# Patient Record
Sex: FEMALE | Race: BLACK OR AFRICAN AMERICAN | Employment: OTHER | URBAN - METROPOLITAN AREA
[De-identification: names, ages, dates, MRNs, and addresses within clinical notes are randomized per-mention and may not be internally consistent; named-entity substitution may affect disease eponyms.]

---

## 2017-01-12 ENCOUNTER — APPOINTMENT (OUTPATIENT)
Dept: GENERAL RADIOLOGY | Age: 57
End: 2017-01-12
Attending: NURSE PRACTITIONER
Payer: MEDICAID

## 2017-01-12 ENCOUNTER — HOSPITAL ENCOUNTER (EMERGENCY)
Age: 57
Discharge: HOME | End: 2017-01-12
Attending: EMERGENCY MEDICINE
Payer: MEDICAID

## 2017-01-12 VITALS
DIASTOLIC BLOOD PRESSURE: 97 MMHG | HEART RATE: 75 BPM | BODY MASS INDEX: 27.41 KG/M2 | RESPIRATION RATE: 17 BRPM | WEIGHT: 175 LBS | TEMPERATURE: 98.1 F | OXYGEN SATURATION: 100 % | SYSTOLIC BLOOD PRESSURE: 154 MMHG

## 2017-01-12 DIAGNOSIS — L03.012 FELON OF FINGER OF LEFT HAND: Primary | ICD-10-CM

## 2017-01-12 PROCEDURE — 74011250637 HC RX REV CODE- 250/637: Performed by: NURSE PRACTITIONER

## 2017-01-12 PROCEDURE — 73140 X-RAY EXAM OF FINGER(S): CPT

## 2017-01-12 PROCEDURE — 75810000289 HC I&D ABSCESS SIMP/COMP/MULT

## 2017-01-12 PROCEDURE — 99283 EMERGENCY DEPT VISIT LOW MDM: CPT

## 2017-01-12 RX ORDER — CLINDAMYCIN HYDROCHLORIDE 150 MG/1
300 CAPSULE ORAL
Status: COMPLETED | OUTPATIENT
Start: 2017-01-12 | End: 2017-01-12

## 2017-01-12 RX ORDER — OXYCODONE AND ACETAMINOPHEN 5; 325 MG/1; MG/1
1 TABLET ORAL
Qty: 12 TAB | Refills: 0 | Status: SHIPPED | OUTPATIENT
Start: 2017-01-12 | End: 2017-07-11

## 2017-01-12 RX ORDER — CLINDAMYCIN HYDROCHLORIDE 300 MG/1
300 CAPSULE ORAL 4 TIMES DAILY
Qty: 28 CAP | Refills: 0 | Status: SHIPPED | OUTPATIENT
Start: 2017-01-12 | End: 2017-01-19

## 2017-01-12 RX ORDER — OXYCODONE AND ACETAMINOPHEN 5; 325 MG/1; MG/1
1 TABLET ORAL
Status: COMPLETED | OUTPATIENT
Start: 2017-01-12 | End: 2017-01-12

## 2017-01-12 RX ADMIN — OXYCODONE HYDROCHLORIDE AND ACETAMINOPHEN 1 TABLET: 5; 325 TABLET ORAL at 17:08

## 2017-01-12 RX ADMIN — CLINDAMYCIN HYDROCHLORIDE 300 MG: 150 CAPSULE ORAL at 19:17

## 2017-01-12 NOTE — ED PROVIDER NOTES
HPI Comments:   4:45 PM   55 y.o. female presents to ED C/O finger pain. Patient has a HX of RA, BV, anemia, GERD, hysterectomy, salpingo-oophorectomy. Patient reports redness and swelling to left thumb x 3 days. Patient concerned she may have a piece of glass in thumb because she was cleaning up a broken glass cup the day before swelling started, however patient has had paronychia previously. Patient reports throbbing pain. Patient denies fever. Patient reports last tetanus shot was 1 year ago. Patient denies loss of sensation or movement of finger. Patient smokes 1/4ppd. Pt denies any other sxs or complaints. Written by Jonathon MORENO      The history is provided by the patient. History limited by: No language barrier. Past Medical History:   Diagnosis Date    Abnormal mammogram 2014     left with biopsy    Bacterial vaginosis 1/9/15     Dr Marcelle Roy GERD (gastroesophageal reflux disease)     Hot flashes 1/9/15     Dr Marcelle Roy Hypertension      intermittent noncomplaince with med per insurance    Iron deficiency anemia 6/2014    Renal insufficiency     Rheumatoid arthritis(714.0)        Past Surgical History:   Procedure Laterality Date    Hx laparoscopic supracervical hysterectomy  11/2014     Dr. Hao Sim, GYN    Hx salpingo-oophorectomy  11/2014         Family History:   Problem Relation Age of Onset    Arthritis-osteo Maternal Grandmother     Cancer Sister     Arthritis-osteo Mother     Hypertension Mother     Diabetes Mother        Social History     Social History    Marital status: SINGLE     Spouse name: N/A    Number of children: N/A    Years of education: N/A     Occupational History    Not on file.      Social History Main Topics    Smoking status: Current Every Day Smoker     Packs/day: 0.50     Years: 20.00     Types: Cigarettes    Smokeless tobacco: Never Used    Alcohol use No    Drug use: Yes     Special: Cocaine    Sexual activity: Yes Partners: Male     Other Topics Concern     Service No    Blood Transfusions Yes    Caffeine Concern No    Occupational Exposure No    Hobby Hazards No    Sleep Concern No    Stress Concern No    Weight Concern No    Special Diet No    Back Care No    Exercise No    Bike Helmet No    Seat Belt Yes    Self-Exams No     Social History Narrative         ALLERGIES: Review of patient's allergies indicates no known allergies. Review of Systems   Constitutional: Negative for chills, fatigue and fever. HENT: Negative for congestion, ear pain, facial swelling, rhinorrhea, sinus pressure, sore throat, trouble swallowing and voice change. Eyes: Negative for photophobia, pain, discharge and visual disturbance. Respiratory: Negative for cough, chest tightness, shortness of breath and wheezing. Cardiovascular: Negative for chest pain and leg swelling. Gastrointestinal: Negative for abdominal pain, blood in stool, constipation, diarrhea, nausea and vomiting. Endocrine: Negative for polyuria. Genitourinary: Negative for dysuria, flank pain, frequency, hematuria, pelvic pain, urgency and vaginal discharge. Musculoskeletal: Negative for arthralgias, back pain, gait problem, joint swelling, neck pain and neck stiffness. Skin: Positive for wound. Negative for rash. Allergic/Immunologic: Negative for immunocompromised state. Neurological: Negative for dizziness, weakness, light-headedness, numbness and headaches. Hematological: Negative for adenopathy. Psychiatric/Behavioral: Negative for agitation, confusion, hallucinations and suicidal ideas. The patient is not nervous/anxious. Vitals:    01/12/17 1544   BP: (!) 145/113   Pulse: 80   Resp: 17   Temp: 98.1 °F (36.7 °C)   SpO2: 100%   Weight: 79.4 kg (175 lb)            Physical Exam   Constitutional: She is oriented to person, place, and time. She appears well-developed and well-nourished. No distress.    HENT:   Head: Normocephalic and atraumatic. Right Ear: External ear normal.   Left Ear: External ear normal.   Nose: Nose normal.   Eyes: Conjunctivae and EOM are normal.   Neck: Normal range of motion. Neck supple. Cardiovascular: Normal rate, regular rhythm and normal heart sounds. Pulmonary/Chest: Effort normal and breath sounds normal. No respiratory distress. She has no wheezes. She has no rales. Musculoskeletal:        Hands:  Neurological: She is alert and oriented to person, place, and time. She exhibits normal muscle tone. Coordination normal.   Skin: She is not diaphoretic. Psychiatric: She has a normal mood and affect. Her behavior is normal. Judgment and thought content normal.   Nursing note and vitals reviewed. MDM  Number of Diagnoses or Management Options  Elevated blood pressure:   Felon of finger of left hand:   Diagnosis management comments: Clinical Impressio - Felon, elevated blood pressure     Plan:  I&D planned, short course of pain medication, no need for tetanus. Xray ordered to evaluate for FB.   5:59 PM No FB noted on xray of left thumb  6:02 PM Consent signed for I&D  7:03 PM Patient had a moderate amount of purulent drainage, improved pain. Dressed finger. Patient given first dose of clindamycin in department. Will discharge home with clindamycin and pain medication, wound check in 2 days. Patient referred to PCP for further evaluation of elevated blood pressure. Patient educated to return to the ED for any new or worsening symptoms. 7:13 PM patient's blood pressure improved in department.         Amount and/or Complexity of Data Reviewed  Tests in the radiology section of CPT®: ordered and reviewed      ED Course       I&D Abcess Simple  Date/Time: 1/12/2017 7:01 PM  Performed by: Alfred Vera  Authorized by: Alfred Vera     Consent:     Consent obtained:  Verbal and written (completed by Dr Maggi Castellano and [facility] NP-C)    Consent given by:  Patient    Risks discussed:  Bleeding and pain    Alternatives discussed:  No treatment  Location:     Indications for incision and drainage: fellon left thumb. Pre-procedure details:     Skin preparation:  Betadine  Anesthesia (see MAR for exact dosages): Anesthesia method:  Nerve block (digital block left thumb)    Block needle gauge:  25 G    Block anesthetic:  Lidocaine 1% w/o epi    Block outcome:  Anesthesia achieved  Procedure type:     Complexity:  Simple  Procedure details:     Incision types:  Stab incision (2 stab incision, black surface and ulnar aspect of black surface)    Scalpel blade:  11    Drainage:  Purulent and bloody    Drainage amount: Moderate    Wound treatment:  Wound left open  Post-procedure details:     Patient tolerance of procedure: Tolerated well, no immediate complications                      RESULTS:    No orders to display       Labs Reviewed - No data to display    No results found for this or any previous visit (from the past 12 hour(s)). PROGRESS NOTE:   4:45 PM   Initial assessment completed. Written by John MORENO     One or more blood pressure readings were noted elevated during the Pt's presentation in the emergency department this date. This abnormal reading has been cited in the Pt's diagnosis, and they have been encouraged to follow up with their primary care physician, or referred to a consultant for further evaluation and treatment. Herson Graves NP 7:07 PM     DISCHARGE NOTE:  7:07 PM   Fannie Huynh  results have been reviewed with her. She has been counseled regarding her diagnosis, treatment, and plan. She verbally conveys understanding and agreement of the signs, symptoms, diagnosis, treatment and prognosis and additionally agrees to follow up as discussed. She also agrees with the care-plan and conveys that all of her questions have been answered.   I have also provided discharge instructions for her that include: educational information regarding their diagnosis and treatment, and list of reasons why they would want to return to the ED prior to their follow-up appointment, should her condition change. CLINICAL IMPRESSION:    No diagnosis found. AFTER VISIT PLAN:    Current Discharge Medication List      CONTINUE these medications which have NOT CHANGED    Details   trimethoprim-polymyxin b (POLYTRIM) ophthalmic solution Administer 1 Drop to right eye every four (4) hours. 2 gtts qid OU x 5-7 days  Qty: 10 mL, Refills: 0      ferrous sulfate 325 mg (65 mg iron) tablet Take  by mouth Daily (before breakfast). Associated Diagnoses: Iron deficiency anemia, unspecified iron deficiency anemia type      quinapril-hydrochlorothiazide (ACCURETIC) 20-25 mg per tablet Take 1 Tab by mouth daily. Qty: 30 Tab, Refills: 6    Associated Diagnoses: Essential hypertension with goal blood pressure less than 140/90      ranitidine (ZANTAC) 150 mg tablet Take 1 Tab by mouth two (2) times a day. Qty: 60 Tab, Refills: 6    Associated Diagnoses: Gastroesophageal reflux disease, esophagitis presence not specified      celecoxib (CELEBREX) 400 mg capsule Take 1 Cap by mouth two (2) times a day.   Qty: 90 Cap, Refills: 0    Associated Diagnoses: Arthritis              Follow-up Information     None           Written by Tamiko MORENO

## 2017-01-13 ENCOUNTER — DOCUMENTATION ONLY (OUTPATIENT)
Dept: FAMILY MEDICINE CLINIC | Age: 57
End: 2017-01-13

## 2017-07-11 ENCOUNTER — HOSPITAL ENCOUNTER (EMERGENCY)
Age: 57
Discharge: HOME | End: 2017-07-11
Attending: EMERGENCY MEDICINE
Payer: MEDICAID

## 2017-07-11 VITALS
HEART RATE: 97 BPM | RESPIRATION RATE: 18 BRPM | TEMPERATURE: 98 F | DIASTOLIC BLOOD PRESSURE: 94 MMHG | OXYGEN SATURATION: 100 % | SYSTOLIC BLOOD PRESSURE: 137 MMHG

## 2017-07-11 DIAGNOSIS — S16.1XXA CERVICAL STRAIN, INITIAL ENCOUNTER: Primary | ICD-10-CM

## 2017-07-11 DIAGNOSIS — S39.012A LUMBAR STRAIN, INITIAL ENCOUNTER: ICD-10-CM

## 2017-07-11 DIAGNOSIS — R03.0 ELEVATED BLOOD PRESSURE READING: ICD-10-CM
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
## 2017-07-11 PROCEDURE — 99282 EMERGENCY DEPT VISIT SF MDM: CPT

## 2017-07-11 RX ORDER — CYCLOBENZAPRINE HCL 10 MG
10 TABLET ORAL
Qty: 10 TAB | Refills: 0 | Status: ON HOLD | OUTPATIENT
Start: 2017-07-11 | End: 2021-06-13 | Stop reason: CLARIF
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
## 2017-08-21 DIAGNOSIS — I10 ESSENTIAL HYPERTENSION WITH GOAL BLOOD PRESSURE LESS THAN 140/90: ICD-10-CM

## 2017-08-21 NOTE — LETTER
8/25/2017 6:33 PM 
 
Ms. Zulma Abel [facility] [place] [address] We have been trying to get in touch with you regarding an appointment. This appointment is recommended to follow up on chronic conditions and medication refills. Please call our office to make an appointment at [phone]. If you have been seeing a different primary care provider, have relocated to another area, or just do not want to be seen by our office any longer, please just give us a call and we will take you off the list to be contacted.  
 
 
 
 
Sincerely, 
 
 
Himanshu Mendez NP

## 2017-08-23 RX ORDER — QUINAPRIL HCL AND HYDROCHLOROTHIAZIDE 20; 25 MG/1; MG/1
TABLET ORAL
Qty: 30 TAB | Refills: 6 | OUTPATIENT
Start: 2017-08-23

## 2017-08-23 NOTE — TELEPHONE ENCOUNTER
8/23/2017  8:17 AM    Chief Complaint   Patient presents with    Medication Refill       Noted refill request for below medication. Last office visit 6/2016. Needs appointment- forwarded to clinical staff to get patient scheduled.          Requested Prescriptions     Pending Prescriptions Disp Refills    quinapril-hydroCHLOROthiazide (ACCURETIC) 20-25 mg per tablet [Pharmacy Med Name: QUINAPRIL/HCTZ 20-25MG TAB] 30 Tab 6     Sig: TAKE ONE TABLET BY MOUTH ONCE DAILY

## 2017-08-24 NOTE — TELEPHONE ENCOUNTER
Call made to pt, line busy. This is the second attempt to contact the pt. . Forwarded to the provider for review.

## 2017-08-25 NOTE — TELEPHONE ENCOUNTER
8/25/2017  6:33 PM    Chief Complaint   Patient presents with    Medication Refill       Noted that patient is unable to be reached via phone. Letter sent regarding need for appointment.

## 2018-01-15 ENCOUNTER — HOSPITAL ENCOUNTER (OUTPATIENT)
Dept: LAB | Age: 58
Discharge: HOME | End: 2018-01-15

## 2018-01-15 PROCEDURE — 99001 SPECIMEN HANDLING PT-LAB: CPT | Performed by: SPECIALIST

## 2018-06-22 ENCOUNTER — HOSPITAL ENCOUNTER (EMERGENCY)
Age: 58
Discharge: HOME | End: 2018-06-22
Attending: EMERGENCY MEDICINE
Payer: MEDICAID

## 2018-06-22 VITALS
DIASTOLIC BLOOD PRESSURE: 90 MMHG | RESPIRATION RATE: 20 BRPM | HEART RATE: 88 BPM | OXYGEN SATURATION: 98 % | SYSTOLIC BLOOD PRESSURE: 141 MMHG | TEMPERATURE: 99.1 F

## 2018-06-22 DIAGNOSIS — H15.101 EPISCLERITIS OF RIGHT EYE: Primary | ICD-10-CM

## 2018-06-22 DIAGNOSIS — G44.219 EPISODIC TENSION-TYPE HEADACHE, NOT INTRACTABLE: ICD-10-CM

## 2018-06-22 PROCEDURE — 74011250637 HC RX REV CODE- 250/637: Performed by: EMERGENCY MEDICINE

## 2018-06-22 PROCEDURE — 99283 EMERGENCY DEPT VISIT LOW MDM: CPT

## 2018-06-22 RX ORDER — METOCLOPRAMIDE 10 MG/1
10 TABLET ORAL ONCE
Status: COMPLETED | OUTPATIENT
Start: 2018-06-22 | End: 2018-06-22

## 2018-06-22 RX ORDER — ACETAMINOPHEN 325 MG/1
650 TABLET ORAL
Status: COMPLETED | OUTPATIENT
Start: 2018-06-22 | End: 2018-06-22

## 2018-06-22 RX ORDER — OLOPATADINE HYDROCHLORIDE 1 MG/ML
2 SOLUTION/ DROPS OPHTHALMIC 2 TIMES DAILY
Qty: 5 ML | Refills: 0 | Status: ON HOLD | OUTPATIENT
Start: 2018-06-22 | End: 2021-06-13 | Stop reason: CLARIF

## 2018-06-22 RX ADMIN — ACETAMINOPHEN 650 MG: 325 TABLET ORAL at 15:19

## 2018-06-22 RX ADMIN — METOCLOPRAMIDE HYDROCHLORIDE 10 MG: 10 TABLET ORAL at 03:00

## 2018-06-22 NOTE — ED TRIAGE NOTES
Pt reports headache, blurred vision and eye redness X 3-4 days. Pt states redness and blurry vision worse at night.  Pt ambulatory to triage with steady gait no assist

## 2018-06-22 NOTE — ED PROVIDER NOTES
EMERGENCY DEPARTMENT HISTORY AND PHYSICAL EXAM    2:36 PM      Date: 6/22/2018  Patient Name: Huang Tamayo    History of Presenting Illness     Chief Complaint   Patient presents with    Red Eye    Headache    Blurred Vision         History Provided By: Patient    Chief Complaint: Eye redness  Duration:  4 days  Timing:  Worsening  Location: worse right eye  Quality: red  Severity: Moderate  Modifying Factors: PT took BC powder and had relief of her HA. She says she has had this HA and feet swelling before. Associated Symptoms: Blurry vision, HA, and feet swelling      Additional History (Context): Huang Tamayo is a 65 y.o. female with Rheumatoid arthritis and HTN who presents with right eye redness which has been worsening over the past 4 days. PT took [facility] powder and had relief of her HA. She says she has had this HA and feet swelling before from her RA. She says here feet swelling flares up every once or twice a month. She does not wear glasses or contacts. Associated sx are  Blurry vision, HA, and feet swelling. She says she has had this HA and feet swelling before. Denies abd pain. Pt smokes but does not drink. She thinks it is worse outside. PCP: Christiano Thompson MD    Current Outpatient Prescriptions   Medication Sig Dispense Refill    olopatadine (PATANOL) 0.1 % ophthalmic solution Administer 2 Drops to both eyes two (2) times a day. 5 mL 0    cyclobenzaprine (FLEXERIL) 10 mg tablet Take 1 Tab by mouth three (3) times daily as needed for Muscle Spasm(s). 10 Tab 0    trimethoprim-polymyxin b (POLYTRIM) ophthalmic solution Administer 1 Drop to right eye every four (4) hours. 2 gtts qid OU x 5-7 days 10 mL 0    ferrous sulfate 325 mg (65 mg iron) tablet Take  by mouth Daily (before breakfast).  quinapril-hydrochlorothiazide (ACCURETIC) 20-25 mg per tablet Take 1 Tab by mouth daily. 30 Tab 6    ranitidine (ZANTAC) 150 mg tablet Take 1 Tab by mouth two (2) times a day.  60 Tab 6    celecoxib (CELEBREX) 400 mg capsule Take 1 Cap by mouth two (2) times a day. 80 Cap 0       Past History     Past Medical History:  Past Medical History:   Diagnosis Date    Abnormal mammogram 2014    left with biopsy    Bacterial vaginosis 1/9/15    Dr Jeremy Romeo GERD (gastroesophageal reflux disease)     Hot flashes 1/9/15    Dr Jeremy Romeo Hypertension     intermittent noncomplaince with med per insurance    Iron deficiency anemia 6/2014    Renal insufficiency     Rheumatoid arthritis(714.0)        Past Surgical History:  Past Surgical History:   Procedure Laterality Date    HX LAPAROSCOPIC SUPRACERVICAL HYSTERECTOMY  11/2014    Dr. Krystina Feldman, GYN    HX SALPINGO-OOPHORECTOMY  11/2014       Family History:  Family History   Problem Relation Age of Onset    Arthritis-osteo Maternal Grandmother     Cancer Sister     Arthritis-osteo Mother     Hypertension Mother     Diabetes Mother        Social History:  Social History   Substance Use Topics    Smoking status: Current Every Day Smoker     Packs/day: 0.50     Years: 20.00     Types: Cigarettes    Smokeless tobacco: Never Used    Alcohol use No       Allergies:  No Known Allergies      Review of Systems       Review of Systems   Constitutional: Negative for chills, diaphoresis and fever. HENT: Negative. Eyes: Positive for redness and visual disturbance. Respiratory: Negative for cough and shortness of breath. Cardiovascular: Negative for chest pain. Gastrointestinal: Negative for abdominal pain, constipation, diarrhea, nausea and vomiting. Genitourinary: Negative for dysuria, frequency and hematuria. Musculoskeletal: Negative for back pain. Positive for feet swelling   Skin: Negative for rash. Neurological: Positive for headaches. Negative for dizziness, light-headedness and numbness. All other systems reviewed and are negative.         Physical Exam     Visit Vitals    /90 (BP 1 Location: Left arm, BP Patient Position: Sitting)  Pulse 88    Temp 99.1 °F (37.3 °C)    Resp 20    LMP 10/15/2014    SpO2 98%         Physical Exam   Constitutional: No distress. HENT:   Head: Normocephalic and atraumatic. Mouth/Throat: Oropharynx is clear and moist.   Eyes: EOM are normal. Pupils are equal, round, and reactive to light. Right eye cornea and anterior chamber clear  Sectorial conjunctivitis/episcleritis form the 1 O'clock to the 4 o'clock position temproally   Neck: Normal range of motion. Neck supple. Cardiovascular: Normal rate, regular rhythm and normal heart sounds. No murmur heard. Pulmonary/Chest: Effort normal and breath sounds normal. She has no wheezes. She has no rales. Abdominal: Soft. Bowel sounds are normal. She exhibits no distension. There is no tenderness. Musculoskeletal: Normal range of motion. She exhibits no edema or deformity. Lymphadenopathy:     She has no cervical adenopathy. Neurological: She is alert. She exhibits normal muscle tone. Coordination normal.   Skin: Skin is warm and dry. No rash noted. She is not diaphoretic. No erythema. Psychiatric: She has a normal mood and affect. Her behavior is normal.         Diagnostic Study Results     Labs -  No results found for this or any previous visit (from the past 12 hour(s)). Radiologic Studies -   No orders to display         Medical Decision Making   I am the first provider for this patient. I reviewed the vital signs, available nursing notes, past medical history, past surgical history, family history and social history. Vital Signs-Reviewed the patient's vital signs.     Records Reviewed: Nursing Notes (Time of Review: 2:36 PM)    ED Course: Progress Notes, Reevaluation, and Consults:      Provider Notes (Medical Decision Making): right sectorial episcleritis, likely related to the pt's known Rheumatoid Arthritis; visual acuity ok; no indication for emergency opthalmology referral. Recurrent HA similar to events low suspicion for acute life threatening etiology. Symptomatic care, close outpatient PCM f/u. Diagnosis     Clinical Impression:   1. Episcleritis of right eye    2. Episodic tension-type headache, not intractable        Disposition: discharge home    Follow-up Information     Follow up With Details Comments [address], [place] In 2 days  [address] [place] [id number] [phone]      [facility] EMERGENCY DEPT  As needed, If symptoms worsen [address] [facility] [address] [phone]           Current Discharge Medication List      START taking these medications    Details   olopatadine (PATANOL) 0.1 % ophthalmic solution Administer 2 Drops to both eyes two (2) times a day. Qty: 5 mL, Refills: 0         CONTINUE these medications which have NOT CHANGED    Details   cyclobenzaprine (FLEXERIL) 10 mg tablet Take 1 Tab by mouth three (3) times daily as needed for Muscle Spasm(s). Qty: 10 Tab, Refills: 0      ferrous sulfate 325 mg (65 mg iron) tablet Take  by mouth Daily (before breakfast). Associated Diagnoses: Iron deficiency anemia, unspecified iron deficiency anemia type      quinapril-hydrochlorothiazide (ACCURETIC) 20-25 mg per tablet Take 1 Tab by mouth daily. Qty: 30 Tab, Refills: 6    Associated Diagnoses: Essential hypertension with goal blood pressure less than 140/90      ranitidine (ZANTAC) 150 mg tablet Take 1 Tab by mouth two (2) times a day. Qty: 60 Tab, Refills: 6    Associated Diagnoses: Gastroesophageal reflux disease, esophagitis presence not specified      celecoxib (CELEBREX) 400 mg capsule Take 1 Cap by mouth two (2) times a day.   Qty: 90 Cap, Refills: 0    Associated Diagnoses: Arthritis         STOP taking these medications       trimethoprim-polymyxin b (POLYTRIM) ophthalmic solution Comments:   Reason for Stopping:             _______________________________    Attestations:  Scribe Attestation     [employer] acting as a scribe for and in the presence of Bhavik Sharma MD      June 22, 2018 at 2:36 PM       Provider Attestation:      I personally performed the services described in the documentation, reviewed the documentation, as recorded by the scribe in my presence, and it accurately and completely records my words and actions.  June 22, 2018 at 2:36 PM - Bhavik Sharma MD    _______________________________

## 2018-06-22 NOTE — DISCHARGE INSTRUCTIONS
Episcleritis: Care Instructions  Your Care Instructions  The sclera (say \"SKLAIR-uh\") is the white of the eye. It protects the eye's inner parts. There are several layers of the sclera. The episclera (say \"ap-xqx-HZBYKD-uh\") is the top layer. Episcleritis (say \"fk-cdg-oqwuw-RY-tus\") means that the episclera is inflamed. The inflammation makes all or part of the white of the eye look red. For some people, the eye feels tender, hot, or irritated. Episcleritis is not a serious eye problem. It doesn't damage your eye or harm your vision. Usually it starts suddenly, and the cause isn't known. Some people already have a health problem that could be related, such as rheumatoid arthritis or an infection. To see if you have episcleritis, your doctor checks your vision. He or she looks closely at your eyes and talks to you about your symptoms. You may have tests and X-rays to look for medical problems that might be linked to your eye problem. Episcleritis usually goes away on its own in a few days, though it can take a few weeks. Your doctor may prescribe eyedrops to help relieve the inflammation. Follow-up care is a key part of your treatment and safety. Be sure to make and go to all appointments, and call your doctor if you are having problems. It's also a good idea to know your test results and keep a list of the medicines you take. How can you care for yourself at home? · Be safe with medicines. Use all your medicines exactly as prescribed. · If you are using eyedrops or ointment, be sure the dropper or bottle tip is clean. To put in eyedrops or ointment:  ¨ Tilt your head back, and pull your lower eyelid down with one finger. ¨ Drop or squirt the medicine inside the lower lid. ¨ Close your eye for 30 to 60 seconds to let the drops or ointment move around. ¨ Do not touch the ointment or dropper tip to your eyelashes or any other surface. When should you call for help?   Call your doctor now or seek immediate medical care if:  ? · You notice a loss of your vision. ? · You have vision changes. ? · You have new or worse eye pain. ? · You have new or worse redness in your eye. ? Watch closely for changes in your health, and be sure to contact your doctor if:  ? · You do not get better as expected. Where can you learn more? Go to http://migdalia-gisselle.info/. Enter [phone] in the search box to learn more about \"Episcleritis: Care Instructions. \"  Current as of: March 3, 2017  Content Version: 11.4  © 0455-2229 [employer]. Care instructions adapted under license by [employer] (which disclaims liability or warranty for this information). If you have questions about a medical condition or this instruction, always ask your healthcare professional. [address] any warranty or liability for your use of this information. Tension Headache: Care Instructions  Your Care Instructions  Most headaches are tension headaches. These headaches tend to happen again, especially if you are under stress. A tension headache may cause pain or a feeling of pressure all over your head. You probably can't pinpoint the center of the pain. If you keep getting tension headaches, the best thing you can do to limit them is to find out what is causing them and then make changes in those areas. Follow-up care is a key part of your treatment and safety. Be sure to make and go to all appointments, and call your doctor if you are having problems. It's also a good idea to know your test results and keep a list of the medicines you take. How can you care for yourself at home? · Rest in a quiet, dark room with a cool cloth on your forehead until your headache is gone. Close your eyes, and try to relax or go to sleep. Don't watch TV or read. Avoid using the computer. · Use a warm, moist towel or a heating pad set on low to relax tight shoulder and neck muscles.   · Have someone gently massage your neck and shoulders. · Take pain medicines exactly as directed. ¨ If the doctor gave you a prescription medicine for pain, take it as prescribed. ¨ If you are not taking a prescription pain medicine, ask your doctor if you can take an over-the-counter medicine. · Be careful not to take pain medicine more often than the instructions allow, because you may get worse or more frequent headaches when the medicine wears off. · If you get another tension headache, stop what you are doing and sit quietly for a moment. Close your eyes and breathe slowly. Try to relax your head and neck muscles. · Do not ignore new symptoms that occur with a headache, such as fever, weakness or numbness, vision changes, or confusion. These may be signs of a more serious problem. To help prevent headaches  · Keep a headache diary so you can figure out what triggers your headaches. Avoiding triggers may help you prevent headaches. Record when each headache began, how long it lasted, and what the pain was like (throbbing, aching, stabbing, or dull). List anything that may have triggered the headache, such as being physically or emotionally stressed or being anxious or depressed. Other possible triggers are hunger, anger, fatigue, poor posture, and muscle strain. · Find healthy ways to deal with stress. Headaches are most common during or right after stressful times. Take time to relax before and after you do something that has caused a headache in the past.  · Exercise daily to relieve stress. Relaxation exercises may help reduce tension. · Get plenty of sleep. · Eat regularly and well. Long periods without food can trigger a headache. · Treat yourself to a massage. Some people find that massages are very helpful in relieving tension. · Try to keep your muscles relaxed by keeping good posture. Check your jaw, face, neck, and shoulder muscles for tension, and try to relax them.  When sitting at a desk, change positions often, and stretch for 30 seconds each hour. · Reduce eyestrain from computers by blinking frequently and looking away from the computer screen every so often. Make sure you have proper eyewear and that your monitor is set up properly, about an arm's length away. When should you call for help? Call 911 anytime you think you may need emergency care. For example, call if:  ? · You have signs of a stroke. These may include:  ¨ Sudden numbness, paralysis, or weakness in your face, arm, or leg, especially on only one side of your body. ¨ Sudden vision changes. ¨ Sudden trouble speaking. ¨ Sudden confusion or trouble understanding simple statements. ¨ Sudden problems with walking or balance. ¨ A sudden, severe headache that is different from past headaches. ?Call your doctor now or seek immediate medical care if:  ? · You have new or worse nausea and vomiting. ? · You have a new or higher fever. ? · Your headache gets much worse. ? Watch closely for changes in your health, and be sure to contact your doctor if:  ? · You are not getting better after 2 days (48 hours). Where can you learn more? Go to http://migdalia-gisselle.info/. Enter [phone] in the search box to learn more about \"Tension Headache: Care Instructions. \"  Current as of: October 14, 2016  Content Version: 11.4  © 1813-9209 [employer]. Care instructions adapted under license by [employer] (which disclaims liability or warranty for this information). If you have questions about a medical condition or this instruction, always ask your healthcare professional. [place] [address] any warranty or liability for your use of this information.

## 2018-06-25 ENCOUNTER — HOSPITAL ENCOUNTER (OUTPATIENT)
Dept: LAB | Age: 58
Discharge: HOME | End: 2018-06-25
Payer: MEDICAID

## 2018-06-25 LAB
ERYTHROCYTE [SEDIMENTATION RATE] IN BLOOD: 49 MM/HR (ref 0–30)
RHEUMATOID FACT SER QL LA: NEGATIVE

## 2018-06-25 PROCEDURE — 36415 COLL VENOUS BLD VENIPUNCTURE: CPT | Performed by: SPECIALIST

## 2018-06-25 PROCEDURE — 86200 CCP ANTIBODY: CPT | Performed by: SPECIALIST

## 2018-06-25 PROCEDURE — 85652 RBC SED RATE AUTOMATED: CPT | Performed by: SPECIALIST

## 2018-06-25 PROCEDURE — 86140 C-REACTIVE PROTEIN: CPT | Performed by: SPECIALIST

## 2018-06-25 PROCEDURE — 86430 RHEUMATOID FACTOR TEST QUAL: CPT | Performed by: SPECIALIST

## 2018-06-26 LAB
CCP IGA+IGG SERPL IA-ACNC: >250 UNITS (ref 0–19)
CRP SERPL-MCNC: 3 MG/DL (ref 0–0.3)

## 2019-05-17 ENCOUNTER — HOSPITAL ENCOUNTER (EMERGENCY)
Age: 59
Discharge: HOME | End: 2019-05-17
Attending: EMERGENCY MEDICINE
Payer: MEDICAID

## 2019-05-17 VITALS
HEART RATE: 84 BPM | DIASTOLIC BLOOD PRESSURE: 111 MMHG | RESPIRATION RATE: 18 BRPM | BODY MASS INDEX: 27.28 KG/M2 | WEIGHT: 180 LBS | HEIGHT: 68 IN | OXYGEN SATURATION: 100 % | SYSTOLIC BLOOD PRESSURE: 168 MMHG | TEMPERATURE: 98.1 F

## 2019-05-17 DIAGNOSIS — R51.9 NONINTRACTABLE EPISODIC HEADACHE, UNSPECIFIED HEADACHE TYPE: Primary | ICD-10-CM

## 2019-05-17 DIAGNOSIS — I10 ESSENTIAL HYPERTENSION: ICD-10-CM

## 2019-05-17 PROCEDURE — 74011250637 HC RX REV CODE- 250/637: Performed by: EMERGENCY MEDICINE

## 2019-05-17 PROCEDURE — 99284 EMERGENCY DEPT VISIT MOD MDM: CPT

## 2019-05-17 RX ORDER — ACETAMINOPHEN 500 MG
1000 TABLET ORAL
Status: COMPLETED | OUTPATIENT
Start: 2019-05-17 | End: 2019-05-17

## 2019-05-17 RX ORDER — LISINOPRIL 20 MG/1
20 TABLET ORAL
Status: COMPLETED | OUTPATIENT
Start: 2019-05-17 | End: 2019-05-17

## 2019-05-17 RX ORDER — HYDROCHLOROTHIAZIDE 25 MG/1
25 TABLET ORAL
Status: COMPLETED | OUTPATIENT
Start: 2019-05-17 | End: 2019-05-17

## 2019-05-17 RX ADMIN — HYDROCHLOROTHIAZIDE 25 MG: 25 TABLET ORAL at 15:25

## 2019-05-17 RX ADMIN — ACETAMINOPHEN 1000 MG: 500 TABLET, FILM COATED ORAL at 14:49

## 2019-05-17 RX ADMIN — LISINOPRIL 20 MG: 20 TABLET ORAL at 15:24

## 2019-05-17 NOTE — DISCHARGE INSTRUCTIONS
Patient Education        DASH Diet: Care Instructions  Your Care Instructions    The DASH diet is an eating plan that can help lower your blood pressure. DASH stands for Dietary Approaches to Stop Hypertension. Hypertension is high blood pressure. The DASH diet focuses on eating foods that are high in calcium, potassium, and magnesium. These nutrients can lower blood pressure. The foods that are highest in these nutrients are fruits, vegetables, low-fat dairy products, nuts, seeds, and legumes. But taking calcium, potassium, and magnesium supplements instead of eating foods that are high in those nutrients does not have the same effect. The DASH diet also includes whole grains, fish, and poultry. The DASH diet is one of several lifestyle changes your doctor may recommend to lower your high blood pressure. Your doctor may also want you to decrease the amount of sodium in your diet. Lowering sodium while following the DASH diet can lower blood pressure even further than just the DASH diet alone. Follow-up care is a key part of your treatment and safety. Be sure to make and go to all appointments, and call your doctor if you are having problems. It's also a good idea to know your test results and keep a list of the medicines you take. How can you care for yourself at home? Following the DASH diet  · Eat 4 to 5 servings of fruit each day. A serving is 1 medium-sized piece of fruit, ½ cup chopped or canned fruit, 1/4 cup dried fruit, or 4 ounces (½ cup) of fruit juice. Choose fruit more often than fruit juice. · Eat 4 to 5 servings of vegetables each day. A serving is 1 cup of lettuce or raw leafy vegetables, ½ cup of chopped or cooked vegetables, or 4 ounces (½ cup) of vegetable juice. Choose vegetables more often than vegetable juice. · Get 2 to 3 servings of low-fat and fat-free dairy each day. A serving is 8 ounces of milk, 1 cup of yogurt, or 1 ½ ounces of cheese. · Eat 6 to 8 servings of grains each day.  A serving is 1 slice of bread, 1 ounce of dry cereal, or ½ cup of cooked rice, pasta, or cooked cereal. Try to choose whole-grain products as much as possible. · Limit lean meat, poultry, and fish to 2 servings each day. A serving is 3 ounces, about the size of a deck of cards. · Eat 4 to 5 servings of nuts, seeds, and legumes (cooked dried beans, lentils, and split peas) each week. A serving is 1/3 cup of nuts, 2 tablespoons of seeds, or ½ cup of cooked beans or peas. · Limit fats and oils to 2 to 3 servings each day. A serving is 1 teaspoon of vegetable oil or 2 tablespoons of salad dressing. · Limit sweets and added sugars to 5 servings or less a week. A serving is 1 tablespoon jelly or jam, ½ cup sorbet, or 1 cup of lemonade. · Eat less than 2,300 milligrams (mg) of sodium a day. If you limit your sodium to 1,500 mg a day, you can lower your blood pressure even more. Tips for success  · Start small. Do not try to make dramatic changes to your diet all at once. You might feel that you are missing out on your favorite foods and then be more likely to not follow the plan. Make small changes, and stick with them. Once those changes become habit, add a few more changes. · Try some of the following:  ? Make it a goal to eat a fruit or vegetable at every meal and at snacks. This will make it easy to get the recommended amount of fruits and vegetables each day. ? Try yogurt topped with fruit and nuts for a snack or healthy dessert. ? Add lettuce, tomato, cucumber, and onion to sandwiches. ? Combine a ready-made pizza crust with low-fat mozzarella cheese and lots of vegetable toppings. Try using tomatoes, squash, spinach, broccoli, carrots, cauliflower, and onions. ? Have a variety of cut-up vegetables with a low-fat dip as an appetizer instead of chips and dip. ? Sprinkle sunflower seeds or chopped almonds over salads. Or try adding chopped walnuts or almonds to cooked vegetables.   ? Try some vegetarian meals using beans and peas. Add garbanzo or kidney beans to salads. Make burritos and tacos with mashed elizalde beans or black beans. Where can you learn more? Go to http://migdalia-gisselle.info/. Enter F537 in the search box to learn more about \"DASH Diet: Care Instructions. \"  Current as of: July 22, 2018  Content Version: 11.9  © 9273-9974 [employer]. Care instructions adapted under license by [employer] (which disclaims liability or warranty for this information). If you have questions about a medical condition or this instruction, always ask your healthcare professional. [place] [address] any warranty or liability for your use of this information.

## 2019-10-28 ENCOUNTER — HOSPITAL ENCOUNTER (OUTPATIENT)
Dept: LAB | Age: 59
Discharge: HOME | End: 2019-10-28

## 2019-10-28 LAB — XX-LABCORP SPECIMEN COL,LCBCF: NORMAL

## 2019-10-28 PROCEDURE — 99001 SPECIMEN HANDLING PT-LAB: CPT

## 2020-03-16 ENCOUNTER — HOSPITAL ENCOUNTER (OUTPATIENT)
Dept: LAB | Age: 60
Discharge: HOME | End: 2020-03-16

## 2020-03-16 LAB — XX-LABCORP SPECIMEN COL,LCBCF: NORMAL

## 2020-03-16 PROCEDURE — 99001 SPECIMEN HANDLING PT-LAB: CPT

## 2020-04-25 ENCOUNTER — HOSPITAL ENCOUNTER (EMERGENCY)
Age: 60
Discharge: HOME | End: 2020-04-25
Attending: EMERGENCY MEDICINE
Payer: MEDICAID

## 2020-04-25 VITALS
HEIGHT: 67 IN | OXYGEN SATURATION: 100 % | SYSTOLIC BLOOD PRESSURE: 160 MMHG | TEMPERATURE: 98.7 F | BODY MASS INDEX: 27.94 KG/M2 | WEIGHT: 178 LBS | RESPIRATION RATE: 16 BRPM | HEART RATE: 56 BPM | DIASTOLIC BLOOD PRESSURE: 93 MMHG

## 2020-04-25 DIAGNOSIS — K08.89 PAIN, DENTAL: Primary | ICD-10-CM

## 2020-04-25 PROCEDURE — 99281 EMR DPT VST MAYX REQ PHY/QHP: CPT

## 2020-04-25 RX ORDER — PENICILLIN V POTASSIUM 500 MG/1
500 TABLET, FILM COATED ORAL 2 TIMES DAILY
Qty: 20 TAB | Refills: 0 | Status: SHIPPED | OUTPATIENT
Start: 2020-04-25 | End: 2020-05-05

## 2020-04-25 NOTE — ED TRIAGE NOTES
Pt presents with painful, swollen gums making it difficult to eat x1 week. Denies any new meds. Denies sob, difficulty breathing, tongue swelling, itchy/scratchy throat.

## 2020-04-25 NOTE — ED PROVIDER NOTES
EMERGENCY DEPARTMENT HISTORY AND PHYSICAL EXAM    Date: 4/25/2020  Patient Name: Philippe Aguirre    History of Presenting Illness     Chief Complaint   Patient presents with    Dental Pain         History Provided By: Patient        Additional History (Context): Philippe Aguirre is a 52-year-old female presenting to the emergency department with generalized dental pain. States has had this for years. Worse over the past few weeks. Has not been able to follow-up with dentist due to coronavirus. Patient denies fever, chills, drug use or any other complaints. Patient does smoke and does use illicit drugs. PCP: Nubia Fernandez MD    Current Outpatient Medications   Medication Sig Dispense Refill    penicillin v potassium (VEETID) 500 mg tablet Take 1 Tab by mouth two (2) times a day for 10 days. 20 Tab 0    olopatadine (PATANOL) 0.1 % ophthalmic solution Administer 2 Drops to both eyes two (2) times a day. 5 mL 0    cyclobenzaprine (FLEXERIL) 10 mg tablet Take 1 Tab by mouth three (3) times daily as needed for Muscle Spasm(s). 10 Tab 0    ferrous sulfate 325 mg (65 mg iron) tablet Take  by mouth Daily (before breakfast).  quinapril-hydrochlorothiazide (ACCURETIC) 20-25 mg per tablet Take 1 Tab by mouth daily. 30 Tab 6    ranitidine (ZANTAC) 150 mg tablet Take 1 Tab by mouth two (2) times a day. 60 Tab 6    celecoxib (CELEBREX) 400 mg capsule Take 1 Cap by mouth two (2) times a day.  80 Cap 0       Past History     Past Medical History:  Past Medical History:   Diagnosis Date    Abnormal mammogram 2014    left with biopsy    Bacterial vaginosis 1/9/15    Dr Dany Jimenez GERD (gastroesophageal reflux disease)     Hot flashes 1/9/15    Dr Dany Jimenez Hypertension     intermittent noncomplaince with med per insurance    Iron deficiency anemia 6/2014    Renal insufficiency     Rheumatoid arthritis(714.0)        Past Surgical History:  Past Surgical History:   Procedure Laterality Date    HX LAPAROSCOPIC SUPRACERVICAL HYSTERECTOMY  11/2014    Dr. Jovany Seymour, GYN    HX SALPINGO-OOPHORECTOMY  11/2014       Family History:  Family History   Problem Relation Age of Onset    Arthritis-osteo Maternal Grandmother     Cancer Sister     Arthritis-osteo Mother     Hypertension Mother     Diabetes Mother        Social History:  Social History     Tobacco Use    Smoking status: Current Every Day Smoker     Packs/day: 0.50     Years: 20.00     Pack years: 10.00     Types: Cigarettes    Smokeless tobacco: Never Used   Substance Use Topics    Alcohol use: No    Drug use: Yes     Types: Cocaine       Allergies:  No Known Allergies      Review of Systems   Review of Systems   Constitutional: Negative for chills and fever. HENT: Dental pain, negative for dysphagia  Eyes: Negative. Respiratory: Negative for cough and shortness of breath. Cardiovascular: Negative for chest pain and palpitations. Gastrointestinal: Negative for abdominal pain, constipation, diarrhea, nausea and vomiting. Genitourinary: Negative. Negative for difficulty urinating and flank pain. Musculoskeletal: Negative for back pain. Negative for gait problem and neck pain. Skin: Negative. Allergic/Immunologic: Negative. Neurological: Negative for dizziness, weakness, numbness and headaches. Psychiatric/Behavioral: Negative. All other systems reviewed and are negative. All Other Systems Negative  Physical Exam     Vitals:    04/25/20 1312   BP: (!) 160/93   Pulse: (!) 56   Resp: 16   Temp: 98.7 °F (37.1 °C)   SpO2: 100%   Weight: 80.7 kg (178 lb)   Height: 5' 7\" (1.702 m)     Physical Exam  Vitals signs and nursing note reviewed. Constitutional:       General: She is not in acute distress. Appearance: She is well-developed. She is not diaphoretic. HENT:      Head: Normocephalic and atraumatic. Nose: Nose normal.      Mouth/Throat:      Dentition: Abnormal dentition. Does not have dentures.  Dental caries present. No dental tenderness, gingival swelling or dental abscesses. Tongue: No lesions. Palate: No mass. Pharynx: Oropharynx is clear. Tonsils: No tonsillar exudate or tonsillar abscesses. Comments: No obvious source of pain, all gums erythematous. No abscess. No swelling  Eyes:      Conjunctiva/sclera: Conjunctivae normal.      Pupils: Pupils are equal, round, and reactive to light. Neck:      Musculoskeletal: Normal range of motion and neck supple. Cardiovascular:      Rate and Rhythm: Normal rate and regular rhythm. Pulmonary:      Effort: Pulmonary effort is normal. No respiratory distress. Breath sounds: Normal breath sounds. Abdominal:      Palpations: Abdomen is soft. Musculoskeletal: Normal range of motion. Skin:     General: Skin is warm. Findings: No rash. Neurological:      Mental Status: She is alert and oriented to person, place, and time. Cranial Nerves: No cranial nerve deficit. Coordination: Coordination normal.   Psychiatric:         Behavior: Behavior normal.           Diagnostic Study Results     Labs -   No results found for this or any previous visit (from the past 12 hour(s)). Radiologic Studies -   No orders to display     CT Results  (Last 48 hours)    None        CXR Results  (Last 48 hours)    None            Medical Decision Making   I am the first provider for this patient. I reviewed the vital signs, available nursing notes, past medical history, past surgical history, family history and social history. Vital Signs-Reviewed the patient's vital signs.         Records Reviewed: Nursing notes, old medical records and any previous labs, imaging, visits, consultations pertinent to patient care    Procedures:  Procedures    ED Course: Progress Notes, Reevaluation, and Consults:      Provider Notes (Medical Decision Making):   Pt presents ambulatory in NAD, well-hydrated, non-toxic in appearance, with slightly elevated bp normal vitals. Exam reveals diffusely poor dentition without localized induration or fluctuance to suggest drainable abscess. No sublingual/submandibular induration, trismus, or stridor. Normal speech. Handling oral secretions without difficulty. Pt with full ROM of neck. Low suspicion for Chris's angina or deep space infection. Will DC home with amoxicillin, analgesia. Pt is strongly advised to follow-up with dentist in short period of time as they will need definitive management. MED RECONCILIATION:  No current facility-administered medications for this encounter. Current Outpatient Medications   Medication Sig    penicillin v potassium (VEETID) 500 mg tablet Take 1 Tab by mouth two (2) times a day for 10 days.  olopatadine (PATANOL) 0.1 % ophthalmic solution Administer 2 Drops to both eyes two (2) times a day.  cyclobenzaprine (FLEXERIL) 10 mg tablet Take 1 Tab by mouth three (3) times daily as needed for Muscle Spasm(s).  ferrous sulfate 325 mg (65 mg iron) tablet Take  by mouth Daily (before breakfast).  quinapril-hydrochlorothiazide (ACCURETIC) 20-25 mg per tablet Take 1 Tab by mouth daily.  ranitidine (ZANTAC) 150 mg tablet Take 1 Tab by mouth two (2) times a day.  celecoxib (CELEBREX) 400 mg capsule Take 1 Cap by mouth two (2) times a day. Disposition:  home    DISCHARGE NOTE:     Pt has been reexamined. Patient has no new complaints, changes, or physical findings. Care plan outlined and precautions discussed. Results of work up, treatment plan and disposition were reviewed with the patient. All medications were reviewed with the patient; including any prescriptions given. All of pt's questions and concerns were addressed. Patient was instructed and agrees to follow up with pcp , as well as to return to the ED upon further deterioration. Patient is ready to go home.     Follow-up Information     Follow up With Specialties Details Why Contact Info    [facility] EMERGENCY DEPT Emergency Medicine   [address] [place] [id number]  [phone]    Ti Roach MD Rheumatology   [facility]    Janis Aguirre MD  [facility] [address] [phone]            Current Discharge Medication List      START taking these medications    Details   penicillin v potassium (VEETID) 500 mg tablet Take 1 Tab by mouth two (2) times a day for 10 days. Qty: 20 Tab, Refills: 0                 Diagnosis     Clinical Impression:   1.  Pain, dental

## 2020-04-25 NOTE — DISCHARGE INSTRUCTIONS
Patient Education        Head or Face Pain: Care Instructions  Your Care Instructions    Common causes of head or face pain are allergies, stress, and injuries. Other causes include tooth problems and sinus infections. Eating certain foods, such as chocolate or cheese, or drinking certain liquids, such as coffee or cola, can cause head pain for some people. If you have mild head pain, you may not need treatment. It is important to watch your symptoms and talk to your doctor if your pain continues or gets worse. Follow-up care is a key part of your treatment and safety. Be sure to make and go to all appointments, and call your doctor if you are having problems. It's also a good idea to know your test results and keep a list of the medicines you take. How can you care for yourself at home? · Take pain medicines exactly as directed. ? If the doctor gave you a prescription medicine for pain, take it as prescribed. ? If you are not taking a prescription pain medicine, ask your doctor if you can take an over-the-counter pain medicine. · Take it easy for the next few days or longer if you are not feeling well. · Use a warm, moist towel or heating pad set on low to relax tight muscles in your shoulder and neck. Have someone gently massage your neck and shoulders. · Put ice or a cold pack on the area for 10 to 20 minutes at a time. Put a thin cloth between the ice and your skin. When should you call for help? Call 911 anytime you think you may need emergency care. For example, call if:    · You have twitching, jerking, or a seizure.     · You passed out (lost consciousness).     · You have symptoms of a stroke. These may include:  ? Sudden numbness, tingling, weakness, or loss of movement in your face, arm, or leg, especially on only one side of your body. ? Sudden vision changes. ? Sudden trouble speaking. ? Sudden confusion or trouble understanding simple statements.   ? Sudden problems with walking or balance. ? A sudden, severe headache that is different from past headaches.     · You have jaw pain and pain in your chest, shoulder, neck, or arm.    Call your doctor now or seek immediate medical care if:    · You have a fever with a stiff neck or a severe headache.     · You have nausea and vomiting, or you cannot keep food or liquids down.    Watch closely for changes in your health, and be sure to contact your doctor if:    · Your head or face pain does not get better as expected. Where can you learn more? Go to http://migdalia-gisselle.info/  Enter P568 in the search box to learn more about \"Head or Face Pain: Care Instructions. \"  Current as of: June 26, 2019Content Version: 12.4  © 6096-8196 Healthwise, Incorporated. Care instructions adapted under license by [employer] (which disclaims liability or warranty for this information). If you have questions about a medical condition or this instruction, always ask your healthcare professional. [place] [address] any warranty or liability for your use of this information. Patient Education        Tooth and Gum Pain: Care Instructions  Your Care Instructions    The most common causes of dental pain are tooth decay and gum disease. Pain can also be caused by an infection of the tooth (abscess) or the gums. Or you may have pain from a broken or cracked tooth. Other causes of pain include infection and damage to a tooth from nervous grinding of your teeth. A wisdom tooth can be painful when it is coming in but cannot break through the gum. It can also be painful when the tooth is only partway in and extra gum tissue has formed around it. The tissue can get inflamed (pericoronitis), and sometimes it gets infected. Prompt dental care can help find the cause of your toothache and keep the tooth from dying or gum disease from getting worse. Self-care at home may reduce your pain and discomfort.   Follow-up care is a key part of your treatment and safety. Be sure to make and go to all appointments, and call your dentist or doctor if you are having problems. It's also a good idea to know your test results and keep a list of the medicines you take. How can you care for yourself at home? · To reduce pain and facial swelling, put an ice or cold pack on the outside of your cheek for 10 to 20 minutes at a time. Put a thin cloth between the ice and your skin. Do not use heat. · If your doctor prescribed antibiotics, take them as directed. Do not stop taking them just because you feel better. You need to take the full course of antibiotics. · Ask your doctor if you can take an over-the-counter pain medicine, such as acetaminophen (Tylenol), ibuprofen (Advil, Motrin), or naproxen (Aleve). Be safe with medicines. Read and follow all instructions on the label. · Avoid very hot, cold, or sweet foods and drinks if they increase your pain. · Rinse your mouth with warm salt water every 2 hours to help relieve pain and swelling. Mix 1 teaspoon of salt in 8 ounces of water. · Talk to your dentist about using special toothpaste for sensitive teeth. To reduce pain on contact with heat or cold or when brushing, brush with this toothpaste regularly or rub a small amount of the paste on the sensitive area with a clean finger 2 or 3 times a day. Floss gently between your teeth. · Do not smoke or use spit tobacco. Tobacco use can make gum problems worse, decreases your ability to fight infection in your gums, and delays healing. If you need help quitting, talk to your doctor about stop-smoking programs and medicines. These can increase your chances of quitting for good. When should you call for help? Call 911 anytime you think you may need emergency care.  For example, call if:    · You have trouble breathing.    Call your dentist or doctor now or seek immediate medical care if:    · You have signs of infection, such as:  ? Increased pain, swelling, warmth, or redness. ? Red streaks leading from the area. ? Pus draining from the area. ? A fever.    Watch closely for changes in your health, and be sure to contact your doctor if:    · You do not get better as expected. Where can you learn more? Go to http://migdalia-gisselle.info/  Enter H417 in the search box to learn more about \"Tooth and Gum Pain: Care Instructions. \"  Current as of: July 28, 2019Content Version: 12.4  © 7147-9835 Healthwise, Incorporated. Care instructions adapted under license by [email or web address] (which disclaims liability or warranty for this information). If you have questions about a medical condition or this instruction, always ask your healthcare professional. [address] any warranty or liability for your use of this information.

## 2020-04-30 ENCOUNTER — HOSPITAL ENCOUNTER (EMERGENCY)
Age: 60
Discharge: HOME | End: 2020-04-30
Attending: EMERGENCY MEDICINE
Payer: MEDICAID

## 2020-04-30 VITALS
DIASTOLIC BLOOD PRESSURE: 94 MMHG | WEIGHT: 178 LBS | SYSTOLIC BLOOD PRESSURE: 159 MMHG | RESPIRATION RATE: 15 BRPM | HEIGHT: 67 IN | TEMPERATURE: 98.6 F | BODY MASS INDEX: 27.94 KG/M2 | OXYGEN SATURATION: 100 % | HEART RATE: 60 BPM

## 2020-04-30 DIAGNOSIS — K12.1 STOMATITIS, ULCERATIVE: Primary | ICD-10-CM

## 2020-04-30 DIAGNOSIS — K13.79 BLEEDING FROM MOUTH: ICD-10-CM

## 2020-04-30 PROCEDURE — 99283 EMERGENCY DEPT VISIT LOW MDM: CPT

## 2020-04-30 PROCEDURE — 74011000250 HC RX REV CODE- 250: Performed by: EMERGENCY MEDICINE

## 2020-04-30 RX ORDER — LIDOCAINE HYDROCHLORIDE 20 MG/ML
15 SOLUTION OROPHARYNGEAL
Status: COMPLETED | OUTPATIENT
Start: 2020-04-30 | End: 2020-04-30

## 2020-04-30 RX ORDER — TRIAMCINOLONE ACETONIDE 1 MG/G
PASTE DENTAL 2 TIMES DAILY
Qty: 10 G | Refills: 0 | Status: ON HOLD | OUTPATIENT
Start: 2020-04-30 | End: 2021-06-13 | Stop reason: CLARIF

## 2020-04-30 RX ADMIN — LIDOCAINE HYDROCHLORIDE 15 ML: 20 SOLUTION ORAL; TOPICAL at 01:20

## 2020-04-30 NOTE — ED TRIAGE NOTES
Pt to ED via EMS with complaints of lesions inside of her mouth that started one week ago after smoking marijuana from an iron pipe that she states \"got too hot\"    Small amount of blood noted from upper and lower inner lip.

## 2020-04-30 NOTE — ED PROVIDER NOTES
Erica Ruth is a 63 y.o. female states she still having intermittent bleeding and hot pipe about a week ago. Still having lesions in her lips that keep bleeding. Patient has had no anticoagulants. She has no other bleeding anywhere else. No fever. No difficulty swallowing. The history is provided by the patient and medical records.         Past Medical History:   Diagnosis Date    Abnormal mammogram 2014    left with biopsy    Bacterial vaginosis 1/9/15    Dr Dottie Rutledge GERD (gastroesophageal reflux disease)     Hot flashes 1/9/15    Dr Dottie Rutledge Hypertension     intermittent noncomplaince with med per insurance    Iron deficiency anemia 6/2014    Renal insufficiency     Rheumatoid arthritis(714.0)        Past Surgical History:   Procedure Laterality Date    HX LAPAROSCOPIC SUPRACERVICAL HYSTERECTOMY  11/2014    Dr. Jerry Lawler, GYN    HX SALPINGO-OOPHORECTOMY  11/2014         Family History:   Problem Relation Age of Onset    Arthritis-osteo Maternal Grandmother     Cancer Sister     Arthritis-osteo Mother     Hypertension Mother     Diabetes Mother        Social History     Socioeconomic History    Marital status: SINGLE     Spouse name: Not on file    Number of children: Not on file    Years of education: Not on file    Highest education level: Not on file   Occupational History    Not on file   Social Needs    Financial resource strain: Not on file    Food insecurity     Worry: Not on file     Inability: Not on file    Transportation needs     Medical: Not on file     Non-medical: Not on file   Tobacco Use    Smoking status: Current Every Day Smoker     Packs/day: 0.50     Years: 20.00     Pack years: 10.00     Types: Cigarettes    Smokeless tobacco: Never Used   Substance and Sexual Activity    Alcohol use: No    Drug use: Yes     Types: Cocaine    Sexual activity: Yes     Partners: Male   Lifestyle    Physical activity     Days per week: Not on file     Minutes per session: Not on file    Stress: Not on file   Relationships    Social connections     Talks on phone: Not on file     Gets together: Not on file     Attends Jain service: Not on file     Active member of club or organization: Not on file     Attends meetings of clubs or organizations: Not on file     Relationship status: Not on file    Intimate partner violence     Fear of current or ex partner: Not on file     Emotionally abused: Not on file     Physically abused: Not on file     Forced sexual activity: Not on file   Other Topics Concern     Service No    Blood Transfusions Yes    Caffeine Concern No    Occupational Exposure No    Hobby Hazards No    Sleep Concern No    Stress Concern No    Weight Concern No    Special Diet No    Back Care No    Exercise No    Bike Helmet No    Seat Belt Yes    Self-Exams No   Social History Narrative    Not on file         ALLERGIES: Patient has no known allergies. Review of Systems   Constitutional: Negative for fever. HENT: Positive for dental problem and mouth sores. Negative for drooling, sore throat, trouble swallowing and voice change. Respiratory: Negative for shortness of breath. Gastrointestinal: Negative for vomiting. Musculoskeletal: Negative for gait problem. Neurological: Negative for numbness. Hematological: Does not bruise/bleed easily. Psychiatric/Behavioral: Positive for sleep disturbance. Vitals:    04/30/20 0112   BP: (!) 159/94   Pulse: 60   Resp: 15   Temp: 98.6 °F (37 °C)   SpO2: 100%   Weight: 80.7 kg (178 lb)   Height: 5' 7\" (1.702 m)            Physical Exam  Vitals signs and nursing note reviewed. Constitutional:       General: She is not in acute distress. Appearance: She is well-developed. She is not diaphoretic. HENT:      Head: Normocephalic and atraumatic.       Right Ear: External ear normal.      Left Ear: External ear normal.      Nose: Nose normal.      Mouth/Throat:      Mouth: Mucous membranes are moist.      Pharynx: Uvula midline. Comments: There are ulcerative lesions in the the entire lower and upper lips. There are small amount of bleeding. No other lesions noted. Multiple dental caries but no obvious abscesses. Eyes:      General: No scleral icterus. Conjunctiva/sclera: Conjunctivae normal.   Neck:      Musculoskeletal: Neck supple. Cardiovascular:      Rate and Rhythm: Normal rate and regular rhythm. Heart sounds: Normal heart sounds. Pulmonary:      Effort: Pulmonary effort is normal.      Breath sounds: Normal breath sounds. Skin:     General: Skin is warm and dry. Neurological:      Mental Status: She is alert and oriented to person, place, and time. Gait: Gait normal.   Psychiatric:         Behavior: Behavior normal.          MDM       Procedures    Vitals:  Patient Vitals for the past 12 hrs:   Temp Pulse Resp BP SpO2   04/30/20 0112 98.6 °F (37 °C) 60 15 (!) 159/94 100 %         Medications ordered:   Medications   lidocaine (XYLOCAINE) 2 % viscous solution 15 mL (has no administration in time range)         Lab findings:  No results found for this or any previous visit (from the past 12 hour(s)). EKG interpretation by ED Physician:      X-Ray, CT or other radiology findings or impressions:  No orders to display       Progress notes, Consult notes or additional Procedure notes:   Suspect stomatitis secondary to likely crack pipe use. Will place on intraoral rinses along with steroid paste    I have discussed with patient and/or family/sig other the results, interpretation of any imaging if performed, suspected diagnosis and treatment plan to include instructions regarding the diagnoses listed to which understanding was expressed with all questions answered      Reevaluation of patient:   stable    Disposition:  Diagnosis:   1. Stomatitis, ulcerative    2.  Bleeding from mouth        Disposition: home    Follow-up Information     Follow up With Specialties Details Why Viky Fine MD Rheumatology Schedule an appointment as soon as possible for a visit  [address] [address] [phone]              Patient's Medications   Start Taking    ALUMINUM-MAGNESIUM HYDROXIDE 200-200 MG/5 ML SUSP 5 ML, DIPHENHYDRAMINE 12.5 MG/5 ML LIQD 12.5 MG, LIDOCAINE 2 % SOLN 5 ML    15 mL by Swish and Spit route two (2) times daily as needed for Stomatitis or Pain. Magic mouth wash   Maalox  Lidocaine 2% viscous   Diphenhydramine oral solution     Pharmacy to mix equal portions of ingredients to a total volume as indicated in the dispense amount. TRIAMCINOLONE ACETONIDE (KENALOG) 0.1 % DENTAL PASTE    by Dental route two (2) times a day. Continue Taking    CELECOXIB (CELEBREX) 400 MG CAPSULE    Take 1 Cap by mouth two (2) times a day. CYCLOBENZAPRINE (FLEXERIL) 10 MG TABLET    Take 1 Tab by mouth three (3) times daily as needed for Muscle Spasm(s). FERROUS SULFATE 325 MG (65 MG IRON) TABLET    Take  by mouth Daily (before breakfast). OLOPATADINE (PATANOL) 0.1 % OPHTHALMIC SOLUTION    Administer 2 Drops to both eyes two (2) times a day. PENICILLIN V POTASSIUM (VEETID) 500 MG TABLET    Take 1 Tab by mouth two (2) times a day for 10 days. QUINAPRIL-HYDROCHLOROTHIAZIDE (ACCURETIC) 20-25 MG PER TABLET    Take 1 Tab by mouth daily. RANITIDINE (ZANTAC) 150 MG TABLET    Take 1 Tab by mouth two (2) times a day.    These Medications have changed    No medications on file   Stop Taking    No medications on file

## 2020-04-30 NOTE — DISCHARGE INSTRUCTIONS
Patient Education        Stomatitis: Care Instructions  Your Care Instructions  Stomatitis is swelling and redness of the lining of your mouth. It can cause painful sores that can make it hard for you to eat, drink, or swallow. Stomatitis may be caused by a viral or bacterial infection, a disease, or not taking care of your teeth and gums properly. Other causes include reactions to smoking, burns from hot food or drinks, or an allergic reaction. Allergy causes may be a result of flavorings such as spearmint or cinnamon that are used in chewing gum, toothpaste, soft drinks, candies, and other products. Your doctor may prescribe pain medicines or special mouth rinses. He or she may tell you to quit using some products that may be causing the sores. It can take up to 2 weeks for the sores to heal.  Some people with stomatitis also get a yeast infection of the mouth, called thrush. Medicines can treat this problem. Follow-up care is a key part of your treatment and safety. Be sure to make and go to all appointments, and call your doctor if you are having problems. It's also a good idea to know your test results and keep a list of the medicines you take. How can you care for yourself at home? · Be safe with medicines. Read and follow all instructions on the label. ? If the doctor gave you a prescription medicine for pain, take it as prescribed. ? If you are not taking a prescription pain medicine, ask your doctor if you can take an over-the-counter medicine. · If your doctor prescribed antibiotics, take them as directed. Do not stop taking them just because you feel better. You need to take the full course of antibiotics. · Use prescription mouth rinses as prescribed. Ask your doctor if you can freeze the mouth rinse in an ice cube tray. Sucking on a frozen cube of the mouth rinse can help ease the pain. · Make a rinse to keep your mouth from getting dry.  Add 1 teaspoon baking soda and ½ teaspoon salt to a quart of water. Use it to rinse your mouth 4 to 6 times each day. Spit out the rinse. Do not swallow it. · Do not use a mouthwash or other over-the-counter rinse with alcohol. These can dry out your mouth or cause more pain. · If your doctor gave you mouthwash or lozenges to treat your yeast infection, use them as directed. · Eat soft foods such as mashed potatoes and other cooked vegetables, noodles, applesauce, clear broth soups, yogurt, and cottage cheese. You can get extra protein by adding protein powder to milk shakes or breakfast drinks. Avoid eating spicy or crunchy foods. · Try eating cold foods such as ice cream or yogurt. · Avoid drinking high-acid juices such as orange, grapefruit, and cranberry juices. · Drink plenty of fluids to prevent dehydration. Drinking through a straw may help with pain. · Practice good oral hygiene. Use a very soft toothbrush to brush your teeth at least two times a day. Or use your finger to brush your teeth if your mouth is sore. When should you call for help? Call your doctor now or seek immediate medical care if:    · You have new or worse symptoms of infection, such as:  ? Increased pain, swelling, warmth, or redness. ? Red streaks leading from the area. ? Pus draining from the area. ? A fever.    Watch closely for changes in your health, and be sure to contact your doctor if:    · You do not get better as expected. Where can you learn more? Go to http://migdalia-gisselle.info/  Enter C462 in the search box to learn more about \"Stomatitis: Care Instructions. \"  Current as of: July 28, 2019Content Version: 12.4  © 6763-6143 Healthwise, Incorporated. Care instructions adapted under license by [employer] (which disclaims liability or warranty for this information).  If you have questions about a medical condition or this instruction, always ask your healthcare professional. [address]  any warranty or liability for your use of this information.

## 2020-04-30 NOTE — ED NOTES
Allergies verified, pt medicated per [facility] with Oral Lidocaine, tolerated well, pt immediately reports relief of mouth pain, currently prepared for discharge.

## 2020-05-02 ENCOUNTER — HOSPITAL ENCOUNTER (EMERGENCY)
Age: 60
Discharge: HOME | End: 2020-05-02
Attending: EMERGENCY MEDICINE
Payer: MEDICAID

## 2020-05-02 DIAGNOSIS — S09.93XD INJURY OF LIP, SUBSEQUENT ENCOUNTER: Primary | ICD-10-CM

## 2020-05-02 PROCEDURE — 99283 EMERGENCY DEPT VISIT LOW MDM: CPT

## 2020-05-02 PROCEDURE — 74011000250 HC RX REV CODE- 250: Performed by: EMERGENCY MEDICINE

## 2020-05-02 PROCEDURE — 74011250637 HC RX REV CODE- 250/637: Performed by: EMERGENCY MEDICINE

## 2020-05-02 RX ORDER — BACITRACIN 500 UNIT/G
1 PACKET (EA) TOPICAL 3 TIMES DAILY
Status: DISCONTINUED | OUTPATIENT
Start: 2020-05-02 | End: 2020-05-02 | Stop reason: HOSPADM

## 2020-05-02 RX ORDER — OXYCODONE AND ACETAMINOPHEN 5; 325 MG/1; MG/1
1 TABLET ORAL
Status: COMPLETED | OUTPATIENT
Start: 2020-05-02 | End: 2020-05-02

## 2020-05-02 RX ORDER — BACITRACIN 500 UNIT/G
1 PACKET (EA) TOPICAL 3 TIMES DAILY
Status: DISCONTINUED | OUTPATIENT
Start: 2020-05-02 | End: 2020-05-02

## 2020-05-02 RX ORDER — TRANEXAMIC ACID 100 MG/ML
1 INJECTION, SOLUTION INTRAVENOUS
Status: COMPLETED | OUTPATIENT
Start: 2020-05-02 | End: 2020-05-02

## 2020-05-02 RX ADMIN — BACITRACIN 1 PACKET: 500 OINTMENT TOPICAL at 13:15

## 2020-05-02 RX ADMIN — TRANEXAMIC ACID 1000 MG: 1 INJECTION, SOLUTION INTRAVENOUS at 10:43

## 2020-05-02 RX ADMIN — OXYCODONE HYDROCHLORIDE AND ACETAMINOPHEN 1 TABLET: 5; 325 TABLET ORAL at 13:15

## 2020-05-02 NOTE — ED PROVIDER NOTES
EMERGENCY DEPARTMENT HISTORY AND PHYSICAL EXAM    10:34 AM      Date: 5/2/2020  Patient Name: Heidy Chan    History of Presenting Illness     Chief Complaint   Patient presents with    Other         History Provided By: Patient      Additional History (Context): Heidy Chan is a 63 y.o. female who presents with bleeding from her lip wound. Patient states that a week ago she was going to smoke tobacco through a metal pipe and states it was hot and it burned her lips. Patient was seen the emergency department a couple of days ago and is still having continued bleeding. She denies any blood thinner use. She states that it is difficult to eat or drink anything due to the pain and continued bleeding. Patient denies any lightheadedness, dizziness, chest pain, shortness of breath, abdominal pain, nausea, vomiting. Patient was given Magic mouthwash and states that she has been using the mouthwash but is not improving her symptoms. PCP: Meg Wagoner MD      Current Facility-Administered Medications   Medication Dose Route Frequency Provider Last Rate Last Dose    bacitracin 500 unit/gram packet 1 Packet  1 Packet Topical TID Trace Hebert, DO   1 Packet at 05/02/20 1315     Current Outpatient Medications   Medication Sig Dispense Refill    aluminum-magnesium hydroxide 200-200 mg/5 mL susp 5 mL, diphenhydrAMINE 12.5 mg/5 mL liqd 12.5 mg, lidocaine 2 % soln 5 mL 15 mL by Swish and Spit route two (2) times daily as needed for Stomatitis or Pain. Magic mouth wash   Maalox  Lidocaine 2% viscous   Diphenhydramine oral solution     Pharmacy to mix equal portions of ingredients to a total volume as indicated in the dispense amount. 300 mL 1    triamcinolone acetonide (KENALOG) 0.1 % dental paste by Dental route two (2) times a day. 10 g 0    penicillin v potassium (VEETID) 500 mg tablet Take 1 Tab by mouth two (2) times a day for 10 days.  20 Tab 0    olopatadine (PATANOL) 0.1 % ophthalmic solution Administer 2 Drops to both eyes two (2) times a day. 5 mL 0    cyclobenzaprine (FLEXERIL) 10 mg tablet Take 1 Tab by mouth three (3) times daily as needed for Muscle Spasm(s). 10 Tab 0    ferrous sulfate 325 mg (65 mg iron) tablet Take  by mouth Daily (before breakfast).  quinapril-hydrochlorothiazide (ACCURETIC) 20-25 mg per tablet Take 1 Tab by mouth daily. 30 Tab 6    ranitidine (ZANTAC) 150 mg tablet Take 1 Tab by mouth two (2) times a day. 60 Tab 6    celecoxib (CELEBREX) 400 mg capsule Take 1 Cap by mouth two (2) times a day. 80 Cap 0       Past History     Past Medical History:  Past Medical History:   Diagnosis Date    Abnormal mammogram 2014    left with biopsy    Bacterial vaginosis 1/9/15    Dr Salina Shaw GERD (gastroesophageal reflux disease)     Hot flashes 1/9/15    Dr Salina Shaw Hypertension     intermittent noncomplaince with med per insurance    Iron deficiency anemia 6/2014    Renal insufficiency     Rheumatoid arthritis(714.0)        Past Surgical History:  Past Surgical History:   Procedure Laterality Date    HX LAPAROSCOPIC SUPRACERVICAL HYSTERECTOMY  11/2014    Dr. Julia Martin, GYN    HX SALPINGO-OOPHORECTOMY  11/2014       Family History:  Family History   Problem Relation Age of Onset    Arthritis-osteo Maternal Grandmother     Cancer Sister     Arthritis-osteo Mother     Hypertension Mother     Diabetes Mother        Social History:  Social History     Tobacco Use    Smoking status: Current Every Day Smoker     Packs/day: 0.50     Years: 20.00     Pack years: 10.00     Types: Cigarettes    Smokeless tobacco: Never Used   Substance Use Topics    Alcohol use: No    Drug use: Yes     Types: Cocaine       Allergies:  No Known Allergies      Review of Systems       Review of Systems   Constitutional: Negative for chills, fatigue and fever. HENT: Positive for facial swelling (Lip swelling and bleeding).  Negative for congestion, rhinorrhea, sinus pressure, sinus pain, sneezing and sore throat. Eyes: Negative for photophobia and visual disturbance. Respiratory: Negative for cough, shortness of breath and wheezing. Cardiovascular: Negative for chest pain and palpitations. Gastrointestinal: Negative for abdominal pain, constipation, diarrhea, nausea and vomiting. Genitourinary: Negative for dysuria, frequency and hematuria. Musculoskeletal: Negative for back pain, neck pain and neck stiffness. Neurological: Negative for dizziness, light-headedness and headaches. Psychiatric/Behavioral: Negative for agitation, behavioral problems and confusion. Physical Exam     Visit Vitals  [facility] 10/15/2014       Physical Exam  Constitutional:       General: She is not in acute distress. HENT:      Head: Normocephalic and atraumatic. Comments: Patient has blisters on the mucosal surface of her upper and lower lip. There is a large blood clot of the left lower lip. She does have slow oozing coming from the blisters. Dry oral mucosa present. Nose: No congestion or rhinorrhea. Mouth/Throat:      Mouth: Mucous membranes are dry. Eyes:      General: No scleral icterus. Pupils: Pupils are equal, round, and reactive to light. Neck:      Musculoskeletal: Normal range of motion. No neck rigidity. Cardiovascular:      Rate and Rhythm: Normal rate. Heart sounds: No murmur. No gallop. Pulmonary:      Effort: No respiratory distress. Breath sounds: Normal breath sounds. No wheezing. Abdominal:      General: There is no distension. Palpations: Abdomen is soft. Tenderness: There is no abdominal tenderness. Musculoskeletal: Normal range of motion. General: No swelling or deformity. Lymphadenopathy:      Cervical: No cervical adenopathy. Skin:     General: Skin is warm. Capillary Refill: Capillary refill takes less than 2 seconds. Coloration: Skin is not jaundiced. Findings: No bruising.    Neurological: Mental Status: She is alert and oriented to person, place, and time. Cranial Nerves: No cranial nerve deficit. Motor: No weakness. Psychiatric:         Mood and Affect: Mood normal.         Thought Content: Thought content normal.           Diagnostic Study Results     Labs -  No results found for this or any previous visit (from the past 12 hour(s)). Radiologic Studies -   No orders to display         Medical Decision Making   I am the first provider for this patient. I reviewed the vital signs, available nursing notes, past medical history, past surgical history, family history and social history. Vital Signs-Reviewed the patient's vital signs. Records Reviewed: Nursing Notes (Time of Review: 10:34 AM)    ED Course: Progress Notes, Reevaluation, and Consults:  TXA was soaked on a gauze pad and the patient put pressure on her lips. Upon reevaluation the bleeding has slowed and she states that she is seeing improvement in her symptoms. She would like a pain medication and to be discharged home. The patient still has the mouthwashes from her previous visit. Patient will continue keeping her lips moist and hydrated, to prevent cracking and dry and repeat bleeding. The patient understands that she was given bacitracin ointment. Patient will be discharged home and will return if she has worsening symptoms. Provider Notes (Medical Decision Making):   MDM  Number of Diagnoses or Management Options  Injury of lip, subsequent encounter:   Diagnosis management comments: Patient is a 30-year-old female who presents with a chief complaint of bleeding lip wound. Patient had smoked from a hot pipe 1 week ago, states that she was trying to smoke tobacco but it was hot and burned her lips. Patient sustained blisters of the upper and lower lips. They have continued to bleed and was seen the emergency department several days ago.   She got mouthwashes and has been using them but they are continued to bleed. Patient denies any blood thinner use. No lightheadedness or dizziness. On examination she does have open blisters of the upper and lower lips. There was a large blood clot that was removed and TXA was applied to the wounds. It did slow the oozing of blood. Patient was feeling better and she was given 1 pain pill upon discharge. The patient was educated on keeping her lips moist and covered in triple antibiotic ointment. Patient was given nonadherent dressing and discharged home. She will return if she is worsening symptoms. No further questions. Critical Care Time: 0      Diagnosis     Clinical Impression:   1. Injury of lip, subsequent encounter        Disposition: Discharge    Follow-up Information     Follow up With Specialties Details Why Antonio De La Torre MD Rheumatology Call in 1 day  [address] [facility] [place] [address] [phone]             Discharge Medication List as of 5/2/2020 12:57 PM      CONTINUE these medications which have NOT CHANGED    Details   aluminum-magnesium hydroxide 200-200 mg/5 mL susp 5 mL, diphenhydrAMINE 12.5 mg/5 mL liqd 12.5 mg, lidocaine 2 % soln 5 mL 15 mL by Swish and Spit route two (2) times daily as needed for Stomatitis or Pain. Magic mouth wash   Maalox  Lidocaine 2% viscous   Diphenhydramine oral solution     Pharmacy to mix equal portions of ingredients to a total volume as indicated in the di spense amount. , Print, Disp-300 mL, R-1      triamcinolone acetonide (KENALOG) 0.1 % dental paste by Dental route two (2) times a day., Print, Disp-10 g, R-0      penicillin v potassium (VEETID) 500 mg tablet Take 1 Tab by mouth two (2) times a day for 10 days. , Print, Disp-20 Tab, R-0      olopatadine (PATANOL) 0.1 % ophthalmic solution Administer 2 Drops to both eyes two (2) times a day., Normal, Disp-5 mL, R-0      cyclobenzaprine (FLEXERIL) 10 mg tablet Take 1 Tab by mouth three (3) times daily as needed for Muscle Spasm(s). , Print, Disp-10 Tab, R-0      ferrous sulfate 325 mg (65 mg iron) tablet Take  by mouth Daily (before breakfast). , Historical Med      quinapril-hydrochlorothiazide (ACCURETIC) 20-25 mg per tablet Take 1 Tab by mouth daily. , Normal, Disp-30 Tab, R-6      ranitidine (ZANTAC) 150 mg tablet Take 1 Tab by mouth two (2) times a day., Normal, Disp-60 Tab, R-6      celecoxib (CELEBREX) 400 mg capsule Take 1 Cap by mouth two (2) times a day., Sample, Disp-90 Cap, R-0           _______________________________    Please note that this dictation was completed with Domob, the computer voice recognition software. Quite often unanticipated grammatical, syntax, homophones, and other interpretive errors are inadvertently transcribed by the computer software. Please disregard these errors. Please excuse any errors that have escaped final proofreading.

## 2021-05-27 ENCOUNTER — HOSPITAL ENCOUNTER (INPATIENT)
Age: 61
LOS: 18 days | Discharge: HOME HEALTH | DRG: 058 | End: 2021-06-14
Attending: INTERNAL MEDICINE | Admitting: INTERNAL MEDICINE
Payer: MEDICAID

## 2021-05-27 DIAGNOSIS — R79.89 HIGH SERUM MAGNESIUM: ICD-10-CM

## 2021-05-27 DIAGNOSIS — E55.9 VITAMIN D DEFICIENCY: ICD-10-CM

## 2021-05-27 DIAGNOSIS — Z74.09 IMPAIRED MOBILITY AND ADLS: ICD-10-CM

## 2021-05-27 DIAGNOSIS — Z79.82 CURRENT USE OF ASPIRIN: ICD-10-CM

## 2021-05-27 DIAGNOSIS — G81.91 HEMIPARESIS OF RIGHT DOMINANT SIDE DUE TO ACUTE CEREBROVASCULAR DISEASE (HCC): ICD-10-CM

## 2021-05-27 DIAGNOSIS — N18.32 STAGE 3B CHRONIC KIDNEY DISEASE (HCC): ICD-10-CM

## 2021-05-27 DIAGNOSIS — Z86.2 HISTORY OF IRON DEFICIENCY ANEMIA: ICD-10-CM

## 2021-05-27 DIAGNOSIS — E78.2 MIXED HYPERLIPIDEMIA: ICD-10-CM

## 2021-05-27 DIAGNOSIS — N17.9 ACUTE RENAL FAILURE SUPERIMPOSED ON STAGE 3B CHRONIC KIDNEY DISEASE, UNSPECIFIED ACUTE RENAL FAILURE TYPE (HCC): ICD-10-CM

## 2021-05-27 DIAGNOSIS — Z79.01 ON CLOPIDOGREL THERAPY: ICD-10-CM

## 2021-05-27 DIAGNOSIS — Z79.899 ON STATIN THERAPY DUE TO RISK OF FUTURE CARDIOVASCULAR EVENT: ICD-10-CM

## 2021-05-27 DIAGNOSIS — N18.32 ACUTE RENAL FAILURE SUPERIMPOSED ON STAGE 3B CHRONIC KIDNEY DISEASE, UNSPECIFIED ACUTE RENAL FAILURE TYPE (HCC): ICD-10-CM

## 2021-05-27 DIAGNOSIS — K59.00 CONSTIPATION, UNSPECIFIED CONSTIPATION TYPE: ICD-10-CM

## 2021-05-27 DIAGNOSIS — I63.81 ACUTE LACUNAR STROKE (HCC): Primary | ICD-10-CM

## 2021-05-27 DIAGNOSIS — I67.89 HEMIPARESIS OF RIGHT DOMINANT SIDE DUE TO ACUTE CEREBROVASCULAR DISEASE (HCC): ICD-10-CM

## 2021-05-27 DIAGNOSIS — N18.32 HYPERTENSIVE HEART AND KIDNEY DISEASE WITHOUT HEART FAILURE AND WITH STAGE 3B CHRONIC KIDNEY DISEASE (HCC): ICD-10-CM

## 2021-05-27 DIAGNOSIS — Z78.9 IMPAIRED MOBILITY AND ADLS: ICD-10-CM

## 2021-05-27 DIAGNOSIS — Z86.39 HISTORY OF VITAMIN D DEFICIENCY: ICD-10-CM

## 2021-05-27 DIAGNOSIS — I13.10 HYPERTENSIVE HEART AND KIDNEY DISEASE WITHOUT HEART FAILURE AND WITH STAGE 3B CHRONIC KIDNEY DISEASE (HCC): ICD-10-CM

## 2021-05-27 PROCEDURE — 74011250637 HC RX REV CODE- 250/637: Performed by: INTERNAL MEDICINE

## 2021-05-27 PROCEDURE — 65310000000 HC RM PRIVATE REHAB

## 2021-05-27 PROCEDURE — 74011250636 HC RX REV CODE- 250/636: Performed by: INTERNAL MEDICINE

## 2021-05-27 PROCEDURE — 99223 1ST HOSP IP/OBS HIGH 75: CPT | Performed by: INTERNAL MEDICINE

## 2021-05-27 PROCEDURE — 2709999900 HC NON-CHARGEABLE SUPPLY

## 2021-05-27 RX ORDER — ACETAMINOPHEN 325 MG/1
650 TABLET ORAL
Status: DISCONTINUED | OUTPATIENT
Start: 2021-05-27 | End: 2021-06-14 | Stop reason: HOSPADM

## 2021-05-27 RX ORDER — BISACODYL 5 MG
10 TABLET, DELAYED RELEASE (ENTERIC COATED) ORAL
Status: DISCONTINUED | OUTPATIENT
Start: 2021-05-27 | End: 2021-06-14 | Stop reason: HOSPADM

## 2021-05-27 RX ORDER — LANOLIN ALCOHOL/MO/W.PET/CERES
3 CREAM (GRAM) TOPICAL
Status: DISCONTINUED | OUTPATIENT
Start: 2021-05-27 | End: 2021-06-14 | Stop reason: HOSPADM

## 2021-05-27 RX ORDER — POLYETHYLENE GLYCOL 3350 17 G/17G
17 POWDER, FOR SOLUTION ORAL DAILY
Status: ON HOLD | COMMUNITY
End: 2021-06-13 | Stop reason: CLARIF

## 2021-05-27 RX ORDER — ADHESIVE BANDAGE
30 BANDAGE TOPICAL
Status: ON HOLD | COMMUNITY
End: 2021-06-13 | Stop reason: CLARIF

## 2021-05-27 RX ORDER — CLOPIDOGREL BISULFATE 75 MG/1
75 TABLET ORAL DAILY
Status: ON HOLD | COMMUNITY
End: 2021-06-13 | Stop reason: CLARIF

## 2021-05-27 RX ORDER — ATORVASTATIN CALCIUM 40 MG/1
80 TABLET, FILM COATED ORAL DAILY
Status: DISCONTINUED | OUTPATIENT
Start: 2021-05-28 | End: 2021-06-14 | Stop reason: HOSPADM

## 2021-05-27 RX ORDER — ATORVASTATIN CALCIUM 40 MG/1
40 TABLET, FILM COATED ORAL DAILY
Status: ON HOLD | COMMUNITY
End: 2021-06-13 | Stop reason: CLARIF

## 2021-05-27 RX ORDER — AMOXICILLIN 250 MG
2 CAPSULE ORAL
Status: DISCONTINUED | OUTPATIENT
Start: 2021-05-27 | End: 2021-05-28

## 2021-05-27 RX ORDER — HEPARIN SODIUM 5000 [USP'U]/ML
5000 INJECTION, SOLUTION INTRAVENOUS; SUBCUTANEOUS EVERY 8 HOURS
Status: DISCONTINUED | OUTPATIENT
Start: 2021-05-27 | End: 2021-06-07

## 2021-05-27 RX ORDER — CHOLECALCIFEROL (VITAMIN D3) 125 MCG
100 CAPSULE ORAL DAILY
Status: DISCONTINUED | OUTPATIENT
Start: 2021-05-28 | End: 2021-06-14 | Stop reason: HOSPADM

## 2021-05-27 RX ORDER — GUAIFENESIN 100 MG/5ML
81 LIQUID (ML) ORAL DAILY
Status: ON HOLD | COMMUNITY
End: 2021-06-13 | Stop reason: CLARIF

## 2021-05-27 RX ORDER — AMOXICILLIN 250 MG
1 CAPSULE ORAL 2 TIMES DAILY
Status: ON HOLD | COMMUNITY
End: 2021-06-13 | Stop reason: CLARIF

## 2021-05-27 RX ORDER — CLOPIDOGREL BISULFATE 75 MG/1
75 TABLET ORAL
Status: DISCONTINUED | OUTPATIENT
Start: 2021-05-28 | End: 2021-06-14 | Stop reason: HOSPADM

## 2021-05-27 RX ORDER — AMLODIPINE BESYLATE 10 MG/1
10 TABLET ORAL DAILY
Status: DISCONTINUED | OUTPATIENT
Start: 2021-05-28 | End: 2021-06-02 | Stop reason: ALTCHOICE

## 2021-05-27 RX ORDER — AMLODIPINE BESYLATE 10 MG/1
10 TABLET ORAL DAILY
COMMUNITY
End: 2021-06-14

## 2021-05-27 RX ORDER — NITROGLYCERIN 0.4 MG/1
0.4 TABLET SUBLINGUAL
Status: ON HOLD | COMMUNITY
End: 2021-06-13 | Stop reason: CLARIF

## 2021-05-27 RX ORDER — POLYETHYLENE GLYCOL 3350 17 G/17G
17 POWDER, FOR SOLUTION ORAL DAILY
Status: DISCONTINUED | OUTPATIENT
Start: 2021-05-28 | End: 2021-05-28

## 2021-05-27 RX ORDER — HEPARIN SODIUM 5000 [USP'U]/ML
5000 INJECTION, SOLUTION INTRAVENOUS; SUBCUTANEOUS EVERY 8 HOURS
Status: ON HOLD | COMMUNITY
End: 2021-06-13 | Stop reason: CLARIF

## 2021-05-27 RX ORDER — GUAIFENESIN 100 MG/5ML
81 LIQUID (ML) ORAL
Status: COMPLETED | OUTPATIENT
Start: 2021-05-28 | End: 2021-06-12

## 2021-05-27 RX ORDER — MELOXICAM 7.5 MG/1
7.5 TABLET ORAL DAILY
COMMUNITY
End: 2021-06-14

## 2021-05-27 RX ORDER — ATENOLOL 25 MG/1
25 TABLET ORAL DAILY
COMMUNITY
End: 2021-06-14

## 2021-05-27 RX ORDER — ACETAMINOPHEN 325 MG/1
650 TABLET ORAL
Status: ON HOLD | COMMUNITY
End: 2021-06-13 | Stop reason: CLARIF

## 2021-05-27 RX ADMIN — Medication 3 MG: at 21:14

## 2021-05-27 RX ADMIN — HEPARIN SODIUM 5000 UNITS: 5000 INJECTION INTRAVENOUS; SUBCUTANEOUS at 22:00

## 2021-05-27 NOTE — ROUTINE PROCESS
[facility]    Bedside and Verbal shift change report given to Jesus Strickland RN (oncoming nurse) by Max Abbott RN (offgoing nurse). Report included the following information SBAR, Kardex, MAR and Recent Results.     Situation:   Code Status: Full Code   Reason for Admission: CVA  Hospital Day: 0   Problem List:   Hospital Problems  Date Reviewed: 6/28/2016        Codes Class Noted POA    Hypertensive heart and kidney disease without heart failure and with stage 3b chronic kidney disease (HCC) (Chronic) ICD-10-CM: I13.10, N18.32  ICD-9-CM: 404.90, 585.3  Unknown Yes        Mixed hyperlipidemia (Chronic) ICD-10-CM: E78.2  ICD-9-CM: 272.2  Unknown Yes        Constipation (Chronic) ICD-10-CM: K59.00  ICD-9-CM: 564.00  Unknown Yes        Current use of aspirin ICD-10-CM: Z79.82  ICD-9-CM: V58.66  5/23/2021 Yes        On clopidogrel therapy ICD-10-CM: Z79.01  ICD-9-CM: V58.61  5/23/2021 Yes        On statin therapy due to risk of future cardiovascular event ICD-10-CM: Z79.899  ICD-9-CM: V58.69  5/22/2021 Yes    Overview Signed 5/27/2021  2:27 PM by Shannon Gant MD     On Atorvastatin             * (Principal) Acute lacunar stroke [facility]) ICD-10-CM: I63.81  ICD-9-CM: 434.91  5/21/2021 Yes    Overview Signed 5/27/2021  2:15 PM by Shannon Gant MD     Acute Lacunar Stroke (acute lacunar infarct in the posterior left lentiform nucleus extending into the corona radiata) with residual right hemiparesis             Impaired mobility and ADLs ICD-10-CM: Z74.09, Z78.9  ICD-9-CM: V49.89  5/21/2021 Yes        Hemiparesis of right dominant side due to acute cerebrovascular disease ([facility] [address] ICD-10-CM: I67.89, G81.91  ICD-9-CM: 436, 342.91  5/21/2021 Yes              Background:   Past Medical History:   Past Medical History:   Diagnosis Date    Abnormal mammogram 2014    left with biopsy    Acute lacunar stroke ([facility] [address] 5/21/2021    Acute Lacunar Stroke (acute lacunar infarct in the posterior left lentiform nucleus extending into the corona radiata) with residual right hemiparesis    Bacterial vaginosis 1/9/15    Dr Michael Ceron Constipation     Current use of aspirin 5/23/2021    Gastroesophageal reflux disease 6/30/2016    Hemiparesis of right dominant side due to acute cerebrovascular disease ([facility] [address] 5/21/2021    History of Helicobacter pylori infection 7/24/2015    History of iron deficiency anemia 06/2014    History of vitamin D deficiency 6/11/2015    Hot flashes 1/9/15    Dr Michael Ceron On clopidogrel therapy 5/23/2021    On statin therapy due to risk of future cardiovascular event 5/22/2021    On Atorvastatin    Rheumatoid arthritis ([facility] [address]     Stage 3b chronic kidney disease ([facility] 7/24/2015    Tobacco use disorder       Patient taking anticoagulants yes    Patient has a defibrillator: no     Assessment:   Changes in Assessment throughout shift: none    Patient has central line: no  I     Last Vitals:     Vitals:    05/27/21 1417 05/27/21 1619   BP: 134/83 (!) 139/93   Pulse: 69 65   Resp: 18 18   Temp: 97 °F (36.1 °C) 96.8 °F (36 °C)   SpO2: 100% 98%   Weight: 84.4 kg (186 lb)    Height: 5' 7\" (1.702 m)    LMP: 10/15/2014        PAIN    Pain Assessment    Pain Intensity 1: 0 (05/27/21 1417) Pain Intensity 1: 2 (12/29/14 1105)      Pain Location 1: Abdomen      Pain Intervention(s) 1: Medication (see MAR)  Patient Stated Pain Goal: 0 Patient Stated Pain Goal: 0  o Intervention effective: no pain reported  o Other actions taken for pain: no pain reported     Skin Assessment  Skin color    Condition/Temperature    Integrity    Turgor    Weekly Pressure Ulcer Documentation  Pressure  Injury Documentation: No Pressure Injury Noted-Pressure Ulcer Prevention Initiated  Wound Prevention & Protection Methods  Orientation of wound Orientation of Wound Prevention: Posterior  Location of Prevention Location of Wound Prevention: Buttocks, Heel  Dressing Present Dressing Present : No  Dressing Status    Wound Offloading Wound Offloading (Prevention Methods): Adaptive equipment, Bed, pressure reduction mattress, Chair cushion, Elevate heels, Repositioning, Pillows, Turning, Wheelchair     INTAKE/OUPUT  Date 05/26/21 1900 - 05/27/21 0659(Not Admitted) 05/27/21 0700 - 05/28/21 0659   Shift 1533-6955 24 Hour Total 1034-5823 0025-7861 24 Hour Total   INTAKE   Shift Total(mL/kg)        OUTPUT   Urine          Urine Occurrence(s)   1 x  1 x   Stool          Stool Occurrence(s)   0 x  0 x   Shift Total(mL/kg)        NET        Weight (kg)   84.4 84.4 84.4       Recommendations:  1. Patient needs and requests: food & fluids    2. Diet: Cardiac & thin liquids    3. Pending tests/procedures: Labs in a.m.     4. Functional Level/Equipment: W/C with min assist    5. Estimated Discharge Date: TBD Posted on Whiteboard in Patients Room: [facility] Safety Check    A safety check occurred in the patient's room between off going nurse and oncoming nurse listed above. The safety check included the below items  Area Items   H  High Alert Medications - Verify all high alert medication drips (heparin, PCA, etc.)   E  Equipment - Suction is set up for ALL patients (with blessing)  - Red plugs utilized for all equipment (IV pumps, etc.)  - WOWs wiped down at end of shift.  - Room stocked with oxygen, suction, and other unit-specific supplies   A  Alarms - Bed alarm is set for fall risk patients  - Ensure chair alarm is in place and activated if patient is up in a chair   L  Lines - Check IV for any infiltration  - Rollins bag is empty if patient has a Rollins   - Tubing and IV bags are labeled   S  Safety   - Room is clean, patient is clean, and equipment is clean. - Hallways are clear from equipment besides carts. - Fall bracelet on for fall risk patients  - Ensure room is clear and free of clutter  - Suction is set up for ALL patients (with blessing)  - Hallways are clear from equipment besides carts.    - Isolation precautions followed, supplies available outside room, sign posted

## 2021-05-27 NOTE — H&P
[address]  [address] [place] [address], [address]     INPATIENT REHABILITATION  HISTORY AND PHYSICAL    Name: Jasper An CSN: [id number]   Age: 62 y.o. MRN: [id number]   Sex: female Admit Date: 5/27/2021     PCP: None    Rheumatologist: Dr. Christopher Krishnamurthy      Primary Rehab Impairment Category (DANGELO): Stroke    Impairment Group Label: Right body involvement (left brain)    Etiologic Diagnosis: Acute Lacunar Stroke (acute lacunar infarct in the posterior left lentiform nucleus extending into the corona radiata) with residual right hemiparesis      Subjective:     Patient seen and examined. History of the Present Illness: The patient is a 81-year-old, right-handed, Black female with multiple medical comorbidities who was admitted to [facility] on 5/21/2021 due to recurrent falls due to right-sided weakness. On 5/21/2021, the patient was brought to the [facility] Emergency Department for further evaluation of recurrent falls due to right-sided weakness. The patient was seen and examined by Emergency Medicine (Dr. Anjelica Cruz). Excerpt (HPI and Neurological) from the ED Provider Note by Dr. Anjelica Cruz:  \"SCOTT Friedman is a 62 y.o. female with PMHx HTN presents with RT sided weakness starting at 10am. She has had right arm and leg weakness that is made it difficult for her to walk, She has also felt like her face has been twisted. She feels like her symptoms have worsened since they started. She was driven to the hospital by family but had difficulty getting out of the car due to her right leg weakness. She denied any fever, chills, chest pain, shortness of breath, abdominal pain, nausea, vomiting. Neurological:    Mental status: She is alert and oriented to person, place and time.     Comments: RT leg and RT leg weakness that drifts to hit the bed  No appreciable facial droop or dysarthria  No aphasia\"    CT scan of the head (5/21/2021) showed no acute intracranial abnormality; moderate sequela of chronic small vessel ischemic disease    Acute Stroke Alert TeleNeurology (Dr. Petrona Marie) evaluated the patient and was not deemed to be a candidate for intravenous Alteplase because last known normal >4.5 hr.    CT angiogram of the head and neck (5/21/2021) showed:  1. Moderate stenosis in the inferior M2 division of the left MCA but with patent flow beyond the stenosis. 2. Otherwise patent intracranial circulation. No large vessel occlusion. No other significant intracranial stenosis. 3. Patent bilateral cervical carotid and vertebral arteries. No ICA origin stenosis. 4. 2 mm enhancing probable outpouching at the anterior lateral left cavernous ICA. Does not appear calcified on noncontrast CT images. Could represent a tiny extradural cavernous ICA aneurysm. 5. Soft tissue findings remarkable for a small gas collection right lateral to the trachea and esophagus at the thoracic inlet consistent with a small tracheal diverticulum. Subcentimeter heterogenous right thyroid nodule. Interventional Neurology consult (Dr. Josias Davey) was called for evaluation and comanagement. Patient was not deemed to be a candidate for mechanical thrombectomy due to the non-occlusive nature of the thrombus and being outside the typical 6 hour, NIHSS 6 CTA guidelines for mechanical thrombectomy. Patient was started on Aspirin 325 mg PO once daily and Atorvastatin 40 mg PO once daily. Atenolol was held due to bradycardia. Amlodipine 10 mg PO once daily was continued. WBC count (5/21/2021) = 6.1  Hgb/Hct (5/21/2021) = 16.5/50.0  BUN/Creatinine (5/21/2021) = 23/2.2    The patient was admitted under the service of the [address] [place] [address] (Dr. Tiago John).     Excerpt (Subjective and Neuro) from the H&P by Dr. Tiago John:  Raghu Little is a 62 y.o. female with a past medical history significant for hypertension who presented to the emergency department with complaints of increasing falls throughout the day as well as right-sided weakness. Patient reports she was in her usual state of health until 10:00 this a.m. She attempted to go upstairs and noted that it felt as though her feet had fallen from under her. She has had several episodes of falls today. She denies any head trauma. She has had not any associated visual changes, no shortness of breath or sputum production. She denies any fever or chills. No prior history of CVA. Wava Prim She had noted some slight slurring of he speech as well. Denies any dysphagia. Family at bedside state that F are the patient's second fall today, she was transported to the emergency department. Code stroke was initiated and initial CT imaging was negative. CTA was also performed that showed some blood vessel narrowing, however interventional neuroradiology did not feel patient was a candidate for any therapy. Neurology recommended continued hospitalization for work up. Patient states that she does not take aspirin therapy at home. She has been referred to the hospitalist service for further evaluation and management. Neuro: alert oriented, affect appropriate, new focal weakness is present Right upper and lower extremity 4/5 strength and Speech is dysarthric\"    Unfractionated heparin and SCDs were used for DVT prophylaxis. Aspirin dose was decreased from 325 mg to 81 mg PO once daily. Clopidogrel 75 mg PO once daily was added. MRI of the brain showed:  1. Acute lacunar infarct in the posterior left lentiform nucleus extending into the corona radiata. 2. Underlying moderate chronic microvascular ischemic changes in the white matter. 3. Incidental 2 cm left anterior frontal convexity arachnoid cyst.    MRA of the head and neck showed:  1. Compared with the CTA head and neck from 4 hours earlier no significant interval change.   2. Up to moderate narrowing in the left MCA inferior M2 division with patent flow beyond the stenosis unchanged from the CTA. 3. A 2 mm outpouching visible at the anterior lateral cavernous left ICA suspicious for extradural cavernous ICA aneurysm on CTA is not seen on this exam.   -This outpouching is a uncertain significance. Could be a crossing vein rather than aneurysm given the nonvisualization on this exam.  -These extradural aneurysms are typically asymptomatic with no risk of subarachnoid hemorrhage. 2D echocardiogram showed EF 65%; moderate concentric LV hypertrophy; normal LV diastolic function; no hemodynamically significant valvular disease; bubble study was performed and was negative for shunt. Physical Medicine and Rehabilitation consult (Dr. Adal Flores) was called on 5/24/2021 for evaluation of rehabilitation needs. The patient had remained hemodynamically stable but due to the abovementioned neurologic deficit, the patient was noted to have impaired mobility and ADLs. Patient was felt to be a good candidate for acute inpatient rehabilitation. Upon evaluation by Physical Therapy and Occupational Therapy, the patient was recommended for acute inpatient rehabilitation. The patient was discharged and was subsequently admitted to the [facility] for intensive rehabilitation to help recover strength, function and mobility.       Past Medical History:  Past Medical History:   Diagnosis Date    Abnormal mammogram 2014    left with biopsy    Acute lacunar stroke ([address] 5/21/2021    Acute Lacunar Stroke (acute lacunar infarct in the posterior left lentiform nucleus extending into the corona radiata) with residual right hemiparesis    Bacterial vaginosis 1/9/15    Dr Ofelia Barksdale Constipation     Current use of aspirin 5/23/2021    Gastroesophageal reflux disease 6/30/2016    Hemiparesis of right dominant side due to acute cerebrovascular disease ([address] 5/21/2021    History of Helicobacter pylori infection 2015    History of iron deficiency anemia 2014    History of vitamin D deficiency 2015    Hot flashes 1/9/15    Dr Dahlia Garcia On clopidogrel therapy 2021    On statin therapy due to risk of future cardiovascular event 2021    On Atorvastatin    Rhe[facility]     Stage 3b chronic kidney disease ([facility] 2015    Tobacco use disorder        Past Surgical History:  Past Surgical History:   Procedure Laterality Date    HX LAPAROSCOPIC SUPRACERVICAL HYSTERECTOMY  2014    Dr. Varinder Slade, GYN    HX SALPINGO-OOPHORECTOMY  2014       Allergies:  No Known Allergies      Social History: The patient is single, lives with her fiancee in a 2-story house with a 4-step entry in [address] Her bedroom is on the 2nd floor with ~9 steps to get upstairs. She smokes 5-6 cigarettes per day. She denied any alcohol or illicit drug use. She is on disability. Family History: Both parents are . Family History   Problem Relation Age of Onset    Arthritis-osteo Maternal Grandmother     Cancer Sister     Hypertension Sister    [de-identified] Arthritis-osteo Mother     Hypertension Mother     Diabetes Mother     Hypertension Brother     Stroke Neg Hx        Home Medications ( [x] Verbally confirmed with patient    [] From medication bottles brought by patient     [] From list provided by patient):  1. Amlodipine 10 mg PO once daily  2. Atenolol 25 mg PO once daily  3. Meloxicam 7.5 mg PO once daily as needed for pain      Transfer Medications (from the Discharge Summary prepared by Dr. Shauna Ramirez):  1. Aspirin 81 mg PO once daily  2. Atorvastatin 40 mg PO q HS  3. Clopidogrel 75 mg PO once daily  4. Polyethylene glycol 1 packet by mouth daily as needed for constipation  5. Senna-Docusate 1 tab PO BID  6. Amlodipine 10 mg PO once daily      Review Of Systems:   CONSTITUTIONAL: No weight loss. EYES: No blurred vision and no eye discharge. ENT: No nasal discharge. No ear pain. CARDIOVASCULAR: No chest pain and no diaphoresis. RESPIRATORY: No cough, no hemoptysis. GI: No vomiting, no diarrhea   : No urinary frequency and no dysuria. MUSCULOSKELETAL: No muscle pains. SKIN: No rashes. NEURO: As above. ENDOCRINE: No polyphagia and no polydipsia. HEMATOLOGY: No bleeding tendencies. Objective:     Vital Signs:  Patient Vitals for the past 24 hrs:   BP Temp Pulse Resp SpO2   05/27/21 1417 134/83 97 °F (36.1 °C) 69 18 100 %        There is no height or weight on file to calculate BMI. Physical Examination:  GENERAL SURVEY: Patient is awake, alert, oriented x 3, sitting comfortably on the chair, not in acute respiratory distress. HEENT: pink palpebral conjunctivae, anicteric sclerae, no nasoaural discharge, moist oral mucosa  NECK: supple, no jugular venous distention, no palpable lymph nodes  CHEST/LUNGS: symmetrical chest expansion, good air entry, clear breath sounds  HEART: adynamic precordium, good S1 S2, no S3, regular rhythm, no murmurs  ABDOMEN: flat, bowel sounds appreciated, soft, non-tender  EXTREMITIES: pink nailbeds, no edema, full and equal pulses, no calf tenderness   NEUROLOGICAL EXAM: The patient is awake, alert and oriented x3, able to answer questions fairly appropriately, able to follow 1 and 2 step commands. Able to tell time from the wall clock. Cranial nerves II-XII are grossly intact. No gross sensory deficit. Motor strength is 4 to 4+/5 on the RUE and RLE, 5/5 on the LUE and LLE.       Current Medications:  Current Facility-Administered Medications   Medication Dose Route Frequency    acetaminophen (TYLENOL) tablet 650 mg  650 mg Oral Q4H PRN    bisacodyL (DULCOLAX) tablet 10 mg  10 mg Oral Q48H PRN    heparin (porcine) injection 5,000 Units  5,000 Units SubCUTAneous Q8H    [START ON 5/28/2021] aspirin chewable tablet 81 mg  81 mg Oral DAILY WITH DINNER    [START ON 5/28/2021] atorvastatin (LIPITOR) tablet 80 mg  80 mg Oral DAILY    [START ON 5/28/2021] clopidogreL (PLAVIX) tablet 75 mg  75 mg Oral DAILY WITH BREAKFAST    [START ON 5/28/2021] polyethylene glycol (MIRALAX) packet 17 g  17 g Oral DAILY    senna-docusate (PERICOLACE) 8.6-50 mg per tablet 2 Tablet  2 Tablet Oral PCD    [START ON 5/28/2021] amLODIPine (NORVASC) tablet 10 mg  10 mg Oral DAILY    [START ON 5/28/2021] co-enzyme Q-10 (CO Q-10) capsule 100 mg  100 mg Oral DAILY    melatonin tablet 3 mg  3 mg Oral QHS       Assessment:     Primary Rehabilitation Diagnosis  1. Impaired Mobility and ADLs  2. Acute Lacunar Stroke (acute lacunar infarct in the posterior left lentiform nucleus extending into the corona radiata) with residual right hemiparesis    Comorbidities  Patient Active Problem List   Diagnosis Code    Rheumatoid arthritis ([facility] M06.9    History of vitamin D deficiency Z86.39    Cocaine use F14.90    Stage 3b chronic kidney disease (HCC) N18.32    History of Helicobacter pylori infection Z86.19    Abscess of finger L02.519    Gastroesophageal reflux disease K21.9    Acute lacunar stroke (HCC) I63.81    Impaired mobility and ADLs Z74.09, Z78.9    Hemiparesis of right dominant side due to acute cerebrovascular disease (HCC) I67.89, G81.91    Tobacco use disorder F17.200    Current use of aspirin Z79.82    On clopidogrel therapy Z79.01    Hypertensive heart and kidney disease without heart failure and with stage 3b chronic kidney disease (HCC) I13.10, N18.32    Mixed hyperlipidemia E78.2    On statin therapy due to risk of future cardiovascular event Z79.899    Constipation K59.00    History of iron deficiency anemia Z86.2       Willingness to participate in the program: Good      Rehabilitation Potential: Good      Plan:     1.  Medical Issues being followed closely:    [x]  Fall and safety precautions     []  Wound Care     [x]  Bowel and Bladder Function     [x]  Fluid Electrolyte and Nutrition Balance     []  Pain Control      2. Issues that 24 hour rehabilitation nursing is following:    [x]  Fall and safety precautions     []  Wound Care     [x]  Bowel and Bladder Function     [x]  Fluid Electrolyte and Nutrition Balance     []  Pain Control      [x]  Assistance with and education on in-room safety with transfers to and from the bed, wheelchair, toilet and shower. 3. Acute rehabilitation plan of care:    [x]  Patient to be evaluated and treated by:           [x]  Physical Therapy           [x]  Occupational Therapy           [x]  Speech Therapy     []  Hold Rehab until further notice     5. Medications:    [x]  MAR Reviewed     [x]  Continue Present Medications     6. DVT Prophylaxis:      []  Enoxaparin     [x]  Unfractionated Heparin     []  Warfarin     []  NOAC     []  NORBERT Stockings     []  Sequential Compression Device     []  None     7. Code status    [x]  Full code     []  Partial code     []  Do not intubate     []  Do not resuscitate     8. Rehabilitation program and expectations from patient, as well as medical issues discussed with the patient.       MEDICAL PLAN:  > Acute Lacunar Stroke (acute lacunar infarct in the posterior left lentiform nucleus extending into the corona radiata) with residual right hemiparesis   > Dual antiplatelet therapy (DAPT) was started 5/22/2021; Neurology recommending to continue DAPT for 21 days (~6/12/2021), then discontinue Aspirin and continue on Clopidogrel 75 mg PO once daily    > Aspirin 81 PO once daily with dinner   > Atorvastatin 40 mg PO once daily   > Clopidogrel 75 mg PO once daily with breakfast     > Melatonin 3 mg PO q HS (for stroke recovery)    > Constipation   > Miralax 17 grams in 8 oz water PO once daily   > PeriColace 2 tabs PO once daily with dinner    > Hypertensive heart and kidney disease without heart failure with stage 3b chronic kidney disease   > Amlodipine 10 mg PO once daily (9AM)    > Mixed hyperlipidemia   > Atorvastatin 40 mg PO once daily   > Coenzyme Q10 100 mg PO once daily    > Analgesia   > Acetaminophen 650 mg PO q 4 hr PRN for pain     > Diet:   > Specifications: Cardiac   > Solids (consistency): Regular    > Liquids (consistency): Thin    > Fluid restriction: None      PRECAUTIONS:   1. Safety/fall precautions. 2. Deep venous thrombosis precautions. 3. Aspiration precautions. POTENTIAL BARRIERS TO DISCHARGE:   1. Risk for falls. 2. Current home layout. 3. Family limited in ability to provide constant care. Personal Protective Equipment was used while interacting with patient including: goggles and KN95 face mask. Patient was using a surgical mask. Total clinical care time is 75 minutes, including review of chart including all labs, radiology, past medical history, and discussion with patient. Greater than 50% of my time was spent in coordination of care and counseling.       Naldo Bingham MD    May 27, 2021

## 2021-05-27 NOTE — ROUTINE PROCESS
Carlos Eduardo Lopez is a 62 y.o.  female admitted on 5/27/2021 from [facility]. Report received from [facility]. Transportation was provided by ambulance, and the patient was transferred to her room via [employer]. Patient was assisted to bed with assist of 3. The patient was oriented to her room. Fall risk precautions were discussed with the patient; she was instructed to call for help prior to getting up. The following fall risk precautions were initiated: bed/ chair alarms, door signage, yellow bracelet and socks as well as completion of the Devere Cornland tool in the patient's room. Four eyes nurse skin assessment was performed by Porfirio Santana RN and Dilshad Olosn RN.  Skin problems noted: SHYLA Liu RN

## 2021-05-28 ENCOUNTER — APPOINTMENT (OUTPATIENT)
Dept: ULTRASOUND IMAGING | Age: 61
DRG: 058 | End: 2021-05-28
Attending: INTERNAL MEDICINE
Payer: MEDICAID

## 2021-05-28 PROBLEM — R79.89 HIGH SERUM MAGNESIUM: Status: ACTIVE | Noted: 2021-05-28

## 2021-05-28 LAB
ANION GAP SERPL CALC-SCNC: 8 MMOL/L (ref 3–18)
BASOPHILS # BLD: 0 K/UL (ref 0–0.1)
BASOPHILS NFR BLD: 1 % (ref 0–2)
BUN SERPL-MCNC: 36 MG/DL (ref 7–18)
BUN/CREAT SERPL: 15 (ref 12–20)
CALCIUM SERPL-MCNC: 9.3 MG/DL (ref 8.5–10.1)
CHLORIDE SERPL-SCNC: 109 MMOL/L (ref 100–111)
CO2 SERPL-SCNC: 25 MMOL/L (ref 21–32)
CREAT SERPL-MCNC: 2.47 MG/DL (ref 0.6–1.3)
DIFFERENTIAL METHOD BLD: ABNORMAL
EOSINOPHIL # BLD: 0.1 K/UL (ref 0–0.4)
EOSINOPHIL NFR BLD: 2 % (ref 0–5)
ERYTHROCYTE [DISTWIDTH] IN BLOOD BY AUTOMATED COUNT: 13.7 % (ref 11.6–14.5)
GLUCOSE SERPL-MCNC: 97 MG/DL (ref 74–99)
HCT VFR BLD AUTO: 48.6 % (ref 35–45)
HGB BLD-MCNC: 15.8 G/DL (ref 12–16)
LYMPHOCYTES # BLD: 2.7 K/UL (ref 0.9–3.6)
LYMPHOCYTES NFR BLD: 49 % (ref 21–52)
MAGNESIUM SERPL-MCNC: 3.2 MG/DL (ref 1.6–2.6)
MCH RBC QN AUTO: 28.4 PG (ref 24–34)
MCHC RBC AUTO-ENTMCNC: 32.5 G/DL (ref 31–37)
MCV RBC AUTO: 87.3 FL (ref 74–97)
MONOCYTES # BLD: 0.6 K/UL (ref 0.05–1.2)
MONOCYTES NFR BLD: 10 % (ref 3–10)
NEUTS SEG # BLD: 2.1 K/UL (ref 1.8–8)
NEUTS SEG NFR BLD: 38 % (ref 40–73)
PLATELET # BLD AUTO: 230 K/UL (ref 135–420)
PMV BLD AUTO: 9.6 FL (ref 9.2–11.8)
POTASSIUM SERPL-SCNC: 4.9 MMOL/L (ref 3.5–5.5)
RBC # BLD AUTO: 5.57 M/UL (ref 4.2–5.3)
SODIUM SERPL-SCNC: 142 MMOL/L (ref 136–145)
WBC # BLD AUTO: 5.6 K/UL (ref 4.6–13.2)

## 2021-05-28 PROCEDURE — 76770 US EXAM ABDO BACK WALL COMP: CPT

## 2021-05-28 PROCEDURE — 85025 COMPLETE CBC W/AUTO DIFF WBC: CPT

## 2021-05-28 PROCEDURE — 97116 GAIT TRAINING THERAPY: CPT

## 2021-05-28 PROCEDURE — 74011250636 HC RX REV CODE- 250/636: Performed by: INTERNAL MEDICINE

## 2021-05-28 PROCEDURE — 99232 SBSQ HOSP IP/OBS MODERATE 35: CPT | Performed by: INTERNAL MEDICINE

## 2021-05-28 PROCEDURE — 36415 COLL VENOUS BLD VENIPUNCTURE: CPT

## 2021-05-28 PROCEDURE — 97166 OT EVAL MOD COMPLEX 45 MIN: CPT

## 2021-05-28 PROCEDURE — 83735 ASSAY OF MAGNESIUM: CPT

## 2021-05-28 PROCEDURE — 74011250637 HC RX REV CODE- 250/637: Performed by: INTERNAL MEDICINE

## 2021-05-28 PROCEDURE — 97530 THERAPEUTIC ACTIVITIES: CPT

## 2021-05-28 PROCEDURE — 92522 EVALUATE SPEECH PRODUCTION: CPT

## 2021-05-28 PROCEDURE — 2709999900 HC NON-CHARGEABLE SUPPLY

## 2021-05-28 PROCEDURE — 97162 PT EVAL MOD COMPLEX 30 MIN: CPT

## 2021-05-28 PROCEDURE — 80048 BASIC METABOLIC PNL TOTAL CA: CPT

## 2021-05-28 PROCEDURE — 97535 SELF CARE MNGMENT TRAINING: CPT

## 2021-05-28 PROCEDURE — 96125 COGNITIVE TEST BY HC PRO: CPT

## 2021-05-28 PROCEDURE — 65310000000 HC RM PRIVATE REHAB

## 2021-05-28 PROCEDURE — 76775 US EXAM ABDO BACK WALL LIM: CPT

## 2021-05-28 PROCEDURE — BW40ZZZ ULTRASONOGRAPHY OF ABDOMEN: ICD-10-PCS | Performed by: INTERNAL MEDICINE

## 2021-05-28 PROCEDURE — 97110 THERAPEUTIC EXERCISES: CPT

## 2021-05-28 PROCEDURE — 96105 ASSESSMENT OF APHASIA: CPT

## 2021-05-28 RX ORDER — IBUPROFEN 200 MG
1 TABLET ORAL DAILY
Status: DISCONTINUED | OUTPATIENT
Start: 2021-05-29 | End: 2021-06-14 | Stop reason: HOSPADM

## 2021-05-28 RX ADMIN — Medication 100 MG: at 08:52

## 2021-05-28 RX ADMIN — ATORVASTATIN CALCIUM 80 MG: 40 TABLET, FILM COATED ORAL at 08:52

## 2021-05-28 RX ADMIN — Medication 3 MG: at 21:55

## 2021-05-28 RX ADMIN — AMLODIPINE BESYLATE 10 MG: 10 TABLET ORAL at 08:52

## 2021-05-28 RX ADMIN — ASPIRIN 81 MG: 81 TABLET, CHEWABLE ORAL at 17:02

## 2021-05-28 RX ADMIN — HEPARIN SODIUM 5000 UNITS: 5000 INJECTION INTRAVENOUS; SUBCUTANEOUS at 06:27

## 2021-05-28 RX ADMIN — HEPARIN SODIUM 5000 UNITS: 5000 INJECTION INTRAVENOUS; SUBCUTANEOUS at 14:28

## 2021-05-28 RX ADMIN — CLOPIDOGREL BISULFATE 75 MG: 75 TABLET ORAL at 08:52

## 2021-05-28 RX ADMIN — HEPARIN SODIUM 5000 UNITS: 5000 INJECTION INTRAVENOUS; SUBCUTANEOUS at 21:55

## 2021-05-28 NOTE — PROGRESS NOTES
Occupational Therapy Goals   Long Term Goals  Initiated 2021 and to be accomplished within 4 week(s) 2021    1. Patient will perform grooming with modified independence using adaptive equipment as needed. 2. Pt will perform UB bathing with Mod I.  3. Pt will perform LB bathing with Mod I.  4. Pt will perform tub/shower transfer with Mod I using DME. 5. Pt will perform UB dressing with Mod I.  6. Patient will perform upper body dressing and lower body dressing with modified independence. 7. Patient will perform all aspects of toileting with modified independence. 8. Patient will perform toilet transfers with modified independence using RW. Short Term Goals   Initiated 2021 and to be accomplished within 7 day(s) 2021    1. Pt will perform grooming with Supv using adaptive equipment as needed. 2. Pt will perform UB bathing with Supv.  3. Pt will perform LB bathing with Supv.  4. Pt will perform tub/shower transfer with SBA using DME. 5. Pt will perform UB dressing with Supv.  6. Pt will perform LB dressing with Supv.  7. Pt will perform toileting task with SBA. 8. Pt will perform toilet transfer with SBA using RW. Prior Level of Function: independent with ADLs and functional mobility      OCCUPATIONAL THERAPY EVALUATION    Patient: Cori Moore 62 y.o.   Date: 2021  Primary Diagnosis: Acute lacunar stroke [facility]) [I63.81]    Patient identified with name and : yes    Past Medical History:   Past Medical History:   Diagnosis Date    Abnormal mammogram     left with biopsy    Acute [facility]unar stroke ([facility] [address] 2021    Acute Lacunar Stroke (acute lacunar infarct in the posterior left lentiform nucleus extending into the corona radiata) with residual right hemiparesis    Bacterial vaginosis 1/9/15    Dr Sven Ballesteros Constipation     Current use of aspirin 2021    Gastroesophageal reflux disease 2016    Hemiparesis of right dominant side due to acute cerebrovascular disease ([facility] [address] 5/21/2021    History of Helicobacter pylori infection 7/24/2015    History of iron deficiency anemia 06/2014    History of vitamin D deficiency 6/11/2015    Hot flashes 1/9/15    Dr Preeti Ritchie On clopidogrel therapy 5/23/2021    On statin therapy due to risk of future cardiovascular event 5/22/2021    On Atorvastatin    Rheumatoid arthritis ([facility] [address]     Stage 3b chronic kidney disease ([facility] [address] 7/24/2015    Tobacco use disorder      Precautions: fall risk    Time In: 900  Time Out: 10:30    Pain:  Pt reports 0/10 pain or discomfort prior to treatment. Pt reports 0/10 pain or discomfort post treatment. SUBJECTIVE:   Patient stated My right hand is definitely weaker.     OBJECTIVE DATA SUMMARY:   Nursing/RN cleared for pt to participate in OT evaluation and tx session. Patient presents lying semi-reclined in bed, A & O x 4 (except year), no c/o pain. Bed mobility: CGA supine -> sit edge of bed. Noted bilateral hands with contractures d/t chronic RA, pt c/o difficulty using dominant right hand during ADL tasks e.g. self feeding, issued built up handle utensils I.e. spoon, fork and knife, two handle cup landrum and two handle cup with lid with good return demonstration for use, nursing notified of adaptive equipment. Bedside commode transfer: Min A using RW, vc's for correct hand and feet placement. ADL routine performed seated on bedside commode and in stance w/ RW (FIM Scores in flow chart). Functional transfer: Min A using RW from bedside commode-> w/c, instruction for safety for lock/unlock brakes prior to sit to  order to prevent falls, pt provided good return demonstration. Grooming tasks seated w/c level at sinkside using nondominant left hand for oral hygiene, pt will benefit from red foam tubey  over toothbrush handle to grade grasp easier with affected dominant right hand.  Patient issued foam resistive block (blue), provided eduction with demonstration for (R) hand exercises for gross grasp, pincer pinch and digit adduction, pt provided good return demonstration and verbalized understanding to perform as tolerated to increase strength, coordination and motor control of affected (R) hand for functional tasks and mobility. Patient sitting up in w/c at end of tx session, call bell within reach & pt verbalized understanding to utilize for assist e.g. functional transfers in order to prevent falls, call bell within reach.       [x]  Right hand dominant   []  Left hand dominant    Therapy Diagnosis:   Difficulty with ADLs  [x]     Difficulty with functional transfers  [x]     Difficulty with ambulation  [x]     Difficulty with IADLs  [x]       Problem List:    Decreased strength B UE  [x]     Decreased strength trunk/core  [x]     Decreased AROM/hands  [x]     Decreased endurance  [x]     Decreased balance sitting  [x]     Decreased balance standing  [x]     Pain   []     Decreased PROM - hands  [x]       Functional Limitations:   Decreased independence with ADL  [x]     Decreased independence with functional transfers  [x]     Decreased independence with ambulation  [x]     Decreased independence with IADL  [x]       Previous Functional Level: Pre-Morbid Level of Function: independent    Home Environment: Home Situation  Home Environment: Private residence  # Steps to Enter: 2  Rails to Enter: Yes  Hand Rails : Bilateral  One/Two Story Residence: One story  # of Interior Steps: 9  Interior Rails: Left (ascending)  Living Alone: No  Support Systems: Family member(s)  Patient Expects to be Discharged to[de-identified] Private residence  Current DME Used/Available at Home: None  Tub or Shower Type: Tub/Shower combination    Barriers to Learning/Limitations: None  Compensate with: visual, verbal, tactile, kinesthetic cues/model    Outcome Measures:      MMT Initial Assessment   Right Upper Extremity  Left Upper Extremity    UE AROM 4/5 5/5   Shoulder flexion [facility]   Shoulder extension [facility] WFL   Shoulder ABDuction [facility]   Shoulder ADDUction [facility]  WFL   Elbow Flexion WFL WFL   Elbow Extension [facility] WFL   Wrist Extension/Flexion [facility] WFL    Impaired d/t arthritic changes Impaired d/t arthritic changes   0/5 No palpable muscle contraction  1/5 Palpable muscle contraction, no joint movement  2-/5 Less than full range of motion in gravity eliminated position  2/5 Able to complete full range of motion in gravity eliminated position  2+/5 Able to initiate movement against gravity  3-/5 More than half but not full range of motion against gravity  3/5 Able to complete full range of motion against gravity  3+/5 Completes full range of motion against gravity with minimal resistance  4-/5 Completes full range of motion against gravity with minimal resistance  4/5 Completes full range of motion against gravity with moderate resistance  5/5 Completes full range of motion against gravity with maximum resistance    Sensation: intact to light touch  Coordination: impaired d/t arthritic changes R > L d/t recent CVA    FIM SCORES Initial Assessment   Bladder - level of assist     Bladder - accident frequency score     Bowel - level of assist     Bowel - accident frequency score     Please see [facility] Interdisciplinary Eval: Coordination/Balance Section for details regarding FIM score description.     COGNITION/PERCEPTION Initial Assessment   Reading Status     Writing Status     Arousal/Alertness     Orientation Level Oriented X4   Visual Fields     Praxis     Body Scheme       COMPREHENSION MODE Initial Assessment   Primary Mode of Comprehension     Hearing Aide     Corrective Lenses     Score       EXPRESSION Initial Assessment   Primary Mode of Expression     Score     Comments       SOCIAL INTERACTION/PRAGMATICS Initial Assessment   Score     Comments       PROBLEM SOLVING Initial Assessment   Score     Comments       MEMORY Initial Assessment   Score     Comments       EATING Initial Assessment   Functional Level 5 Comments  (issued AE:built up handle utensils,2 handled cup with lid)     GROOMING Initial Assessment   Functional Level 5 (seated w/c level at sink using nondominant left hand)     Tasks completed by patient Brushed teeth, Washed face, Washed hands   Comments       BATHING Initial Assessment   Functional Level 5 (5 SBA UB, 4.5 CGA LB)   Body parts patient bathed Arm, left, Abdomen, Arm, right, Buttocks, Chest, Lower leg and foot, left, Lower leg and foot, right, Uma area, Thigh, left, Thigh, right   Comments  (seated on bedside commode & standing w/ RW)     TUB/SHOWER TRANSFER INDEPENDENCE Initial Assessment   Score  (NT d/t time constraints)   Comments       UPPER BODY DRESSING/UNDRESSING Initial Assessment   Functional Level 5 (SBA)   Items applied/Steps completed Pullover (4 steps)   Comments  (instruction for hemitechnique)       LOWER BODY DRESSING/UNDRESSING Initial Assessment   Functional Level  (4.5-4 CGA/Min A seated on bedside commode & in stance w/ RW)   Items applied/Steps completed Elastic waist pants (3 steps), Shoe, right (1 step), Shoe, left (1 step), Sock, left (1 step), Sock, right (1 step), Underpants (3 steps)   Comments  (cross leg method and hemitechnique)     TOILETING Initial Assessment   Functional Level  (4.5 CGA-4 Min A)   Comments  (4 Min A to hike underwear and pants over hips)     TOILET TRANSFER INDEPENDENCE Initial Assessment   Transfer score  (4 Min A using RW to bedside commode)   Comments       INSTRUMENTAL ADL Initial Assessment (PLOF)   Meal preparation Independent (PLOF)   Homemaking Independent (PLOF)   Medicine Management Independent (PLOF)   Financial Management Independent (PLOF)        ASSESSMENT :  Patient presents with chronic b/l arthritic changes d/t RA, decreased right hand strength,  functional activity tolerance and impaired balance resulting in impaired self care performance skills and functional mobility.     Pt would benefit from skilled occupational therapy in order to improve independent functional mobility/ADLs,/IADLs within the home. Interventions may include range of motion (AROM, PROM B UE), motor function (B UE/ strengthening/coordination), activity tolerance (vitals, oxygen saturation levels), balance training, ADL/IADL training and functional transfer training. Please see IRC; Interdisciplinary Eval, Care Plan, and Patient Education for further information regarding occupational therapy examination and plan of care. PLAN :  Recommendations and Planned Interventions:  [x]               Self Care Training                   [x]        Therapeutic Activities  [x]               Functional Mobility Training    []        Cognitive Retraining  [x]               Therapeutic Exercises            [x]        Endurance Activities  [x]               Balance Training                     [x]        Neuromuscular Re-Education  []               Visual/Perceptual Training      [x]   Home Safety Training  [x]               Patient Education                    [x]        Family Training/Education  []               Other (comment):    Frequency/Duration: Patient will be followed by occupational therapy 1-2 times per day/4-7 days per week to address goals. Discharge Recommendations: Home Health with 24/7 supv and assist e.g. ADLs   Further Equipment Recommendations for Discharge: bedside commode, grab bars, tub transfer bench, rolling walker and wheelchair      Please refer to the flow sheet for vital signs taken during this treatment. After treatment:   [x] Patient left in no apparent distress sitting up in chair  [] Patient left in no apparent distress in bed  [x] Call bell left within reach  [x] Nursing notified  [] Caregiver present  [x] Bed alarm activated    COMMUNICATION/EDUCATION:   [x] Home safety education was provided and the patient/caregiver indicated understanding. [x] Patient/family have participated as able in goal setting and plan of care.   [x] Patient/family agree to work toward stated goals and plan of care. [x] Patient understands intent and goals of therapy, but is neutral about his/her participation. [] Patient is unable to participate in goal setting and plan of care. Order received from MD for occupational therapy services and chart reviewed. Pt to be seen 5 times per week for 3 hours of total therapy per day for 2 weeks.   Thank you for the referral.    Thank you for this referral.  Crystal Bean  Outcome: Progressing Towards Goal

## 2021-05-28 NOTE — ROUTINE PROCESS
[facility]    Bedside and Verbal shift change report given to Bobo RN (oncoming nurse) by Genesis Sandoval RN (offgoing nurse). Report included the following information SBAR, Kardex, MAR and Recent Results.     Situation:   Code Status: Full Code   Reason for Admission: CVA  Hospital Day: 1   Problem List:   Hospital Problems  Date Reviewed: 6/28/2016        Codes Class Noted POA    Hypertensive heart and kidney disease without heart failure and with stage 3b chronic kidney disease (HCC) (Chronic) ICD-10-CM: I13.10, N18.32  ICD-9-CM: 404.90, 585.3  Unknown Yes        Mixed hyperlipidemia (Chronic) ICD-10-CM: E78.2  ICD-9-CM: 272.2  Unknown Yes        Constipation (Chronic) ICD-10-CM: K59.00  ICD-9-CM: 564.00  Unknown Yes        Current use of aspirin ICD-10-CM: Z79.82  ICD-9-CM: V58.66  5/23/2021 Yes        On clopidogrel therapy ICD-10-CM: Z79.01  ICD-9-CM: V58.61  5/23/2021 Yes        On statin therapy due to risk of future cardiovascular event ICD-10-CM: Z79.899  ICD-9-CM: V58.69  5/22/2021 Yes    Overview Signed 5/27/2021  2:27 PM by Odalis Castorena MD     On Atorvastatin             * (Principal) Acute lacunar stroke [facility]) ICD-10-CM: I63.81  ICD-9-CM: 434.91  5/21/2021 Yes    Overview Signed 5/27/2021  2:15 PM by Odalis Castorena MD     Acute Lacunar Stroke (acute lacunar infarct in the posterior left lentiform nucleus extending into the corona radiata) with residual right hemiparesis             Impaired mobility and ADLs ICD-10-CM: Z74.09, Z78.9  ICD-9-CM: V49.89  5/21/2021 Yes        Hemiparesis of right dominant side due to acute cerebrovascular disease ([facility] [address] ICD-10-CM: I67.89, G81.91  ICD-9-CM: 436, 342.91  5/21/2021 Yes              Background:   Past Medical History:   Past Medical History:   Diagnosis Date    Abnormal mammogram 2014    left with biopsy    Acute lacunar stroke ([facility] [address] 5/21/2021    Acute Lacunar Stroke (acute lacunar infarct in the posterior left lentiform nucleus extending into the corona radiata) with residual right hemiparesis    Bacterial vaginosis 1/9/15    Dr Eliana Snell Constipation     Current use of aspirin 5/23/2021    Gastroesophageal reflux disease 6/30/2016    Hemiparesis of right dominant side due to acute cerebrovascular disease ([facility] [address] 5/21/2021    History of Helicobacter pylori infection 7/24/2015    History of iron deficiency anemia 06/2014    History of vitamin D deficiency 6/11/2015    Hot flashes 1/9/15    Dr Eliana Snell On clopidogrel therapy 5/23/2021    On statin therapy due to risk of future cardiovascular event 5/22/2021    On Atorvastatin    Rheumatoid arthritis ([facility] [address]     Stage 3b chronic kidney disease ([facility] 7/24/2015    Tobacco use disorder       Patient taking anticoagulants yes    Patient has a defibrillator: no     Assessment:   Changes in Assessment throughout shift: none    Patient has central line: no  I     Last Vitals:     Vitals:    05/27/21 1417 05/27/21 1619 05/27/21 1942   BP: 134/83 (!) 139/93 (!) 149/89   Pulse: 69 65 75   Resp: 18 18 18   Temp: 97 °F (36.1 °C) 96.8 °F (36 °C) 97.1 °F (36.2 °C)   SpO2: 100% 98% 100%   Weight: 84.4 kg (186 lb)     Height: 5' 7\" (1.702 m)     LMP: 10/15/2014        PAIN    Pain Assessment    Pain Intensity 1: 0 (05/28/21 0400) Pain Intensity 1: 2 (12/29/14 1105)      Pain Location 1: Abdomen      Pain Intervention(s) 1: Medication (see MAR)  Patient Stated Pain Goal: 0 Patient Stated Pain Goal: 0  o Intervention effective: no pain reported  o Other actions taken for pain: no pain reported     Skin Assessment  Skin color    Condition/Temperature    Integrity    Turgor    Weekly Pressure Ulcer Documentation  Pressure  Injury Documentation: No Pressure Injury Noted-Pressure Ulcer Prevention Initiated  Wound Prevention & Protection Methods  Orientation of wound Orientation of Wound Prevention: Posterior  Location of Prevention Location of Wound Prevention: Buttocks, Heel  Dressing Present Dressing Present : No  Dressing Status    Wound Offloading Wound Offloading (Prevention Methods): Adaptive equipment, Bed, pressure reduction mattress, Chair cushion, Elevate heels, Repositioning, Pillows, Turning, Wheelchair     INTAKE/OUPUT  Date 05/27/21 0700 - 05/28/21 0659 05/28/21 0700 - 05/29/21 0659   Shift 1641-4811 7930-7519 24 Hour Total 2394-6665 0977-4568 24 Hour Total   INTAKE   Shift Total(mL/kg)         OUTPUT   Urine(mL/kg/hr)           Urine Occurrence(s) 1 x 1 x 2 x      Stool           Stool Occurrence(s) 0 x 0 x 0 x      Shift Total(mL/kg)         NET         Weight (kg) 84.4 84.4 84.4 84.4 84.4 84.4       Recommendations:  1. Patient needs and requests: Assist with toileting    2. Diet: Cardiac & thin liquids    3. Pending tests/procedures: Labs in a.m.     4. Functional Level/Equipment: W/C with min assist    5. Estimated Discharge Date: TBD Posted on Whiteboard in Patients Room: no       [facility] [place] Safety Check    A safety check occurred in the patient's room between off going nurse and oncoming nurse listed above. The safety check included the below items  Area Items   H  High Alert Medications - Verify all high alert medication drips (heparin, PCA, etc.)   E  Equipment - Suction is set up for ALL patients (with joaquinker)  - Red plugs utilized for all equipment (IV pumps, etc.)  - WOWs wiped down at end of shift.  - Room stocked with oxygen, suction, and other unit-specific supplies   A  Alarms - Bed alarm is set for fall risk patients  - Ensure chair alarm is in place and activated if patient is up in a chair   L  Lines - Check IV for any infiltration  - Rollins bag is empty if patient has a Rollins   - Tubing and IV bags are labeled   S  Safety   - Room is clean, patient is clean, and equipment is clean. - Hallways are clear from equipment besides carts.    - Fall bracelet on for fall risk patients  - Ensure room is clear and free of clutter  - Suction is set up for ALL patients (with blessing)  - Hallways are clear from equipment besides carts.    - Isolation precautions followed, supplies available outside room, sign posted

## 2021-05-28 NOTE — ROUTINE PROCESS
SHIFT CHANGE NOTE FOR [place]    Bedside and Verbal shift change report given to Oscar Mclean, RN (oncoming nurse) by Jasmin Fox RN (offgoing nurse). Report included the following information SBAR, Kardex, MAR and Recent Results.     Situation:   Code Status: Full Code   Reason for Admission: CVA  Hospital Day: 1   Problem List:   Hospital Problems  Date Reviewed: 6/28/2016        Codes Class Noted POA    Hypertensive heart and kidney disease without heart failure and with stage 3b chronic kidney disease (HCC) (Chronic) ICD-10-CM: I13.10, N18.32  ICD-9-CM: 404.90, 585.3  Unknown Yes        Mixed hyperlipidemia (Chronic) ICD-10-CM: E78.2  ICD-9-CM: 272.2  Unknown Yes        Constipation (Chronic) ICD-10-CM: K59.00  ICD-9-CM: 564.00  Unknown Yes        Current use of aspirin ICD-10-CM: Z79.82  ICD-9-CM: V58.66  5/23/2021 Yes        On clopidogrel therapy ICD-10-CM: Z79.01  ICD-9-CM: V58.61  5/23/2021 Yes        On statin therapy due to risk of future cardiovascular event ICD-10-CM: Z79.899  ICD-9-CM: V58.69  5/22/2021 Yes    Overview Signed 5/27/2021  2:27 PM by Duane Lorenz MD     On Atorvastatin             * (Principal) Acute lacunar stroke [facility]) ICD-10-CM: I63.81  ICD-9-CM: 434.91  5/21/2021 Yes    Overview Signed 5/27/2021  2:15 PM by Duane Lorenz MD     Acute Lacunar Stroke (acute lacunar infarct in the posterior left lentiform nucleus extending into the corona radiata) with residual right hemiparesis             Impaired mobility and ADLs ICD-10-CM: Z74.09, Z78.9  ICD-9-CM: V49.89  5/21/2021 Yes        Hemiparesis of right dominant side due to acute cerebrovascular disease ([facility] [address] ICD-10-CM: I67.89, G81.91  ICD-9-CM: 436, 342.91  5/21/2021 Yes              Background:   Past Medical History:   Past Medical History:   Diagnosis Date    Abnormal mammogram 2014    left with biopsy    Acute lacunar stroke ([facility] [address] 5/21/2021    Acute Lacunar Stroke (acute lacunar infarct in the posterior left lentiform nucleus extending into the corona radiata) with residual right hemiparesis    Bacterial vaginosis 1/9/15    Dr Latasha Murillo Constipation     Current use of aspirin 5/23/2021    Gastroesophageal reflux disease 6/30/2016    Hemiparesis of right dominant side due to acute cerebrovascular disease ([facility] [address] 5/21/2021    History of Helicobacter pylori infection 7/24/2015    History of iron deficiency anemia 06/2014    History of vitamin D deficiency 6/11/2015    Hot flashes 1/9/15    Dr Latasha Murillo On clopidogrel therapy 5/23/2021    On statin therapy due to risk of future cardiovascular event 5/22/2021    On Atorvastatin    Rheumatoid arthritis ([facility] [address]     Stage 3b chronic kidney disease ([facility] 7/24/2015    Tobacco use disorder       Patient taking anticoagulants yes    Patient has a defibrillator: no     Assessment:   Changes in Assessment throughout shift: none    Patient has central line: no  I     Last Vitals:     Vitals:    05/27/21 1417 05/27/21 1619 05/27/21 1942   BP: 134/83 (!) 139/93 (!) 149/89   Pulse: 69 65 75   Resp: 18 18 18   Temp: 97 °F (36.1 °C) 96.8 °F (36 °C) 97.1 °F (36.2 °C)   SpO2: 100% 98% 100%   Weight: 84.4 kg (186 lb)     Height: 5' 7\" (1.702 m)     LMP: 10/15/2014        PAIN    Pain Assessment    Pain Intensity 1: 0 (05/28/21 0400) Pain Intensity 1: 2 (12/29/14 1105)      Pain Location 1: Abdomen      Pain Intervention(s) 1: Medication (see MAR)  Patient Stated Pain Goal: 0 Patient Stated Pain Goal: 0  o Intervention effective: no pain reported  o Other actions taken for pain: no pain reported     Skin Assessment  Skin color    Condition/Temperature    Integrity    Turgor    Weekly Pressure Ulcer Documentation  Pressure  Injury Documentation: No Pressure Injury Noted-Pressure Ulcer Prevention Initiated  Wound Prevention & Protection Methods  Orientation of wound Orientation of Wound Prevention: Posterior  Location of Prevention Location of Wound Prevention: Buttocks, Heel  Dressing Present Dressing Present : No  Dressing Status    Wound Offloading Wound Offloading (Prevention Methods): Adaptive equipment, Bed, pressure reduction mattress, Chair cushion, Elevate heels, Repositioning, Pillows, Turning, Wheelchair     INTAKE/OUPUT  Date 05/27/21 0700 - 05/28/21 0659 05/28/21 0700 - 05/29/21 0659   Shift 7237-8426 2580-8066 24 Hour Total 7449-3658 1674-8180 24 Hour Total   INTAKE   Shift Total(mL/kg)         OUTPUT   Urine(mL/kg/hr)           Urine Occurrence(s) 1 x 1 x 2 x      Stool           Stool Occurrence(s) 0 x 0 x 0 x      Shift Total(mL/kg)         NET         Weight (kg) 84.4 84.4 84.4 84.4 84.4 84.4       Recommendations:  1. Patient needs and requests: Assist with toileting    2. Diet: Cardiac & thin liquids    3. Pending tests/procedures: Labs in a.m.     4. Functional Level/Equipment: W/C with min assist    5. Estimated Discharge Date: TBD Posted on Whiteboard in Patients Room: no       [facility] [place] Safety Check    A safety check occurred in the patient's room between off going nurse and oncoming nurse listed above. The safety check included the below items  Area Items   H  High Alert Medications - Verify all high alert medication drips (heparin, PCA, etc.)   E  Equipment - Suction is set up for ALL patients (with joaquinker)  - Red plugs utilized for all equipment (IV pumps, etc.)  - WOWs wiped down at end of shift.  - Room stocked with oxygen, suction, and other unit-specific supplies   A  Alarms - Bed alarm is set for fall risk patients  - Ensure chair alarm is in place and activated if patient is up in a chair   L  Lines - Check IV for any infiltration  - Rollins bag is empty if patient has a Rollins   - Tubing and IV bags are labeled   S  Safety   - Room is clean, patient is clean, and equipment is clean. - Hallways are clear from equipment besides carts.    - Fall bracelet on for fall risk patients  - Ensure room is clear and free of clutter  - Suction is set up for ALL patients (with blessing)  - Hallways are clear from equipment besides carts.    - Isolation precautions followed, supplies available outside room, sign posted

## 2021-05-28 NOTE — PROGRESS NOTES
Problem: Neurolinguistics Impaired (Adult)  Goal: *Speech Goal: (INSERT TEXT)  Description: Long term goals  Patient will:  1. Be oriented x 3 and recall events of the day, supervision. 2. Recall 3 words after 5 minutes, supervision. 3. Name 10-12 items within categories of increasing complexity, supervision. 4. Perform varied word finding tasks with 90% accuracy. 5. Perform problem solving/reasoning tasks with 90% accuracy. 6. Perform basic mathematical calculations, 90% accuracy. Short term goals (by 6/4/21)  Patient will:  1. Be oriented x 3 and recall events of the day, supervision. 2. Recall 3 words after 5 minutes, min cues. 3. Name 10-12 items within concrete categories, supervision. 4. Perform varied word finding tasks with 75-85% accuracy. 5. Perform problem solving/reasoning tasks with 70-80% accuracy. 6. Perform basic mathematical calculations, 70-80% accuracy. Note:   Speech LAnguage Pathology evaluation    Patient: Malina Moore (89 y.o. female)  Date: 5/28/2021  Primary Diagnosis: Acute lacunar stroke [facility]) [I63.81]        Precautions:   Fall, Skin  Time in: 1330  Time Out:  1430  SUBJECTIVE:   Patient stated It takes me longer to think.     OBJECTIVE:     Past Medical History:   Diagnosis Date    Abnormal mammogram 2014    left with biopsy    Acute lacunar stroke ([facility] [address] 5/21/2021    Acute Lacunar Stroke (acute lacunar infarct in the posterior left lentiform nucleus extending into the corona radiata) with residual right hemiparesis    Bacterial vaginosis 1/9/15    Dr Pebbles Chiu    Constipation     Current use of aspirin 5/23/2021    Gastroesophageal reflux disease 6/30/2016    Hemiparesis of right dominant side due to acute cerebrovascular disease ([facility] [address] 5/21/2021    History of Helicobacter pylori infection 7/24/2015    History of iron deficiency anemia 06/2014    History of vitamin D deficiency 6/11/2015    Hot flashes 1/9/15    Dr Pebbles Chiu    On clopidogrel therapy 5/23/2021    On statin therapy due to risk of future cardiovascular event 5/22/2021    On Atorvastatin    Rheumatoid arthritis ([facility] [address]     Stage 3b chronic kidney disease ([facility] [address] 7/24/2015    Tobacco use disorder      Past Surgical History:   Procedure Laterality Date    HX LAPAROSCOPIC SUPRACERVICAL HYSTERECTOMY  11/2014    Dr. Dragan Villalba, GYN    HX SALPINGO-OOPHORECTOMY  11/2014     Prior Level of Function/Home Situation: Independent, on disability due to arthritis  Home Situation  Home Environment: Private residence  # Steps to Enter: 5  Rails to Enter: Yes  Hand Rails : Bilateral (close enough to hold onto simultaneously)  One/Two Story Residence: Two story  # of Interior Steps: 9  Interior Rails: Right (right side going up stairs)  Living Alone: No  Support Systems: Spouse/Significant Other/Partner, Family member(s)  Patient Expects to be Discharged to[de-identified] Private residence  Current DME Used/Available at Home: None  Tub or Shower Type: Tub/Shower combination  Mental Status:  Neurologic State: Alert  Orientation Level: Oriented X4  Cognition: Appropriate for age attention/concentration, Follows commands, Impaired decision making, Memory loss  Perception: Appears intact  Perseveration: No perseveration noted  Safety/Judgement: Awareness of environment  Motor Speech:  Oral-Motor Structure/Motor Speech  Face: No impairment  Labial: No impairment  Dentition: Limited;Natural  Oral Hygiene: WFL  Lingual: No impairment  Velum: No impairment  Mandible: No impairment  Intelligibility: No impairment  Intonation: WFL  Rate: WFL  Prosody: WFL  Overall Impairment Severity: None  Language Comprehension and Expression:  Auditory Comprehension  Auditory Impairment: No   Verbal Expression  Primary Mode of Expression: Verbal  Initiation: No impairment  Repetition: Impaired  Sentence Repetition (%): 50 %  Repetition cueing type: Repetition  Repetition cueing amount:  Moderate  Naming: Impaired  Confrontation (%): 100 %  Divergent (%):  (Impaired)  Sentence Formulation: No impairment  Conversation: No impairment  Overall Impairment: Minimal  Reading Comprehension  Visual Impairment: No impairment  Pre-Morbid Reading Status: Literate  Scanning/Tracking : No impairment  Words : Yes  Sentence: Impaired  Paragraph : Impaired  Oral Reading: No impairment  Interfering Components: Attention to detail;Processing time  Effective Techniques: Large print  Overall Impairment Severity: Mild-moderate  Written Expression  Pre-Morbid Dominant Hand: Right  Pre-Morbid Writing Skills: Functional  Legibility : Decreased legibility;Uses dominant hand  Dictation : No impairment  Interfering Components: Other (Comment) (using affected hand, severe arthritis)  Effective Techniques: Language cues  Overall Impairment Severity: Moderate-severe  Neuro-Linguistics:  Verbal Reasoning Tasks: Impaired  Verbal Problem Solving: No Impairment  Verbal Organization: No Impairment  Thought Organization: WFL  Mathematical: Impaired  Memory: Impaired  Attention : No Impairement  Pragmatics:  Pragmatics Impairment: Verbose  Pragmatics Impairment Severity: Minimal  Voice:  Patient's voice quality is WFL for age and gender. Pain:  Pain Scale 1: Numeric (0 - 10)  Pain Intensity 1: 0     After treatment:   [x]              Patient left in no apparent distress sitting up in chair  []              Patient left in no apparent distress in bed  [x]              Call bell left within reach  []              Nursing notified  []              Caregiver present  []              Bed alarm activated    ASSESSMENT:   Based on the objective data described below, the patient presents with mild deficits of recall, reasoning and word retrieval secondary to L CVA. Patient will benefit from skilled intervention to address the above impairments.   Patients rehabilitation potential is considered to be Excellent  Factors which may influence rehabilitation potential include:   []              None noted  []              Mental ability/status  []              Medical condition  [x]              Home/family situation and support systems  [x]              Safety awareness  []              Pain tolerance/management  []              Other:     PLAN:   Recommendations and Planned Interventions:  SLP will follow daily to address memory, reasoning and word retrieval difficulty. Frequency/Duration: Patient will be followed by speech-language pathology 1-2 times per day/4-7 days per week to address goals. Discharge Recommendations: To Be Determined    Estimated LOS: 2-3 weeks    COMMUNICATION/EDUCATION:   [x]  Patient/family have participated as able in goal setting and plan of care. [x]  Patient/family agree to work toward stated goals and plan of care. []  Patient understands intent and goals of therapy, but is neutral about his/her participation. []  Patient is unable to participate in goal setting and plan of care.     Thank you for this referral.  MAGGIE Taylor  Time Calculation: 60 mins

## 2021-05-28 NOTE — PROGRESS NOTES
SW received referral. SW reviewed chart. SW met with pt at bedside. SW introduced self and discussed role. SW engaged pt in the initial SW assessment. Pt. prior to admission was staying with her fiadrianna at [address], [place]. She verbalized this home is a 2 story [facility] with 4 steps to enter, and 6 steps to get to the second floor. The Home on file for [address] [facility] is the home of her daughter's home whom she is still debating on if she will transition there or where she was prior to discharge with her fiance. The home of her daughter's is 1 story with no steps. Her daughter's name is Matthew Funez [phone]. She is listed as the point of contact for family education. SW did get permission to reach out. Prior to admission pt was independent with ADL's, and IADL's. She had just lost her sister and was dealing with a lot of stress according to the pt. No current hx of rehab. She verbalized she needed a PCP, and was coordinating with her sister to get the name of her PCP to call the office and see if they are accepting patients. Pt. Has no DME. Her pharmacy is [facility]. SW informed pt of the dcp process. The discharge date is pending. DHARMESH educated her on team conference meetings, family education, and the need to communicate with her  for continued stay reviews. DHARMESH contacted pt daughter with permission to arrange for family education. Initial family education arranged for 6/1 at 9:30am    Contact info provided. DHARMESH will continue to follow.     Aditi Speaker MSW, LCSW, CCM  Doctoral Candidate, Doctor of Social Work

## 2021-05-28 NOTE — PROGRESS NOTES
Problem: Mobility Impaired (Adult and Pediatric)  Goal: *Therapy Goal (Edit Goal, Insert Text)  Description: Physical Therapy Short Term Goals  Initiated 5/28/2021 and to be accomplished within 7 day(s) on 6/4/2021  1. Patient will move from supine to sit and sit to supine , scoot up and down, and roll side to side in bed with supervision/set-up. 2.  Patient will transfer from bed to chair and chair to bed with minimal assistance/contact guard assist using the least restrictive device. 3.  Patient will perform sit to stand with minimal assistance/contact guard assist.  4.  Patient will ambulate with minimal assistance/contact guard assist for 50 feet with the least restrictive device. 5.  Patient will ascend/descend 5 stairs with 1 handrail(s) with moderate assistance . Physical Therapy Long Term Goals  Initiated 5/28/2021 and to be accomplished within 21 day(s) on 6/18/2021  1. Patient will move from supine to sit and sit to supine , scoot up and down, and roll side to side in bed with modified independence. 2.  Patient will transfer from bed to chair and chair to bed with modified independence using the least restrictive device. 3.  Patient will perform sit to stand with modified independence. 4.  Patient will ambulate with modified independence for 150 feet with the least restrictive device. 5.  Patient will ascend/descend 12 stairs with 1 handrail(s) with contact guard assist/standby assistance. Outcome: Progressing Towards Goal   PHYSICAL THERAPY EVALUATION    Patient: Carmen Mejia (2 y.o. female)  Date: 5/28/2021  Diagnosis: Acute lacunar stroke [facility]) [I63.81] [facility]  Precautions: (P) Fall, Skin  Chart, physical therapy assessment, plan of care and goals were reviewed. Time in:1035  Time out:1135    Patient seen for: Balance activities; Patient education;Gait training; Therapeutic exercise;Transfer training; Wheelchair mobility    Pain:  Patient denied pain during session. Patient identified with name and : yes    SUBJECTIVE:     Patient reporting ready to participate in therapy. Reporting that she fell prior to her hospitalization due to stroke onset, but has not had falls prior to her stroke. Patient's Goal for Physical Therapy: Patient indicating that she wanted to walk better.     OBJECTIVE DATA SUMMARY:     Past Medical History:   Diagnosis Date    Abnormal mammogram     left with biopsy    Acute lacunar stroke ([facility] 2021    Acute Lacunar Stroke (acute lacunar infarct in the posterior left lentiform nucleus extending into the corona radiata) with residual right hemiparesis    Bacterial vaginosis 1/9/15    Dr Pebbles Chiu    Constipation     Current use of aspirin 2021    Gastroesophageal reflux disease 2016    Hemiparesis of right dominant side due to acute cerebrovascular disease ([facility] 2021    History of Helicobacter pylori infection 2015    History of iron deficiency anemia 2014    History of vitamin D deficiency 2015    Hot flashes 1/9/15    Dr Pebbles Chiu    On clopidogrel therapy 2021    On statin therapy due to risk of future cardiovascular event 2021    On Atorvastatin    Rheumatoid arthritis ([facility]     Stage 3b chronic kidney disease ([facility] 2015    Tobacco use disorder      Past Surgical History:   Procedure Laterality Date    HX LAPAROSCOPIC SUPRACERVICAL HYSTERECTOMY  2014    Dr. Juan Manuel Bernal, GYN    HX SALPINGO-OOPHORECTOMY  2014       Problem List:    Decreased strength B LE, right LE weaker than left  [x]     Decreased strength trunk/core  [x]     Decreased AROM   [x]     Decreased PROM  []    Decreased endurance  [x]     Decreased balance sitting  [x]     Decreased balance standing  [x]     Pain   []     Slow ambulation velocity  [x]    Decreased coordination  [x]    Decreased safety awareness  [x]      Functional Limitations:   Decreased independence with bed mobility  [x]     Decreased independence with functional transfers  [x]     Decreased independence with ambulation  [x]     Decreased independence with stair negotiation  [x]       Previous Functional Level: Patient lives in two story home, was independent with walking, transfers and bed mobility without AD prior to hospitalization. Patient reporting that she drove and was able to walk community-level distances. Home Environment: Home Environment: Private residence  # Steps to Enter: 5  Rails to Enter: Yes  Hand Rails : Bilateral  One/Two Story Residence: One story  # of Interior Steps: 9  Interior Rails: Right (ascending)  Living Alone: No  Support Systems: Family member(s), significant other  Patient Expects to be Discharged to[de-identified] Private residence  Current DME Used/Available at Home: None  Tub or Shower Type: Tub/Shower combination    Barriers to Learning/Limitations: yes;  cognitive and physical  Compensate with: visual, verbal, tactile, kinesthetic cues/model         Outcome Measures: See functional measures. Lawrence Balance Scale and PASS to be further assessed.       MMT Initial Assessment   Right Lower Extremity Left Lower Extremity   Hip Flexion  2  4+   Knee Extension  4  4+   Knee Flexion  4  4+   Ankle Dorsiflexion  3+  4+   0/5 No palpable muscle contraction  1/5 Palpable muscle contraction, no joint movement  2-/5 Less than full range of motion in gravity eliminated position  2/5 Able to complete full range of motion in gravity eliminated position  2+/5 Able to initiate movement against gravity  3-/5 More than half but not full range of motion against gravity  3/5 Able to complete full range of motion against gravity  3+/5 Completes full range of motion against gravity with minimal resistance  4-/5 Completes full range of motion against gravity with minimal-moderate resistance  4/5 Completes full range of motion against gravity with moderate resistance  4+/5 Completes full range of motion against gravity with moderate-maximum resistance  5/5 Completes full range of motion against gravity with maximum resistance        GROSS ASSESSMENT Initial Assessment 5/28/2021   AROM Generally decreased, functional   Strength Grossly decreased, non-functional (right LE more impaired than left)   Coordination Generally decreased, functional   Tone Normal   Sensation Impaired (patient reporting intermittent \"numbness\" right LE)   PROM Within functional limits       POSTURE Initial Assessment 5/28/2021   Posture (WDL) Exceptions to [facility]   Posture Assessment Forward head;Rounded shoulders       BALANCE Initial Assessment 5/28/2021    Sitting - Static: Good (unsupported)  Sitting - Dynamic: Good (unsupported)  Standing - Static: Fair;Occasional;Poor  Standing - Dynamic : Impaired       BED/CHAIR/WHEELCHAIR TRANSFERS Initial Assessment 5/28/2021   Rolling Right 5 (Stand-by assistance)   Rolling Left 5 (Stand-by assistance)   Supine to Sit 5 (Stand-by assistance) (cue for not holding her breath)   Sit to Stand Moderate assistance   Sit to Supine  (SBA)   Transfer Assist Score 3 (Moderate assistance )   Transfer Type Other   Comments  Performing bed mobility using mat table. Performing stand step transfers without AD as per PLOF, needing moderate assistance and blocking of right knee for sit to stand and stand step transfer, assist with weightshifting appropriately. Using RW, needing moderate support with assist at right hand to  RW appropriately (may benefit from handle splint)   Car Transfer Moderate assistance (using RW); needing steadying support and assist with right hand .     Car Type car transfer simulator       WHEELCHAIR MOBILITY/MANAGEMENT Initial Assessment 5/28/2021   Able to Propel 70 feet   Assist Level 2   Curbs/ramps assistance required  (mod A for steering, using rosalba-technique)   Wheelchair set up assistance required 2 (Maximal assistance)   Wheelchair management Manages left brake, Manages right brake (extra time, cues) Comments Patient performing w/c mobility with mod A for steering. Performing with rosalba-technique. WALKING INDEPENDENCE Initial Assessment 5/28/2021   Assistive device  (no AD as per PLOF; trialed RW for 38 ft mod A)   Ambulation assistance - level surface 2 (Maximal assistance) (maximal trunk support, blocking right LE)   Distance 1 Feet (ft) without AD; 38 ft with RW   Comments  Max A with gait without AD with patient reporting feeling \"dizzy\" with attempt. Vitals monitored (see flowsheet), but patient did not have significant orthostatic changes observed from sitting to standing. Patient also performing gait with RW and needing assistance with right hand  (would benefit from trial of handle splint to facilitate ), able to gait for 38 ft but very narrow VLADIMIR observed with increased path deviations requiring therapist to manage RW. Ambulation assistance - unlevel surface  (NT due to safety concern)       GAIT Initial Assessment 5/28/2021   Gait Description (WDL) Exceptions to WDL   Gait Abnormalities Decreased step clearance; Step to gait; Hemiplegic (right knee buckling)       STEPS/STAIRS Initial Assessment 5/28/2021   Steps/Stairs ambulated 1   Rail Use Both (therapist assisting at right UE to )   Assistance Level 2 (Maximal assistance) (lifting/lowering assistance required)   Comments  Patient performing one step with assistance for lifting and lowering right LE appropriately as well as to facilitate  right UE. Curbs/Ramps  (NT due to safety concern)       ASSESSMENT :  Based on the objective data described above, the patient presents with decreased strength, endurance, balance, coordination leading to difficulty performing safe and independent functional mobility. Patient will benefit from skilled intervention to address the above impairments.   Patient's rehabilitation potential is considered to be Good  Factors which may influence rehabilitation potential include:   [] None noted  []         Mental ability/status  [x]         Medical condition  [x]         Home/family situation and support systems  [x]         Safety awareness  []         Pain tolerance/management  []         Other:      PLAN :  See STG and LTG above. Order received from MD for physical therapy services and chart reviewed. Pt to be seen 5 times per week for 3 hours of total therapy per day for 3 weeks. Thank you for the referral.    Pt would benefit from skilled physical therapy in order to improve independent functional mobility within the home. Interventions may include range of motion (AROM, PROM B LE/trunk), motor function (B LE/trunk strengthening/coordination), activity tolerance (vitals, oxygen saturation levels), bed mobility training, balance activities, gait training (progressive ambulation program), wheelchair management and functional transfer training. Discharge Recommendations: Home Health  Further Equipment Recommendations for Discharge: TBD pending progress       Activity Tolerance:   Fair to good depending on fatigue. Please refer to the flowsheet for vital signs taken during this treatment. After treatment:   [] Patient left in no apparent distress in bed  [x] Patient left in no apparent distress sitting up in chair  [] Patient left in no apparent distress in w/c mobilizing under own power  [] Patient left in no apparent distress dining area  [] Patient left in no apparent distress mobilizing under own power  [x] Call bell left within reach  [] Nursing notified  [] Caregiver present  [] Bed alarm activated   [x] Chair alarm activated. COMMUNICATION/EDUCATION:   [x]         Fall prevention education was provided and the patient/caregiver indicated understanding. [x]         Patient/family have participated as able in goal setting and plan of care. [x]         Patient/family agree to work toward stated goals and plan of care.   []         Patient understands intent and goals of therapy, but is neutral about his/her participation. []         Patient is unable to participate in goal setting and plan of care.     Thank you for this referral.  Alicia Lundberg, PT  5/28/2021

## 2021-05-28 NOTE — PROGRESS NOTES
[facility] PHYSICAL REHABILITATION  [address] [place] [address], [address]     INPATIENT REHABILITATION  DAILY PROGRESS NOTE     Date: 5/28/2021    Name: Blanca Chan Age / Sex: 62 y.o. / female   CSN: [id number] MRN: [id number]   [facility] Date: 5/27/2021 Length of Stay: 1 days     Primary Rehab Diagnosis: Impaired Mobility and ADLs secondary to Acute Lacunar Stroke (acute lacunar infarct in the posterior left lentiform nucleus extending into the corona radiata) with residual right hemiparesis      Subjective:     Patient seen and examined. Blood pressure fairly controlled. Patient's Complaint:   No significant medical complaints    Pain Control: no current joint or muscle symptoms, essentially pain-free      Objective:     Vital Signs:  Patient Vitals for the past 24 hrs:   BP Temp Pulse Resp SpO2 Height Weight   05/28/21 1103 (!) 139/90 -- (!) 107 -- -- -- --   05/28/21 1100 (!) 141/96 -- 85 -- -- -- --   05/28/21 0750 (!) 146/105 (!) 96.6 °F (35.9 °C) 67 20 96 % -- --   05/27/21 1942 (!) 149/89 97.1 °F (36.2 °C) 75 18 100 % -- --   05/27/21 1619 (!) 139/93 96.8 °F (36 °C) 65 18 98 % -- --   05/27/21 1417 134/83 97 °F (36.1 °C) 69 18 100 % 5' 7\" (1.702 m) 84.4 kg (186 lb)        Physical Examination:  GENERAL SURVEY: Patient is awake, alert, oriented x 3, sitting comfortably on the chair, not in acute respiratory distress.   HEENT: pink palpebral conjunctivae, anicteric sclerae, no nasoaural discharge, moist oral mucosa  NECK: supple, no jugular venous distention, no palpable lymph nodes  CHEST/LUNGS: symmetrical chest expansion, good air entry, clear breath sounds  HEART: adynamic precordium, good S1 S2, no S3, regular rhythm, no murmurs  ABDOMEN: flat, bowel sounds appreciated, soft, non-tender  EXTREMITIES: pink nailbeds, no edema, full and equal pulses, no calf tenderness   NEUROLOGICAL EXAM: The patient is awake, alert and oriented x3, able to answer questions fairly appropriately, able to follow 1 and 2 step commands. Able to tell time from the wall clock. Cranial nerves II-XII are grossly intact. No gross sensory deficit. Motor strength is 4 to 4+/5 on the RUE and RLE, 5/5 on the LUE and LLE.       Current Medications:  Current Facility-Administered Medications   Medication Dose Route Frequency    acetaminophen (TYLENOL) tablet 650 mg  650 mg Oral Q4H PRN    bisacodyL (DULCOLAX) tablet 10 mg  10 mg Oral Q48H PRN    heparin (porcine) injection 5,000 Units  5,000 Units SubCUTAneous Q8H    aspirin chewable tablet 81 mg  81 mg Oral DAILY WITH DINNER    atorvastatin (LIPITOR) tablet 80 mg  80 mg Oral DAILY    clopidogreL (PLAVIX) tablet 75 mg  75 mg Oral DAILY WITH BREAKFAST    polyethylene glycol (MIRALAX) packet 17 g  17 g Oral DAILY    senna-docusate (PERICOLACE) 8.6-50 mg per tablet 2 Tablet  2 Tablet Oral PCD    amLODIPine (NORVASC) tablet 10 mg  10 mg Oral DAILY    co-enzyme Q-10 (CO Q-10) capsule 100 mg  100 mg Oral DAILY    melatonin tablet 3 mg  3 mg Oral QHS       Allergies:  No Known Allergies      Functional Progress:    OCCUPATIONAL THERAPY    Eating  Functional Level: 5     Grooming  Functional Level: 5 (seated w/c level at sink using nondominant left hand)     Bathing  Functional Level: 5 (5 SBA UB, 4.5 CGA LB)     Upper Body Dressing  Functional Level: 5 (SBA)     Lower Body Dressing  Functional Level:  (4.5-4 CGA/Min A seated on bedside commode & in stance w/ RW)     Toileting  Functional Level:  (4.5 CGA-4 Min A)     Toilet Transfers  Toilet Transfer Score:  (4 Min A using RW to bedside commode)     Tub/Shower Transfer  Tub/Shower Transfer Score:  (NT d/t time constraints)       Legend:   7 - Independent   6 - Modified Independent   5 - Standby Assistance / Supervision / Set-up   4 - Minimum Assistance / Contact Guard Assistance   3 - Moderate Assistance   2 - Maximum Assistance   1 - Total Assistance / Dependent       PHYSICAL THERAPY    Wheelchair Mobility/Management  Able to Propel (ft): 70 feet  Functional Level: 2  Curbs/Ramps Assist Required (FIM Score):  (mod A for steering, using rosalba-technique)  Wheelchair Setup Assist Required : 2 (Maximal assistance)  Wheelchair Management: Manages left brake, Manages right brake (extra time, cues)     Gait  Amount of Assistance: 2 (Maximal assistance) (maximal trunk support, blocking right LE)  Distance (ft): 1 Feet (ft)  Assistive Device:  (no AD as per PLOF; trialed RW for 38 ft mod A)     Balance-Sitting/Standing  Sitting - Static: Good (unsupported)  Sitting - Dynamic: Good (unsupported)  Standing - Static: Fair, Occasional, Poor  Standing - Dynamic : Impaired     Bed/Mat Mobility  Rolling Right : 5 (Stand-by assistance)  Rolling Left : 5 (Stand-by assistance)  Supine to Sit : 5 (Stand-by assistance) (cue for not holding her breath)  Sit to Supine :  (SBA)     Transfers  Transfer Type: Other  Other: stand step without AD as per PLOF  Transfer Assistance : 3 (Moderate assistance )  Sit to Stand Assistance: Moderate assistance  Car Transfers:  Moderate assistance (using RW)  Car Type: car transfer simulator     Steps or Stairs  Steps/Stairs Ambulated (#): 1  Level of Assist : 2 (Maximal assistance) (lifting/lowering assistance required)  Rail Use: Both (therapist assisting at right UE to )         SPEECH AND LANGUAGE PATHOLOGY                                      Legend:   7 - Independent   6 - Modified Independent   5 - Standby Assistance / Supervision / Set-up   4 - Minimum Assistance / Contact Guard Assistance   3 - Moderate Assistance   2 - Maximum Assistance   1 - Total Assistance / Dependent        Lab/Data Review:  Recent Results (from the past 24 hour(s))   CBC WITH AUTOMATED DIFF    Collection Time: 05/28/21  6:13 AM   Result Value Ref Range    WBC 5.6 4.6 - 13.2 K/uL    RBC 5.57 (H) 4.20 - 5.30 M/uL    HGB 15.8 12.0 - 16.0 g/dL    HCT 48.6 (H) 35.0 - 45.0 %    MCV 87.3 74.0 - 97.0 FL    MCH 28.4 24.0 - 34.0 PG    MCHC 32.5 31.0 - 37.0 g/dL    RDW 13.7 11.6 - 14.5 %    PLATELET 584 930 - 987 K/uL    MPV 9.6 9.2 - 11.8 FL    NEUTROPHILS 38 (L) 40 - 73 %    LYMPHOCYTES 49 21 - 52 %    MONOCYTES 10 3 - 10 %    EOSINOPHILS 2 0 - 5 %    BASOPHILS 1 0 - 2 %    ABS. NEUTROPHILS 2.1 1.8 - 8.0 K/UL    ABS. LYMPHOCYTES 2.7 0.9 - 3.6 K/UL    ABS. MONOCYTES 0.6 0.05 - 1.2 K/UL    ABS. EOSINOPHILS 0.1 0.0 - 0.4 K/UL    ABS. BASOPHILS 0.0 0.0 - 0.1 K/UL    DF AUTOMATED     MAGNESIUM    Collection Time: 05/28/21  6:13 AM   Result Value Ref Range    Magnesium 3.2 (H) 1.6 - 2.6 mg/dL   METABOLIC PANEL, BASIC    Collection Time: 05/28/21  6:13 AM   Result Value Ref Range    Sodium 142 136 - 145 mmol/L    Potassium 4.9 3.5 - 5.5 mmol/L    Chloride 109 100 - 111 mmol/L    CO2 25 21 - 32 mmol/L    Anion gap 8 3.0 - 18 mmol/L    Glucose 97 74 - 99 mg/dL    BUN 36 (H) 7.0 - 18 MG/DL    Creatinine 2.47 (H) 0.6 - 1.3 MG/DL    BUN/Creatinine ratio 15 12 - 20      GFR est AA 24 (L) >60 ml/min/1.73m2    GFR est non-AA 20 (L) >60 ml/min/1.73m2    Calcium 9.3 8.5 - 10.1 MG/DL       Estimated Glomerular Filtration Rate:  On admission, estimated GFR, based on a Creatinine of 2.47 mg/dl:   Using CKD-EPI = 23.9 mL/min/1.73m2   Using MDRD = 25.7 mL/min/1.73m2       Assessment:     Primary Rehab Diagnosis  1. Impaired Mobility and ADLs  2.  Acute Lacunar Stroke (acute lacunar infarct in the posterior left lentiform nucleus extending into the corona radiata) with residual right hemiparesis    Comorbidities  Patient Active Problem List   Diagnosis Code    Rheumatoid arthritis ([facility] M06.9    History of vitamin D deficiency Z86.39    Cocaine use F14.90    Stage 3b chronic kidney disease (HCC) N18.32    History of Helicobacter pylori infection Z86.19    Abscess of finger L02.519    Gastroesophageal reflux disease K21.9    Acute lacunar stroke (HCC) I63.81    Impaired mobility and ADLs Z74.09, Z78.9    Hemiparesis of right dominant side due to acute cerebrovascular disease (HCC) I67.89, G81.91    Tobacco use disorder F17.200    Current use of aspirin Z79.82    On clopidogrel therapy Z79.01    Hypertensive heart and kidney disease without heart failure and with stage 3b chronic kidney disease (HCC) I13.10, N18.32    Mixed hyperlipidemia E78.2    On statin therapy due to risk of future cardiovascular event Z79.899    Constipation K59.00    History of iron deficiency anemia Z86.2    High serum magnesium R79.89        Plan:     1. Justification for continued stay: Good progression towards established rehabilitation goals. 2. Medical Issues being followed closely:    [x]  Fall and safety precautions     []  Wound Care     [x]  Bowel and Bladder Function     [x]  Fluid Electrolyte and Nutrition Balance     []  Pain Control      3. Issues that 24 hour rehabilitation nursing is following:    [x]  Fall and safety precautions     []  Wound Care     [x]  Bowel and Bladder Function     [x]  Fluid Electrolyte and Nutrition Balance     []  Pain Control      [x]  Assistance with and education on in-room safety with transfers to and from the bed, wheelchair, toilet and shower. 4. Acute rehabilitation plan of care:    [x]  Continue current care and rehab. [x]  Physical Therapy           [x]  Occupational Therapy           [x]  Speech Therapy     []  Hold Rehab until further notice     5. Medications:    [x]  MAR Reviewed     [x]  Continue Present Medications     6. DVT Prophylaxis:      []  Enoxaparin     [x]  Unfractionated Heparin     []  Warfarin     []  NOAC     []  NORBERT Stockings     []  Sequential Compression Device     []  None     7. Code status    [x]  Full code     []  Partial code     []  Do not intubate     []  Do not resuscitate     8.  Orders:   > Acute Lacunar Stroke (acute lacunar infarct in the posterior left lentiform nucleus extending into the corona radiata) with residual right hemiparesis   > Dual antiplatelet therapy (DAPT) was started 5/22/2021; Neurology recommending to continue DAPT for 21 days (~6/12/2021), then discontinue Aspirin and continue on Clopidogrel 75 mg PO once daily    > On 5/27/2021, started Melatonin 3 mg PO q HS (for stroke recovery)   > Continue:    > Aspirin 81 PO once daily with dinner (STOP DATE: 6/12/2021)    > Atorvastatin 40 mg PO once daily    > Clopidogrel 75 mg PO once daily with breakfast      > Melatonin 3 mg PO q HS    > Constipation   > Discontinue (re: refused by patient last night and this AM):    > Miralax 17 grams in 8 oz water PO once daily    > PeriColace 2 tabs PO once daily with dinner    > Hypertensive heart and kidney disease without heart failure with stage 3b chronic kidney disease   > Continue Amlodipine 10 mg PO once daily (9AM)    > Mixed hyperlipidemia   > Start Coenzyme Q10 100 mg PO once daily   > Continue Atorvastatin 40 mg PO once daily    > High serum magnesium   > Mg (5/28/2021, on admission to the ARU) = 3.2   > patient reported she was given 3 doses of Milk of magnesia at [place] [id number] prior to discharge to the ARU   > Avoid magnesium-containing medications/supplements   > Repeat Mg on Monday (5/31/2021)    > Stage 3b-4 chronic kidney disease   > BUN/Creatinine (5/28/2021, on admission to the ARU) = 36/2.47   > Retroperitoneal ultrasound (5/28/2021) showed:    > Increased parenchymal echogenicity in both kidneys consistent with medical renal disease. There is a prominent cortical cyst in the mid right kidney. No hydronephrosis or nephrolithiasis.       > Renal cortical thickness is somewhat less than 10 mm in both kidneys.   The left kidney is somewhat small with respect to the right kidney which is low normal in size.   > PTH   > Vitamin D 25-Hydroxy   > Urine microalbumin   > Will monitor renal function    > Tobacco use disorder   > Start Nicotine 14 mg/24 hr patch, 1 patch on skin once daily    > Analgesia   > Continue Acetaminophen 650 mg PO q 4 hr PRN for pain     > Diet:   > Specifications: Cardiac   > Solids (consistency): Regular    > Liquids (consistency): Thin    > Fluid restriction: None      9. Personal Protective Equipment was used while interacting with patient including: goggles and KN95 face mask. Patient was using a surgical mask. 10. Patient's progress in rehabilitation and medical issues discussed with the patient. All questions answered to the best of my ability. Care plan discussed with patient, 2 daughters and nurse. 11. Total clinical care time is 30 minutes, including review of chart including all labs, radiology, past medical history, and discussion with patient and 2 daughters. Greater than 50% of my time was spent in coordination of care and counseling.       SignedVinay Aranda MD    May 28, 2021

## 2021-05-28 NOTE — CONSULTS
Comprehensive Nutrition Assessment    Type and Reason for Visit: Initial, Consult    Nutrition Recommendations/Plan:   No nutrition intervention indicated at this time. Will re-screen as appropriate. Monitor meal intake    Nutrition Assessment:  Pt admitted for therapy s/p CVA. On regular consistency diet; SLP consulted. Unable to assess po intake at this time; will continue to monitor. Wt fluctuation with recent wt gain PTA per chart hx    Malnutrition Assessment:  Malnutrition Status:  Insufficient data      Nutrition History and Allergies: Past medical hx:  Stroke, constipation, GERD, iron deficiency anemia, vitamin D deficiency, CKD stage 3. Weight fluctuation PTA per chart hx; recent wt gain PTA. No known food allergies     Estimated Daily Nutrient Needs:  Energy (kcal): 4895-3336; Weight Used for Energy Requirements: Admission (84.4 kg)  Protein (g): 68-84; Weight Used for Protein Requirements: Admission (x0.8-1)  Fluid (ml/day): 6983-1594; Method Used for Fluid Requirements: 1 ml/kcal      Nutrition Related Findings:  BM 5/27. No edema. Wounds:    None       Current Nutrition Therapies:  DIET CARDIAC Regular    Anthropometric Measures:  · Height:  5' 7\" (170.2 cm)  · Current Body Wt:  84.4 kg (186 lb 1.1 oz)   · Admission Body Wt:  186 lb 1.1 oz    · Usual Body Wt:   (178-186 lb recently)     · Ideal Body Wt:  135 lbs:  137.8 %   · Adjusted Body Weight:   ; Weight Adjustment for: No adjustment   · BMI Category: Overweight (BMI 25.0-29. 9)       Nutrition Diagnosis:   · No nutrition diagnosis at this time        Nutrition Interventions:   Food and/or Nutrient Delivery: Continue current diet  Nutrition Education and Counseling: Education not indicated  Coordination of Nutrition Care: Continue to monitor while inpatient    Goals:  PO nutrition intake will meet >75% of patient's estimated nutrition needs within the next 7 days       Nutrition Monitoring and Evaluation:   Behavioral-Environmental Outcomes: None identified  Food/Nutrient Intake Outcomes: Food and nutrient intake  Physical Signs/Symptoms Outcomes: Meal time behavior, Nutrition focused physical findings    Discharge Planning:     Too soon to determine     Electronically signed by Sulma Maddox RD on 5/28/2021 at 3:41 PM    Contact: [phone]

## 2021-05-29 PROCEDURE — 74011250636 HC RX REV CODE- 250/636: Performed by: INTERNAL MEDICINE

## 2021-05-29 PROCEDURE — 74011250637 HC RX REV CODE- 250/637: Performed by: INTERNAL MEDICINE

## 2021-05-29 PROCEDURE — 2709999900 HC NON-CHARGEABLE SUPPLY

## 2021-05-29 PROCEDURE — 65310000000 HC RM PRIVATE REHAB

## 2021-05-29 RX ORDER — SODIUM CHLORIDE 9 MG/ML
1000 INJECTION, SOLUTION INTRAVENOUS
Status: DISCONTINUED | OUTPATIENT
Start: 2021-05-29 | End: 2021-05-29

## 2021-05-29 RX ORDER — SODIUM CHLORIDE 9 MG/ML
100 INJECTION, SOLUTION INTRAVENOUS
Status: DISCONTINUED | OUTPATIENT
Start: 2021-05-29 | End: 2021-05-29

## 2021-05-29 RX ORDER — SODIUM CHLORIDE 9 MG/ML
100 INJECTION, SOLUTION INTRAVENOUS
Status: COMPLETED | OUTPATIENT
Start: 2021-05-29 | End: 2021-05-30

## 2021-05-29 RX ORDER — HYDRALAZINE HYDROCHLORIDE 10 MG/1
10 TABLET, FILM COATED ORAL
Status: DISCONTINUED | OUTPATIENT
Start: 2021-05-29 | End: 2021-06-05

## 2021-05-29 RX ADMIN — AMLODIPINE BESYLATE 10 MG: 10 TABLET ORAL at 08:20

## 2021-05-29 RX ADMIN — HEPARIN SODIUM 5000 UNITS: 5000 INJECTION INTRAVENOUS; SUBCUTANEOUS at 21:47

## 2021-05-29 RX ADMIN — Medication 100 MG: at 08:19

## 2021-05-29 RX ADMIN — ACETAMINOPHEN 650 MG: 325 TABLET ORAL at 21:49

## 2021-05-29 RX ADMIN — HEPARIN SODIUM 5000 UNITS: 5000 INJECTION INTRAVENOUS; SUBCUTANEOUS at 14:30

## 2021-05-29 RX ADMIN — SODIUM CHLORIDE 100 ML/HR: 900 INJECTION, SOLUTION INTRAVENOUS at 18:09

## 2021-05-29 RX ADMIN — ASPIRIN 81 MG: 81 TABLET, CHEWABLE ORAL at 16:38

## 2021-05-29 RX ADMIN — Medication 3 MG: at 21:47

## 2021-05-29 RX ADMIN — HEPARIN SODIUM 5000 UNITS: 5000 INJECTION INTRAVENOUS; SUBCUTANEOUS at 06:13

## 2021-05-29 RX ADMIN — ATORVASTATIN CALCIUM 80 MG: 40 TABLET, FILM COATED ORAL at 08:20

## 2021-05-29 RX ADMIN — CLOPIDOGREL BISULFATE 75 MG: 75 TABLET ORAL at 08:20

## 2021-05-29 NOTE — ROUTINE PROCESS
[facility]    Bedside and Verbal shift change report given to David Miner RN (oncoming nurse) by Dagmar Cogan, RN (offgoing nurse). Report included the following information SBAR, Kardex, MAR and Recent Results.     Situation:   Code Status: Full Code   Reason for Admission: CVA  Hospital Day: 2   Problem List:   Hospital Problems  Date Reviewed: 5/28/2021        Codes Class Noted POA    High serum magnesium ICD-10-CM: R79.89  ICD-9-CM: 790.99  5/28/2021 Yes        Hypertensive heart and kidney disease without heart failure and with stage 3b chronic kidney disease (HCC) (Chronic) ICD-10-CM: I13.10, N18.32  ICD-9-CM: 404.90, 585.3  Unknown Yes        Mixed hyperlipidemia (Chronic) ICD-10-CM: E78.2  ICD-9-CM: 272.2  Unknown Yes        Constipation (Chronic) ICD-10-CM: K59.00  ICD-9-CM: 564.00  Unknown Yes        Current use of aspirin ICD-10-CM: Z79.82  ICD-9-CM: V58.66  5/23/2021 Yes        On clopidogrel therapy ICD-10-CM: Z79.01  ICD-9-CM: V58.61  5/23/2021 Yes        On statin therapy due to risk of future cardiovascular event ICD-10-CM: Z79.899  ICD-9-CM: V58.69  5/22/2021 Yes    Overview Signed 5/27/2021  2:27 PM by Shira Floyd MD     On Atorvastatin             * (Principal) Acute lacunar stroke [facility]) ICD-10-CM: I63.81  ICD-9-CM: 434.91  5/21/2021 Yes    Overview Signed 5/27/2021  2:15 PM by Shira Floyd MD     Acute Lacunar Stroke (acute lacunar infarct in the posterior left lentiform nucleus extending into the corona radiata) with residual right hemiparesis             Impaired mobility and ADLs ICD-10-CM: Z74.09, Z78.9  ICD-9-CM: V49.89  5/21/2021 Yes        Hemiparesis of right dominant side due to acute cerebrovascular disease ([address] ICD-10-CM: I67.89, G81.91  ICD-9-CM: 436, 342.91  5/21/2021 Yes              Background:   Past Medical History:   Past Medical History:   Diagnosis Date    Abnormal mammogram 2014    left with biopsy    [facility] 5/21/2021    Acute Lacunar Stroke (acute lacunar infarct in the posterior left lentiform nucleus extending into the corona radiata) with residual right hemiparesis    Bacterial vaginosis 1/9/15    Dr Cho Resides Constipation     Current use of aspirin 5/23/2021    Gastroesophageal reflux disease 6/30/2016    Hemiparesis of right dominant side due to acute cerebrovascular disease ([facility] 5/21/2021    History of Helicobacter pylori infection 7/24/2015    History of iron deficiency anemia 06/2014    History of vitamin D deficiency 6/11/2015    Hot flashes 1/9/15    Dr Cho Resides On clopidogrel therapy 5/23/2021    On statin therapy due to risk of future cardiovascular event 5/22/2021    On Atorvastatin    Rheumatoid arthritis ([facility]     Stage 3b chronic kidney disease ([facility] 7/24/2015    Tobacco use disorder       Patient taking anticoagulants yes    Patient has a defibrillator: no     Assessment:   Changes in Assessment throughout shift: none    Patient has central line: no  I     Last Vitals:     Vitals:    05/28/21 1103 05/28/21 1510 05/28/21 1544 05/28/21 2145   BP: (!) 139/90 (!) 140/88  (!) 141/97   Pulse: (!) 107 88  97   Resp:  19  18   Temp:  96.9 °F (36.1 °C)  97 °F (36.1 °C)   SpO2:  98%  100%   Weight:       Height:   5' 7\" (1.702 m)    LMP: 10/15/2014        PAIN    Pain Assessment    Pain Intensity 1: 0 (05/29/21 0400) Pain Intensity 1: 2 (12/29/14 1105)      Pain Location 1: Abdomen      Pain Intervention(s) 1: Medication (see MAR)  Patient Stated Pain Goal: 0 Patient Stated Pain Goal: 0  o Intervention effective: no pain reported  o Other actions taken for pain: no pain reported     Skin Assessment  Skin color    Condition/Temperature    Integrity    Turgor    Weekly Pressure Ulcer Documentation  Pressure  Injury Documentation: No Pressure Injury Noted-Pressure Ulcer Prevention Initiated  Wound Prevention & Protection Methods  Orientation of wound Orientation of Wound Prevention: Posterior  Location of Prevention Location of Wound Prevention: Buttocks, Sacrum/Coccyx  Dressing Present Dressing Present : No  Dressing Status    Wound Offloading Wound Offloading (Prevention Methods): Bed, pressure redistribution/air     INTAKE/OUPUT  Date 05/28/21 0700 - 05/29/21 0659 05/29/21 0700 - 05/30/21 0659   Shift 0700-1859 1900-0659 24 Hour Total 0700-1859 1900-0659 24 Hour Total   INTAKE   Shift Total(mL/kg)         OUTPUT   Urine(mL/kg/hr)           Urine Occurrence(s) 1 x  1 x      Stool           Stool Occurrence(s) 0 x  0 x      Shift Total(mL/kg)         NET         Weight (kg) 84.4 84.4 84.4 84.4 84.4 84.4       Recommendations:  1. Patient needs and requests: Assist with toileting    2. Diet: Cardiac & thin liquids    3. Pending tests/procedures: none  4. .     5. Functional Level/Equipment: W/C with min assist    6. Estimated Discharge Date: TBD Posted on Whiteboard in Patients Room: [facility] Safety Check    A safety check occurred in the patient's room between off going nurse and oncoming nurse listed above. The safety check included the below items  Area Items   H  High Alert Medications - Verify all high alert medication drips (heparin, PCA, etc.)   E  Equipment - Suction is set up for ALL patients (with blessing)  - Red plugs utilized for all equipment (IV pumps, etc.)  - WOWs wiped down at end of shift.  - Room stocked with oxygen, suction, and other unit-specific supplies   A  Alarms - Bed alarm is set for fall risk patients  - Ensure chair alarm is in place and activated if patient is up in a chair   L  Lines - Check IV for any infiltration  - Rollins bag is empty if patient has a Rollins   - Tubing and IV bags are labeled   S  Safety   - Room is clean, patient is clean, and equipment is clean. - Hallways are clear from equipment besides carts.    - Fall bracelet on for fall risk patients  - Ensure room is clear and free of clutter  - Suction is set up for ALL patients (with blessing)  - Hallways are clear from equipment besides carts.    - Isolation precautions followed, supplies available outside room, sign posted

## 2021-05-29 NOTE — ROUTINE PROCESS
SHIFT CHANGE NOTE FOR [place]    Bedside and Verbal shift change report given to Bobo RN (oncoming nurse) by Trinh Mendoza RN (offgoing nurse). Report included the following information SBAR, Kardex, MAR and Recent Results.     Situation:   Code Status: Full Code   Reason for Admission: CVA  Hospital Day: 2   Problem List:   Hospital Problems  Date Reviewed: 5/28/2021        Codes Class Noted POA    High serum magnesium ICD-10-CM: R79.89  ICD-9-CM: 790.99  5/28/2021 Yes        Hypertensive heart and kidney disease without heart failure and with stage 3b chronic kidney disease (HCC) (Chronic) ICD-10-CM: I13.10, N18.32  ICD-9-CM: 404.90, 585.3  Unknown Yes        Mixed hyperlipidemia (Chronic) ICD-10-CM: E78.2  ICD-9-CM: 272.2  Unknown Yes        Constipation (Chronic) ICD-10-CM: K59.00  ICD-9-CM: 564.00  Unknown Yes        Current use of aspirin ICD-10-CM: Z79.82  ICD-9-CM: V58.66  5/23/2021 Yes        On clopidogrel therapy ICD-10-CM: Z79.01  ICD-9-CM: V58.61  5/23/2021 Yes        On statin therapy due to risk of future cardiovascular event ICD-10-CM: Z79.899  ICD-9-CM: V58.69  5/22/2021 Yes    Overview Signed 5/27/2021  2:27 PM by Tiesha To MD     On Atorvastatin             * (Principal) Acute lacunar stroke [facility]) ICD-10-CM: I63.81  ICD-9-CM: 434.91  5/21/2021 Yes    Overview Signed 5/27/2021  2:15 PM by Tiesha To MD     Acute Lacunar Stroke (acute lacunar infarct in the posterior left lentiform nucleus extending into the corona radiata) with residual right hemiparesis             Impaired mobility and ADLs ICD-10-CM: Z74.09, Z78.9  ICD-9-CM: V49.89  5/21/2021 Yes        Hemiparesis of right dominant side due to acute cerebrovascular disease ([address] ICD-10-CM: I67.89, G81.91  ICD-9-CM: 436, 342.91  5/21/2021 Yes              Background:   Past Medical History:   Past Medical History:   Diagnosis Date    Abnormal mammogram 2014    left with biopsy    [facility] 5/21/2021    Acute Lacunar Stroke (acute lacunar infarct in the posterior left lentiform nucleus extending into the corona radiata) with residual right hemiparesis    Bacterial vaginosis 1/9/15    Dr Vidal Getting Constipation     Current use of aspirin 5/23/2021    Gastroesophageal reflux disease 6/30/2016    [facility]ares[facility] of right dominant side [facility] to [facility]21/20[facility]    History of Helicobacter pylori infection 7/24/2015    History of iron deficiency anemia 06/2014    History of vitamin D deficiency 6/11/2015    Hot flashes 1/9/15    Dr Vidal Getting On clopidogrel therapy 5/23/2021    On statin therapy due to risk of future cardiovascular event 5/22/2021    On Atorvastatin    Rheumatoid arthritis ([facility]     Stage 3b chronic kidney disease ([facility] 7/24/2015    Tobacco use disorder       Patient taking anticoagulants yes    Patient has a defibrillator: no     Assessment:   Changes in Assessment throughout shift: none    Patient has central line: no  I     Last Vitals:     Vitals:    05/28/21 1544 05/28/21 2145 05/29/21 0738 05/29/21 1549   BP:  (!) 141/97 (!) 146/94 (!) 153/96   Pulse:  97 80 91   Resp:  18 20 18   Temp:  97 °F (36.1 °C) 97.4 °F (36.3 °C) 98.1 °F (36.7 °C)   SpO2:  100% 98% 100%   Weight:       Height: 5' 7\" (1.702 m)      LMP: 10/15/2014        PAIN    Pain Assessment    Pain Intensity 1: 0 (05/29/21 1637) Pain Intensity 1: 2 (12/29/14 1105)      Pain Location 1: Abdomen      Pain Intervention(s) 1: Medication (see MAR)  Patient Stated Pain Goal: 0 Patient Stated Pain Goal: 0  o Intervention effective: no pain reported  o Other actions taken for pain: no pain reported     Skin Assessment  Skin color    Condition/Temperature    Integrity    Turgor    Weekly Pressure Ulcer Documentation  Pressure  Injury Documentation: No Pressure Injury Noted-Pressure Ulcer Prevention Initiated  Wound Prevention & Protection Methods  Orientation of wound Orientation of Wound Prevention: Posterior  Location of Prevention Location of Wound Prevention: Buttocks  Dressing Present Dressing Present : No  Dressing Status    Wound Offloading Wound Offloading (Prevention Methods): Bed, pressure redistribution/air     INTAKE/OUPUT  Date 05/28/21 1900 - 05/29/21 0659 05/29/21 0700 - 05/30/21 0659   Shift 9398-9167 24 Hour Total 0946-7921 7209-2670 24 Hour Total   INTAKE   Shift Total(mL/kg)        OUTPUT   Urine(mL/kg/hr)          Urine Occurrence(s) 2 x 3 x 3 x  3 x   Stool          Stool Occurrence(s) 0 x 0 x 0 x  0 x   Shift Total(mL/kg)        NET        Weight (kg) 84.4 84.4 84.4 84.4 84.4       Recommendations:  1. Patient needs and requests: Assist with toileting    2. Diet: Cardiac & thin liquids    3. Pending tests/procedures: none  4. .     5. Functional Level/Equipment: W/C with min assist    6. Estimated Discharge Date: TBD Posted on Whiteboard in Patients Room: [facility] Safety Check    A safety check occurred in the patient's room between off going nurse and oncoming nurse listed above. The safety check included the below items  Area Items   H  High Alert Medications - Verify all high alert medication drips (heparin, PCA, etc.)   E  Equipment - Suction is set up for ALL patients (with blessing)  - Red plugs utilized for all equipment (IV pumps, etc.)  - WOWs wiped down at end of shift.  - Room stocked with oxygen, suction, and other unit-specific supplies   A  Alarms - Bed alarm is set for fall risk patients  - Ensure chair alarm is in place and activated if patient is up in a chair   L  Lines - Check IV for any infiltration  - Rollins bag is empty if patient has a Rollins   - Tubing and IV bags are labeled   S  Safety   - Room is clean, patient is clean, and equipment is clean. - Hallways are clear from equipment besides carts.    - Fall bracelet on for fall risk patients  - Ensure room is clear and free of clutter  - Suction is set up for ALL patients (with blessing)  - Hallways are clear from equipment besides carts.    - Isolation precautions followed, supplies available outside room, sign posted

## 2021-05-29 NOTE — PROGRESS NOTES
Problem: Pressure Injury - Risk of  Goal: *Prevention of pressure injury  Description: Document John Scale and appropriate interventions in the flowsheet. Outcome: Progressing Towards Goal  Note: Pressure Injury Interventions:  Sensory Interventions: Assess changes in LOC, Assess need for specialty bed, Chair cushion, Check visual cues for pain, Float heels, Keep linens dry and wrinkle-free, Maintain/enhance activity level, Minimize linen layers, Pressure redistribution bed/mattress (bed type)    Moisture Interventions: Absorbent underpads, Assess need for specialty bed, Limit adult briefs, Maintain skin hydration (lotion/cream), Minimize layers    Activity Interventions: Chair cushion, Increase time out of bed, Pressure redistribution bed/mattress(bed type)    Mobility Interventions: Chair cushion, Float heels, HOB 30 degrees or less, Pressure redistribution bed/mattress (bed type)    Nutrition Interventions: Document food/fluid/supplement intake    Friction and Shear Interventions: Feet elevated on foot rest, HOB 30 degrees or less, Minimize layers                Problem: Patient Education: Go to Patient Education Activity  Goal: Patient/Family Education  Outcome: Progressing Towards Goal     Problem: Falls - Risk of  Goal: *Absence of Falls  Description: Document Leigh Ann Fall Risk and appropriate interventions in the flowsheet.   Outcome: Progressing Towards Goal  Note: Fall Risk Interventions:  Mobility Interventions: Bed/chair exit alarm, Patient to call before getting OOB, Utilize walker, cane, or other assistive device, Utilize gait belt for transfers/ambulation         Medication Interventions: Bed/chair exit alarm, Patient to call before getting OOB, Teach patient to arise slowly, Utilize gait belt for transfers/ambulation    Elimination Interventions: Bed/chair exit alarm, Call light in reach, Patient to call for help with toileting needs, Stay With Me (per policy), Elevated toilet seat, Toilet paper/wipes in reach    History of Falls Interventions: Bed/chair exit alarm, Door open when patient unattended, Investigate reason for fall, Room close to nurse's station, Utilize gait belt for transfer/ambulation         Problem: Patient Education: Go to Patient Education Activity  Goal: Patient/Family Education  Outcome: Progressing Towards Goal

## 2021-05-29 NOTE — PROGRESS NOTES
Progress Note    Patient: Rosi Love MRN: [id number]  CSN: [id number]    YOB: 1960  Age: 62 y.o. Sex: female    DOA: 5/27/2021 LOS:  LOS: 2 days                    Subjective:     Primary Rehab Diagnosis: Impaired Mobility and ADLs secondary to Acute Lacunar Stroke (acute lacunar infarct in the posterior left lentiform nucleus extending into the corona radiata) with residual right hemiparesis    Patient seen and examined review chart discussed with the nursing staff    Review of systems  General: No fevers or chills. Cardiovascular: No chest pain or pressure. No palpitations. Pulmonary: No shortness of breath, cough or wheeze. Gastrointestinal: No abdominal pain, nausea, vomiting or diarrhea. Genitourinary: No urinary frequency, urgency, hesitancy or dysuria. Musculoskeletal: No joint or muscle pain, no back pain, no recent trauma. Neurologic: No headache, Rt sided weakness      Objective:     Physical Exam:  Visit Vitals  BP (!) 146/94   Pulse 80   Temp 97.4 °F (36.3 °C)   Resp 20   Ht 5' 7\" (1.702 m)   Wt 84.4 kg (186 lb)   SpO2 98%   Breastfeeding No   BMI 29.13 kg/m²        General:         Alert, cooperative, no acute distress    HEENT: NC, Atraumatic. PERRLA, anicteric sclerae. Lungs: CTA Bilaterally. No Wheezing/Rhonchi/Rales. Heart:  Regular  rhythm,  No murmur, No Rubs, No Gallops  Abdomen: Soft, Non distended, Non tender.  +Bowel sounds, no HSM  Extremities: No lower limb edema   Psych:   Not anxious or agitated. Neurologic:  CN 2-12 grossly intact, Alert and oriented X 3. No gross sensory deficit.  Motor strength is 4 to 4+/5 on the RUE and RLE, 5/5 on the LUE and LLE.     Intake and Output:  Current Shift:  No intake/output data recorded. Last three shifts:  No intake/output data recorded.     Labs: Results:       Chemistry Recent Labs     05/28/21  0613   GLU 97      K 4.9      CO2 25   BUN 36*   CREA 2.47*   CA 9.3   AGAP 8   BUCR 15      CBC w/Diff Recent Labs     05/28/21  0613   WBC 5.6   RBC 5.57*   HGB 15.8   HCT 48.6*      GRANS 38*   LYMPH 49   EOS 2      Cardiac Enzymes No results for input(s): CPK, CKND1, GABBY in the last 72 hours. No lab exists for component: CKRMB, TROIP   Coagulation No results for input(s): PTP, INR, APTT, INREXT in the last 72 hours. Lipid Panel Lab Results   Component Value Date/Time    Cholesterol, total 188 06/25/2016 12:00 AM    HDL Cholesterol 30 (L) 06/25/2016 12:00 AM    LDL, calculated 106 (H) 06/25/2016 12:00 AM    VLDL, calculated 52 (H) 06/25/2016 12:00 AM    Triglyceride 260 (H) 06/25/2016 12:00 AM    CHOL/HDL Ratio 5.6 (H) 06/11/2015 09:01 AM      BNP No results for input(s): BNPP in the last 72 hours. Liver Enzymes No results for input(s): TP, ALB, TBIL, AP in the last 72 hours.     No lab exists for component: SGOT, GPT, DBIL   Thyroid Studies Lab Results   Component Value Date/Time    TSH 0.942 06/25/2016 12:00 AM          Procedures/imaging: see electronic medical records for all procedures/Xrays and details which were not copied into this note but were reviewed prior to creation of Plan    Medications:   Current Facility-Administered Medications   Medication Dose Route Frequency    nicotine (NICODERM CQ) 14 mg/24 hr patch 1 Patch  1 Patch TransDERmal DAILY    acetaminophen (TYLENOL) tablet 650 mg  650 mg Oral Q4H PRN    bisacodyL (DULCOLAX) tablet 10 mg  10 mg Oral Q48H PRN    heparin (porcine) injection 5,000 Units  5,000 Units SubCUTAneous Q8H    aspirin chewable tablet 81 mg  81 mg Oral DAILY WITH DINNER    atorvastatin (LIPITOR) tablet 80 mg  80 mg Oral DAILY    clopidogreL (PLAVIX) tablet 75 mg  75 mg Oral DAILY WITH BREAKFAST    amLODIPine (NORVASC) tablet 10 mg  10 mg Oral DAILY    co-enzyme Q-10 (CO Q-10) capsule 100 mg  100 mg Oral DAILY    melatonin tablet 3 mg  3 mg Oral QHS       Assessment/Plan     Principal Problem:    Acute lacunar stroke ([facility] (5/21/2021) Overview: Acute Lacunar Stroke (acute lacunar infarct in the posterior left       lentiform nucleus extending into the corona radiata) with residual right       hemiparesis    Active Problems:    Impaired mobility and ADLs (5/21/2021)      Hemiparesis of right dominant side due to acute cerebrovascular disease ([address] (5/21/2021)      Current use of aspirin (5/23/2021)      On clopidogrel therapy (5/23/2021)      Hypertensive heart and kidney disease without heart failure and with stage 3b chronic kidney disease (HCC) ()      Mixed hyperlipidemia ()      On statin therapy due to risk of future cardiovascular event (5/22/2021)      Overview:  On Atorvastatin      Constipation ()      High serum magnesium (5/28/2021)    Plan    Acute lacunar stroke/hemiparesis right dominant side due to CVA/moderate stenosis of left MCA  Dual antiplatelet for 21 days  Aspirin 81 mg once a day  Plavix 75 mg once a day  PT and OT    Hyperlipidemia   Lipitor 80 mg  Call to 1000 mg daily    Chronic kidney disease   Will give IV fluid 1 litter   Recheck lab in AM  Will get nephrology consult    Hypertension  Norvasc 5 mg once a day  Hydralazine 10 mg TID prn SBP above 160    Gastroesophageal flux disease/treated for H. pylori    Cbc, bmp , mag in AM  DVT prophylaxis with heparin    Amelie Campos MD  5/29/2021 3:53 PM

## 2021-05-30 LAB
ANION GAP SERPL CALC-SCNC: 6 MMOL/L (ref 3–18)
BASOPHILS # BLD: 0 K/UL (ref 0–0.1)
BASOPHILS NFR BLD: 0 % (ref 0–2)
BUN SERPL-MCNC: 37 MG/DL (ref 7–18)
BUN/CREAT SERPL: 16 (ref 12–20)
CALCIUM SERPL-MCNC: 9.3 MG/DL (ref 8.5–10.1)
CHLORIDE SERPL-SCNC: 113 MMOL/L (ref 100–111)
CO2 SERPL-SCNC: 23 MMOL/L (ref 21–32)
CREAT SERPL-MCNC: 2.25 MG/DL (ref 0.6–1.3)
DIFFERENTIAL METHOD BLD: ABNORMAL
EOSINOPHIL # BLD: 0.2 K/UL (ref 0–0.4)
EOSINOPHIL NFR BLD: 3 % (ref 0–5)
ERYTHROCYTE [DISTWIDTH] IN BLOOD BY AUTOMATED COUNT: 13.7 % (ref 11.6–14.5)
GLUCOSE SERPL-MCNC: 94 MG/DL (ref 74–99)
HCT VFR BLD AUTO: 47.1 % (ref 35–45)
HGB BLD-MCNC: 14.9 G/DL (ref 12–16)
LYMPHOCYTES # BLD: 2.3 K/UL (ref 0.9–3.6)
LYMPHOCYTES NFR BLD: 47 % (ref 21–52)
MAGNESIUM SERPL-MCNC: 2.9 MG/DL (ref 1.6–2.6)
MCH RBC QN AUTO: 27.3 PG (ref 24–34)
MCHC RBC AUTO-ENTMCNC: 31.6 G/DL (ref 31–37)
MCV RBC AUTO: 86.3 FL (ref 74–97)
MONOCYTES # BLD: 0.5 K/UL (ref 0.05–1.2)
MONOCYTES NFR BLD: 10 % (ref 3–10)
NEUTS SEG # BLD: 2 K/UL (ref 1.8–8)
NEUTS SEG NFR BLD: 40 % (ref 40–73)
PLATELET # BLD AUTO: 217 K/UL (ref 135–420)
PMV BLD AUTO: 9.7 FL (ref 9.2–11.8)
POTASSIUM SERPL-SCNC: 4.6 MMOL/L (ref 3.5–5.5)
RBC # BLD AUTO: 5.46 M/UL (ref 4.2–5.3)
SODIUM SERPL-SCNC: 142 MMOL/L (ref 136–145)
WBC # BLD AUTO: 5 K/UL (ref 4.6–13.2)

## 2021-05-30 PROCEDURE — 80048 BASIC METABOLIC PNL TOTAL CA: CPT

## 2021-05-30 PROCEDURE — 74011250636 HC RX REV CODE- 250/636: Performed by: INTERNAL MEDICINE

## 2021-05-30 PROCEDURE — 2709999900 HC NON-CHARGEABLE SUPPLY

## 2021-05-30 PROCEDURE — 36415 COLL VENOUS BLD VENIPUNCTURE: CPT

## 2021-05-30 PROCEDURE — 65310000000 HC RM PRIVATE REHAB

## 2021-05-30 PROCEDURE — 74011250636 HC RX REV CODE- 250/636: Performed by: FAMILY MEDICINE

## 2021-05-30 PROCEDURE — 85025 COMPLETE CBC W/AUTO DIFF WBC: CPT

## 2021-05-30 PROCEDURE — 83735 ASSAY OF MAGNESIUM: CPT

## 2021-05-30 PROCEDURE — 74011250637 HC RX REV CODE- 250/637: Performed by: INTERNAL MEDICINE

## 2021-05-30 RX ORDER — SODIUM CHLORIDE 9 MG/ML
1000 INJECTION, SOLUTION INTRAVENOUS
Status: DISCONTINUED | OUTPATIENT
Start: 2021-05-30 | End: 2021-05-31 | Stop reason: SDUPTHER

## 2021-05-30 RX ADMIN — Medication 100 MG: at 08:37

## 2021-05-30 RX ADMIN — ATORVASTATIN CALCIUM 80 MG: 40 TABLET, FILM COATED ORAL at 08:38

## 2021-05-30 RX ADMIN — ASPIRIN 81 MG: 81 TABLET, CHEWABLE ORAL at 17:02

## 2021-05-30 RX ADMIN — CLOPIDOGREL BISULFATE 75 MG: 75 TABLET ORAL at 08:38

## 2021-05-30 RX ADMIN — AMLODIPINE BESYLATE 10 MG: 10 TABLET ORAL at 08:37

## 2021-05-30 RX ADMIN — HEPARIN SODIUM 5000 UNITS: 5000 INJECTION INTRAVENOUS; SUBCUTANEOUS at 21:34

## 2021-05-30 RX ADMIN — HEPARIN SODIUM 5000 UNITS: 5000 INJECTION INTRAVENOUS; SUBCUTANEOUS at 06:53

## 2021-05-30 RX ADMIN — HEPARIN SODIUM 5000 UNITS: 5000 INJECTION INTRAVENOUS; SUBCUTANEOUS at 13:28

## 2021-05-30 RX ADMIN — SODIUM CHLORIDE 1000 ML: 900 INJECTION, SOLUTION INTRAVENOUS at 17:08

## 2021-05-30 RX ADMIN — Medication 3 MG: at 21:34

## 2021-05-30 NOTE — ROUTINE PROCESS
SHIFT CHANGE NOTE FOR [place]    Bedside and Verbal shift change report given to Matt Biggs RN (oncoming nurse) by Haile Blanco RN   (offgoing nurse). Report included the following information SBAR, Kardex, MAR and Recent Results.     Situation:   Code Status: Full Code   Reason for Admission: CVA  Hospital Day: 3   Problem List:   Hospital Problems  Date Reviewed: 5/28/2021        Codes Class Noted POA    High serum magnesium ICD-10-CM: R79.89  ICD-9-CM: 790.99  5/28/2021 Yes        Hypertensive heart and kidney disease without heart failure and with stage 3b chronic kidney disease (HCC) (Chronic) ICD-10-CM: I13.10, N18.32  ICD-9-CM: 404.90, 585.3  Unknown Yes        Mixed hyperlipidemia (Chronic) ICD-10-CM: E78.2  ICD-9-CM: 272.2  Unknown Yes        Constipation (Chronic) ICD-10-CM: K59.00  ICD-9-CM: 564.00  Unknown Yes        Current use of aspirin ICD-10-CM: Z79.82  ICD-9-CM: V58.66  5/23/2021 Yes        On clopidogrel therapy ICD-10-CM: Z79.01  ICD-9-CM: V58.61  5/23/2021 Yes        On statin therapy due to risk of future cardiovascular event ICD-10-CM: Z79.899  ICD-9-CM: V58.69  5/22/2021 Yes    Overview Signed 5/27/2021  2:27 PM by Leela Bucio MD     On Atorvastatin             * (Principal) Acute lacunar stroke [facility]) ICD-10-CM: I63.81  ICD-9-CM: 434.91  5/21/2021 Yes    Overview Signed 5/27/2021  2:15 PM by Leela Bucio MD     Acute Lacunar Stroke (acute lacunar infarct in the posterior left lentiform nucleus extending into the corona radiata) with residual right hemiparesis             Impaired mobility and ADLs ICD-10-CM: Z74.09, Z78.9  ICD-9-CM: V49.89  5/21/2021 Yes        Hemiparesis of right dominant side due to acute cerebrovascular disease ([facility] ICD-10-CM: I67.89, G81.91  ICD-9-CM: 436, 342.91  5/21/2021 Yes              Background:   Past Medical History:   Past Medical History:   Diagnosis Date    Abnormal mammogram 2014    left with biopsy    [facility] 5/21/2021 Acute Lacunar Stroke (acute lacunar infarct in the posterior left lentiform nucleus extending into the corona radiata) with residual right hemiparesis    Bacterial vaginosis 1/9/15    Dr Ofelia Barksdale Constipation     Current use of aspirin 5/23/2021    Gastroesophageal reflux disease 6/30/2016    Hemiparesis of right dominant side due to acute cerebrovascular disease ([address] [facility] 5/21/2021    History of Helicobacter pylori infection 7/24/2015    History of iron deficiency anemia 06/2014    History of vitamin D deficiency 6/11/2015    Hot flashes 1/9/15    Dr Ofelia Barksdale On clopidogrel therapy 5/23/2021    On statin therapy due to risk of future cardiovascular event 5/22/2021    On Atorvastatin    [facility]     Stage 3b chronic kidney disease ([facility] 7/24/2015    Tobacco use disorder       Patient taking anticoagulants yes    Patient has a defibrillator: no     Assessment:   Changes in Assessment throughout shift: none    Patient has central line: no  I     Last Vitals:     Vitals:    05/29/21 0738 05/29/21 1549 05/29/21 2155 05/30/21 0735   BP: (!) 146/94 (!) 153/96 (!) 153/90 (!) 147/98   Pulse: 80 91 82 78   Resp: 20 18 18 17   Temp: 97.4 °F (36.3 °C) 98.1 °F (36.7 °C) 97.3 °F (36.3 °C) (!) 96.5 °F (35.8 °C)   SpO2: 98% 100% 99% 96%   Weight:       Height:       LMP: 10/15/2014        PAIN    Pain Assessment    Pain Intensity 1: 0 (05/30/21 1600) Pain Intensity 1: 2 (12/29/14 1105)    Pain Location 1: Arm Pain Location 1: Abdomen    Pain Intervention(s) 1: Medication (see MAR) Pain Intervention(s) 1: Medication (see MAR)  Patient Stated Pain Goal: 0 Patient Stated Pain Goal: 0  o Intervention effective: no pain reported  o Other actions taken for pain: no pain reported     Skin Assessment  Skin color    Condition/Temperature    Integrity    Turgor    Weekly Pressure Ulcer Documentation  Pressure  Injury Documentation: No Pressure Injury Noted-Pressure Ulcer Prevention Initiated  Wound Prevention & Protection Methods  Orientation of wound Orientation of Wound Prevention: Posterior  Location of Prevention Location of Wound Prevention: Sacrum/Coccyx  Dressing Present Dressing Present : No  Dressing Status    Wound Offloading Wound Offloading (Prevention Methods): Bed, pressure reduction mattress     INTAKE/OUPUT  Date 05/29/21 1900 - 05/30/21 0659 05/30/21 0700 - 05/31/21 0659   Shift 2529-7672 24 Hour Total 4950-6171 7117-3595 24 Hour Total   INTAKE   Shift Total(mL/kg)        OUTPUT   Urine(mL/kg/hr)          Urine Occurrence(s)  3 x 3 x  3 x   Stool          Stool Occurrence(s)  0 x 0 x  0 x   Shift Total(mL/kg)        NET        Weight (kg) 84.4 84.4 84.4 84.4 84.4       Recommendations:  1. Patient needs and requests: Assist with toileting    2. Diet: Cardiac & thin liquids    3. Pending tests/procedures: none  4. .     5. Functional Level/Equipment: W/C with min assist    6. Estimated Discharge Date: TBD Posted on Whiteboard in Patients Room: no       [facility] Safety Check    A safety check occurred in the patient's room between off going nurse and oncoming nurse listed above. The safety check included the below items  Area Items   H  High Alert Medications - Verify all high alert medication drips (heparin, PCA, etc.)   E  Equipment - Suction is set up for ALL patients (with yanker)  - Red plugs utilized for all equipment (IV pumps, etc.)  - WOWs wiped down at end of shift.  - Room stocked with oxygen, suction, and other unit-specific supplies   A  Alarms - Bed alarm is set for fall risk patients  - Ensure chair alarm is in place and activated if patient is up in a chair   L  Lines - Check IV for any infiltration  - Rollins bag is empty if patient has a Rollins   - Tubing and IV bags are labeled   S  Safety   - Room is clean, patient is clean, and equipment is clean. - Hallways are clear from equipment besides carts.    - Fall bracelet on for fall risk patients  - Ensure room is clear and free of clutter  - Suction is set up for ALL patients (with blessing)  - Hallways are clear from equipment besides carts.    - Isolation precautions followed, supplies available outside room, sign posted

## 2021-05-30 NOTE — PROGRESS NOTES
Progress Note    Patient: Hansel Rosales MRN: [id number]  CSN: [id number]    YOB: 1960  Age: 62 y.o. Sex: female    DOA: 5/27/2021 LOS:  LOS: 3 days                    Subjective:     Primary Rehab Diagnosis: Impaired Mobility and ADLs secondary to Acute Lacunar Stroke (acute lacunar infarct in the posterior left lentiform nucleus extending into the corona radiata) with residual right hemiparesis    Patient seen and examined    Review of systems  General: No fevers or chills. Cardiovascular: No chest pain or pressure. Pulmonary: No shortness of breath, cough or wheeze. Gastrointestinal: No abdominal pain, nausea, vomiting or diarrhea. Genitourinary: Nodysuria. Musculoskeletal: No joint or muscle pain, no back pain, no recent trauma. Neurologic: No headache, Rt sided weakness      Objective:     Physical Exam:  Visit Vitals  BP (!) 147/98 (BP 1 Location: Right upper arm, BP Patient Position: At rest)   Pulse 78   Temp (!) 96.5 °F (35.8 °C)   Resp 17   Ht 5' 7\" (1.702 m)   Wt 84.4 kg (186 lb)   SpO2 96%   Breastfeeding No   BMI 29.13 kg/m²        General:         Alert,no acute distress    HEENT: NC, Atraumatic. PERRLA, anicteric sclerae. Lungs: CTA Bilaterally. No Wheezing/Rhonchi/Rales. Heart:  Regular  rhythm,  No murmur, No Rubs, No Gallops  Abdomen: Soft, Non distended, Non tender.    Extremities: No lower limb edema   Psych:   Not anxious or agitated. Neurologic:  CN 2-12 grossly intact, Alert and oriented X 3. No gross sensory deficit.  Motor strength is 4 to 4+/5 on the RUE and RLE, 5/5 on the LUE and LLE.     Intake and Output:  Current Shift:  No intake/output data recorded. Last three shifts:  No intake/output data recorded.     Labs: Results:       Chemistry Recent Labs     05/30/21  0706 05/28/21  0613   GLU 94 97    142   K 4.6 4.9   * 109   CO2 23 25   BUN 37* 36*   CREA 2.25* 2.47*   CA 9.3 9.3   AGAP 6 8   BUCR 16 15      CBC w/Diff Recent Labs 05/30/21  0706 05/28/21  0613   WBC 5.0 5.6   RBC 5.46* 5.57*   HGB 14.9 15.8   HCT 47.1* 48.6*    230   GRANS 40 38*   LYMPH 47 49   EOS 3 2      Cardiac Enzymes No results for input(s): CPK, CKND1, GABBY in the last 72 hours. No lab exists for component: CKRMB, TROIP   Coagulation No results for input(s): PTP, INR, APTT, INREXT, INREXT in the last 72 hours. Lipid Panel Lab Results   Component Value Date/Time    Cholesterol, total 188 06/25/2016 12:00 AM    HDL Cholesterol 30 (L) 06/25/2016 12:00 AM    LDL, calculated 106 (H) 06/25/2016 12:00 AM    VLDL, calculated 52 (H) 06/25/2016 12:00 AM    Triglyceride 260 (H) 06/25/2016 12:00 AM    CHOL/HDL Ratio 5.6 (H) 06/11/2015 09:01 AM      BNP No results for input(s): BNPP in the last 72 hours. Liver Enzymes No results for input(s): TP, ALB, TBIL, AP in the last 72 hours.     No lab exists for component: SGOT, GPT, DBIL   Thyroid Studies Lab Results   Component Value Date/Time    TSH 0.942 06/25/2016 12:00 AM          Procedures/imaging: see electronic medical records for all procedures/Xrays and details which were not copied into this note but were reviewed prior to creation of Plan    Medications:   Current Facility-Administered Medications   Medication Dose Route Frequency    0.9% sodium chloride infusion 1,000 mL  1,000 mL IntraVENous PCD    hydrALAZINE (APRESOLINE) tablet 10 mg  10 mg Oral TID PRN    nicotine (NICODERM CQ) 14 mg/24 hr patch 1 Patch  1 Patch TransDERmal DAILY    acetaminophen (TYLENOL) tablet 650 mg  650 mg Oral Q4H PRN    bisacodyL (DULCOLAX) tablet 10 mg  10 mg Oral Q48H PRN    heparin (porcine) injection 5,000 Units  5,000 Units SubCUTAneous Q8H    aspirin chewable tablet 81 mg  81 mg Oral DAILY WITH DINNER    atorvastatin (LIPITOR) tablet 80 mg  80 mg Oral DAILY    clopidogreL (PLAVIX) tablet 75 mg  75 mg Oral DAILY WITH BREAKFAST    amLODIPine (NORVASC) tablet 10 mg  10 mg Oral DAILY    co-enzyme Q-10 (CO Q-10) capsule 100 mg  100 mg Oral DAILY    melatonin tablet 3 mg  3 mg Oral QHS       Assessment/Plan     Principal Problem:    Acute lacunar stroke ([facility] [address] (5/21/2021)      Overview: Acute Lacunar Stroke (acute lacunar infarct in the posterior left       lentiform nucleus extending into the corona radiata) with residual right       hemiparesis    Active Problems:    Impaired mobility and ADLs (5/21/2021)      Hemiparesis of right dominant side due to acute cerebrovascular disease ([facility] [address] (5/21/2021)      Current use of aspirin (5/23/2021)      On clopidogrel therapy (5/23/2021)      Hypertensive heart and kidney disease without heart failure and with stage 3b chronic kidney disease (HCC) ()      Mixed hyperlipidemia ()      On statin therapy due to risk of future cardiovascular event (5/22/2021)      Overview:  On Atorvastatin      Constipation ()      High serum magnesium (5/28/2021)    Plan    Acute lacunar stroke/hemiparesis right dominant side due to CVA/moderate stenosis of left MCA  Dual antiplatelet for 21 days  Aspirin 81 mg once a day  Plavix 75 mg once a day  PT and OT    Hyperlipidemia   Lipitor 80 mg  Call to 1000 mg daily    Chronic kidney disease   Will give IV fluid 1 litter x days   Recheck BMP  And mag in AM   Will get nephrology consult before discharge    Hypertension  Norvasc 5 mg once a day  Hydralazine 10 mg TID prn SBP above 160    Gastroesophageal flux disease/treated for H. pylori    BMP in AM  DVT prophylaxis with heparin    Judy Pires MD  5/30/2021 4:16 PM

## 2021-05-30 NOTE — ROUTINE PROCESS
SHIFT CHANGE NOTE FOR Celsa Pham RN (offgoing nurse). Report included the following information SBAR, Kardex, MAR and Recent Results. Situation: 
 Code Status: Full Code Reason for Admission: CVA Hospital Day: 3 Problem List:  
Hospital Problems  Date Reviewed: 5/28/2021 Codes Class Noted POA High serum magnesium ICD-10-CM: R79.89 ICD-9-CM: 790.99  5/28/2021 Yes Hypertensive heart and kidney disease without heart failure and with stage 3b chronic kidney disease (HCC) (Chronic) ICD-10-CM: I13.10, N18.32 
ICD-9-CM: 404.90, 585.3  Unknown Yes Mixed hyperlipidemia (Chronic) ICD-10-CM: M18.3 ICD-9-CM: 272.2  Unknown Yes Constipation (Chronic) ICD-10-CM: K59.00 ICD-9-CM: 564.00  Unknown Yes Current use of aspirin ICD-10-CM: Z79.82 ICD-9-CM: V58.66  5/23/2021 Yes On clopidogrel therapy ICD-10-CM: Z79.01 
ICD-9-CM: V58.61  5/23/2021 Yes On statin therapy due to risk of future cardiovascular event ICD-10-CM: Z79.899 ICD-9-CM: V58.69  5/22/2021 Yes Overview Signed 5/27/2021  2:27 PM by Dylan Ochoa MD  
  On Atorvastatin * (Principal) Acute lacunar stroke [facility]) ICD-10-CM: W83.02 ICD-9-CM: 434.91  5/21/2021 Yes Overview Signed 5/27/2021  2:15 PM by Dylan Ochoa MD  
  Acute Lacunar Stroke (acute lacunar infarct in the posterior left lentiform nucleus extending into the corona radiata) with residual right hemiparesis Impaired mobility and ADLs ICD-10-CM: Z74.09, Z78.9 ICD-9-CM: V49.89  5/21/2021 Yes Hemiparesis of right dominant side due to acute cerebrovascular disease ([facility] [address] ICD-10-CM: I67.89, G81.91 
ICD-9-CM: 436, 342.91  5/21/2021 Yes Background: 
 Past Medical History:  
Past Medical History:  
Diagnosis Date  Abnormal mammogram 2014  
 left with biopsy  Acute lacunar stroke ([facility] [address] 5/21/2021  Acute Lacunar Stroke (acute lacunar infarct in the posterior left lentiform nucleus extending into the corona radiata) with residual right hemiparesis  Bacterial vaginosis 1/9/15 Dr Onelia Lynch  Constipation  Current use of aspirin 5/23/2021  Gastroesophageal reflux disease 6/30/2016  Hemiparesis of right dominant side due to acute cerebrovascular disease ([facility] [address] 5/21/2021  
 History of Helicobacter pylori infection 7/24/2015  History of iron deficiency anemia 06/2014  History of vitamin D deficiency 6/11/2015  Hot flashes 1/9/15 Dr Onelia Lynch  On clopidogrel therapy 5/23/2021  On statin therapy due to risk of future cardiovascular event 5/22/2021 On Atorvastatin  Rheumatoid arthritis ([facility] [address]  Stage 3b chronic kidney disease ([facility] 7/24/2015  Tobacco use disorder Patient taking anticoagulants yes Patient has a defibrillator: no  
 
Assessment: 
 Changes in Assessment throughout shift: none Patient has central line: no I   
 Last Vitals: 
  
Vitals:  
 05/28/21 2145 05/29/21 2261 05/29/21 1549 05/29/21 2155 BP: (!) 141/97 (!) 146/94 (!) 153/96 (!) 153/90 Pulse: 97 80 91 82 Resp: 18 20 18 18 Temp: 97 °F (36.1 °C) 97.4 °F (36.3 °C) 98.1 °F (36.7 °C) 97.3 °F (36.3 °C) SpO2: 100% 98% 100% 99% Weight:      
Height:      
LMP: 10/15/2014  PAIN Pain Assessment Pain Intensity 1: 0 (05/29/21 2020) Pain Intensity 1: 2 (12/29/14 1105) Pain Location 1: Abdomen Pain Intervention(s) 1: Medication (see MAR) Patient Stated Pain Goal: 0 Patient Stated Pain Goal: 0 
o Intervention effective: no pain reported 
o Other actions taken for pain: no pain reported  Skin Assessment Skin color Condition/Temperature Integrity Turgor Weekly Pressure Ulcer Documentation  Pressure  Injury Documentation: No Pressure Injury Noted-Pressure Ulcer Prevention Initiated Wound Prevention & Protection Methods Orientation of wound Orientation of Wound Prevention: Posterior Location of Prevention Location of Wound Prevention: Buttocks, Sacrum/Coccyx Dressing Present Dressing Present : No 
Dressing Status Wound Offloading Wound Offloading (Prevention Methods): Bed, pressure redistribution/air  INTAKE/OUPUT Date 05/29/21 0700 - 05/30/21 9019 05/30/21 0700 - 05/31/21 1610 Shift 0415-0641 5505-9183 24 Hour Total 9944-7307 5508-1633 24 Hour Total  
INTAKE Shift Total(mL/kg) OUTPUT Urine(mL/kg/hr) Urine Occurrence(s) 3 x  3 x Stool Stool Occurrence(s) 0 x  0 x Shift Total(mL/kg) NET Weight (kg) 84.4 84.4 84.4 84.4 84.4 84.4 Recommendations: 1. Patient needs and requests: Assist with toileting 2. Diet: Cardiac & thin liquids 3. Pending tests/procedures: Labs 
4. .  
 
5. Functional Level/Equipment: W/C with min assist 
 
6. Estimated Discharge Date: TBD Posted on Whiteboard in Patients Room: no  
 
 
[facility] [place] Safety Check A safety check occurred in the patient's room between off going nurse and oncoming nurse listed above. The safety check included the below items Area Items H High Alert Medications - Verify all high alert medication drips (heparin, PCA, etc.) E Equipment - Suction is set up for ALL patients (with blessing) - Red plugs utilized for all equipment (IV pumps, etc.) - WOWs wiped down at end of shift. 
- Room stocked with oxygen, suction, and other unit-specific supplies A Alarms - Bed alarm is set for fall risk patients - Ensure chair alarm is in place and activated if patient is up in a chair L Lines - Check IV for any infiltration - Rollins bag is empty if patient has a Rollins - Tubing and IV bags are labeled [place] Safety - Room is clean, patient is clean, and equipment is clean. - Hallways are clear from equipment besides carts. - Fall bracelet on for fall risk patients - Ensure room is clear and free of clutter - Suction is set up for ALL patients (with blessing) - Hallways are clear from equipment besides carts.   
- Isolation precautions followed, supplies available outside room, sign posted

## 2021-05-30 NOTE — PROGRESS NOTES
Problem: Self Care Deficits Care Plan (Adult)  Goal: *Acute Goals and Plan of Care (Insert Text)  Description: Occupational Therapy Goals   Long Term Goals  Initiated 2021 and to be accomplished within 4 week(s) 2021    1. Patient will perform grooming with modified independence using adaptive equipment as needed. 2. Pt will perform UB bathing with Mod I.  3. Pt will perform LB bathing with Mod I.  4. Pt will perform tub/shower transfer with Mod I using DME. 5. Pt will perform UB dressing with Mod I.  6. Patient will perform upper body dressing and lower body dressing with modified independence. 7. Patient will perform all aspects of toileting with modified independence. 8. Patient will perform toilet transfers with modified independence using RW. Short Term Goals   Initiated 2021 and to be accomplished within 7 day(s) 2021    1. Pt will perform grooming with Supv using adaptive equipment as needed. 2. Pt will perform UB bathing with Supv.  3. Pt will perform LB bathing with Supv.  4. Pt will perform tub/shower transfer with SBA using DME. 5. Pt will perform UB dressing with Supv.  6. Pt will perform LB dressing with Supv.  7. Pt will perform toileting task with SBA. 8. Pt will perform toilet transfer with SBA using RW. Outcome: Progressing Towards Goal  Occupational Therapy TREATMENT    Patient: Latha Yap   62 y.o. Patient identified with name and : yes    Date: 2021    First Tx Session  Time In: 1200  Time Out: 200    Second Tx Session  Time In:  Time Out:    Diagnosis: Acute lacunar stroke [facility]) [I63.81]   Precautions: Fall, Skin  Chart, occupational therapy assessment, plan of care, and goals were reviewed. Pain:  Pt reports 0/10 pain or discomfort prior to treatment. Pt reports 0/10 pain or discomfort post treatment.    Intervention Provided:       SUBJECTIVE:   Patient stated .    OBJECTIVE DATA SUMMARY:     THERAPEUTIC ACTIVITY Daily Assessment Right hand strengthening with hoop and loop velcro board tools, pt participated well with right hand grasp, supination and pronation for increasing right hand strength, coordination and dexterity for improved ADL and functional tasks and functional transfers. THERAPEUTIC EXERCISE Daily Assessment    Patient participated yellow resistive  theraputty hand exercises to increase right hand strength, coordination and dexterity. Following education with demonstration, pt performed return demonstration with good accuracy for removing of small beads from putty incorporating pincer pinch and three jaw germania pinch, pincer pinch with each digit opposed to thumb and extensor stretch of all digits and thumb, pt tolerated well. MOBILITY/TRANSFERS Daily Assessment    Bed mobility: supv supine <-> sit edge of bed. Functional transfer: Min A sit to stand and Min A stand step using RW from edge of bed <-> w/c, vc's for correct right hand positioing to push up from bed surface and w/c arm rest during sit to stand and correct right hand grasp on RW during functional transfers. ASSESSMENT:  First tx session: Group tx x 2 for UE there ex to increase UB strength and functional activity tolerance for improved ADL performance and functional mobility: UB bike x 15 mins w/ 4 rest breaks, pt tolerated well. Patient sitting up in w/c at end of tx session, call bell within reach & pt verbalized understanding following repetition with education for how to utilize for assist e.g. functional transfers in order to prevent falls, w/c alarm intact. Second tx session: THERE EX and THERE act performed seated w/c level at tabletop addressing strengthening of affected right hand. Patient reports she felt like her affected right hand was getting stronger and tolerated there ex and there act well with report of muscle fatigue, no c/o pain and/or discomfort.  Patient lying semi-reclined in bed at end of tx session, call bell within reach & pt verbalized understanding following repetition with education for how to utilize for assist e.g. functional transfers in order to prevent falls, bed alarm intact. Progression toward goals:  [x]          Improving appropriately and progressing toward goals  []          Improving slowly and progressing toward goals  []          Not making progress toward goals and plan of care will be adjusted     PLAN:  Patient continues to benefit from skilled intervention to address the above impairments. Continue treatment per established plan of care. Discharge Recommendations:  Home Health w/ 24/7 supv and assist e.g. ADLs  Further Equipment Recommendations for Discharge:  bedside commode, tub bench, grab bars, rolling walker, and wheelchair      Activity Tolerance:  fair    Estimated LOS:1-2 weeks    Please refer to the flowsheet for vital signs taken during this treatment. After treatment:   [x]  Patient left in no apparent distress sitting up in chair   []  Patient left in no apparent distress in bed  [x]  Call bell left within reach  [x]  Nursing notified  []  Caregiver present  [x]  chair alarm activated    COMMUNICATION/EDUCATION:   [x] Home safety education was provided and the patient/caregiver indicated understanding. [x] Patient/family have participated as able in goal setting and plan of care. [x] Patient/family agree to work toward stated goals and plan of care. [] Patient understands intent and goals of therapy, but is neutral about his/her participation. [] Patient is unable to participate in goal setting and plan of care.       Duane Stacy

## 2021-05-30 NOTE — PROGRESS NOTES
Problem: Pressure Injury - Risk of  Goal: *Prevention of pressure injury  Description: Document John Scale and appropriate interventions in the flowsheet. Outcome: Progressing Towards Goal  Note: Pressure Injury Interventions:  Sensory Interventions: Assess changes in LOC    Moisture Interventions: Absorbent underpads    Activity Interventions: Chair cushion, Increase time out of bed, Pressure redistribution bed/mattress(bed type)    Mobility Interventions: Chair cushion, HOB 30 degrees or less, Pressure redistribution bed/mattress (bed type)    Nutrition Interventions: Document food/fluid/supplement intake    Friction and Shear Interventions: HOB 30 degrees or less                Problem: Patient Education: Go to Patient Education Activity  Goal: Patient/Family Education  Outcome: Progressing Towards Goal     Problem: Falls - Risk of  Goal: *Absence of Falls  Description: Document Leigh Ann Fall Risk and appropriate interventions in the flowsheet.   Outcome: Progressing Towards Goal  Note: Fall Risk Interventions:  Mobility Interventions: Bed/chair exit alarm, Patient to call before getting OOB         Medication Interventions: Patient to call before getting OOB    Elimination Interventions: Bed/chair exit alarm, Call light in reach, Patient to call for help with toileting needs    History of Falls Interventions: Bed/chair exit alarm, Evaluate medications/consider consulting pharmacy         Problem: Patient Education: Go to Patient Education Activity  Goal: Patient/Family Education  Outcome: Progressing Towards Goal     Problem: Patient Education: Go to Patient Education Activity  Goal: Patient/Family Education  Outcome: Progressing Towards Goal     Problem: Patient Education: Go to Patient Education Activity  Goal: Patient/Family Education  Outcome: Progressing Towards Goal     Problem: Patient Education: Go to Patient Education Activity  Goal: Patient/Family Education  Outcome: Progressing Towards Goal

## 2021-05-31 PROBLEM — E55.9 VITAMIN D DEFICIENCY: Chronic | Status: ACTIVE | Noted: 2021-05-31

## 2021-05-31 PROBLEM — N25.81 SECONDARY HYPERPARATHYROIDISM OF RENAL ORIGIN (HCC): Chronic | Status: ACTIVE | Noted: 2021-05-31

## 2021-05-31 LAB
25(OH)D3 SERPL-MCNC: 10.5 NG/ML (ref 30–100)
ALBUMIN SERPL-MCNC: 3.5 G/DL (ref 3.4–5)
ANION GAP SERPL CALC-SCNC: 8 MMOL/L (ref 3–18)
APPEARANCE UR: CLEAR
BACTERIA URNS QL MICRO: ABNORMAL /HPF
BILIRUB UR QL: NEGATIVE
BUN SERPL-MCNC: 36 MG/DL (ref 7–18)
BUN/CREAT SERPL: 17 (ref 12–20)
CALCIUM SERPL-MCNC: 9.1 MG/DL (ref 8.5–10.1)
CALCIUM SERPL-MCNC: 9.4 MG/DL (ref 8.5–10.1)
CHLORIDE SERPL-SCNC: 110 MMOL/L (ref 100–111)
CK SERPL-CCNC: 107 U/L (ref 26–192)
CO2 SERPL-SCNC: 22 MMOL/L (ref 21–32)
COLOR UR: YELLOW
CREAT SERPL-MCNC: 2.15 MG/DL (ref 0.6–1.3)
CREAT UR-MCNC: 93 MG/DL (ref 30–125)
CREAT UR-MCNC: 93 MG/DL (ref 30–125)
EPITH CASTS URNS QL MICRO: ABNORMAL /LPF (ref 0–5)
GLUCOSE SERPL-MCNC: 100 MG/DL (ref 74–99)
GLUCOSE UR STRIP.AUTO-MCNC: NEGATIVE MG/DL
HCT VFR BLD AUTO: 44.1 % (ref 35–45)
HGB BLD-MCNC: 14.5 G/DL (ref 12–16)
HGB UR QL STRIP: NEGATIVE
KETONES UR QL STRIP.AUTO: NEGATIVE MG/DL
LEUKOCYTE ESTERASE UR QL STRIP.AUTO: NEGATIVE
MAGNESIUM SERPL-MCNC: 2.6 MG/DL (ref 1.6–2.6)
MICROALBUMIN UR-MCNC: 15.3 MG/DL (ref 0–3)
MICROALBUMIN/CREAT UR-RTO: 165 MG/G (ref 0–30)
NITRITE UR QL STRIP.AUTO: NEGATIVE
PH UR STRIP: 5 [PH] (ref 5–8)
PHOSPHATE SERPL-MCNC: 4.1 MG/DL (ref 2.5–4.9)
PLATELET # BLD AUTO: 207 K/UL (ref 135–420)
POTASSIUM SERPL-SCNC: 4.6 MMOL/L (ref 3.5–5.5)
PROT UR STRIP-MCNC: 30 MG/DL
PROT UR-MCNC: 31 MG/DL
PTH-INTACT SERPL-MCNC: 276.1 PG/ML (ref 18.4–88)
RBC #/AREA URNS HPF: ABNORMAL /HPF (ref 0–5)
SODIUM SERPL-SCNC: 140 MMOL/L (ref 136–145)
SODIUM UR-SCNC: 52 MMOL/L (ref 20–110)
SP GR UR REFRACTOMETRY: 1.01 (ref 1–1.03)
UROBILINOGEN UR QL STRIP.AUTO: 0.2 EU/DL (ref 0.2–1)
WBC URNS QL MICRO: ABNORMAL /HPF (ref 0–5)

## 2021-05-31 PROCEDURE — 82570 ASSAY OF URINE CREATININE: CPT

## 2021-05-31 PROCEDURE — 74011250637 HC RX REV CODE- 250/637: Performed by: INTERNAL MEDICINE

## 2021-05-31 PROCEDURE — 82043 UR ALBUMIN QUANTITATIVE: CPT

## 2021-05-31 PROCEDURE — 97116 GAIT TRAINING THERAPY: CPT

## 2021-05-31 PROCEDURE — 82550 ASSAY OF CK (CPK): CPT

## 2021-05-31 PROCEDURE — 83735 ASSAY OF MAGNESIUM: CPT

## 2021-05-31 PROCEDURE — 65310000000 HC RM PRIVATE REHAB

## 2021-05-31 PROCEDURE — 85018 HEMOGLOBIN: CPT

## 2021-05-31 PROCEDURE — 83970 ASSAY OF PARATHORMONE: CPT

## 2021-05-31 PROCEDURE — 36415 COLL VENOUS BLD VENIPUNCTURE: CPT

## 2021-05-31 PROCEDURE — 97530 THERAPEUTIC ACTIVITIES: CPT

## 2021-05-31 PROCEDURE — 84300 ASSAY OF URINE SODIUM: CPT

## 2021-05-31 PROCEDURE — 84156 ASSAY OF PROTEIN URINE: CPT

## 2021-05-31 PROCEDURE — 74011250636 HC RX REV CODE- 250/636: Performed by: INTERNAL MEDICINE

## 2021-05-31 PROCEDURE — 97110 THERAPEUTIC EXERCISES: CPT

## 2021-05-31 PROCEDURE — 80069 RENAL FUNCTION PANEL: CPT

## 2021-05-31 PROCEDURE — 85049 AUTOMATED PLATELET COUNT: CPT

## 2021-05-31 PROCEDURE — 82306 VITAMIN D 25 HYDROXY: CPT

## 2021-05-31 PROCEDURE — 2709999900 HC NON-CHARGEABLE SUPPLY

## 2021-05-31 PROCEDURE — 97150 GROUP THERAPEUTIC PROCEDURES: CPT

## 2021-05-31 PROCEDURE — 74011250636 HC RX REV CODE- 250/636: Performed by: FAMILY MEDICINE

## 2021-05-31 PROCEDURE — 92507 TX SP LANG VOICE COMM INDIV: CPT

## 2021-05-31 PROCEDURE — 81001 URINALYSIS AUTO W/SCOPE: CPT

## 2021-05-31 PROCEDURE — 97535 SELF CARE MNGMENT TRAINING: CPT

## 2021-05-31 RX ORDER — SODIUM CHLORIDE 9 MG/ML
1000 INJECTION, SOLUTION INTRAVENOUS
Status: COMPLETED | OUTPATIENT
Start: 2021-05-31 | End: 2021-06-02

## 2021-05-31 RX ORDER — METOPROLOL TARTRATE 25 MG/1
25 TABLET, FILM COATED ORAL EVERY 12 HOURS
Status: DISCONTINUED | OUTPATIENT
Start: 2021-05-31 | End: 2021-06-11

## 2021-05-31 RX ORDER — CHOLECALCIFEROL (VITAMIN D3) 125 MCG
5000 CAPSULE ORAL DAILY
Status: DISCONTINUED | OUTPATIENT
Start: 2021-06-01 | End: 2021-06-14 | Stop reason: HOSPADM

## 2021-05-31 RX ADMIN — Medication 3 MG: at 21:30

## 2021-05-31 RX ADMIN — CLOPIDOGREL BISULFATE 75 MG: 75 TABLET ORAL at 08:43

## 2021-05-31 RX ADMIN — HEPARIN SODIUM 5000 UNITS: 5000 INJECTION INTRAVENOUS; SUBCUTANEOUS at 13:03

## 2021-05-31 RX ADMIN — Medication 50000 UNITS: at 13:30

## 2021-05-31 RX ADMIN — ASPIRIN 81 MG: 81 TABLET, CHEWABLE ORAL at 17:03

## 2021-05-31 RX ADMIN — METOPROLOL TARTRATE 25 MG: 25 TABLET, FILM COATED ORAL at 13:26

## 2021-05-31 RX ADMIN — ATORVASTATIN CALCIUM 80 MG: 40 TABLET, FILM COATED ORAL at 08:43

## 2021-05-31 RX ADMIN — AMLODIPINE BESYLATE 10 MG: 10 TABLET ORAL at 08:43

## 2021-05-31 RX ADMIN — SODIUM CHLORIDE 1000 ML: 900 INJECTION, SOLUTION INTRAVENOUS at 18:12

## 2021-05-31 RX ADMIN — HEPARIN SODIUM 5000 UNITS: 5000 INJECTION INTRAVENOUS; SUBCUTANEOUS at 06:09

## 2021-05-31 RX ADMIN — METOPROLOL TARTRATE 25 MG: 25 TABLET, FILM COATED ORAL at 21:29

## 2021-05-31 RX ADMIN — Medication 100 MG: at 08:43

## 2021-05-31 RX ADMIN — HEPARIN SODIUM 5000 UNITS: 5000 INJECTION INTRAVENOUS; SUBCUTANEOUS at 21:30

## 2021-05-31 NOTE — PROGRESS NOTES
Problem: Mobility Impaired (Adult and Pediatric)  Goal: *Therapy Goal (Edit Goal, Insert Text)  Description: Physical Therapy Short Term Goals  Initiated 5/28/2021 and to be accomplished within 7 day(s) on 6/4/2021  1. Patient will move from supine to sit and sit to supine , scoot up and down, and roll side to side in bed with supervision/set-up. 2.  Patient will transfer from bed to chair and chair to bed with minimal assistance/contact guard assist using the least restrictive device. 3.  Patient will perform sit to stand with minimal assistance/contact guard assist.  4.  Patient will ambulate with minimal assistance/contact guard assist for 50 feet with the least restrictive device. 5.  Patient will ascend/descend 5 stairs with 1 handrail(s) with moderate assistance . Physical Therapy Long Term Goals  Initiated 5/28/2021 and to be accomplished within 21 day(s) on 6/18/2021  1. Patient will move from supine to sit and sit to supine , scoot up and down, and roll side to side in bed with modified independence. 2.  Patient will transfer from bed to chair and chair to bed with modified independence using the least restrictive device. 3.  Patient will perform sit to stand with modified independence. 4.  Patient will ambulate with modified independence for 150 feet with the least restrictive device. 5.  Patient will ascend/descend 12 stairs with 1 handrail(s) with contact guard assist/standby assistance. Outcome: Progressing Towards Goal    PHYSICAL THERAPY TREATMENT    Patient: Latha Yap (67 y.o. female)  Date: 5/31/2021  Diagnosis: Acute lacunar stroke [facility]) [I63.81] Acute lacunar stroke [facility])  Precautions: Fall, Skin  Chart, physical therapy assessment, plan of care and goals were reviewed. Time In:1130  Time Out:1230    Patient seen for: Gait training;Patient education; Therapeutic exercise;Transfer training; Wheelchair mobility    Pain:  Pt pain was reported as 0/10 pre-treatment.   Pt pain was reported as 0/10 post-treatment. Intervention: N/A    Patient identified with name and : YES    SUBJECTIVE:      Patient reports she is going to work hard because she wants to get out of here. She asks to call her friend who is on the way to lt her know she will be in therapy and to wait in her room. OBJECTIVE DATA SUMMARY:    Objective:     BED/MAT MOBILITY Daily Assessment    Patient performed bed mobility on the mat in the gym with SBA and minimal cuing for attention to right UE while rolling. Rolling Right : 5 (Stand-by assistance)  Rolling Left : 5 (Stand-by assistance)  Supine to Sit : 5 (Stand-by assistance)  Sit to Supine :  (SBA)      TRANSFERS Daily Assessment    Patient performed STS x 10 from w/c, trial of bilateral NDT and right NDT hand placement. Patient is limited with use of hands d/t arthritis however performed safest with right NDT method. Assistance from PTA to maintain pressure through hands on distal right thigh. Mod A for anterior and up boost with max cuing to engage gluts. Patient performed stand step transfers with RW and Mod A x 10 from w/c<>mat table, with manual assistance for NDT hand placement and anterior and up boost during STS portion. Mod cuing for step pattern with turning with RW to prevent scissoring. Patient demonstrating increased right quad control and increased right step clearance today. Transfer Type: Other  Other: stand step with RW  Transfer Assistance : 3 (Moderate assistance ) (d/t STS assistance)  Sit to Stand Assistance: Moderate assistance  Car Transfers: Not tested  Car Type:  (NT)        GAIT Daily Assessment    Gait Description (WDL) Exceptions to WDL    Gait Abnormalities Decreased step clearance; Hemiplegic; Step to gait    Assistive device Gait belt;Walker, rolling    Ambulation assistance - level surface 4 (Minimal assistance)    Distance 20 Feet (ft) (x 2)    Ambulation assistance- uneven surface NT    Comments Patient ambulated in the gym between mat tables with RW and Min A for balance and safety. She demonstrates decreased step clearance and partial step through gait after mod cuing. Patient demonstrating increased right quad control and increased right step clearance today. STEPS/STAIRS Daily Assessment     Steps/Stairs ambulated 0    Assistance Required 0 (Not tested)    Rail Use  (NT)    Comments NT    Curbs/Ramps NT        BALANCE Daily Assessment     Sitting - Static: Good (unsupported)  Sitting - Dynamic: Good (unsupported)  Standing - Static: Fair  Standing - Dynamic : Impaired        WHEELCHAIR MOBILITY Daily Assessment    Patient propels w/c with left rosalba technique from room to gym and gym to room with S and VCs for attention to surroundings as she bumped her right foot rest into the wall  X 2. Able to Propel (ft): 72 feet  Functional Level: 2  Curbs/Ramps Assist Required (FIM Score): 5 (Supervision)  Wheelchair Setup Assist Required : 2 (Maximal assistance)  Wheelchair Management: Manages left brake;Manages right brake (with cuing)        THERAPEUTIC EXERCISES Daily Assessment    Patient performed therex in the gym to increase core and LE strength to allow patient to perform safer transfers and improve gait pattern. Extremity: Both;Right  Exercise Type #1: Supine lower extremity strengthening (Bridging with TrA and GS, R heel slides, R SAQ)  Sets Performed: 1  Reps Performed: 10  Level of Assist: Supervision  Exercise Type #2: Seated lower extremity strengthening (Marching, LAQ, HR/TR)  Sets Performed: 1  Reps Performed: 10       ASSESSMENT:  Patient is making progress towards her goals requiring decreased assistance today and demonstrating improving right quad control and step clearance. She would benefit from continued focus on sit to stands as she demonstrates moderate challenge partially d/t decreased use of hand d/t arthritis as well as decreased BLE strength, Right>Left.    Progression toward goals:  [x]      Improving appropriately and progressing toward goals  []      Improving slowly and progressing toward goals  []      Not making progress toward goals and plan of care will be adjusted      PLAN:  Patient continues to benefit from skilled intervention to address the above impairments. Continue treatment per established plan of care. Discharge Recommendations:  Home Health  Further Equipment Recommendations for Discharge: TBD pending progress      Estimated Discharge Date: 3 weeks    Activity Tolerance:   Fair +  Please refer to the flowsheet for vital signs taken during this treatment.     After treatment:   [] Patient left in no apparent distress in bed  [] Patient left in no apparent distress sitting up in chair  [x] Patient left in no apparent distress in w/c   [] Patient left in no apparent distress dining area  [] Patient left in no apparent distress mobilizing under own power  [x] Call bell left within reach  [] Nursing notified  [] Caregiver present  [] Bed alarm activated   [x] Chair alarm activated      Christine Bailey  5/31/2021

## 2021-05-31 NOTE — PROGRESS NOTES
Problem: Neurolinguistics Impaired (Adult)  Goal: *Speech Goal: (INSERT TEXT)  Description: Long term goals  Patient will:  1. Be oriented x 3 and recall events of the day, supervision. 2. Recall 3 words after 5 minutes, supervision. 3. Name 10-12 items within categories of increasing complexity, supervision. 4. Perform varied word finding tasks with 90% accuracy. 5. Perform problem solving/reasoning tasks with 90% accuracy. 6. Perform basic mathematical calculations, 90% accuracy. Short term goals (by 6/4/21)  Patient will:  1. Be oriented x 3 and recall events of the day, supervision. 2. Recall 3 words after 5 minutes, min cues. 3. Name 10-12 items within concrete categories, supervision. 4. Perform varied word finding tasks with 75-85% accuracy. 5. Perform problem solving/reasoning tasks with 70-80% accuracy. 6. Perform basic mathematical calculations, 70-80% accuracy. Note:   Speech language pathology treatment    Patient: Sasha Valenzuela (89 y.o. female)  Date: 5/31/2021  Diagnosis: Acute lacunar stroke [facility]) [I63.81] Acute lacunar stroke [facility])       Time in: 1530  Time Out:  1600  SUBJECTIVE:   Patient stated Elena Modi worked me hard. OBJECTIVE:   Mental Status:  Mrs. Kaur was awake and alert this afternoon. Treatment & Interventions:   Patient was seen for a thirty minute treatment session this afternoon. The following therapy tasks were presented:  Neuro-Linguistics:   Orientation:   Supervision  Recent memory:  Supervision  Recall 3 words:  Min assist  Basic calculations:  60% accuracy  Discuss advantages:  85% accuracy  Discuss disadvantages: 80% accuracy (same topics as above)  Divergent naming (FRUIT): Patient listed 5, more with cues from the SLP    Response & Tolerance to Activities:  Mrs. Kaur was engaged and cooperative this afternoon. She continues to demonstrate mild cognitive-linguistic deficits.     Pain:  Pain Scale 1: Numeric (0 - 10)  No report of pain     After treatment:   [x]       Patient left in no apparent distress sitting up in chair  []       Patient left in no apparent distress in bed  [x]       Call bell left within reach  []       Nursing notified  []       Caregiver present  []       Bed alarm activated    ASSESSMENT:   Progression toward goals:  [x]       Improving appropriately and progressing toward goals  []       Improving slowly and progressing toward goals  []       Not making progress toward goals and plan of care will be adjusted    PLAN:   Patient continues to benefit from skilled intervention to address the above impairments. Continue treatment per established plan of care. Discharge Recommendations:   To Be Determined    Estimated LOS: 2-3 weeks    MAGGIE Taylor  Time Calculation: 30 mins

## 2021-05-31 NOTE — PROGRESS NOTES
Problem: Self Care Deficits Care Plan (Adult)  Goal: *Acute Goals and Plan of Care (Insert Text)  Description: Occupational Therapy Goals   Long Term Goals  Initiated 2021 and to be accomplished within 4 week(s) 2021    1. Patient will perform grooming with modified independence using adaptive equipment as needed. 2. Pt will perform UB bathing with Mod I.  3. Pt will perform LB bathing with Mod I.  4. Pt will perform tub/shower transfer with Mod I using DME. 5. Pt will perform UB dressing with Mod I.  6. Patient will perform upper body dressing and lower body dressing with modified independence. 7. Patient will perform all aspects of toileting with modified independence. 8. Patient will perform toilet transfers with modified independence using RW. Short Term Goals   Initiated 2021 and to be accomplished within 7 day(s) 2021    1. Pt will perform grooming with Supv using adaptive equipment as needed. 2. Pt will perform UB bathing with Supv.  3. Pt will perform LB bathing with Supv.  4. Pt will perform tub/shower transfer with SBA using DME. 5. Pt will perform UB dressing with Supv.  6. Pt will perform LB dressing with Supv.  7. Pt will perform toileting task with SBA. 8. Pt will perform toilet transfer with SBA using RW. Outcome: Progressing Towards Goal    Occupational Therapy TREATMENT    Patient: Rosi Love   62 y.o. Patient identified with name and : yes    Date: 2021    First Tx Session  Time In: 26  Time Out: 1030    Diagnosis: Acute lacunar stroke [facility]) [I63.81]   Precautions: Fall, Skin  Chart, occupational therapy assessment, plan of care, and goals were reviewed. Pain:  Pt reports 0/10 pain or discomfort prior to treatment. Pt reports 0/10 pain or discomfort post treatment. Intervention Provided:       SUBJECTIVE:   Patient stated I already washed my lower half in the bed, my fiance helped me set up.     OBJECTIVE DATA SUMMARY: THERAPEUTIC ACTIVITY Daily Assessment    Strengthening of right hand with graded resistive clamps (yellow and red) for retrieval of small sponges from tabletop and placement into tall cup to increase fine motor and coordination for improved ADL and functional tasks and functional transfers, pt participated well with no c/o pain and/or discomfort. GROOMING Daily Assessment    Grooming  Grooming Assistance : 6 (Modified independent)  Adaptive Equipment: Adaptive toothbrush  Comments:  (built up handle toothbrush seated edge of bed)     UPPER BODY BATHING Daily Assessment    Upper Body Bathing  Bathing Assistance, Upper: 5 (Supervision) (dep with back)  Upper Body : Marcel technique training  Position Performed: Seated edge of bed  Comments:  (pt will benefit from long hand bath sponge (on order))     LOWER BODY BATHING Daily Assessment    Lower Body Bathing  Bathing Assistance, Lower :  (pt reports already completed long sitting w/ fiance assist)     UPPER BODY DRESSING Daily Assessment    Upper Body Dressing   Dressing Assistance : 5 (Supervision)  Adaptive Equipment:  (hemitechnique)  Comments:  (seated edge of bed)     LOWER BODY DRESSING Daily Assessment    Lower Body Dressing   Dressing Assistance :  (pt reports already completed long sitting w/ fiance assist)     MOBILITY/TRANSFERS Daily Assessment     Bed mobility: Supv supine  to sit edge of bed, Functional transfer: CGA sit to stand, vc's to push up from bed surface, stand step using RW with improved right hand grasp on RW, vc's to reach back for w/c arm rests during stand to sit for slow and safe descent. Tub-shower unit transfer: following instruction and demonstration, pt provided good return demonstration with CGA using RW, tub transfer bench and grab bar.        ASSESSMENT:  Group tx x 2 with instruction provided for arthritic and energy conservation techniques and home safety, visual aide handouts provided to increase accuracy with carryover for safe d/c home, pt participated well and provided good return verbalization for use of ECTs and home safety. Patient sitting up in w/c at end of tx session, call bell within reach & pt verbalized understanding to utilize for assist e.g. functional transfers in order to prevent falls, call bell within reach. Progression toward goals:  [x]          Improving appropriately and progressing toward goals  []          Improving slowly and progressing toward goals  []          Not making progress toward goals and plan of care will be adjusted     PLAN:  Patient continues to benefit from skilled intervention to address the above impairments. Continue treatment per established plan of care. Discharge Recommendations:  Home Health w/ 24/7 supv and assist e.g. ADLs  Further Equipment Recommendations for Discharge:  bedside commode, grab bars, transfer bench, rolling walker, and wheelchair      Activity Tolerance:  Fair    Estimated LOS: 1 week    Please refer to the flowsheet for vital signs taken during this treatment. After treatment:   [x]  Patient left in no apparent distress sitting up in chair   []  Patient left in no apparent distress in bed  [x]  Call bell left within reach  [x]  Nursing notified  []  Caregiver present  [x]  chair alarm activated    COMMUNICATION/EDUCATION:   [x] Home safety education was provided and the patient/caregiver indicated understanding. [x] Patient/family have participated as able in goal setting and plan of care. [x] Patient/family agree to work toward stated goals and plan of care. [] Patient understands intent and goals of therapy, but is neutral about his/her participation. [] Patient is unable to participate in goal setting and plan of care.       Sravanthi Vega

## 2021-05-31 NOTE — PROGRESS NOTES
Consult noted for REHANA on CKD , chart reviewed, orders placed, A/P as below    Patient is a 64-year-old -American female past medical history of hypertension, dyslipidemia, history of CVA with hemiparesis, anemia, history of rheumatoid arthritis on multiple NSAIDs  who is presently admitted at the rehab s/p CVA    HPI  The patient was initially brought to [facility] emergency room for evaluation of recurrent falls due to right-sided weakness. ED of the head showed no acute intracranial abnormality, acute stroke telehealth was done patient was not deemed a candidate for alteplase. On discharge patient was transferred to the rehab unit. It was noted that patient had elevated creatinine in the 2.5 range. Patient's last known creatinine of 1.6 in 2016. Unclear what her baseline creatinine is recently. Nephrology was consulted for concern for REHANA versus advanced CKD    Assessment and plan:    #1 acute kidney injury on chronic kidney disease stage III/IV. Patient's baseline creatinine unclear but is close to 2 range. Appears to be having a mild REHANA, I think it is prerenal as it is improving with hydration. Creatinine is trending down now 2.1 today. Patient also has history of using Sutton 2 inhibitors chronically.   #2 hypertension  #3 history of CVA with right-sided hemiparesis  #4 dyslipidemia  #5 mild proteinuria  #6 rheumatoid arthritis  #7 NSAID use    Plan:    #1 check urine analysis, urine protein creatinine ratio  #2 agree with gentle hydration  #3 encourage p.o. intake  #4 no more NSAIDs  #5 follow renal panels daily  #6 renal ultrasound    Following patient closely, consult note to follow    Please call with questions    Savannah Gates MD [facility]  Cell [phone]  Pager: [phone]

## 2021-05-31 NOTE — PROGRESS NOTES
Progress Note    Patient: Anuja Jean MRN: [id number]  CSN: [id number]    YOB: 1960  Age: 62 y.o. Sex: female    DOA: 5/27/2021 LOS:  LOS: 4 days                    Subjective:     Primary Rehab Diagnosis: Impaired Mobility and ADLs secondary to Acute Lacunar Stroke (acute lacunar infarct in the posterior left lentiform nucleus extending into the corona radiata) with residual right hemiparesis    Patient seen and examined going to rehab this morning   Received IV fluid no new c/o     Review of systems  General: No fevers or chills. Cardiovascular: No chest pain or pressure. Pulmonary: No shortness of breath, cough or wheeze. Gastrointestinal: No abdominal pain, nausea, vomiting or diarrhea. Genitourinary: No dysuria. Musculoskeletal: No joint or muscle pain, no back pain, no recent trauma. Neurologic: No headache, Rt sided weakness      Objective:     Physical Exam:  Visit Vitals  BP (!) 143/87 (BP 1 Location: Left upper arm)   Pulse 78   Temp 97 °F (36.1 °C)   Resp 18   Ht 5' 7\" (1.702 m)   Wt 84.4 kg (186 lb)   SpO2 99%   Breastfeeding No   BMI 29.13 kg/m²        General:         Alert,no acute distress    HEENT: NC, Atraumatic. PERRLA, anicteric sclerae. Lungs: CTA Bilaterally. No Wheezing/Rhonchi/Rales. Heart:  Regular  rhythm,  No murmur, No Rubs, No Gallops  Abdomen: Soft, Non distended, Non tender.    Extremities: No lower limb edema   Psych:   Not anxious or agitated. Neurologic:  CN 2-12 grossly intact, Alert and oriented X 3. No gross sensory deficit.  Motor strength is 4 to 4+/5 on the RUE and RLE, 5/5 on the LUE and LLE.     Intake and Output:  Current Shift:  05/31 0701 - 05/31 1900  In: 250 [P.O.:250]  Out: -   Last three shifts:  No intake/output data recorded.     Labs: Results:       Chemistry Recent Labs     05/31/21  0545 05/30/21  0706   * 94    142   K 4.6 4.6    113*   CO2 22 23   BUN 36* 37*   CREA 2.15* 2.25*   CA 9.4  9.1 9.3 AGAP 8 6   BUCR 17 16   ALB 3.5  --       CBC w/Diff Recent Labs     05/31/21  0545 05/30/21  0706   WBC  --  5.0   RBC  --  5.46*   HGB 14.5 14.9   HCT 44.1 47.1*    217   GRANS  --  40   LYMPH  --  47   EOS  --  3      Cardiac Enzymes No results for input(s): CPK, CKND1, GABBY in the last 72 hours. No lab exists for component: CKRMB, TROIP   Coagulation No results for input(s): PTP, INR, APTT, INREXT, INREXT in the last 72 hours. Lipid Panel Lab Results   Component Value Date/Time    Cholesterol, total 188 06/25/2016 12:00 AM    HDL Cholesterol 30 (L) 06/25/2016 12:00 AM    LDL, calculated 106 (H) 06/25/2016 12:00 AM    VLDL, calculated 52 (H) 06/25/2016 12:00 AM    Triglyceride 260 (H) 06/25/2016 12:00 AM    CHOL/HDL Ratio 5.6 (H) 06/11/2015 09:01 AM      BNP No results for input(s): BNPP in the last 72 hours.    Liver Enzymes Recent Labs     05/31/21  0545   ALB 3.5      Thyroid Studies Lab Results   Component Value Date/Time    TSH 0.942 06/25/2016 12:00 AM          Procedures/imaging: see electronic medical records for all procedures/Xrays and details which were not copied into this note but were reviewed prior to creation of Plan    Medications:   Current Facility-Administered Medications   Medication Dose Route Frequency    0.9% sodium chloride infusion 1,000 mL  1,000 mL IntraVENous PCD    hydrALAZINE (APRESOLINE) tablet 10 mg  10 mg Oral TID PRN    nicotine (NICODERM CQ) 14 mg/24 hr patch 1 Patch  1 Patch TransDERmal DAILY    acetaminophen (TYLENOL) tablet 650 mg  650 mg Oral Q4H PRN    bisacodyL (DULCOLAX) tablet 10 mg  10 mg Oral Q48H PRN    heparin (porcine) injection 5,000 Units  5,000 Units SubCUTAneous Q8H    aspirin chewable tablet 81 mg  81 mg Oral DAILY WITH DINNER    atorvastatin (LIPITOR) tablet 80 mg  80 mg Oral DAILY    clopidogreL (PLAVIX) tablet 75 mg  75 mg Oral DAILY WITH BREAKFAST    amLODIPine (NORVASC) tablet 10 mg  10 mg Oral DAILY    co-enzyme Q-10 (CO Q-10) capsule 100 mg  100 mg Oral DAILY    melatonin tablet 3 mg  3 mg Oral QHS       Assessment/Plan     Principal Problem:    Acute lacunar stroke ([facility] [address] (5/21/2021)      Overview: Acute Lacunar Stroke (acute lacunar infarct in the posterior left       lentiform nucleus extending into the corona radiata) with residual right       hemiparesis    Active Problems:    Impaired mobility and ADLs (5/21/2021)      Hemiparesis of right dominant side due to acute cerebrovascular disease ([address] (5/21/2021)      Current use of aspirin (5/23/2021)      On clopidogrel therapy (5/23/2021)      Hypertensive heart and kidney disease without heart failure and with stage 3b chronic kidney disease (HCC) ()      Mixed hyperlipidemia ()      On statin therapy due to risk of future cardiovascular event (5/22/2021)      Overview: On Atorvastatin      Constipation ()      High serum magnesium (5/28/2021)    Plan    Acute lacunar stroke/hemiparesis right dominant side due to CVA/moderate stenosis of left MCA  Dual antiplatelet for 21 days  Aspirin 81 mg once a day  Plavix 75 mg once a day  PT and OT    Hyperlipidemia   Lipitor 80 mg  Call to 1000 mg daily    Chronic kidney disease   Give another 1 litter  Tonight recheck lab in AM   Recheck BMP  And mag in AM   Discussed with Dr Yumiko Lopez for nephrology consult    Hypertension  Norvasc 5 mg once a day  Hydralazine 10 mg TID prn SBP above 160    Gastroesophageal flux disease/treated for H. pylori    BMP in AM  DVT prophylaxis with heparin    Part of this note was dictated use Dragon medical software. Please excuse errors that have escaped final proofreading.     La Stanford MD  5/31/2021 12:56 PM

## 2021-05-31 NOTE — ROUTINE PROCESS
[facility]    Bedside and Verbal shift change report given to Rj Read, CLAY (oncoming nurse) by Leeta Cushing, RN   (offgoing nurse). Report included the following information SBAR, Kardex, MAR and Recent Results.     Situation:   Code Status: Full Code   Reason for Admission: CVA  Hospital Day: 4   Problem List:   Hospital Problems  Date Reviewed: 5/28/2021        Codes Class Noted POA    Secondary hyperparathyroidism of renal origin ([facility] (Chronic) ICD-10-CM: N25.81  ICD-9-CM: 588.81  5/31/2021 Yes        Vitamin D deficiency (Chronic) ICD-10-CM: E55.9  ICD-9-CM: 268.9  5/31/2021 Yes    Overview Signed 5/31/2021  1:17 PM by Mariaelena Garcia MD     Vitamin D 25-Hydroxy (5/31/2021) = 10.5              High serum magnesium ICD-10-CM: R79.89  ICD-9-CM: 790.99  5/28/2021 Yes        Hypertensive heart and kidney disease without heart failure and with stage 3b chronic kidney disease ([facility] (Chronic) ICD-10-CM: I13.10, N18.32  ICD-9-CM: 404.90, 585.3  Unknown Yes        Mixed hyperlipidemia (Chronic) ICD-10-CM: E78.2  ICD-9-CM: 272.2  Unknown Yes        Constipation (Chronic) ICD-10-CM: K59.00  ICD-9-CM: 564.00  Unknown Yes        Current use of aspirin ICD-10-CM: Z79.82  ICD-9-CM: V58.66  5/23/2021 Yes        On clopidogrel therapy ICD-10-CM: Z79.01  ICD-9-CM: V58.61  5/23/2021 Yes        On statin therapy due to risk of future cardiovascular event ICD-10-CM: Z79.899  ICD-9-CM: V58.69  5/22/2021 Yes    Overview Signed 5/27/2021  2:27 PM by Mariaelena Garcia MD     On Atorvastatin             * (Principal) Acute lacunar stroke [facility]) ICD-10-CM: I63.81  ICD-9-CM: 434.91  5/21/2021 Yes    Overview Signed 5/27/2021  2:15 PM by Mariaelena Garcia MD     Acute Lacunar Stroke (acute lacunar infarct in the posterior left lentiform nucleus extending into the corona radiata) with residual right hemiparesis             Impaired mobility and ADLs ICD-10-CM: Z74.09, Z78.9  ICD-9-CM: V49.89  5/21/2021 Yes        Hemiparesis of right dominant side due to acute cerebrovascular disease [facility]) ICD-10-CM: I67.89, G81.91  ICD-9-CM: 436, 342.91  5/21/2021 Yes              Background:   Past Medical History:   Past Medical History:   Diagnosis Date    Abnormal mammogram 2014    left with biopsy    Acute lacunar stroke ([facility] 5/21/2021    Acute Lacunar Stroke (acute lacunar infarct in the posterior left lentiform nucleus extending into the corona radiata) with residual right hemiparesis    Bacterial vaginosis 1/9/15    Dr Raquel Gaines Constipation     Current use of aspirin 5/23/2021    Gastroesophageal reflux disease 6/30/2016    Hemiparesis of right dominant side due to acute cerebrovascular disease ([facility] 5/21/2021    History of Helicobacter pylori infection 7/24/2015    History of iron deficiency anemia 06/2014    History of vitamin D deficiency 6/11/2015    Hot flashes 1/9/15    Dr Raquel Gaines On clopidogrel therapy 5/23/2021    On statin therapy due to risk of future cardiovascular event 5/22/2021    On Atorvastatin    Rheumatoid arthritis ([facility]     Secondary hyperparathyroidism of renal origin ([facility] 5/31/2021    Stage 3b chronic kidney disease ([facility] 7/24/2015    Tobacco use disorder     Vitamin D deficiency 5/31/2021    Vitamin D 25-Hydroxy (5/31/2021) = 10.5       Patient taking anticoagulants yes    Patient has a defibrillator: no     Assessment:   Changes in Assessment throughout shift: none    Patient has central line: no  I     Last Vitals:     Vitals:    05/30/21 0735 05/30/21 2100 05/31/21 0719 05/31/21 1647   BP: (!) 147/98 (!) 148/93 (!) 143/87 (!) 131/94   Pulse: 78 86 78 95   Resp: 17 18 18 18   Temp: (!) 96.5 °F (35.8 °C) 97.2 °F (36.2 °C) 97 °F (36.1 °C) 97.2 °F (36.2 °C)   SpO2: 96% 100% 99% 95%   Weight:       Height:       LMP: 10/15/2014        PAIN    Pain Assessment    Pain Intensity 1: 0 (05/31/21 1621) Pain Intensity 1: 2 (12/29/14 3772)    Pain Location 1: Arm Pain Location 1: Abdomen    Pain Intervention(s) 1: Medication (see MAR) Pain Intervention(s) 1: Medication (see MAR)  Patient Stated Pain Goal: 0 Patient Stated Pain Goal: 0  o Intervention effective: no pain reported  o Other actions taken for pain: no pain reported     Skin Assessment  Skin color    Condition/Temperature    Integrity    Turgor    Weekly Pressure Ulcer Documentation  Pressure  Injury Documentation: No Pressure Injury Noted-Pressure Ulcer Prevention Initiated  Wound Prevention & Protection Methods  Orientation of wound Orientation of Wound Prevention: Posterior  Location of Prevention Location of Wound Prevention: Buttocks  Dressing Present Dressing Present : No  Dressing Status    Wound Offloading Wound Offloading (Prevention Methods): Bed, pressure redistribution/air     INTAKE/OUPUT  Date 05/30/21 0700 - 05/31/21 0659 05/31/21 0700 - 06/01/21 0659   Shift 2008-1823 1226-4335 24 Hour Total 8122-2574 2772-9329 24 Hour Total   INTAKE   P.O.    500  500     P. O.    500  500   Shift Total(mL/kg)    500(5.9)  500(5.9)   OUTPUT   Urine(mL/kg/hr)           Urine Occurrence(s) 3 x 3 x 6 x 3 x  3 x   Stool           Stool Occurrence(s) 0 x 0 x 0 x 0 x  0 x   Shift Total(mL/kg)         NET    500  500   Weight (kg) 84.4 84.4 84.4 84.4 84.4 84.4       Recommendations:  1. Patient needs and requests: Assist with toileting    2. Diet: Cardiac & thin liquids    3. Pending tests/procedures: none  4. .     5. Functional Level/Equipment: W/C with min assist    6. Estimated Discharge Date: TBD Posted on Whiteboard in Patients Room: [facility] Safety Check    A safety check occurred in the patient's room between off going nurse and oncoming nurse listed above.     The safety check included the below items  Area Items   H  High Alert Medications - Verify all high alert medication drips (heparin, PCA, etc.)   E  Equipment - Suction is set up for ALL patients (with joaquinker)  - Red plugs utilized for all equipment (IV pumps, etc.)  - WOWs wiped down at end of shift.  - Room stocked with oxygen, suction, and other unit-specific supplies   A  Alarms - Bed alarm is set for fall risk patients  - Ensure chair alarm is in place and activated if patient is up in a chair   L  Lines - Check IV for any infiltration  - Rollins bag is empty if patient has a Rollins   - Tubing and IV bags are labeled   S  Safety   - Room is clean, patient is clean, and equipment is clean. - Hallways are clear from equipment besides carts. - Fall bracelet on for fall risk patients  - Ensure room is clear and free of clutter  - Suction is set up for ALL patients (with yanker)  - Hallways are clear from equipment besides carts.    - Isolation precautions followed, supplies available outside room, sign posted

## 2021-05-31 NOTE — REHAB NOTE
[facility] PHYSICAL REHABILITATION  [address] [place] [address]  OVERALL PLAN OF CARE    Name: Jimena Pond CSN: [id number]   Age: 62 y.o. MRN: [id number]   Sex: female Admit Date: 5/27/2021     Primary Rehab Diagnosis  1. Impaired Mobility and ADLs  2. Acute Lacunar Stroke (acute lacunar infarct in the posterior left lentiform nucleus extending into the corona radiata) with residual right hemiparesis    Comorbidities  Patient Active Problem List   Diagnosis Code    Rheumatoid arthritis ([facility] M06.9    History of vitamin D deficiency Z86.39    Cocaine use F14.90    Stage 3b chronic kidney disease (HCC) N18.32    History of Helicobacter pylori infection Z86.19    Abscess of finger L02.519    Gastroesophageal reflux disease K21.9    Acute lacunar stroke (HCC) I63.81    Impaired mobility and ADLs Z74.09, Z78.9    Hemiparesis of right dominant side due to acute cerebrovascular disease (HCC) I67.89, G81.91    Tobacco use disorder F17.200    Current use of aspirin Z79.82    On clopidogrel therapy Z79.01    Hypertensive heart and kidney disease without heart failure and with stage 3b chronic kidney disease (HCC) I13.10, N18.32    Mixed hyperlipidemia E78.2    On statin therapy due to risk of future cardiovascular event Z79.899    Constipation K59.00    History of iron deficiency anemia Z86.2    High serum magnesium R79.89        ANTICIPATED INTERVENTIONS THAT SUPPORT THE MEDICAL NECESSITY OF THIS ADMISSION:    I. Physical Therapy              A. Intensity: 1 hour per day              B. Frequency: 5 times per week              C. Duration: 3 weeks              D. Long Term Goals:    1. Patient will move from supine to sit and sit to supine , scoot up and down, and roll side to side in bed with modified independence. 2.  Patient will transfer from bed to chair and chair to bed with modified independence using the least restrictive device.     3. Patient will perform sit to stand with modified independence. 4.  Patient will ambulate with modified independence for 150 feet with the least restrictive device. 5.  Patient will ascend/descend 12 stairs with 1 handrail(s) with contact guard assist/standby assistance. E. Interventions: Interventions may include range of motion (AROM, PROM B LE/trunk), motor function (B LE/trunk strengthening/coordination), activity tolerance (vitals, oxygen saturation levels), bed mobility training, balance activities, gait training (progressive ambulation program), wheelchair management and functional transfer training. II. Occupational Therapy  [id number]. Intensity: 1 hour per day              B. Frequency: 5 times per week              C. Duration: 2 weeks              D. Long Term Goals:    1. Patient will perform grooming with modified independence using adaptive equipment as needed. 2. Pt will perform UB bathing with Mod I.    3. Pt will perform LB bathing with Mod I.    4. Pt will perform tub/shower transfer with Mod I using DME. 5. Pt will perform UB dressing with Mod I.    6. Patient will perform upper body dressing and lower body dressing with modified independence. 7. Patient will perform all aspects of toileting with modified independence. 8. Patient will perform toilet transfers with modified independence using RW.   E. Interventions: Interventions may include range of motion (AROM, PROM B UE), motor function (B UE/ strengthening/coordination), activity tolerance (vitals, oxygen saturation levels), balance training, ADL/IADL training and functional transfer training. III. Speech Therapy              A. Intensity: 1-2 times per day              B. Frequency: 4-7 times per week              C. Duration: 2-3 weeks              D. Long Term Goals:    1. Be oriented x 3 and recall events of the day, supervision. 2. Recall 3 words after 5 minutes, supervision.     3. Name 10-12 items within categories of increasing complexity, supervision. 4. Perform varied word finding tasks with 90% accuracy. 5. Perform problem solving/reasoning tasks with 90% accuracy. 6. Perform basic mathematical calculations, 90% accuracy. E. Interventions: SLP will follow daily to address memory, reasoning and word retrieval difficulty. PHYSICIAN'S ASSESSMENT OF FINDINGS:    Are the established goals sufficient for achieving the optimal level of function? [x]  Yes      []  No    What changes would you recommend to the goals as written? None      Are the interventions noted sufficient for achieving the optimal level of function? [x]  Yes      []  No    What changes would you recommend to the interventions noted? If therapy staff is unable to provide 3 hr of total therapy per day in 5 days due to medical issues or decreased patient tolerance, may modify treatment schedule to 15 hr/week.       Estimated length of stay: 2 weeks      Medical rehabilitation prognosis:    []  Excellent     [x]  Good     []  Fair     []  Guarded       Discharge Destination:     [x]  Home     []  [address]     []  [place]     []  [address]     []  [place]     []  Other:       Signed:    Doris Viramontes MD    May 29, 2021

## 2021-06-01 LAB
ANION GAP SERPL CALC-SCNC: 8 MMOL/L (ref 3–18)
BUN SERPL-MCNC: 37 MG/DL (ref 7–18)
BUN/CREAT SERPL: 16 (ref 12–20)
CALCIUM SERPL-MCNC: 8.6 MG/DL (ref 8.5–10.1)
CHLORIDE SERPL-SCNC: 111 MMOL/L (ref 100–111)
CO2 SERPL-SCNC: 20 MMOL/L (ref 21–32)
CREAT SERPL-MCNC: 2.27 MG/DL (ref 0.6–1.3)
GLUCOSE SERPL-MCNC: 121 MG/DL (ref 74–99)
POTASSIUM SERPL-SCNC: 4.3 MMOL/L (ref 3.5–5.5)
SODIUM SERPL-SCNC: 139 MMOL/L (ref 136–145)

## 2021-06-01 PROCEDURE — 2709999900 HC NON-CHARGEABLE SUPPLY

## 2021-06-01 PROCEDURE — 97116 GAIT TRAINING THERAPY: CPT

## 2021-06-01 PROCEDURE — 36415 COLL VENOUS BLD VENIPUNCTURE: CPT

## 2021-06-01 PROCEDURE — 97112 NEUROMUSCULAR REEDUCATION: CPT

## 2021-06-01 PROCEDURE — 92507 TX SP LANG VOICE COMM INDIV: CPT

## 2021-06-01 PROCEDURE — 74011250636 HC RX REV CODE- 250/636: Performed by: INTERNAL MEDICINE

## 2021-06-01 PROCEDURE — 99232 SBSQ HOSP IP/OBS MODERATE 35: CPT | Performed by: INTERNAL MEDICINE

## 2021-06-01 PROCEDURE — 74011250636 HC RX REV CODE- 250/636: Performed by: FAMILY MEDICINE

## 2021-06-01 PROCEDURE — 97535 SELF CARE MNGMENT TRAINING: CPT

## 2021-06-01 PROCEDURE — 97530 THERAPEUTIC ACTIVITIES: CPT

## 2021-06-01 PROCEDURE — 97110 THERAPEUTIC EXERCISES: CPT

## 2021-06-01 PROCEDURE — 65310000000 HC RM PRIVATE REHAB

## 2021-06-01 PROCEDURE — 80048 BASIC METABOLIC PNL TOTAL CA: CPT

## 2021-06-01 PROCEDURE — 74011250637 HC RX REV CODE- 250/637: Performed by: INTERNAL MEDICINE

## 2021-06-01 RX ADMIN — AMLODIPINE BESYLATE 10 MG: 10 TABLET ORAL at 08:13

## 2021-06-01 RX ADMIN — HEPARIN SODIUM 5000 UNITS: 5000 INJECTION INTRAVENOUS; SUBCUTANEOUS at 05:14

## 2021-06-01 RX ADMIN — METOPROLOL TARTRATE 25 MG: 25 TABLET, FILM COATED ORAL at 08:13

## 2021-06-01 RX ADMIN — SODIUM CHLORIDE 1000 ML: 900 INJECTION, SOLUTION INTRAVENOUS at 19:13

## 2021-06-01 RX ADMIN — Medication 100 MG: at 08:13

## 2021-06-01 RX ADMIN — Medication 5000 UNITS: at 08:13

## 2021-06-01 RX ADMIN — HEPARIN SODIUM 5000 UNITS: 5000 INJECTION INTRAVENOUS; SUBCUTANEOUS at 21:02

## 2021-06-01 RX ADMIN — ATORVASTATIN CALCIUM 80 MG: 40 TABLET, FILM COATED ORAL at 08:13

## 2021-06-01 RX ADMIN — HEPARIN SODIUM 5000 UNITS: 5000 INJECTION INTRAVENOUS; SUBCUTANEOUS at 13:04

## 2021-06-01 RX ADMIN — Medication 3 MG: at 21:01

## 2021-06-01 RX ADMIN — ASPIRIN 81 MG: 81 TABLET, CHEWABLE ORAL at 16:18

## 2021-06-01 RX ADMIN — CLOPIDOGREL BISULFATE 75 MG: 75 TABLET ORAL at 08:13

## 2021-06-01 RX ADMIN — METOPROLOL TARTRATE 25 MG: 25 TABLET, FILM COATED ORAL at 21:02

## 2021-06-01 NOTE — INTERDISCIPLINARY ROUNDS
[facility] PHYSICAL REHABILITATION  [address] [place] [address], [address]    INPATIENT REHABILITATION  PRE-TEAM CONFERENCE SUMMARY     Date of Conference: 6/1/2021    Patient Information:        Name: Jean Bass Age / Sex: 62 y.o. / female   CSN: [id number] MRN: [id number]   [facility] Date: 5/27/2021 Length of Stay: 5 days     Primary Rehab Diagnosis  1. Impaired Mobility and ADLs  2.  Acute Lacunar Stroke (acute lacunar infarct in the posterior left lentiform nucleus extending into the corona radiata) with residual right hemiparesis    Comorbidities  Patient Active Problem List   Diagnosis Code    [facility] M06.9    History of vitamin D deficiency Z86.39    Cocaine use F14.90    Stage 3b chronic kidney disease (HCC) N18.32    History of Helicobacter pylori infection Z86.19    Abscess of finger L02.519    Gastroesophageal reflux disease K21.9    Acute lacunar stroke (HCC) I63.81    Impaired mobility and ADLs Z74.09, Z78.9    Hemiparesis of right dominant side due to acute cerebrovascular disease (HCC) I67.89, G81.91    Tobacco use disorder F17.200    Current use of aspirin Z79.82    On clopidogrel therapy Z79.01    Hypertensive heart and kidney disease without heart failure and with stage 3b chronic kidney disease (HCC) I13.10, N18.32    Mixed hyperlipidemia E78.2    On statin therapy due to risk of future cardiovascular event Z79.899    Constipation K59.00    History of iron deficiency anemia Z86.2    High serum magnesium R79.89    Secondary hyperparathyroidism of renal origin ([facility] N25.81    Vitamin D deficiency E55.9          Therapy:     FIM SCORES Initial Assessment Weekly Progress Assessment 6/1/2021   Eating Functional Level: 5  Comments:  (issued AE:built up handle utensils,2 handled cup with lid) Feeding/Eating Assistance: 6 (Modified independent) (following set up e.g. opening packaging and use of AE)  Adaptive Equipment: Built up knife;Built up fork;Built up spoon (two hand cup landrum and 2 handle cup with lid)   Swallowing     Grooming 5 (seated w/c level at sink using nondominant left hand) Grooming Assistance : 6 (Modified independent)  Adaptive Equipment: Adaptive toothbrush  Comments:  (built up handle toothbrush)   Bathing 5 (5 SBA UB, 4.5 CGA LB) Bathing Assistance, Upper: 5 (Stand-by assistance) (dep w/ back, will benefit from long handle bathsponge)  Upper Body : Marcel technique training  Position Performed: Seated edge of bed  Bathing Assistance, Lower : 5 (Stand-by assistance) (- cga)  Adaptive Equipment: Walker  Position Performed: Seated edge of bed;Standing (w/ RW)  Adaptive Equipment: Walker  Comments:  (using crossing leg technique)   Upper Body Dressing Functional Level: 5 (SBA)  Items Applied/Steps Completed: Pullover (4 steps)  Comments:  (instruction for hemitechnique) Dressing Assistance : 5 (Supervision) (SBA using hemitechnique)  Comments:  (seated edge of bed )   Lower Body Dressing Functional Level:  (4.5-4 CGA/Min A seated on bedside commode & in stance w/ RW)  Items Applied/Steps Completed: Elastic waist pants (3 steps), Shoe, right (1 step), Shoe, left (1 step), Sock, left (1 step), Sock, right (1 step), Underpants (3 steps)  Comments:  (cross leg method and hemitechnique) Dressing Assistance : 4 (Contact guard assistance) (- Min A)  Leg Crossed Method Used:  Yes  Adaptive Equipment Used: Reacher;Walker  Comments:  (cross leg technique thread underwear & pants,stand to hike)   Toileting Functional Level:  (4.5 CGA-4 Min A)  Comments:  (4 Min A to hike underwear and pants over hips) 4.5 cga - 5 Min A    Bladder 1 1   Bowel  0 0   Toilet Transfer [place] Toilet Transfer Score:  (4 Min A using RW to bedside commode) 4 (Contact guard assistance) (using RW to bedside commode)   Tub/Shower Transfer [place] Tub or Shower Type: Tub/Shower combination  Tub/Shower Transfer Score:  (NT d/t time constraints) Tub/Shower combination  4 (Contact guard assistance)   Comprehension Primary Mode of Comprehension: Auditory  Score: 5 Auditory  6   Expression Primary Mode of Expression: Verbal  Score: 6 Verbal  5   Social Interaction Score: 5 5   Problem Solving Score: 4 3   Memory Score: 4 4     FIM SCORES Initial Assessment Weekly Progress Assessment 6/1/2021   Bed/Chair/Wheelchair Transfers Transfer Type: Other  Other: stand step without AD as per PLOF  Transfer Assistance : 3 (Moderate assistance )  Sit to Stand Assistance: Moderate assistance  Car Transfers: Moderate assistance (using RW)  Car Type: car transfer simulator   Transfer Type: Other  Other: stand step with RW  Transfer Assistance : 3 (Moderate assistance ) (d/t STS assistance)  Sit to Stand Assistance:  Moderate assistance  Car Transfers: Not tested  Car Type:  (NT)   Bed Mobility Rolling Right 5 (Stand-by assistance)   Rolling Left 5 (Stand-by assistance)   Supine to Sit 5 (Stand-by assistance) (cue for not holding her breath)   Sit to Stand Moderate assistance   Sit to Supine  (SBA)    Rolling Right    SBA   Rolling Left    SBA   Supine to Sit    SBA   Sit to Stand    mod A   Sit to Supine    SBA      Locomotion (W/C) Able to Propel (ft): 70 feet  Functional Level: 2  Curbs/Ramps Assist Required (FIM Score):  (mod A for steering, using rosalba-technique)  Wheelchair Setup Assist Required : 2 (Maximal assistance)  Wheelchair Management: Manages left brake, Manages right brake (extra time, cues) Function  supervision level  Setup Assistance: max A         Locomotion (W/C distance)    72 ft   Locomotion (Walk) 2 (Maximal assistance) (maximal trunk support, blocking right LE)  Min A with RW      Locomotion (Walk dist.) 1 Feet (ft)  20 ft   Steps/Stairs Steps/Stairs Ambulated (#): 1  Level of Assist : 2 (Maximal assistance) (lifting/lowering assistance required)  Rail Use: Both (therapist assisting at right UE to )  NT         Nursing:     Neuro:   AAA&O x  4 Respiratory:   [] WNL   [] O2 LPM:   Other:  Peripheral Vascular:   [] TEDS present   [] Edema present ____ Grade   Cardiac:   [] WNL   [] Other  Genitourinary:   [] continent   [] incontinent   [] irving  Abdominal _______ LBM  GI: __cardiac regular____ Diet ___thin___ Liquids _____ tube feeds  Musculoskeletal: ____ ROM Transfers _____ Assistive Device Used  ____ Level of Assistance  Skin Integumentary:   [] Intact   [] Not Intact   __________Preventative Measures  Details______________________________________________________________  Pain: [x] Controlled   [] Not Controlled   Pain Meds:   [] Scheduled   [] PRN        Registered Dietitian / Nutrition:   No data found. Supplements:          [] Yes   [] No      Amount of supplement consumed:       Intake/Output Summary (Last 24 hours) at 6/1/2021 1024  Last data filed at 5/31/2021 1305  Gross per 24 hour   Intake 250 ml   Output --   Net 250 ml                              Last bowel movement:         Interdisciplinary Team Goals:     1. Discipline  Physical Therapy    Goal  Patient will consistently perform sit to stand at min A level using appropriate AD    Barrier  Decreased strength, endurance, balance, chronic RA    Intervention  Therex, theract, neuromuscular re-ed    Goal written by:   Rahul Alvarenga, PT, DPT     2. Discipline  Occupational Therapy    Goal   increase right arm and hand strength/coordination for ADL and functional tasks and improve balance functional transfers    Barrier     hemiplegia RUE, chronic RA w/ arthritic changes of b/l hands, impaired strength and balance       Intervention  ADL retraining, there ex, there act and NMR    Goal written by:   Velasquez Rice MS OTR/L     3. Discipline  Speech Therapy    Goal Patient will name 10-12 items within mildly abstract categories, supervision. Barrier Mild cognitive-linguistic deficits    Intervention  Cognitive retraining    Goal written by: Shantell Gonzales, REGINO_SLP     4.  Discipline  Nursing Goal  improve creatinine level and keep normal kidney function    Barrier  disease process    Intervention  encourage fluid intake and administer iv fluids as ordered    Goal written by:  Simran Shore     5. Discipline  Clinical Psychology    Goal  Increase insight about all stroke occurrence and recovery    Barrier  Presumed limited insight about parameters of recovery    Intervention  Patient/stroke education    Goal written by:  Guy Garcia, PhD     6. Discipline  Nutrition / Dietetics    Goal      Barrier      Intervention      Goal written by:         Disposition / Discharge Planning: Follow-up services:  [x] Physical Therapy             [x] Occupational Therapy       [x] Speech Therapy           [] Skilled Nursing      [] Medical Social Worker   [] Aide        [] Outpatient      [] vs   [x] Home Health  [] vs       [] to progress to outpatient       [] with 24-hour supervision       [x] with 24-hour assistance   [] [place]   [facility] [place]: Bedside commode, tub transfer bench, grab bar in tub-shower; mobility device TBD   Estimated discharge date:     Discharge Location:  [] Home  [] versus    [] [place]    [] [address]   [] Other:           Electronic Signatures:      Signature Date Signed   Physical Therapist    Bobo Andre, PT, DPT  6/1/2021   Occupational Therapist   Reece Webb MS OTR/L  6/1/2021   Speech Therapist    Danny Thornton  6/1/21   Recreational Therapist    Sally Quintana, CTRS 6/1/2021   Nursing    Simran Shore  6/1/2021   Dietitian         Clinical Psychologist    Guy Garcia, PhD  6/1/2021    Physician    Adia Ac MD   6/1/2021               Opportunity to share with family/caregiver[] YES [] NO    Relationship to patient____________________________________________________      The above information has been reviewed with the patient in a language that they can understand.  Opportunity for comments and questions has been provided and a signed attestation has been scanned into the \"media tab\" of the EMR.       Patient Signature: ______________________________________________________    Date Signed: __________________________________________________________

## 2021-06-01 NOTE — INTERDISCIPLINARY ROUNDS
[facility] PHYSICAL REHABILITATION  [address] [place] [address], [address]    INPATIENT REHABILITATION  TEAM CONFERENCE SUMMARY     Date of Conference: 6/1/2021    Patient Information:        Name: Anuja Jean Age / Sex: 62 y.o. / female   CSN: [id number] MRN: [id number]   [facility] Date: 5/27/2021 Length of Stay: 5 days     Primary Rehab Diagnosis  1. Impaired Mobility and ADLs  2.  Acute Lacunar Stroke (acute lacunar infarct in the posterior left lentiform nucleus extending into the corona radiata) with residual right hemiparesis    Comorbidities  Patient Active Problem List   Diagnosis Code    Rheumatoid arthritis ([facility] [address] [facility] M06.9    History of vitamin D deficiency Z86.39    Cocaine use F14.90    Stage 3b chronic kidney disease (HCC) N18.32    History of Helicobacter pylori infection Z86.19    Abscess of finger L02.519    Gastroesophageal reflux disease K21.9    Acute lacunar stroke (HCC) I63.81    Impaired mobility and ADLs Z74.09, Z78.9    Hemiparesis of right dominant side due to acute cerebrovascular disease (HCC) I67.89, G81.91    Tobacco use disorder F17.200    Current use of aspirin Z79.82    On clopidogrel therapy Z79.01    Hypertensive heart and kidney disease without heart failure and with stage 3b chronic kidney disease (HCC) I13.10, N18.32    Mixed hyperlipidemia E78.2    On statin therapy due to risk of future cardiovascular event Z79.899    Constipation K59.00    History of iron deficiency anemia Z86.2    High serum magnesium R79.89    [facility] of renal [facility] ([facility] N25.81    Vitamin D deficiency E55.9          Therapy:     FIM SCORES Initial Assessment Weekly Progress Assessment 6/1/2021   Eating Functional Level: 5  Comments:  (issued AE:built up handle utensils,2 handled cup with lid) Feeding/Eating Assistance: 6 (Modified independent) (following set up e.g. opening packaging and use of AE)  Adaptive Equipment: Built up knife;Built up fork;Built up spoon (two hand cup landrum and 2 handle cup with lid)   Swallowing     Grooming 5 (seated w/c level at sink using nondominant left hand) Grooming Assistance : 6 (Modified independent)  Adaptive Equipment: Adaptive toothbrush  Comments:  (built up handle toothbrush)   Bathing 5 (5 SBA UB, 4.5 CGA LB) Bathing Assistance, Upper: 5 (Stand-by assistance) (dep w/ back, will benefit from long handle bathsponge)  Upper Body : Marcel technique training  Position Performed: Seated edge of bed  Bathing Assistance, Lower : 5 (Stand-by assistance) (- cga)  Adaptive Equipment: Walker  Position Performed: Seated edge of bed;Standing (w/ RW)  Adaptive Equipment: Walker  Comments:  (using crossing leg technique)   Upper Body Dressing Functional Level: 5 (SBA)  Items Applied/Steps Completed: Pullover (4 steps)  Comments:  (instruction for hemitechnique) Dressing Assistance : 5 (Supervision) (SBA using hemitechnique)  Comments:  (seated edge of bed )   Lower Body Dressing Functional Level:  (4.5-4 CGA/Min A seated on bedside commode & in stance w/ RW)  Items Applied/Steps Completed: Elastic waist pants (3 steps), Shoe, right (1 step), Shoe, left (1 step), Sock, left (1 step), Sock, right (1 step), Underpants (3 steps)  Comments:  (cross leg method and hemitechnique) Dressing Assistance : 4 (Contact guard assistance) (- Min A)  Leg Crossed Method Used:  Yes  Adaptive Equipment Used: Reacher;Walker  Comments:  (cross leg technique thread underwear & pants,stand to hike)   Toileting Functional Level:  (4.5 CGA-4 Min A)  Comments:  (4 Min A to hike underwear and pants over hips) 4.5 cga - 5 Min A    Bladder 1 0   Bowel  0 0   Toilet Transfer [place] Toilet Transfer Score:  (4 Min A using RW to bedside commode) 4 (Contact guard assistance) (using RW to bedside commode)   Tub/Shower Transfer [place] Tub or Shower Type: Tub/Shower combination  Tub/Shower Transfer Score:  (NT d/t time constraints) Tub/Shower combination  4 (Contact guard assistance)   Comprehension Primary Mode of Comprehension: Auditory  Score: 5        Expression Primary Mode of Expression: Verbal  Score: 6        Social Interaction Score: 5     Problem Solving Score: 4     Memory Score: 4       FIM SCORES Initial Assessment Weekly Progress Assessment 6/1/2021   Bed/Chair/Wheelchair Transfers Transfer Type: Other  Other: stand step without AD as per PLOF  Transfer Assistance : 3 (Moderate assistance )  Sit to Stand Assistance: Moderate assistance  Car Transfers: Moderate assistance (using RW)  Car Type: car transfer simulator Sit to Stand Assistance: Minimal assistance Transfer Type: Other  Other: stand step with RW  Transfer Assistance : 3 (Moderate assistance ) (d/t STS assistance)  Sit to Stand Assistance:  Moderate assistance  Car Transfers: Not tested  Car Type:  (NT)   Bed Mobility Rolling Right 5 (Stand-by assistance)   Rolling Left 5 (Stand-by assistance)   Supine to Sit 5 (Stand-by assistance) (cue for not holding her breath)   Sit to Stand Moderate assistance   Sit to Supine  (SBA)    Rolling Right    SBA   Rolling Left    SBA   Supine to Sit    SBA   Sit to Stand   Minimal assistancemod A   Sit to Supine    SBA      Locomotion (W/C) Able to Propel (ft): 70 feet  Functional Level: 2  Curbs/Ramps Assist Required (FIM Score):  (mod A for steering, using rosalba-technique)  Wheelchair Setup Assist Required : 2 (Maximal assistance)  Wheelchair Management: Manages left brake, Manages right brake (extra time, cues) Function  (mod assist for steering)supervision level  Setup Assistance: max A  2 (Maximal assistance)      Locomotion (W/C distance)   72 feet72 ft   Locomotion (Walk) 2 (Maximal assistance) (maximal trunk support, blocking right LE) 4 (Minimal assistance)Min A with RW  Walker, rolling;Gait belt   Locomotion (Walk dist.) 1 Feet (ft) 35 Feet (ft)20 ft   Steps/Stairs Steps/Stairs Ambulated (#): 1  Level of Assist : 2 (Maximal assistance) (lifting/lowering assistance required)  Rail Use: Both (therapist assisting at right UE to )  NT         Nursing:     Neuro:   AAA&O x  4          Respiratory:   [] WNL   [] O2 LPM:   Other:  Peripheral Vascular:   [] TEDS present   [] Edema present ____ Grade   Cardiac:   [] WNL   [] Other  Genitourinary:   [] continent   [] incontinent   [] irving  Abdominal _______ LBM  GI: __cardiac regular____ Diet ___thin___ Liquids _____ tube feeds  Musculoskeletal: ____ ROM Transfers _____ Assistive Device Used  ____ Level of Assistance  Skin Integumentary:   [] Intact   [] Not Intact   __________Preventative Measures  Details______________________________________________________________  Pain: [x] Controlled   [] Not Controlled   Pain Meds:   [] Scheduled   [] PRN        Registered Dietitian / Nutrition:   No data found. Supplements:          [] Yes   [] No      Amount of supplement consumed:     No intake or output data in the 24 hours ending 06/01/21 1801                           Last bowel movement:         Interdisciplinary Team Goals:     1. Discipline  Physical Therapy    Goal  Patient will consistently perform sit to stand at min A level using appropriate AD    Barrier  Decreased strength, endurance, balance, chronic RA    Intervention  Therex, theract, neuromuscular re-ed    Goal written by:   Swetha Erazo, PT, DPT     2. Discipline  Occupational Therapy    Goal   increase right arm and hand strength/coordination for ADL and functional tasks and improve balance functional transfers    Barrier     hemiplegia RUE, chronic RA w/ arthritic changes of b/l hands, impaired strength and balance       Intervention  ADL retraining, there ex, there act and NMR    Goal written by:   Mariely Mejia MS OTR/L     3. Discipline  Speech Therapy    Goal Patient will name 10-12 items within mildly abstract categories, supervision.     Barrier Mild cognitive-linguistic deficits    Intervention  Cognitive retraining    Goal written by: Maverick Mcmullen, CCC_SLP 4. Discipline  Nursing    Goal  improve creatinine level and keep normal kidney function    Barrier  disease process    Intervention  encourage fluid intake and administer iv fluids as ordered    Goal written by:  Joshua Patel     5. Discipline  Clinical Psychology    Goal  Increase insight about all stroke occurrence and recovery    Barrier  Presumed limited insight about parameters of recovery    Intervention  Patient/stroke education    Goal written by:  Cesar Samayoa, PhD     6. Discipline  Nutrition / Dietetics    Goal      Barrier      Intervention      Goal written by:         Disposition / Discharge Planning:      Follow-up services:  [x] Physical Therapy             [x] Occupational Therapy       [x] Speech Therapy           [] Skilled Nursing      [] Medical Social Worker   [] Aide        [] Outpatient      [] vs   [x] Home Health  [] vs       [] to progress to outpatient       [] with 24-hour supervision       [x] with 24-hour assistance   [] [place]   [facility] recommendations: Bedside commode, tub transfer bench, grab bar in tub-shower; mobility device TBD   Estimated discharge date:  6/15/2021   Discharge Location:  [x] Home  [] versus    [] [facility]    [] [address]   [] Other:           Interdisciplinary team rounds were held this PM with the following team members:       Name   Physical Therapist    Alta Peralta PT, DPT   Occupational Therapist    Brianne Speak MS OTR/L   Speech Therapist    Ria Valdez, [id number] [facility]   Recreational Therapist    Monroe Darby, [address] [facility]   Physician    Bhavik Yoon MD        Paige Baxter MSW       Signed:  Bhavik Yoon MD    June 1, 2021

## 2021-06-01 NOTE — PROGRESS NOTES
Problem: Mobility Impaired (Adult and Pediatric)  Goal: *Therapy Goal (Edit Goal, Insert Text)  Description: Physical Therapy Short Term Goals  Initiated 5/28/2021 and to be accomplished within 7 day(s) on 6/4/2021  1. Patient will move from supine to sit and sit to supine , scoot up and down, and roll side to side in bed with supervision/set-up. 2.  Patient will transfer from bed to chair and chair to bed with minimal assistance/contact guard assist using the least restrictive device. 3.  Patient will perform sit to stand with minimal assistance/contact guard assist.  4.  Patient will ambulate with minimal assistance/contact guard assist for 50 feet with the least restrictive device. 5.  Patient will ascend/descend 5 stairs with 1 handrail(s) with moderate assistance . Physical Therapy Long Term Goals  Initiated 5/28/2021 and to be accomplished within 21 day(s) on 6/18/2021  1. Patient will move from supine to sit and sit to supine , scoot up and down, and roll side to side in bed with modified independence. 2.  Patient will transfer from bed to chair and chair to bed with modified independence using the least restrictive device. 3.  Patient will perform sit to stand with modified independence. 4.  Patient will ambulate with modified independence for 150 feet with the least restrictive device. 5.  Patient will ascend/descend 12 stairs with 1 handrail(s) with contact guard assist/standby assistance. Outcome: Progressing Towards Goal   PHYSICAL THERAPY TREATMENT    Patient: Charity White (49 y.o. female)  Date: 6/1/2021  Diagnosis: Acute lacunar stroke [facility]) [I63.81] Acute lacunar stroke [facility])  Precautions: Fall, Skin  Chart, physical therapy assessment, plan of care and goals were reviewed.     Time In:1007  Time UAW:6405    Patient seen for: Wheelchair mobility;Transfer training;Gait training    Time In:1415  Time Out:1515    Patient seen for: Wheelchair mobility;Transfer training;Gait training;Balance activities      Pain:  Pt pain was reported as no c/o pre-treatment. Pt pain was reported as no c/o post-treatment. Intervention: NA     Patient identified with name and : yes     SUBJECTIVE:      Pt reports right hamstring feels tight after propelling w/c with BLE for PM session. OBJECTIVE DATA SUMMARY:    Objective:     TRANSFERS Daily Assessment     Sit to Stand Assistance: Minimal assistance   Pt performed sit to stand from w/c with min assist pushing up from w/c with BUE. Transfer Type: Other  Other: stand step txfr with RW  Transfer Assistance : 4 (Minimal assistance)  Sit to Stand Assistance: Minimal assistance  Pt performed sit to stand from w/c with B hands on distal femurs with min assist for anterior wt shift and v/c for proper feet placement. Pt tends to launch and even with v/c had difficulty not using momentum for sit to stand. GAIT Daily Assessment    Gait Description (WDL)      Gait Abnormalities Decreased step clearance; Hemiplegic;Scissoring; Step to gait    Assistive device Walker, rolling;Gait belt    Ambulation assistance - level surface 4 (Minimal assistance)    Distance 35 Feet (ft) 36 Feet (ft)    Ambulation assistance- uneven surface      Comments Pt ambulated 35ft with RW and min assist for balance and holding right hand on handle of RW. During PM session, pt ambulated 36ft with RW and 2 turn arounds with right handle splint on RW and min assist for balance. Pt required v/c for erect posture and proper sequencing. Pt also demo'd right knee recurvatum and required v/c to attempt to decrease hyperextension. Pt also required v/c to prevent crossing over feet with turn around.          BALANCE Daily Assessment     Sitting - Static: Good (unsupported)  Sitting - Dynamic: Good (unsupported)  Standing - Static: Fair  Standing - Dynamic : Impaired        WHEELCHAIR MOBILITY Daily Assessment     Able to Propel (ft): 72 feet  Functional Level:  (mod assist for steering)  Curbs/Ramps Assist Required (FIM Score): 0 (Not tested)  Wheelchair Setup Assist Required : 2 (Maximal assistance)  Wheelchair Management: Manages left brake;Manages right brake   Pt propelled w/c from room to gym for both sessions. During first session, pt required mod assist for steering with pt using only left UE and attempt to use left LE. During PM, pt used BLE and left UE for propulsion, with pt wearing sneakers. Pt required min assist to maintain momentum. Neuro Re-Education:  Pt performed standing BLE tap ups on 4\" step x15 each to challenge right LE in SLS and with hip flexion to tap up. Pt required min assist to prevent right knee hyperextension with wt bearing to perform tap ups on left LE.     ASSESSMENT:  Pt demo'd improved use of RW with right handle splint. Pt demo's decreased control of right foot placement and poor DF strength for heel strike on right with gait and txfrs. Pt tolerated PT well but fatigued at end. Progression toward goals:  []      Improving appropriately and progressing toward goals  [x]      Improving slowly and progressing toward goals  []      Not making progress toward goals and plan of care will be adjusted      PLAN:  Patient continues to benefit from skilled intervention to address the above impairments. Continue treatment per established plan of care. Discharge Recommendations:  Home Health  Further Equipment Recommendations for Discharge:  rolling walker      Estimated Discharge Date: 6/15/2021    Activity Tolerance:   Fair+  Please refer to the flowsheet for vital signs taken during this treatment.     After treatment:   [x] Patient left in no apparent distress in bed  [] Patient left in no apparent distress sitting up in chair  [] Patient left in no apparent distress in w/c mobilizing under own power  [] Patient left in no apparent distress dining area  [] Patient left in no apparent distress mobilizing under own power  [x] Call bell left within reach  [] Nursing notified  [] Caregiver present  [x] Bed alarm activated   [] Chair alarm activated      Elvie Guzmán PTA  6/1/2021

## 2021-06-01 NOTE — PROGRESS NOTES
[x] Psychology  [] Social Work [] Recreational Therapy    INTERVENTION  UNITS/TIME OF SERVICE   Assessment  June 1, 2021   Supportive Counseling    Orientation    Discharge Planning    Resource Linkage              Progress/Current Status    Patient seen for Psychological Evaluation as requested on admission to ARU by Dr. Deven Mera. Patient found sitting upright in wheelchair in bedroom and immediately and entirely amenable to discussion about stroke occurrence and recurrence. Patient is not only motivated to improve her status and hopeful for herself in recovery, but she also seems interested in better understanding aspects of healthcare management, in order to reduce risk for recurrence. Patient is denying acute feelings of emotional distress and no lability is present; however, she does admit to feeling stressed by forced dependency and separation from family. Patient is otherwise denying history of mental health services and is not currently requiring psychotropic medication for mood nor behavior stability, but is Rx Melatonin for sleep enhancement.   Patient will be monitored for any emergent difficulties while on ARU and be encouraged to persevere in her therapy effort.+    Stanford Peraza, THE [facility] 6/1/2021 4:22 PM

## 2021-06-01 NOTE — PROGRESS NOTES
Problem: Self Care Deficits Care Plan (Adult)  Goal: *Acute Goals and Plan of Care (Insert Text)  Description: Occupational Therapy Goals   Long Term Goals  Initiated 2021 and to be accomplished within 4 week(s) 2021    1. Patient will perform grooming with modified independence using adaptive equipment as needed. 2. Pt will perform UB bathing with Mod I.  3. Pt will perform LB bathing with Mod I.  4. Pt will perform tub/shower transfer with Mod I using DME. 5. Pt will perform UB dressing with Mod I.  6. Patient will perform upper body dressing and lower body dressing with modified independence. 7. Patient will perform all aspects of toileting with modified independence. 8. Patient will perform toilet transfers with modified independence using RW. Short Term Goals   Initiated 2021 and to be accomplished within 7 day(s) 2021    1. Pt will perform grooming with Supv using adaptive equipment as needed. 2. Pt will perform UB bathing with Supv.  3. Pt will perform LB bathing with Supv.  4. Pt will perform tub/shower transfer with SBA using DME. 5. Pt will perform UB dressing with Supv.  6. Pt will perform LB dressing with Supv.  7. Pt will perform toileting task with SBA. 8. Pt will perform toilet transfer with SBA using RW. Outcome: Progressing Towards Goal  Occupational Therapy TREATMENT    Patient: Jasper An   62 y.o. Patient identified with name and : yes    Date: 2021    First Tx Session  Time In: 042  Time Out:958    Diagnosis: Acute lacunar stroke [facility]) [I63.81]   Precautions: Fall, Skin  Chart, occupational therapy assessment, plan of care, and goals were reviewed. Pain:  Pt reports 0/10 pain or discomfort prior to treatment. Pt reports 0/10 pain or discomfort post treatment. Intervention Provided:       SUBJECTIVE:   Patient stated I am using my right hand better.     OBJECTIVE DATA SUMMARY:       FEEDING/EATING Daily Assessment Feeding/Eating  Feeding/Eating Assistance: 6 (Modified independent) (following set up e.g. opening packaging and use of AE)  Adaptive Equipment: Built up knife;Built up fork;Built up spoon (two hand cup landrum and 2 handle cup with lid)     GROOMING Daily Assessment    Grooming  Grooming Assistance : 6 (Modified independent)  Adaptive Equipment: Adaptive toothbrush  Comments:  (built up handle toothbrush)     UPPER BODY BATHING Daily Assessment    Upper Body Bathing  Bathing Assistance, Upper: 5 (Stand-by assistance) (dep w/ back, will benefit from long handle bathsponge)  Upper Body : Marcel technique training  Position Performed: Seated edge of bed     LOWER BODY BATHING Daily Assessment    Lower Body Bathing  Bathing Assistance, Lower : 5 (Stand-by assistance) (- cga)  Adaptive Equipment: Walker  Position Performed: Seated edge of bed;Standing (w/ RW)  Adaptive Equipment: Walker  Comments:  (using crossing leg technique)       UPPER BODY DRESSING Daily Assessment    Upper Body Dressing   Dressing Assistance : 5 (Supervision) (SBA using hemitechnique)  Comments:  (seated edge of bed )     LOWER BODY DRESSING Daily Assessment    Lower Body Dressing   Dressing Assistance : 4 (Contact guard assistance) (- Min A)  Leg Crossed Method Used: Yes  Adaptive Equipment Used: Reacher;Walker  Comments:  (cross leg technique thread underwear & pants,stand to hike)     MOBILITY/TRANSFERS Daily Assessment    Functional Transfers  Amount of Assistance Required: 4 (Contact guard assistance) (using RW to bedside commode)  Tub or Shower Type: Tub/Shower combination  Amount of Assistance Required: 4 (Contact guard assistance)  Adaptive Equipment: Walker (comment); Wheelchair;Tub transfer bench;Grab bars       ASSESSMENT:  Family education with pt and aram/Dustin with instruction and practice performing ADL routine for sponge bathing, grooming and dressing tasks while seated edge of bed and in stance w/ RW (pt declined to shower), (FIM levels in flow sheet), Fiance provided assist with set up of bath basin and gathering clothing to wear. Patient utilizing reacher for item retrieval from floor surface for fall prevention and during LB dressing to thread pants over LLE and using hemitechnique for dressing affected RLE first and undress last, pt utilizing crossing leg method for LB bathing of calves and feet and LB dressing to doff/don slipper socks and thread underwear and RLE, CGA sit to stand with Oziel Holliday providing good return demonstration for CGA with RW while pt performing LB ADL of perihygiene and hiking underwear and pants over hips requiring min A to hike over right hip. Instruction provided with demianance providing good return demonstration for bedside commode transfer with CGA using gait belt and RW while pt side stepping w/o LOB and additional time from edge of bed -> bedside commode w/ RW followed by function transfer from bedside commode-> w/c w/ RW. Following demonstration, fiance provided good return demonstration for tub-shower transfer: CGA sit to stand from w/c with correct hand placement to push up from w/c armrests, CGA using RW and gait belt for stand step from w/c -> tub transfer bench, correct hand placement to reach back for tub transfer bench seat for stand to sit, SBA swinging LEs over tub while scooting toward left using grab bar, sit to stand w/ CGA using left wall grab bar and pushing up from tub transfer bench w/ RUE, recommendation for installation of wall grab bar in tub-shower, non-slip bathtub mat, tub transfer bench and handheld shower head on hose, SBA swinging BLEs over tub while scooting towards right and correct hand placement for sit to stand from tub bench w/ BUEs and CGA sit to stand followed by stand step w/ CGA using RW from tub transfer bench to w/c. Patient and fiance without further questions for family education and verbalize understanding of OTR's recommendation.  Patient sitting up in w/c at end of tx session, call bell within reach & pt verbalized understanding following repetition with education for how to utilize for assist e.g. functional transfers in order to prevent falls, w/c alarm intact. [x]          Improving appropriately and progressing toward goals  []          Improving slowly and progressing toward goals  []          Not making progress toward goals and plan of care will be adjusted     PLAN:  Patient continues to benefit from skilled intervention to address the above impairments. Continue treatment per established plan of care. Discharge Recommendations:  Home Health w/ 24/7 supv and assist e.g. ADLs  Further Equipment Recommendations for Discharge:  bedside commode, grab bars transfer bench, and rolling walker     Activity Tolerance:  fair  Estimated LOS: 1 week    Please refer to the flowsheet for vital signs taken during this treatment. After treatment:   [x]  Patient left in no apparent distress sitting up in chair   []  Patient left in no apparent distress in bed  [x]  Call bell left within reach  [x]  Nursing notified  [x]  Caregiver/fiance present  [x]  chair alarm activated    COMMUNICATION/EDUCATION:   [x] Home safety education was provided and the patient/caregiver indicated understanding. [x] Patient/family have participated as able in goal setting and plan of care. [x] Patient/family agree to work toward stated goals and plan of care. [] Patient understands intent and goals of therapy, but is neutral about his/her participation. [] Patient is unable to participate in goal setting and plan of care.       Sravanthi Vega

## 2021-06-01 NOTE — CONSULTS
[facility] PSYCHOLOGICAL SCREENING    Assessment Initiated:  June 1, 2021    Rehab Diagnosis:  Left CVA    Pertinent Physical/Psychiatric History:     Patient Active Problem List   Diagnosis Code    Rheumatoid arthritis ([facility] M06.9    History of vitamin D deficiency Z86.39    Cocaine use F14.90    Stage 3b chronic kidney disease ([facility].) N18.32    History of Helicobacter pylori infection Z86.19    Abscess of finger L02.519    Gastroesophageal reflux disease K21.9    Acute lacunar stroke (HCC) I63.81    Impaired mobility and ADLs Z74.09, Z78.9    Hemiparesis of right dominant side due to acute cerebrovascular disease (HCC) I67.89, G81.91    Tobacco use disorder F17.200    Current use of aspirin Z79.82    On clopidogrel therapy Z79.01    Hypertensive heart and kidney disease without heart failure and with stage 3b chronic kidney disease (HCC) I13.10, N18.32    Mixed hyperlipidemia E78.2    On statin therapy due to risk of future cardiovascular event Z79.899    Constipation K59.00    History of iron deficiency anemia Z86.2    High serum magnesium R79.89    Secondary hyperparathyroidism of renal origin ([facility] N25.81    Vitamin D deficiency E55.9       Patient denies history of mental health services and is only Rx Melatonin for sleep enhancement, but no psychotropic medication for mood nor behavior stability. Patient acknowledges having recently been tobacco dependent and just beginning Nicotine Patch for ease in cessation, and hoping to curb desire entirely for long term. There is no report of other substance abuse nor dependence.       OBJECTIVE  GENERAL OBSERVATIONS  Willingness to participate in program: [x] good   [] fair [] indifferent [] poor    General Appearance:  Patient observed casually and appropriately dressed and groomed, sitting upright in wheelchair in bedroom and not in any distress    Sensory Impairments:  Patient has satisfactory auditory reception and comprehension and responds to all inquiry with intelligibility.   She presents with left side weakness secondary to CVA    Alevism Affiliation:  [facility]    Admission Assessment  Discharge Status   [x] alert  [] lethargic  [] difficult to arouse  [] fluctuating  [] other: Level of Consciousness [x] alert  [] lethargic  [] difficult to arouse  [] fluctuating  [] other:   [x] person  [x] place  [x] time  [x] situation Oriented [x] person  [x] place  [x] time  [x] situation   [x] within normal limits  [] impaired       [] mild        [] moderate        [] severe Attention [x] within normal limits  [] impaired       [] mild        [] moderate        [] severe   [x] within normal limits  [x] impaired       [x] mild        [] moderate        [] severe Memory [x] within normal limits  [x] impaired       [x] mild        [] moderate        [] severe   [x] appropriate to situation  [] depressed  [] anxious  [] angry   [] fearful  [] emotionally labile  [x] other: Admits to stress with illness and forced dependency Mood [x] appropriate to situation  [] depressed  [] anxious  [] angry   [] fearful  [] emotionally labile  [] other:   [x] appropriate  [] flat  [] inappropriate to content of speech Affect [x] appropriate  [] flat  [] inappropriate to content of speech   [x] appropriate  [] aggressive/agitated  [] withdrawn  [] inappropriate  [x] other: Patient presents calm, composed and cooperative, neither restless nor agitated Behavior [x] appropriate  [] aggressive/agitated  [] withdrawn  [] inappropriate  [] other:   [] good  [x] limited  [] denial  [] none Insight Into Illness [x] good  [] limited  [] denial  [] none   [x] intact  [x] impaired       [x] mild        [] moderate        [] severe       Describe: Patient will require cues, prompts and redirection for maximum recall and problem solving with novel and/or complex instruction Cognition [x] intact  [x] impaired       [x] mild        [] moderate        [] severe       Describe:    [x] coping  [] demonstrates poor adjustment  [] undetermined       As evidenced by: She is motivated to improve and hopeful for self in recovery Patient Adjustment to Disability [x] coping  [] demonstrates poor adjustment  [] undetermined       As evidenced by:    [] coping  [] demonstrates poor adjustment  [x] undetermined      As evidenced by: Not available on evaluation Family Adjustment to Disability [x] coping  [] demonstrates poor adjustment  [] undetermined      As evidenced by:      ASSESSMENT  Clinical Impression:  Patient is a 62year old, coupled (for twenty three years), retired and receiving [address] (secondary to Rheumatoid Arthritis),  female. She and partner reside in [place] in two story residence with four steps to enter, and then nine steps to second floor to sleep; however, on discharge, she expects to recuperate at her daughter's in [place] in one level residence with no steps to enter and then relocate with fiance to new location. Patient admits to having had poor healthcare management prior to this stroke occurrence and now motivated to better understand healthcare issues and improve her lifestyle. She will certainly benefit from further education to best understand the parameters of her recovery and in order to identify realistic goals for self in recovery, thereby minimizing subjective frustration. On contact, patient presents as calm and composed and not in distress. She admits to feeling stressed by stroke and forced recovery, as well as aspects of dependency; however, she is motivated to improve. She denies history of mental health services. At this time, she is only Rx Melatonin for sleep and Nicotine Patch for cessation but no psychotropic medication for mood nor behavior stability purposes. She will be monitored for any emergent, emotional and/or behavioral difficulties that might emerge and cause interference for her.     Cognitively, patient is alert and oriented. She is attentive to all discussion but noted to have some decline in recall and possibly with problem solving. Thus, she will benefit from compensatory strategies such as cues, prompts and redirection to maximize all problem solving and recall with novel and/or complex instruction. Patient Strengths:  Alert, oriented, pleasant, cooperative and motivated to improve status    Patient Preferences:  Patient expects to discharge to daughter's residence in [place] and then to new residential location with her partner that can accommodate her needs    Rehab Potential:  Good    Educational Needs: Under each heading list the specific items in which the patient or family will need education/training. Example: hip precautions, use of walker, ADL equipment, neglect, judgment, adjustment, etc.     Special considerations or accommodations for teaching:  [x] Yes     [] No     [] NA  If Yes, explain: Compensatory strategies for maximum problems with evidence of cognitive communication change post stroke Discharge Status    Completed Demonstrated/ Verbalized Understanding    Yes No Yes No   Info regarding disability:  [] [] [] []   Adjustment:  [] [] [] []   Cognition:  [] [] [] []   Other: [] [] [] []   Other: [] [] [] []   If education not completed, explain: [] [] [] []     PLAN  Problem: Limited insight about stroke and recovery  Long Term Goal: Increase all insight  Intervention: Patient/stroke education  At Discharge - LTG Achieved: [x] Yes [] No If not achieved, explain:    Problem: Situational stress with illness  Long Term Goal: Minimize stress and distress  Intervention: Support  and behavioral redirection  At Discharge - LTG Achieved: [x] Yes [] No If not achieved, explain:    Problem:  Forced dependency  Long Term Goal: Accept increased dependency  Intervention: Patient education and support   At Discharge - LTG Achieved: [x] Yes [] No If not achieved, explain:    Problem: Cognitive communication disorder  Long Term Goal: Maximize all problem solving and recall  Intervention: Cues, prompts, repetition and redirection  At Discharge - LTG Achieved: [x] Yes [] No If not achieved, explain:    Problem: Poor healthcare compliance  Long Term Goal: Maximize understanding of need for compliance  Intervention: Patient education  At Discharge - LTG Achieved: [x] Yes [] No If not achieved, explain:    Jeanne Melgar, THE [facility]  6/1/2021 4:25 PM    DISCHARGE STATUS    Clinical Impressions: Patient was observed to be active and motivated in her therapy effort on ARU and feeling encouraged by gains made on date of discharge. There were \"issues\" observed between her and partner, and he seemingly, especially angry that she was to discharge to sister's rather than return to home with him. Regardless, patient was steadfast in her decision and did not seem to be \"ruffled\" by his antagonism and criticism of her decision. Patient insisting that she feels good about progress to date and remains hopeful for continued gains.     Follow-up Services Recommended Purpose                 Jeanne Melgar, PHD  Discharge Date/Time:

## 2021-06-01 NOTE — ROUTINE PROCESS
[facility]    Bedside and Verbal shift change report given to Fanny Jerome, CLAY (oncoming nurse) by Malik Reese RN   (offgoing nurse). Report included the following information SBAR, Kardex, MAR and Recent Results.     Situation:   Code Status: Full Code   Reason for Admission: CVA  Hospital Day: 5   Problem List:   Hospital Problems  Date Reviewed: 6/1/2021        Codes Class Noted POA    Secondary hyperparathyroidism of renal origin ([facility] (Chronic) ICD-10-CM: N25.81  ICD-9-CM: 588.81  5/31/2021 Yes        Vitamin D deficiency (Chronic) ICD-10-CM: E55.9  ICD-9-CM: 268.9  5/31/2021 Yes    Overview Signed 5/31/2021  1:17 PM by Juan Weber MD     Vitamin D 25-Hydroxy (5/31/2021) = 10.5              High serum magnesium ICD-10-CM: R79.89  ICD-9-CM: 790.99  5/28/2021 Yes        Hypertensive heart and kidney disease without heart failure and with stage 3b chronic kidney disease ([facility] (Chronic) ICD-10-CM: I13.10, N18.32  ICD-9-CM: 404.90, 585.3  Unknown Yes        Mixed hyperlipidemia (Chronic) ICD-10-CM: E78.2  ICD-9-CM: 272.2  Unknown Yes        Constipation (Chronic) ICD-10-CM: K59.00  ICD-9-CM: 564.00  Unknown Yes        Current use of aspirin ICD-10-CM: Z79.82  ICD-9-CM: V58.66  5/23/2021 Yes        On clopidogrel therapy ICD-10-CM: Z79.01  ICD-9-CM: V58.61  5/23/2021 Yes        On statin therapy due to risk of future cardiovascular event ICD-10-CM: Z79.899  ICD-9-CM: V58.69  5/22/2021 Yes    Overview Signed 5/27/2021  2:27 PM by Juan Weber MD     On Atorvastatin             * (Principal) Acute lacunar stroke [facility]) ICD-10-CM: I63.81  ICD-9-CM: 434.91  5/21/2021 Yes    Overview Signed 5/27/2021  2:15 PM by Juan Weber MD     Acute Lacunar Stroke (acute lacunar infarct in the posterior left lentiform nucleus extending into the corona radiata) with residual right hemiparesis             Impaired mobility and ADLs ICD-10-CM: Z74.09, Z78.9  ICD-9-CM: V49.89  5/21/2021 Yes        Hemiparesis of right dominant side due to acute cerebrovascular disease [facility]) ICD-10-CM: I67.89, G81.91  ICD-9-CM: 436, 342.91  5/21/2021 Yes              Background:   Past Medical History:   Past Medical History:   Diagnosis Date    Abnormal mammogram 2014    left with biopsy    Acute lacunar stroke ([facility] 5/21/2021    Acute Lacunar Stroke (acute lacunar infarct in the posterior left lentiform nucleus extending into the corona radiata) with residual right hemiparesis    Bacterial vaginosis 1/9/15    Dr Eduard Shukla Constipation     Current use of aspirin 5/23/2021    Gastroesophageal reflux disease 6/30/2016    Hemiparesis of right dominant side due to acute cerebrovascular disease ([facility] 5/21/2021    History of Helicobacter pylori infection 7/24/2015    History of iron deficiency anemia 06/2014    History of vitamin D deficiency 6/11/2015    Hot flashes 1/9/15    Dr Eduard Shukla On clopidogrel therapy 5/23/2021    On statin therapy due to risk of future cardiovascular event 5/22/2021    On Atorvastatin    Rheumatoid arthritis ([facility]     Secondary hyperparathyroidism of renal origin ([facility] 5/31/2021    Stage 3b chronic kidney disease ([facility] 7/24/2015    Tobacco use disorder     Vitamin D deficiency 5/31/2021    Vitamin D 25-Hydroxy (5/31/2021) = 10.5       Patient taking anticoagulants yes    Patient has a defibrillator: no     Assessment:   Changes in Assessment throughout shift: none    Patient has central line: no  I     Last Vitals:     Vitals:    05/31/21 1647 05/31/21 2111 06/01/21 0748 06/01/21 1530   BP: (!) 131/94 (!) 147/96 134/88 (!) 137/95   Pulse: 95 80 70 79   Resp: 18 20 18 18   Temp: 97.2 °F (36.2 °C) 97.3 °F (36.3 °C) 97 °F (36.1 °C) 97.8 °F (36.6 °C)   SpO2: 95% 97% 100% 100%   Weight:       Height:       LMP: 10/15/2014        PAIN    Pain Assessment    Pain Intensity 1: 0 (06/01/21 1549) Pain Intensity 1: 2 (12/29/14 1872)    Pain Location 1: Arm Pain Location 1: Abdomen    Pain Intervention(s) 1: Medication (see MAR) Pain Intervention(s) 1: Medication (see MAR)  Patient Stated Pain Goal: 0 Patient Stated Pain Goal: 0  o Intervention effective: no pain reported  o Other actions taken for pain: no pain reported     Skin Assessment  Skin color    Condition/Temperature    Integrity    Turgor    Weekly Pressure Ulcer Documentation  Pressure  Injury Documentation: No Pressure Injury Noted-Pressure Ulcer Prevention Initiated  Wound Prevention & Protection Methods  Orientation of wound Orientation of Wound Prevention: Posterior  Location of Prevention Location of Wound Prevention: Buttocks  Dressing Present Dressing Present : No  Dressing Status    Wound Offloading Wound Offloading (Prevention Methods): Bed, pressure reduction mattress     INTAKE/OUPUT  Date 05/31/21 0700 - 06/01/21 0659 06/01/21 0700 - 06/02/21 0659   Shift 3202-9889 9652-4857 24 Hour Total 8266-3222 5095-4344 24 Hour Total   INTAKE   P.O. 500  500        P. O. 500  500      Shift Total(mL/kg) 500(5.9)  500(5.9)      OUTPUT   Urine(mL/kg/hr)           Urine Occurrence(s) 3 x 3 x 6 x 1 x  1 x   Stool           Stool Occurrence(s) 0 x 0 x 0 x 0 x  0 x   Shift Total(mL/kg)           500      Weight (kg) 84.4 84.4 84.4 84.4 84.4 84.4       Recommendations:  1. Patient needs and requests: Assist with toileting    2. Diet: Cardiac & thin liquids    3. Pending tests/procedures: none  4. .     5. Functional Level/Equipment: W/C with min assist    6. Estimated Discharge Date: TBD Posted on Whiteboard in Patients Room: [facility] Safety Check    A safety check occurred in the patient's room between off going nurse and oncoming nurse listed above.     The safety check included the below items  Area Items   H  High Alert Medications - Verify all high alert medication drips (heparin, PCA, etc.)   E  Equipment - Suction is set up for ALL patients (with yanker)  - Red plugs utilized for all equipment (IV pumps, etc.)  - WOWs wiped down at end of shift.  - Room stocked with oxygen, suction, and other unit-specific supplies   A  Alarms - Bed alarm is set for fall risk patients  - Ensure chair alarm is in place and activated if patient is up in a chair   L  Lines - Check IV for any infiltration  - Rollins bag is empty if patient has a Rollins   - Tubing and IV bags are labeled   S  Safety   - Room is clean, patient is clean, and equipment is clean. - Hallways are clear from equipment besides carts. - Fall bracelet on for fall risk patients  - Ensure room is clear and free of clutter  - Suction is set up for ALL patients (with yanker)  - Hallways are clear from equipment besides carts.    - Isolation precautions followed, supplies available outside room, sign posted

## 2021-06-01 NOTE — PROGRESS NOTES
Problem: Neurolinguistics Impaired (Adult)  Goal: *Speech Goal: (INSERT TEXT)  Description: Long term goals  Patient will:  1. Be oriented x 3 and recall events of the day, supervision. 2. Recall 3 words after 5 minutes, supervision. 3. Name 10-12 items within categories of increasing complexity, supervision. 4. Perform varied word finding tasks with 90% accuracy. 5. Perform problem solving/reasoning tasks with 90% accuracy. 6. Perform basic mathematical calculations, 90% accuracy. Short term goals (by 6/4/21)  Patient will:  1. Be oriented x 3 and recall events of the day, supervision. 2. Recall 3 words after 5 minutes, min cues. 3. Name 10-12 items within concrete categories, supervision. 4. Perform varied word finding tasks with 75-85% accuracy. 5. Perform problem solving/reasoning tasks with 70-80% accuracy. 6. Perform basic mathematical calculations, 70-80% accuracy. Note:   Speech language pathology treatment    Patient: Geovanni Landon (61 y.o. female)  Date: 6/1/2021  Diagnosis: Acute lacunar stroke ([facility] [I63.81] Acute lacunar stroke ([facility]       Time in: 1030 1330  Time Out:  1100 1400  SUBJECTIVE:   Patient stated That was my kidney doctor. OBJECTIVE:   Mental Status:  Ms. João Mera was awake, alert and engaged in today's sessions. Treatment & Interventions:   Patient was seen in her room for two thirty minute therapy sessions. The following treatment tasks were presented:  Neuro-Linguistics:   Orientation:   Supervision  Recent memory:  Supervision  Recall 3 words:  Min assist  2 factors for solving problem: 100% accuracy  ID source of message: 80% accuracy  Things that are long & thin: Patient listed 6  Scattergories:  Game presented and patient engaged in 3 rounds. Patient asked to provide word in 12 categories, all beginning with a specific letter. Patient able to provide 10 of 2 responses in each round.     Response & Tolerance to Activities:  Ms. João Mera was pleasant and cooperative in all tasks presented. Pain:  Pain Scale 1: Numeric (0 - 10)  No report of pain     After treatment:   []       Patient left in no apparent distress sitting up in chair  [x]       Patient left in no apparent distress in bed  [x]       Call bell left within reach  []       Nursing notified  []       Caregiver present  []       Bed alarm activated    ASSESSMENT:   Progression toward goals:  [x]       Improving appropriately and progressing toward goals  []       Improving slowly and progressing toward goals  []       Not making progress toward goals and plan of care will be adjusted    PLAN:   Patient continues to benefit from skilled intervention to address the above impairments. Continue treatment per established plan of care. Discharge Recommendations:   To Be Determined    Estimated LOS: Through 6/15/21    MAGGIE Leos  Time Calculation:  61 Minutes

## 2021-06-01 NOTE — PROGRESS NOTES
[facility] PHYSICAL REHABILITATION  [address] [place] [address], [address]     INPATIENT REHABILITATION  DAILY PROGRESS NOTE     Date: 6/1/2021    Name: Whit Jonas Age / Sex: 62 y.o. / female   CSN: [id number] MRN: [id number]   [facility] [place] Date: 5/27/2021 Length of Stay: 5 days     Primary Rehab Diagnosis: Impaired Mobility and ADLs secondary to Acute Lacunar Stroke (acute lacunar infarct in the posterior left lentiform nucleus extending into the corona radiata) with residual right hemiparesis      Subjective:     Patient seen and examined. Blood pressure better controlled. Patient's Complaint:   No significant medical complaints    Pain Control: no current joint or muscle symptoms, essentially pain-free      Objective:     Vital Signs:  Patient Vitals for the past 24 hrs:   BP Temp Pulse Resp SpO2   06/01/21 0748 134/88 97 °F (36.1 °C) 70 18 100 %   05/31/21 2111 (!) 147/96 97.3 °F (36.3 °C) 80 20 97 %   05/31/21 1647 (!) 131/94 97.2 °F (36.2 °C) 95 18 95 %        Physical Examination:  GENERAL SURVEY: Patient is awake, alert, oriented x 3, sitting comfortably on the chair, not in acute respiratory distress. HEENT: pink palpebral conjunctivae, anicteric sclerae, no nasoaural discharge, moist oral mucosa  NECK: supple, no jugular venous distention, no palpable lymph nodes  CHEST/LUNGS: symmetrical chest expansion, good air entry, clear breath sounds  HEART: adynamic precordium, good S1 S2, no S3, regular rhythm, no murmurs  ABDOMEN: flat, bowel sounds appreciated, soft, non-tender  EXTREMITIES: pink nailbeds, no edema, full and equal pulses, no calf tenderness   NEUROLOGICAL EXAM: The patient is awake, alert and oriented x3, able to answer questions fairly appropriately, able to follow 1 and 2 step commands. Able to tell time from the wall clock. Cranial nerves II-XII are grossly intact. No gross sensory deficit.   Motor strength is 4 to 4+/5 on the RUE and RLE, 5/5 on the LUE and MIRIAM.      Current Medications:  Current Facility-Administered Medications   Medication Dose Route Frequency    0.9% sodium chloride infusion 1,000 mL  1,000 mL IntraVENous PCD    metoprolol tartrate (LOPRESSOR) tablet 25 mg  25 mg Oral Q12H    cholecalciferol (VITAMIN D3) capsule 5,000 Units  5,000 Units Oral DAILY    hydrALAZINE (APRESOLINE) tablet 10 mg  10 mg Oral TID PRN    nicotine (NICODERM CQ) 14 mg/24 hr patch 1 Patch  1 Patch TransDERmal DAILY    acetaminophen (TYLENOL) tablet 650 mg  650 mg Oral Q4H PRN    bisacodyL (DULCOLAX) tablet 10 mg  10 mg Oral Q48H PRN    heparin (porcine) injection 5,000 Units  5,000 Units SubCUTAneous Q8H    aspirin chewable tablet 81 mg  81 mg Oral DAILY WITH DINNER    atorvastatin (LIPITOR) tablet 80 mg  80 mg Oral DAILY    clopidogreL (PLAVIX) tablet 75 mg  75 mg Oral DAILY WITH BREAKFAST    amLODIPine (NORVASC) tablet 10 mg  10 mg Oral DAILY    co-enzyme Q-10 (CO Q-10) capsule 100 mg  100 mg Oral DAILY    melatonin tablet 3 mg  3 mg Oral QHS       Allergies:  No Known Allergies      Functional Progress:    OCCUPATIONAL THERAPY    ON ADMISSION MOST RECENT   Eating  Functional Level: 5   Eating  Functional Level: 5     Grooming  Functional Level: 5 (seated w/c level at sink using nondominant left hand)   Grooming  Functional Level: 5 (seated w/c level at sink using nondominant left hand)     Bathing  Functional Level: 5 (5 SBA UB, 4.5 CGA LB)   Bathing  Functional Level: 5 (5 SBA UB, 4.5 CGA LB)     Upper Body Dressing  Functional Level: 5 (SBA)   Upper Body Dressing  Functional Level: 5 (SBA)     Lower Body Dressing  Functional Level:  (4.5-4 CGA/Min A seated on bedside commode & in stance w/ RW)   Lower Body Dressing  Functional Level:  (4.5-4 CGA/Min A seated on bedside commode & in stance w/ RW)     Toileting  Functional Level:  (4.5 CGA-4 Min A)   Toileting  Functional Level: 2     Toilet Transfers  Toilet Transfer Score:  (4 Min A using RW to bedside commode)   Toilet Transfers  Toilet Transfer Score:  (4 Min A using RW to bedside commode)     Tub /Shower Transfers  Tub/Shower Transfer Score:  (NT d/t time constraints)   Tub/Shower Transfers  Tub/Shower Transfer Score:  (NT d/t time constraints)       Legend:   7 - Independent   6 - Modified Independent   5 - Standby Assistance / Supervision / Set-up   4 - Minimum Assistance / Contact Guard Assistance   3 - Moderate Assistance   2 - Maximum Assistance   1 - Total Assistance / Dependent       Lab/Data Review:  Recent Results (from the past 24 hour(s))   CK    Collection Time: 05/31/21  4:37 PM   Result Value Ref Range     26 - 416 U/L   METABOLIC PANEL, BASIC    Collection Time: 06/01/21  4:30 AM   Result Value Ref Range    Sodium 139 136 - 145 mmol/L    Potassium 4.3 3.5 - 5.5 mmol/L    Chloride 111 100 - 111 mmol/L    CO2 20 (L) 21 - 32 mmol/L    Anion gap 8 3.0 - 18 mmol/L    Glucose 121 (H) 74 - 99 mg/dL    BUN 37 (H) 7.0 - 18 MG/DL    Creatinine 2.27 (H) 0.6 - 1.3 MG/DL    BUN/Creatinine ratio 16 12 - 20      GFR est AA 27 (L) >60 ml/min/1.73m2    GFR est non-AA 22 (L) >60 ml/min/1.73m2    Calcium 8.6 8.5 - 10.1 MG/DL       Assessment:     Primary Rehab Diagnosis  1. Impaired Mobility and ADLs  2.  Acute Lacunar Stroke (acute lacunar infarct in the posterior left lentiform nucleus extending into the corona radiata) with residual right hemiparesis    Comorbidities  Patient Active Problem List   Diagnosis Code    Rheumatoid arthritis ([facility] M06.9    History of vitamin D deficiency Z86.39    Cocaine use F14.90    Stage 3b chronic kidney disease (HCC) N18.32    History of Helicobacter pylori infection Z86.19    Abscess of finger L02.519    Gastroesophageal reflux disease K21.9    Acute lacunar stroke (HCC) I63.81    Impaired mobility and ADLs Z74.09, Z78.9    Hemiparesis of right dominant side due to acute cerebrovascular disease (HCC) I67.89, G81.91    Tobacco use disorder F17.200    Current use of aspirin Z79.82    On clopidogrel therapy Z79.01    Hypertensive heart and kidney disease without heart failure and with stage 3b chronic kidney disease (HCC) I13.10, N18.32    Mixed hyperlipidemia E78.2    On statin therapy due to risk of future cardiovascular event Z79.899    Constipation K59.00    History of iron deficiency anemia Z86.2    High serum magnesium R79.89    Secondary hyperparathyroidism of renal origin ([facility] N25.81    Vitamin D deficiency E55.9        Plan:     1. Justification for continued stay: Good progression towards established rehabilitation goals. 2. Medical Issues being followed closely:    [x]  Fall and safety precautions     []  Wound Care     [x]  Bowel and Bladder Function     [x]  Fluid Electrolyte and Nutrition Balance     []  Pain Control      3. Issues that 24 hour rehabilitation nursing is following:    [x]  Fall and safety precautions     []  Wound Care     [x]  Bowel and Bladder Function     [x]  Fluid Electrolyte and Nutrition Balance     []  Pain Control      [x]  Assistance with and education on in-room safety with transfers to and from the bed, wheelchair, toilet and shower. 4. Acute rehabilitation plan of care:    [x]  Continue current care and rehab. [x]  Physical Therapy           [x]  Occupational Therapy           [x]  Speech Therapy     []  Hold Rehab until further notice     5. Medications:    [x]  MAR Reviewed     [x]  Continue Present Medications     6. DVT Prophylaxis:      []  Enoxaparin     [x]  Unfractionated Heparin     []  Warfarin     []  NOAC     []  NORBERT Stockings     []  Sequential Compression Device     []  None     7. Code status    [x]  Full code     []  Partial code     []  Do not intubate     []  Do not resuscitate     8.  Orders:   > Acute Lacunar Stroke (acute lacunar infarct in the posterior left lentiform nucleus extending into the corona radiata) with residual right hemiparesis   > Dual antiplatelet therapy (DAPT) was started 5/22/2021; Neurology recommending to continue DAPT for 21 days (~6/12/2021), then discontinue Aspirin and continue on Clopidogrel 75 mg PO once daily    > On 5/27/2021, started Melatonin 3 mg PO q HS (for stroke recovery)   > Continue:    > Aspirin 81 PO once daily with dinner (STOP DATE: 6/12/2021)    > Atorvastatin 40 mg PO once daily    > Clopidogrel 75 mg PO once daily with breakfast      > Melatonin 3 mg PO q HS    > Constipation   > On 5/28/2021, discontinued (re: refused by patient last night and this AM):    > Miralax 17 grams in 8 oz water PO once daily    > PeriColace 2 tabs PO once daily with dinner    > Hypertensive heart and kidney disease without heart failure with stage 3b chronic kidney disease   > On 5/31/2021, started Metoprolol tartrate 25 mg PO q 12 hr (9AM, 9PM)   > Continue:    > Amlodipine 10 mg PO once daily (9AM)    > Metoprolol tartrate 25 mg PO q 12 hr (9AM, 9PM)    > Mixed hyperlipidemia   > On 5/28/2021, started Coenzyme Q10 100 mg PO once daily   > Continue:    > Atorvastatin 40 mg PO once daily    > Coenzyme Q10 100 mg PO once daily    > High serum magnesium   > Mg (5/28/2021, on admission to the ARU) = 3.2   > patient reported she was given 3 doses of Milk of magnesia at [facility] prior to discharge to the ARU   > Avoid magnesium-containing medications/supplements      05/31/21  0545 05/30/21  0706 05/28/21  0613   MG 2.6 2.9* 3.2*     > Stage 3b-4 chronic kidney disease   > BUN/Creatinine (5/28/2021, on admission to the ARU) = 36/2.47   > Retroperitoneal ultrasound (5/28/2021) showed:    > Increased parenchymal echogenicity in both kidneys consistent with medical renal disease. There is a prominent cortical cyst in the mid right kidney. No hydronephrosis or nephrolithiasis.       > Renal cortical thickness is somewhat less than 10 mm in both kidneys.   The left kidney is somewhat small with respect to the right kidney which is low normal in size.   > On 5/29/2021, started IVF: 0.9%  ml.hr x 1 liter once daily after dinner   > PTH (5/31/2021) = 276.1   > Vitamin D 25-Hydroxy (5/31/2021) = 10.1   > Urine microalbumin (5/31/2021) = 15.30   > Microalbumin/Creatinine ratio (5/31/2021) = 165   > CK (5/31/2021) = 107   > Urinalysis (5/31/2021): SG 1.013, protein 30, no casts seen   > Urine creatinine (5/31/2021) = 93.00   > Random urine protein (5/31/2021) = 31   > Random urine sodium (5/31/2021) = 52      06/01/21  0430 05/31/21  0545 05/30/21  0706 05/28/21  0613   BUN 37* 36* 37* 36*   CREA 2.27* 2.15* 2.25* 2.47*      > Nephrology consult (Dr. Batool Espinoza) called on 5/31/2021 for evaluation and comanagement   > Continue IVF: 0.9%  ml.hr x 1 liter once daily after dinner    > Tobacco use disorder   > On 5/28/2021, started Nicotine 14 mg/24 hr patch, 1 patch on skin once daily   > Continue Nicotine 14 mg/24 hr patch, 1 patch on skin once daily    > Vitamin D Deficiency   > Vitamin D 25-Hydroxy (5/31/2021) = 10.1   > On 5/31/2021, patient was given Cholecalciferol 50,000 units PO x 1 dose    > Start Cholecalciferol 5,000 units PO once daily    > Analgesia   > Continue Acetaminophen 650 mg PO q 4 hr PRN for pain     > Diet:   > Specifications: Cardiac   > Solids (consistency): Regular    > Liquids (consistency): Thin    > Fluid restriction: None      9. Personal Protective Equipment was used while interacting with patient including: goggles and KN95 face mask. Patient was using a surgical mask. 10. Patient's progress in rehabilitation and medical issues discussed with the patient. All questions answered to the best of my ability. Care plan discussed with patient and nurse. 11. Total clinical care time is 30 minutes, including review of chart including all labs, radiology, past medical history, and discussion with patient. Greater than 50% of my time was spent in coordination of care and counseling.       Signed:    Shoaib Oh MD    June 1, 2021

## 2021-06-01 NOTE — ROUTINE PROCESS
SHIFT CHANGE NOTE FOR [facility]    Bedside and Verbal shift change report given to Didi Sparks, RN (oncoming nurse) by Jagruti Hinds, CLAY   (offgoing nurse). Report included the following information SBAR, Kardex, MAR and Recent Results.     Situation:   Code Status: Full Code   Reason for Admission: CVA  Hospital Day: 5   Problem List:   Hospital Problems  Date Reviewed: 5/28/2021        Codes Class Noted POA    Secondary hyperparathyroidism of renal origin ([facility] (Chronic) ICD-10-CM: N25.81  ICD-9-CM: 588.81  5/31/2021 Yes        Vitamin D deficiency (Chronic) ICD-10-CM: E55.9  ICD-9-CM: 268.9  5/31/2021 Yes    Overview Signed 5/31/2021  1:17 PM by Nubia Reeves MD     Vitamin D 25-Hydroxy (5/31/2021) = 10.5              High serum magnesium ICD-10-CM: R79.89  ICD-9-CM: 790.99  5/28/2021 Yes        Hypertensive heart and kidney disease without heart failure and with stage 3b chronic kidney disease ([facility] (Chronic) ICD-10-CM: I13.10, N18.32  ICD-9-CM: 404.90, 585.3  Unknown Yes        Mixed hyperlipidemia (Chronic) ICD-10-CM: E78.2  ICD-9-CM: 272.2  Unknown Yes        Constipation (Chronic) ICD-10-CM: K59.00  ICD-9-CM: 564.00  Unknown Yes        Current use of aspirin ICD-10-CM: Z79.82  ICD-9-CM: V58.66  5/23/2021 Yes        On clopidogrel therapy ICD-10-CM: Z79.01  ICD-9-CM: V58.61  5/23/2021 Yes        On statin therapy due to risk of future cardiovascular event ICD-10-CM: Z79.899  ICD-9-CM: V58.69  5/22/2021 Yes    Overview Signed 5/27/2021  2:27 PM by Nubia Reeves MD     On Atorvastatin             * (Principal) Acute lacunar stroke [facility]) ICD-10-CM: I63.81  ICD-9-CM: 434.91  5/21/2021 Yes    Overview Signed 5/27/2021  2:15 PM by Nubia Reeves MD     Acute Lacunar Stroke (acute lacunar infarct in the posterior left lentiform nucleus extending into the corona radiata) with residual right hemiparesis             Impaired mobility and ADLs ICD-10-CM: Z74.09, Z78.9  ICD-9-CM: V49.89  5/21/2021 Yes        Hemiparesis of right dominant side due to acute cerebrovascular disease [facility]) ICD-10-CM: I67.89, G81.91  ICD-9-CM: 436, 342.91  5/21/2021 Yes              Background:   Past Medical History:   Past Medical History:   Diagnosis Date    Abnormal mammogram 2014    left with biopsy    Acute lacunar stroke ([facility] 5/21/2021    Acute Lacunar Stroke (acute lacunar infarct in the posterior left lentiform nucleus extending into the corona radiata) with residual right hemiparesis    Bacterial vaginosis 1/9/15    Dr Stevens Speaks Constipation     Current use of aspirin 5/23/2021    Gastroesophageal reflux disease 6/30/2016    Hemiparesis of right dominant side due to acute cerebrovascular disease ([facility] 5/21/2021    History of Helicobacter pylori infection 7/24/2015    History of iron deficiency anemia 06/2014    History of vitamin D deficiency 6/11/2015    Hot flashes 1/9/15    Dr Stevens Speaks On clopidogrel therapy 5/23/2021    On statin therapy due to risk of future cardiovascular event 5/22/2021    On Atorvastatin    Rheumatoid arthritis ([facility]     Secondary hyperparathyroidism of renal origin ([facility] 5/31/2021    Stage 3b chronic kidney disease ([facility] 7/24/2015    Tobacco use disorder     Vitamin D deficiency 5/31/2021    Vitamin D 25-Hydroxy (5/31/2021) = 10.5       Patient taking anticoagulants yes    Patient has a defibrillator: no     Assessment:   Changes in Assessment throughout shift: none    Patient has central line: no  I     Last Vitals:     Vitals:    05/30/21 2100 05/31/21 0719 05/31/21 1647 05/31/21 2111   BP: (!) 148/93 (!) 143/87 (!) 131/94 (!) 147/96   Pulse: 86 78 95 80   Resp: 18 18 18 20   Temp: 97.2 °F (36.2 °C) 97 °F (36.1 °C) 97.2 °F (36.2 °C) 97.3 °F (36.3 °C)   SpO2: 100% 99% 95% 97%   Weight:       Height:       LMP: 10/15/2014        PAIN    Pain Assessment    Pain Intensity 1: 0 (06/01/21 0400) Pain Intensity 1: 2 (12/29/14 1105)    Pain Location 1: Arm Pain Location 1: Abdomen    Pain Intervention(s) 1: Medication (see MAR) Pain Intervention(s) 1: Medication (see MAR)  Patient Stated Pain Goal: 0 Patient Stated Pain Goal: 0  o Intervention effective: no pain reported  o Other actions taken for pain: no pain reported     Skin Assessment  Skin color    Condition/Temperature    Integrity    Turgor    Weekly Pressure Ulcer Documentation  Pressure  Injury Documentation: No Pressure Injury Noted-Pressure Ulcer Prevention Initiated  Wound Prevention & Protection Methods  Orientation of wound Orientation of Wound Prevention: Posterior  Location of Prevention Location of Wound Prevention: Buttocks, Sacrum/Coccyx  Dressing Present Dressing Present : No  Dressing Status    Wound Offloading Wound Offloading (Prevention Methods): Bed, pressure redistribution/air     INTAKE/OUPUT  Date 05/31/21 0700 - 06/01/21 0659 06/01/21 0700 - 06/02/21 0659   Shift 0499-8359 7963-0149 24 Hour Total 0742-1204 0476-1074 24 Hour Total   INTAKE   P.O. 500  500        P. O. 500  500      Shift Total(mL/kg) 500(5.9)  500(5.9)      OUTPUT   Urine(mL/kg/hr)           Urine Occurrence(s) 3 x 3 x 6 x      Stool           Stool Occurrence(s) 0 x 0 x 0 x      Shift Total(mL/kg)           500      Weight (kg) 84.4 84.4 84.4 84.4 84.4 84.4       Recommendations:  1. Patient needs and requests: Assist with toileting    2. Diet: Cardiac & thin liquids    3. Pending tests/procedures: none  4. .     5. Functional Level/Equipment: W/C with min assist    6. Estimated Discharge Date: TBD Posted on Whiteboard in Patients Room: [facility] Safety Check    A safety check occurred in the patient's room between off going nurse and oncoming nurse listed above.     The safety check included the below items  Area Items   H  High Alert Medications - Verify all high alert medication drips (heparin, PCA, etc.)   E  Equipment - Suction is set up for ALL patients (with joaquinker)  - Red plugs utilized for all equipment (IV pumps, etc.)  - WOWs wiped down at end of shift.  - Room stocked with oxygen, suction, and other unit-specific supplies   A  Alarms - Bed alarm is set for fall risk patients  - Ensure chair alarm is in place and activated if patient is up in a chair   L  Lines - Check IV for any infiltration  - Rollins bag is empty if patient has a Rollins   - Tubing and IV bags are labeled   S  Safety   - Room is clean, patient is clean, and equipment is clean. - Hallways are clear from equipment besides carts. - Fall bracelet on for fall risk patients  - Ensure room is clear and free of clutter  - Suction is set up for ALL patients (with yanker)  - Hallways are clear from equipment besides carts.    - Isolation precautions followed, supplies available outside room, sign posted

## 2021-06-01 NOTE — CONSULTS
Consult Note           Consult requested by: Marisela Lion MD    ADMIT DATE: 5/27/2021    CONSULT DATE: June 1, 2021             Admission diagnosis: Acute lacunar stroke [facility])   Reason for Nephrology Consultation: REHANA/CKD    Assessment and plan:     #1 acute kidney injury on chronic kidney disease stage III/IV. Patient's baseline creatinine unclear but is likely close to 2 range. Appears to be having a mild REHANA, I think it is prerenal as it is improving with hydration. Patient also has history of using Sutton 2 inhibitors /multiple NSAID use including BC powder chronically. Her CKD is likely d/t HTN nephrosclerosis and NSAID use . Renal US wwith no hydro   #2 hypertension , continue amlodipine   #3 history of CVA with right-sided hemiparesis  #4 dyslipidemia  #5 mild proteinuria  #6 rheumatoid arthritis  #7 NSAID use     Plan:     #1 check urine analysis, urine protein creatinine ratio  #2 agree with gentle hydration  #3 encourage p.o. intake  #4 no more NSAIDs , counseled patient   #5 follow renal panels daily    Following patient closely, consult note to follow     Please call with questions     Mariely Ko MD [facility]  Cell [phone]  Pager: [phone]          HPI  The patient was initially brought to [facility] emergency room for evaluation of recurrent falls due to right-sided weakness. ED of the head showed no acute intracranial abnormality, acute stroke telehealth was done patient was not deemed a candidate for alteplase. On discharge patient was transferred to the rehab unit.     It was noted that patient had elevated creatinine in the 2.5 range. Patient's last known creatinine of 1.6 in 2016. Unclear what her baseline creatinine is recently.   Nephrology was consulted for concern for REHANA versus advanced CKD       Past Medical History:   Diagnosis Date    Abnormal mammogram 2014    left with biopsy    Acute lacunar stroke ([facility] 5/21/2021    Acute Lacunar Stroke (acute lacunar infarct in the posterior left lentiform nucleus extending into the corona radiata) with residual right hemiparesis    Bacterial vaginosis 1/9/15    Dr Pepper Holstein Constipation     Current use of aspirin 5/23/2021    Gastroesophageal reflux disease 6/30/2016    Hemiparesis of right dominant side due to acute cerebrovascular disease ([facility] 5/21/2021    History of Helicobacter pylori infection 7/24/2015    History of iron deficiency anemia 06/2014    History of vitamin D deficiency 6/11/2015    Hot flashes 1/9/15    Dr Pepper Holstein On clopidogrel therapy 5/23/2021    On statin therapy due to risk of future cardiovascular event 5/22/2021    On Atorvastatin    Rheumatoid arthritis ([facility]     Secondary hyperparathyroidism of renal origin ([facility] 5/31/2021    Stage 3b chronic kidney disease ([facility] 7/24/2015    Tobacco use disorder     Vitamin D deficiency 5/31/2021    Vitamin D 25-Hydroxy (5/31/2021) = 10.5       Past Surgical History:   Procedure Laterality Date    HX LAPAROSCOPIC SUPRACERVICAL HYSTERECTOMY  11/2014    Dr. Karina Campo, GYN    HX SALPINGO-OOPHORECTOMY  11/2014       Social History     Socioeconomic History    Marital status: SINGLE     Spouse name: Not on file    Number of children: Not on file    Years of education: Not on file    Highest education level: Not on file   Occupational History    Not on file   Tobacco Use    Smoking status: Current Every Day Smoker     Packs/day: 0.50     Years: 20.00     Pack years: 10.00     Types: Cigarettes    Smokeless tobacco: Never Used   Substance and Sexual Activity    Alcohol use: No    Drug use: Yes     Types: Cocaine    Sexual activity: Yes     Partners: Male   Other Topics Concern     Service No    Blood Transfusions Yes    Caffeine Concern No    Occupational Exposure No    Hobby Hazards No    Sleep Concern No    Stress Concern No    Weight Concern No    Special Diet No    Back Care No    Exercise No    Bike Helmet No  Seat Belt Yes    Self-Exams No   Social History Narrative    Not on file     Social Determinants of Health     Financial Resource Strain:     Difficulty of Paying Living Expenses:    Food Insecurity:     Worried About Running Out of Food in the Last Year:     [address] in the Last Year:    Transportation Needs:     Lack of Transportation (Medical):  Lack of Transportation (Non-Medical):    Physical Activity:     Days of Exercise per Week:     Minutes of Exercise per Session:    Stress:     Feeling of Stress :    Social Connections:     Frequency of Communication with Friends and Family:     Frequency of Social Gatherings with Friends and Family:     Attends [employer] Services:     Active Member of Clubs or Organizations:     Attends Club or Organization Meetings:     Marital Status:    Intimate Partner Violence:     Fear of Current or Ex-Partner:     Emotionally Abused:     Physically Abused:     Sexually Abused:        Family History   Problem Relation Age of Onset    Arthritis-osteo Maternal Grandmother     Cancer Sister     Hypertension Sister     Arthritis-osteo Mother     Hypertension Mother     Diabetes Mother     Hypertension Brother     Stroke Neg Hx      No Known Allergies     Home Medications:     Medications Prior to Admission   Medication Sig    acetaminophen (TylenoL) 325 mg tablet Take 650 mg by mouth every six (6) hours as needed for Pain or Fever.  amLODIPine (Norvasc) 10 mg tablet Take 10 mg by mouth daily. Indications: high blood pressure    aspirin 81 mg chewable tablet Take 81 mg by mouth daily.  atenoloL (TENORMIN) 25 mg tablet Take 25 mg by mouth daily.  atorvastatin (LIPITOR) 40 mg tablet Take 40 mg by mouth daily. At bedtime    clopidogreL (Plavix) 75 mg tab Take 75 mg by mouth daily.  heparin sodium,porcine (heparin, porcine,) 5,000 unit/mL injection 5,000 Units every eight (8) hours.     magnesium hydroxide ([facility]) 400 mg/5 mL suspension Take 30 mL by mouth nightly. As needed    meloxicam (MOBIC) 7.5 mg tablet Take 7.5 mg by mouth daily. As needed for pain    nitroglycerin (Nitrostat) 0.4 mg SL tablet 0.4 mg by SubLINGual route every five (5) minutes as needed for Chest Pain. Up to 3 doses.  polyethylene glycol (Miralax) 17 gram/dose powder Take 17 g by mouth daily. Prn constipation   Indications: constipation    senna-docusate (PERICOLACE) 8.6-50 mg per tablet Take 1 Tablet by mouth two (2) times a day.  aluminum-magnesium hydroxide 200-200 mg/5 mL susp 5 mL, diphenhydrAMINE 12.5 mg/5 mL liqd 12.5 mg, lidocaine 2 % soln 5 mL 15 mL by Swish and Spit route two (2) times daily as needed for Stomatitis or Pain. Magic mouth wash   Maalox  Lidocaine 2% viscous   Diphenhydramine oral solution     Pharmacy to mix equal portions of ingredients to a total volume as indicated in the dispense amount.  triamcinolone acetonide (KENALOG) 0.1 % dental paste by Dental route two (2) times a day.  olopatadine (PATANOL) 0.1 % ophthalmic solution Administer 2 Drops to both eyes two (2) times a day.  cyclobenzaprine (FLEXERIL) 10 mg tablet Take 1 Tab by mouth three (3) times daily as needed for Muscle Spasm(s).  ferrous sulfate 325 mg (65 mg iron) tablet Take  by mouth Daily (before breakfast).  quinapril-hydrochlorothiazide (ACCURETIC) 20-25 mg per tablet Take 1 Tab by mouth daily.  ranitidine (ZANTAC) 150 mg tablet Take 1 Tab by mouth two (2) times a day.  celecoxib (CELEBREX) 400 mg capsule Take 1 Cap by mouth two (2) times a day.        Current Inpatient Medications:     Current Facility-Administered Medications   Medication Dose Route Frequency    0.9% sodium chloride infusion 1,000 mL  1,000 mL IntraVENous PCD    metoprolol tartrate (LOPRESSOR) tablet 25 mg  25 mg Oral Q12H    cholecalciferol (VITAMIN D3) capsule 5,000 Units  5,000 Units Oral DAILY    hydrALAZINE (APRESOLINE) tablet 10 mg  10 mg Oral TID PRN  nicotine (NICODERM CQ) 14 mg/24 hr patch 1 Patch  1 Patch TransDERmal DAILY    acetaminophen (TYLENOL) tablet 650 mg  650 mg Oral Q4H PRN    bisacodyL (DULCOLAX) tablet 10 mg  10 mg Oral Q48H PRN    heparin (porcine) injection 5,000 Units  5,000 Units SubCUTAneous Q8H    aspirin chewable tablet 81 mg  81 mg Oral DAILY WITH DINNER    atorvastatin (LIPITOR) tablet 80 mg  80 mg Oral DAILY    clopidogreL (PLAVIX) tablet 75 mg  75 mg Oral DAILY WITH BREAKFAST    amLODIPine (NORVASC) tablet 10 mg  10 mg Oral DAILY    co-enzyme Q-10 (CO Q-10) capsule 100 mg  100 mg Oral DAILY    melatonin tablet 3 mg  3 mg Oral QHS       Review of Systems:   No fever or chills. No sore throat. No cough or hemoptysis. No shortness of breath or chest pain. No orthopnea or paroxysmal nocturnal dyspnea. Good appetite. No nausea, vomiting, abdominal pain, melena or hematochezia. No constipation or diarrhea. No dysuria, no gross hematuria of voiding difficulties. No ankle swelling, no joint paints. No muscle aches. No skin changes. No dizziness or lightheadedness. No headaches. Physical Assessment:     Vitals:    05/31/21 0719 05/31/21 1647 05/31/21 2111 06/01/21 0748   BP: (!) 143/87 (!) 131/94 (!) 147/96 134/88   Pulse: 78 95 80 70   Resp: 18 18 20 18   Temp: 97 °F (36.1 °C) 97.2 °F (36.2 °C) 97.3 °F (36.3 °C) 97 °F (36.1 °C)   SpO2: 99% 95% 97% 100%   Weight:       Height:         Last 3 Recorded Weights in this Encounter    05/27/21 1417   Weight: 84.4 kg (186 lb)     Admission weight: Weight: 84.4 kg (186 lb) (05/27/21 1417)    No intake or output data in the 24 hours ending 06/01/21 1400    Patient is in no apparent distress. HEENT: mmm  Neck: no cervical lymphadenopathy or thyromegaly. Lungs: good air entry, clear to auscultation bilaterally. Cardiovascular system: S1, S2, regular rate and rhythm. Abdomen: soft, non tender, non distended. Extremities: no clubbing, cyanosis or edema.    Neurologic: Alert, oriented time three. Data Review:    Labs: Results:       Chemistry Recent Labs     06/01/21  0430 05/31/21  0545 05/30/21  0706   * 100* 94    140 142   K 4.3 4.6 4.6    110 113*   CO2 20* 22 23   BUN 37* 36* 37*   CREA 2.27* 2.15* 2.25*   CA 8.6 9.4  9.1 9.3   AGAP 8 8 6   BUCR 16 17 16   ALB  --  3.5  --    PHOS  --  4.1  --          CBC w/Diff Recent Labs     05/31/21  0545 05/30/21  0706   WBC  --  5.0   RBC  --  5.46*   HGB 14.5 14.9   HCT 44.1 47.1*    217   GRANS  --  40   LYMPH  --  47   EOS  --  3         Iron/Ferritin No results for input(s): IRON in the last 72 hours. No lab exists for component: TIBCCALC   PTH/VIT D No results for input(s): PTH in the last 72 hours.     No lab exists for component: ARIEL Nava MD  6/1/2021  2:00 PM      June 1, 2021

## 2021-06-02 LAB
ANION GAP SERPL CALC-SCNC: 6 MMOL/L (ref 3–18)
BUN SERPL-MCNC: 35 MG/DL (ref 7–18)
BUN/CREAT SERPL: 16 (ref 12–20)
CALCIUM SERPL-MCNC: 9.3 MG/DL (ref 8.5–10.1)
CHLORIDE SERPL-SCNC: 112 MMOL/L (ref 100–111)
CO2 SERPL-SCNC: 22 MMOL/L (ref 21–32)
CREAT SERPL-MCNC: 2.24 MG/DL (ref 0.6–1.3)
GLUCOSE SERPL-MCNC: 106 MG/DL (ref 74–99)
POTASSIUM SERPL-SCNC: 5.2 MMOL/L (ref 3.5–5.5)
SODIUM SERPL-SCNC: 140 MMOL/L (ref 136–145)

## 2021-06-02 PROCEDURE — 80048 BASIC METABOLIC PNL TOTAL CA: CPT

## 2021-06-02 PROCEDURE — 97535 SELF CARE MNGMENT TRAINING: CPT

## 2021-06-02 PROCEDURE — 65310000000 HC RM PRIVATE REHAB

## 2021-06-02 PROCEDURE — 97110 THERAPEUTIC EXERCISES: CPT

## 2021-06-02 PROCEDURE — 2709999900 HC NON-CHARGEABLE SUPPLY

## 2021-06-02 PROCEDURE — 36415 COLL VENOUS BLD VENIPUNCTURE: CPT

## 2021-06-02 PROCEDURE — 97112 NEUROMUSCULAR REEDUCATION: CPT

## 2021-06-02 PROCEDURE — 99232 SBSQ HOSP IP/OBS MODERATE 35: CPT | Performed by: INTERNAL MEDICINE

## 2021-06-02 PROCEDURE — 97116 GAIT TRAINING THERAPY: CPT

## 2021-06-02 PROCEDURE — 74011250637 HC RX REV CODE- 250/637: Performed by: INTERNAL MEDICINE

## 2021-06-02 PROCEDURE — 92507 TX SP LANG VOICE COMM INDIV: CPT

## 2021-06-02 PROCEDURE — 74011250636 HC RX REV CODE- 250/636: Performed by: INTERNAL MEDICINE

## 2021-06-02 PROCEDURE — 97530 THERAPEUTIC ACTIVITIES: CPT

## 2021-06-02 RX ORDER — LOSARTAN POTASSIUM 25 MG/1
12.5 TABLET ORAL
Status: COMPLETED | OUTPATIENT
Start: 2021-06-02 | End: 2021-06-02

## 2021-06-02 RX ORDER — POLYETHYLENE GLYCOL 3350 17 G/17G
17 POWDER, FOR SOLUTION ORAL DAILY
Status: DISCONTINUED | OUTPATIENT
Start: 2021-06-03 | End: 2021-06-14 | Stop reason: HOSPADM

## 2021-06-02 RX ORDER — AMOXICILLIN 250 MG
2 CAPSULE ORAL
Status: DISCONTINUED | OUTPATIENT
Start: 2021-06-02 | End: 2021-06-14 | Stop reason: HOSPADM

## 2021-06-02 RX ORDER — LOSARTAN POTASSIUM 25 MG/1
12.5 TABLET ORAL DAILY
Status: DISCONTINUED | OUTPATIENT
Start: 2021-06-03 | End: 2021-06-05

## 2021-06-02 RX ADMIN — METOPROLOL TARTRATE 25 MG: 25 TABLET, FILM COATED ORAL at 21:24

## 2021-06-02 RX ADMIN — Medication 100 MG: at 09:22

## 2021-06-02 RX ADMIN — CLOPIDOGREL BISULFATE 75 MG: 75 TABLET ORAL at 09:24

## 2021-06-02 RX ADMIN — ACETAMINOPHEN 650 MG: 325 TABLET ORAL at 06:28

## 2021-06-02 RX ADMIN — METOPROLOL TARTRATE 25 MG: 25 TABLET, FILM COATED ORAL at 09:23

## 2021-06-02 RX ADMIN — LOSARTAN POTASSIUM 12.5 MG: 25 TABLET, FILM COATED ORAL at 12:11

## 2021-06-02 RX ADMIN — HEPARIN SODIUM 5000 UNITS: 5000 INJECTION INTRAVENOUS; SUBCUTANEOUS at 06:24

## 2021-06-02 RX ADMIN — HEPARIN SODIUM 5000 UNITS: 5000 INJECTION INTRAVENOUS; SUBCUTANEOUS at 14:54

## 2021-06-02 RX ADMIN — AMLODIPINE BESYLATE 10 MG: 10 TABLET ORAL at 09:24

## 2021-06-02 RX ADMIN — HEPARIN SODIUM 5000 UNITS: 5000 INJECTION INTRAVENOUS; SUBCUTANEOUS at 21:25

## 2021-06-02 RX ADMIN — Medication 3 MG: at 21:24

## 2021-06-02 RX ADMIN — ACETAMINOPHEN 650 MG: 325 TABLET ORAL at 21:28

## 2021-06-02 RX ADMIN — DOCUSATE SODIUM 50MG AND SENNOSIDES 8.6MG 2 TABLET: 8.6; 5 TABLET, FILM COATED ORAL at 18:05

## 2021-06-02 RX ADMIN — Medication 5000 UNITS: at 09:24

## 2021-06-02 RX ADMIN — ATORVASTATIN CALCIUM 80 MG: 40 TABLET, FILM COATED ORAL at 09:24

## 2021-06-02 RX ADMIN — ASPIRIN 81 MG: 81 TABLET, CHEWABLE ORAL at 18:04

## 2021-06-02 NOTE — PROGRESS NOTES
[facility] PHYSICAL REHABILITATION  [address] [place] [address], [address]     INPATIENT REHABILITATION  DAILY PROGRESS NOTE     Date: 6/2/2021    Name: Nayana James Age / Sex: 62 y.o. / female   CSN: [id number] MRN: [id number]   [facility] Date: 5/27/2021 Length of Stay: 6 days     Primary Rehab Diagnosis: Impaired Mobility and ADLs secondary to Acute Lacunar Stroke (acute lacunar infarct in the posterior left lentiform nucleus extending into the corona radiata) with residual right hemiparesis      Subjective:     Patient seen and examined. Blood pressure fairly controlled. Patient's Complaint:   Constipation    Pain Control: no current joint or muscle symptoms, essentially pain-free      Objective:     Vital Signs:  Patient Vitals for the past 24 hrs:   BP Temp Pulse Resp SpO2   06/02/21 1211 130/79 -- -- -- --   06/02/21 0816 (!) 143/100 97.1 °F (36.2 °C) 69 16 99 %   06/01/21 2055 (!) 155/98 97.3 °F (36.3 °C) 76 20 99 %   06/01/21 1530 (!) 137/95 97.8 °F (36.6 °C) 79 18 100 %        Physical Examination:  GENERAL SURVEY: Patient is awake, alert, oriented x 3, sitting comfortably on the chair, not in acute respiratory distress. HEENT: pink palpebral conjunctivae, anicteric sclerae, no nasoaural discharge, moist oral mucosa  NECK: supple, no jugular venous distention, no palpable lymph nodes  CHEST/LUNGS: symmetrical chest expansion, good air entry, clear breath sounds  HEART: adynamic precordium, good S1 S2, no S3, regular rhythm, no murmurs  ABDOMEN: flat, bowel sounds appreciated, soft, non-tender  EXTREMITIES: pink nailbeds, no edema, full and equal pulses, no calf tenderness   NEUROLOGICAL EXAM: The patient is awake, alert and oriented x3, able to answer questions fairly appropriately, able to follow 1 and 2 step commands. Able to tell time from the wall clock. Cranial nerves II-XII are grossly intact. No gross sensory deficit.   Motor strength is 4 to 4+/5 on the RUE and RLE, 5/5 on the KLAUS and MIRIAM.       Current Medications:  Current Facility-Administered Medications   Medication Dose Route Frequency    [START ON 6/3/2021] losartan (COZAAR) tablet 12.5 mg  12.5 mg Oral DAILY    metoprolol tartrate (LOPRESSOR) tablet 25 mg  25 mg Oral Q12H    cholecalciferol (VITAMIN D3) capsule 5,000 Units  5,000 Units Oral DAILY    hydrALAZINE (APRESOLINE) tablet 10 mg  10 mg Oral TID PRN    nicotine (NICODERM CQ) 14 mg/24 hr patch 1 Patch  1 Patch TransDERmal DAILY    acetaminophen (TYLENOL) tablet 650 mg  650 mg Oral Q4H PRN    bisacodyL (DULCOLAX) tablet 10 mg  10 mg Oral Q48H PRN    heparin (porcine) injection 5,000 Units  5,000 Units SubCUTAneous Q8H    aspirin chewable tablet 81 mg  81 mg Oral DAILY WITH DINNER    atorvastatin (LIPITOR) tablet 80 mg  80 mg Oral DAILY    clopidogreL (PLAVIX) tablet 75 mg  75 mg Oral DAILY WITH BREAKFAST    co-enzyme Q-10 (CO Q-10) capsule 100 mg  100 mg Oral DAILY    melatonin tablet 3 mg  3 mg Oral QHS       Allergies:  No Known Allergies      Functional Progress:    SPEECH AND LANGUAGE PATHOLOGY    ON ADMISSION MOST RECENT   Comprehension (Native Language)  Primary Mode of Comprehension: Auditory  Score: 5 Comprehension (Native Language)  Primary Mode of Comprehension: Auditory  Score: 6     Expression (Native Language)  Primary Mode of Expression: Verbal  Score: 6   Expression (Native Language)  Primary Mode of Expression: Verbal  Score: 5     Social Interaction/Pragmatics  Score: 5 Social Interaction/Pragmatics  Score: 5     Problem Solving  Score: 4   Problem Solving  Score: 3     Memory  Score: 4 Memory  Score: 4       Legend:   7 - Independent   6 - Modified Independent   5 - Standby Assistance / Supervision / Set-up   4 - Minimum Assistance / Contact Guard Assistance   3 - Moderate Assistance   2 - Maximum Assistance   1 - Total Assistance / Dependent       Lab/Data Review:  Recent Results (from the past 24 hour(s))   METABOLIC PANEL, BASIC    Collection Time: 06/02/21  4:45 AM   Result Value Ref Range    Sodium 140 136 - 145 mmol/L    Potassium 5.2 3.5 - 5.5 mmol/L    Chloride 112 (H) 100 - 111 mmol/L    CO2 22 21 - 32 mmol/L    Anion gap 6 3.0 - 18 mmol/L    Glucose 106 (H) 74 - 99 mg/dL    BUN 35 (H) 7.0 - 18 MG/DL    Creatinine 2.24 (H) 0.6 - 1.3 MG/DL    BUN/Creatinine ratio 16 12 - 20      GFR est AA 27 (L) >60 ml/min/1.73m2    GFR est non-AA 22 (L) >60 ml/min/1.73m2    Calcium 9.3 8.5 - 10.1 MG/DL       Assessment:     Primary Rehab Diagnosis  1. Impaired Mobility and ADLs  2. Acute Lacunar Stroke (acute lacunar infarct in the posterior left lentiform nucleus extending into the corona radiata) with residual right hemiparesis    Comorbidities  Patient Active Problem List   Diagnosis Code    Rheumatoid arthritis ([facility] M06.9    History of vitamin D deficiency Z86.39    Cocaine use F14.90    Stage 3b chronic kidney disease (HCC) N18.32    History of Helicobacter pylori infection Z86.19    Abscess of finger L02.519    Gastroesophageal reflux disease K21.9    Acute lacunar stroke (HCC) I63.81    Impaired mobility and ADLs Z74.09, Z78.9    Hemiparesis of right dominant side due to acute cerebrovascular disease (HCC) I67.89, G81.91    Tobacco use disorder F17.200    Current use of aspirin Z79.82    On clopidogrel therapy Z79.01    Hypertensive heart and kidney disease without heart failure and with stage 3b chronic kidney disease (HCC) I13.10, N18.32    Mixed hyperlipidemia E78.2    On statin therapy due to risk of future cardiovascular event Z79.899    Constipation K59.00    History of iron deficiency anemia Z86.2    High serum magnesium R79.89    Secondary hyperparathyroidism of renal origin ([facility] N25.81    Vitamin D deficiency E55.9        Plan:     1. Justification for continued stay: Good progression towards established rehabilitation goals.     2. Medical Issues being followed closely:    [x]  Fall and safety precautions     []  Wound Care [x]  Bowel and Bladder Function     [x]  Fluid Electrolyte and Nutrition Balance     []  Pain Control      3. Issues that 24 hour rehabilitation nursing is following:    [x]  Fall and safety precautions     []  Wound Care     [x]  Bowel and Bladder Function     [x]  Fluid Electrolyte and Nutrition Balance     []  Pain Control      [x]  Assistance with and education on in-room safety with transfers to and from the bed, wheelchair, toilet and shower. 4. Acute rehabilitation plan of care:    [x]  Continue current care and rehab. [x]  Physical Therapy           [x]  Occupational Therapy           [x]  Speech Therapy     []  Hold Rehab until further notice     5. Medications:    [x]  MAR Reviewed     [x]  Continue Present Medications     6. DVT Prophylaxis:      []  Enoxaparin     [x]  Unfractionated Heparin     []  Warfarin     []  NOAC     []  NORBERT Stockings     []  Sequential Compression Device     []  None     7. Code status    [x]  Full code     []  Partial code     []  Do not intubate     []  Do not resuscitate     8.  Orders:   > Acute Lacunar Stroke (acute lacunar infarct in the posterior left lentiform nucleus extending into the corona radiata) with residual right hemiparesis   > Dual antiplatelet therapy (DAPT) was started 5/22/2021; Neurology recommending to continue DAPT for 21 days (~6/12/2021), then discontinue Aspirin and continue on Clopidogrel 75 mg PO once daily    > On 5/27/2021, started Melatonin 3 mg PO q HS (for stroke recovery)   > Continue:    > Aspirin 81 PO once daily with dinner (STOP DATE: 6/12/2021)    > Atorvastatin 40 mg PO once daily    > Clopidogrel 75 mg PO once daily with breakfast      > Melatonin 3 mg PO q HS    > Constipation   > On 5/28/2021, discontinued (re: refused by patient last night and this AM):    > Miralax 17 grams in 8 oz water PO once daily    > PeriColace 2 tabs PO once daily with dinner    > Hypertensive heart and kidney disease without heart failure with stage 3b-4 chronic kidney disease   > On 5/31/2021, started Metoprolol tartrate 25 mg PO q 12 hr (9AM, 9PM)   > Discontinue Amlodipine 10 mg PO once daily (9AM)   > Start Losartan 12.5 mg PO once daily (9AM)   > Continue Metoprolol tartrate 25 mg PO q 12 hr (9AM, 9PM)    > Mixed hyperlipidemia   > On 5/28/2021, started Coenzyme Q10 100 mg PO once daily   > Continue:    > Atorvastatin 40 mg PO once daily    > Coenzyme Q10 100 mg PO once daily    > High serum magnesium   > Mg (5/28/2021, on admission to the ARU) = 3.2   > patient reported she was given 3 doses of Milk of magnesia at [facility] prior to discharge to the ARU   > Avoid magnesium-containing medications/supplements      05/31/21  0545 05/30/21  0706 05/28/21  0613   MG 2.6 2.9* 3.2*     > Stage 3b-4 chronic kidney disease   > BUN/Creatinine (5/28/2021, on admission to the ARU) = 36/2.47   > Retroperitoneal ultrasound (5/28/2021) showed:    > Increased parenchymal echogenicity in both kidneys consistent with medical renal disease. There is a prominent cortical cyst in the mid right kidney. No hydronephrosis or nephrolithiasis.       > Renal cortical thickness is somewhat less than 10 mm in both kidneys.   The left kidney is somewhat small with respect to the right kidney which is low normal in size.   > On 5/29/2021, started IVF: 0.9%  ml.hr x 1 liter once daily after dinner   > PTH (5/31/2021) = 276.1   > Vitamin D 25-Hydroxy (5/31/2021) = 10.1   > Urine microalbumin (5/31/2021) = 15.30   > Microalbumin/Creatinine ratio (5/31/2021) = 165   > Urinalysis (5/31/2021): SG 1.013, protein 30, no casts seen   > Nephrology consult (Dr. Erica Gutierrez) called on 5/31/2021 for evaluation and comanagement   > CK (5/31/2021) = 107   > Urine creatinine (5/31/2021) = 93.00   > Random urine protein (5/31/2021) = 31   > Random urine sodium (5/31/2021) = 52      06/02/21  0445 06/01/21  0430 05/31/21  0545 05/30/21  0706 05/28/21  0613   BUN 35* 37* 36* 37* 36*   CREA 2.24* 2.27* 2.15* 2.25* 2.47*      > Discontinue IVF: 0.9%  ml/hr x 1 liter once daily after dinner   > Start Losartan 12.5 mg PO once daily (9AM)   > Will need to monitor serum potassium level as it was already on the higher side of normal prior to starting Losartan    > Tobacco use disorder   > On 5/28/2021, started Nicotine 14 mg/24 hr patch, 1 patch on skin once daily   > Continue Nicotine 14 mg/24 hr patch, 1 patch on skin once daily    > Vitamin D Deficiency   > Vitamin D 25-Hydroxy (5/31/2021) = 10.1   > On 5/31/2021, patient was given Cholecalciferol 50,000 units PO x 1 dose    > On 6/1/2021, started Cholecalciferol 5,000 units PO once daily   > Continue Cholecalciferol 5,000 units PO once daily    > Constipation   > Start:    > Pericolace 2 tabs PO once daily after dinner    > Polyethylene glycol 17 grams in 8 oz water PO once daily     > Analgesia   > Continue Acetaminophen 650 mg PO q 4 hr PRN for pain     > Diet:   > Specifications: Cardiac   > Solids (consistency): Regular    > Liquids (consistency): Thin    > Fluid restriction: None      9. Personal Protective Equipment was used while interacting with patient including: goggles and KN95 face mask. Patient was using a surgical mask. 10. Patient's progress in rehabilitation and medical issues discussed with the patient. All questions answered to the best of my ability. Care plan discussed with patient and nurse. 11. Total clinical care time is 30 minutes, including review of chart including all labs, radiology, past medical history, and discussion with patient. Greater than 50% of my time was spent in coordination of care and counseling.       Signed:    Yolie Major MD    June 2, 2021

## 2021-06-02 NOTE — PROGRESS NOTES
In Patient Progress note      Admit Date: 5/27/2021    Assessment and plan:     #1 acute kidney injury on chronic kidney disease stage III/IV. Patient's baseline creatinine unclear but is likely close to 2 range. Appears to be having a mild REHANA, I think it is prerenal as it is improving with hydration. Patient also has history of using Sutton 2 inhibitors /multiple NSAID use including BC powder chronically. Her CKD is likely d/t HTN nephrosclerosis and NSAID use . Renal US wwith no hydro   #2 hypertension , continue amlodipine   #3 history of CVA with right-sided hemiparesis  #4 dyslipidemia  #5 mild proteinuria  #6 rheumatoid arthritis  #7 NSAID use  #8 subnephrotic proteinuria     Plan:     #1 start low dose losartan , monitor renal panel , uptitrate   #2 d/c amlodipine   #3 encourage p.o. intake , no need for IVF as volume status ok  #4 no more NSAIDs , counseled patient   #5 follow renal panels daily     Following patient closely,     Please call with questions     Tena Quinones MD FASN  Cell [phone]  Pager: [phone]    Subjective:     - No acute over night events. - respiratory - stable  - hemodynamics - stable, no pressrs  - UOP-good   - Nutrition -ok    Objective:     Visit Vitals  BP (!) 143/100 (BP 1 Location: Left upper arm, BP Patient Position: At rest)   Pulse 69   Temp 97.1 °F (36.2 °C)   Resp 16   Ht 5' 7\" (1.702 m)   Wt 84.4 kg (186 lb)   LMP 10/15/2014   SpO2 99%   Breastfeeding No   BMI 29.13 kg/m²       No intake or output data in the 24 hours ending 06/02/21 1203    Physical Exam:     Patient is in no apparent distress. HEENT: mmm  Neck: no cervical lymphadenopathy or thyromegaly. Lungs: good air entry, clear to auscultation bilaterally. Cardiovascular system: S1, S2, regular rate and rhythm. Abdomen: soft, non tender, non distended. Extremities: no clubbing, cyanosis or edema. Neurologic: Alert, oriented time three.        Data Review:    Recent Labs     05/31/21  0545   HCT 44.1     Recent Labs     06/02/21  0445 06/01/21  0430 05/31/21  0545   BUN 35* 37* 36*   CREA 2.24* 2.27* 2.15*   CA 9.3 8.6 9.4  9.1   ALB  --   --  3.5   K 5.2 4.3 4.6    139 140   * 111 110   CO2 22 20* 22   PHOS  --   --  4.1   * 121* 100*       Kush Umanzor MD

## 2021-06-02 NOTE — PROGRESS NOTES
Problem: Mobility Impaired (Adult and Pediatric)  Goal: *Therapy Goal (Edit Goal, Insert Text)  Description: Physical Therapy Short Term Goals  Initiated 5/28/2021 and to be accomplished within 7 day(s) on 6/4/2021  1. Patient will move from supine to sit and sit to supine , scoot up and down, and roll side to side in bed with supervision/set-up. 2.  Patient will transfer from bed to chair and chair to bed with minimal assistance/contact guard assist using the least restrictive device. 3.  Patient will perform sit to stand with minimal assistance/contact guard assist.  4.  Patient will ambulate with minimal assistance/contact guard assist for 50 feet with the least restrictive device. 5.  Patient will ascend/descend 5 stairs with 1 handrail(s) with moderate assistance . Physical Therapy Long Term Goals  Initiated 5/28/2021 and to be accomplished within 21 day(s) on 6/18/2021  1. Patient will move from supine to sit and sit to supine , scoot up and down, and roll side to side in bed with modified independence. 2.  Patient will transfer from bed to chair and chair to bed with modified independence using the least restrictive device. 3.  Patient will perform sit to stand with modified independence. 4.  Patient will ambulate with modified independence for 150 feet with the least restrictive device. 5.  Patient will ascend/descend 12 stairs with 1 handrail(s) with contact guard assist/standby assistance. Outcome: Progressing Towards Goal   PHYSICAL THERAPY TREATMENT    Patient: Raegan Orosco (59 y.o. female)  Date: 6/2/2021  Diagnosis: Acute lacunar stroke [facility]) [I63.81] Acute lacunar stroke [facility])  Precautions: Fall, Skin  Chart, physical therapy assessment, plan of care and goals were reviewed.     Time [id number]  Time [id number]    Patient seen for: Wheelchair mobility;Transfer training;Gait training;Balance activities (stair training)  Time in: 1210  Time out: 1240  Pt seen for: txfr training  Pain:  Pt pain was reported as no reports pre-treatment. Pt pain was reported as no reports post-treatment. Intervention: NA     Patient identified with name and : yes     SUBJECTIVE:      Pt reports during second session that her BP was elevated and had to receive and extra BP med to lower it. Pt states \"My head was hurting earlier so I thought that's what it was but I wanted to get through the therapy. \"     OBJECTIVE DATA SUMMARY:    Objective:     TRANSFERS Daily Assessment     Transfer Type: Other  Other: stand step txfr with RW  Transfer Assistance : 4 (Contact guard assistance)  Sit to Stand Assistance: Minimal assistance  Pt performed sit to stand from w/c with B hands on distal femurs to decrease dependence on BUE with min assist. Pt continues to launch fwd to lift off even with max v/c for proper anterior wt shift technique. Pt performed stand step txfr with RW and CGA for balance and v/c for safety to prevent crossing over feet. During second session, pt performed sit to stand from 23\" mat table x10 with B hands on distal femurs to decrease dependence on BUE and to improve anterior wt shift for push off with BLE, with CGA for anterior wt shift. GAIT Daily Assessment    Gait Description (WDL)      Gait Abnormalities Decreased step clearance; Foot drop; Hemiplegic; Step to gait    Assistive device Brace/Splint;Gait belt;Walker, rolling    Ambulation assistance - level surface 4 (Minimal assistance)    Distance 80 Feet (ft) (and 50ft )    Ambulation assistance- uneven surface      Comments Pt ambulated 80ft with RW with right handle splint and CGA/min assist for safety. Pt required min/mod v/c for erect posture, right heel strike and decreased right knee recurvatum. Pt's  present to observe gait training for 50ft with RW with right handle splint and CGA/min assist. Educated on proper sequencing with RW and v/c for decreasing right knee hyperextension.          STEPS/STAIRS Daily Assessment     Steps/Stairs ambulated 4 (6\" steps )    Assistance Required 4 (Minimal assistance)    Rail Use Both    Comments   Pt negotiated up and down 4 6\" steps with BHR and min assist for balance with v/c for proper LE sequencing, performing step to pattern, leading with left LE to ascend and right LE to descend. Pt's  present and educated on proper sequencing. Curbs/Ramps  NT        BALANCE Daily Assessment     Sitting - Static: Good (unsupported)  Sitting - Dynamic: Good (unsupported)  Standing - Static: Fair  Standing - Dynamic : Impaired        WHEELCHAIR MOBILITY Daily Assessment     Able to Propel (ft): 72 feet  Functional Level:  (supervision)  Curbs/Ramps Assist Required (FIM Score): 0 (Not tested)  Wheelchair Setup Assist Required : 4 (Minimal assistance)  Wheelchair Management: Manages left brake;Manages right brake   Pt propelled w/c from room to gym with rosalba technique using left UE and LE with supervision. Neuro Re-Education:  Pt performed standing BLE tap ups on 6\" step x15 each to challenge right LE in SLS and with hip flexion to tap up. Pt required min assist to prevent right knee hyperextension with wt bearing to perform tap ups on left LE.     ASSESSMENT:  Pt continues to perform requiring CGA/min assist for safety and balance with gait and txfrs using RW due to right LE foot drop and weakness. Progression toward goals:  []      Improving appropriately and progressing toward goals  [x]      Improving slowly and progressing toward goals  []      Not making progress toward goals and plan of care will be adjusted      PLAN:  Patient continues to benefit from skilled intervention to address the above impairments. Continue treatment per established plan of care.   Discharge Recommendations:  Home Health  Further Equipment Recommendations for Discharge:  rolling walker with right handle splint      Estimated Discharge Date: 6/15/2021    Activity Tolerance:   Fair+  Please refer to the flowsheet for vital signs taken during this treatment.     After treatment:   [] Patient left in no apparent distress in bed  [x] Patient left in no apparent distress sitting up in chair  [] Patient left in no apparent distress in w/c mobilizing under own power  [] Patient left in no apparent distress dining area  [] Patient left in no apparent distress mobilizing under own power  [x] Call bell left within reach  [] Nursing notified  [] Caregiver present  [] Bed alarm activated   [x] Chair alarm activated      Tanya Persaud, PTA  6/2/2021

## 2021-06-02 NOTE — ROUTINE PROCESS
SHIFT CHANGE NOTE FOR [facility]    Bedside and Verbal shift change report given to Sully Gutierrez (oncoming nurse) by Miroslava Amado   (offgoing nurse). Report included the following information SBAR, Kardex, MAR and Recent Results.     Situation:   Code Status: Full Code   Reason for Admission: CVA  Hospital Day: 6   Problem List:   Hospital Problems  Date Reviewed: 6/1/2021        Codes Class Noted POA    Secondary hyperparathyroidism of renal origin ([facility] (Chronic) ICD-10-CM: N25.81  ICD-9-CM: 588.81  5/31/2021 Yes        Vitamin D deficiency (Chronic) ICD-10-CM: E55.9  ICD-9-CM: 268.9  5/31/2021 Yes    Overview Signed 5/31/2021  1:17 PM by Keanu Mayes MD     Vitamin D 25-Hydroxy (5/31/2021) = 10.5              High serum magnesium ICD-10-CM: R79.89  ICD-9-CM: 790.99  5/28/2021 Yes        Hypertensive heart and kidney disease without heart failure and with stage 3b chronic kidney disease ([facility] (Chronic) ICD-10-CM: I13.10, N18.32  ICD-9-CM: 404.90, 585.3  Unknown Yes        Mixed hyperlipidemia (Chronic) ICD-10-CM: E78.2  ICD-9-CM: 272.2  Unknown Yes        Constipation (Chronic) ICD-10-CM: K59.00  ICD-9-CM: 564.00  Unknown Yes        Current use of aspirin ICD-10-CM: Z79.82  ICD-9-CM: V58.66  5/23/2021 Yes        On clopidogrel therapy ICD-10-CM: Z79.01  ICD-9-CM: V58.61  5/23/2021 Yes        On statin therapy due to risk of future cardiovascular event ICD-10-CM: Z79.899  ICD-9-CM: V58.69  5/22/2021 Yes    Overview Signed 5/27/2021  2:27 PM by Keanu Mayes MD     On Atorvastatin             * (Principal) Acute lacunar stroke [facility]) ICD-10-CM: I63.81  ICD-9-CM: 434.91  5/21/2021 Yes    Overview Signed 5/27/2021  2:15 PM by Keanu Mayes MD     Acute Lacunar Stroke (acute lacunar infarct in the posterior left lentiform nucleus extending into the corona radiata) with residual right hemiparesis             Impaired mobility and ADLs ICD-10-CM: Z74.09, Z78.9  ICD-9-CM: V49.89  5/21/2021 Yes        Hemiparesis of right dominant side due to acute cerebrovascular disease [facility]) ICD-10-CM: I67.89, G81.91  ICD-9-CM: 436, 342.91  5/21/2021 Yes              Background:   Past Medical History:   Past Medical History:   Diagnosis Date    Abnormal mammogram 2014    left with biopsy    Acute lacunar stroke ([facility] 5/21/2021    Acute Lacunar Stroke (acute lacunar infarct in the posterior left lentiform nucleus extending into the corona radiata) with residual right hemiparesis    Bacterial vaginosis 1/9/15    Dr Stevens Speaks Constipation     Current use of aspirin 5/23/2021    Gastroesophageal reflux disease 6/30/2016    Hemiparesis of right dominant side due to acute cerebrovascular disease ([facility] 5/21/2021    History of Helicobacter pylori infection 7/24/2015    History of iron deficiency anemia 06/2014    History of vitamin D deficiency 6/11/2015    Hot flashes 1/9/15    Dr Stevens Speaks On clopidogrel therapy 5/23/2021    On statin therapy due to risk of future cardiovascular event 5/22/2021    On Atorvastatin    Rheumatoid arthritis ([facility]     Secondary hyperparathyroidism of renal origin ([facility] [facility] 5/31/2021    Stage 3b chronic kidney disease ([facility] [facility] 7/24/2015    Tobacco use disorder     Vitamin D deficiency 5/31/2021    Vitamin D 25-Hydroxy (5/31/2021) = 10.5       Patient taking anticoagulants yes    Patient has a defibrillator: no     Assessment:   Changes in Assessment throughout shift: none    Patient has central line: no  I     Last Vitals:     Vitals:    05/31/21 2111 06/01/21 0748 06/01/21 1530 06/01/21 2055   BP: (!) 147/96 134/88 (!) 137/95 (!) 155/98   Pulse: 80 70 79 76   Resp: 20 18 18 20   Temp: 97.3 °F (36.3 °C) 97 °F (36.1 °C) 97.8 °F (36.6 °C) 97.3 °F (36.3 °C)   SpO2: 97% 100% 100% 99%   Weight:       Height:       LMP: 10/15/2014        PAIN    Pain Assessment    Pain Intensity 1: 8 (06/02/21 0624) Pain Intensity 1: 2 (12/29/14 1105)    Pain Location 1: Generalized Pain Location 1: Abdomen    Pain Intervention(s) 1: Medication (see MAR) Pain Intervention(s) 1: Medication (see MAR)  Patient Stated Pain Goal: 0 Patient Stated Pain Goal: 0  o Intervention effective: no pain reported  o Other actions taken for pain: no pain reported     Skin Assessment  Skin color    Condition/Temperature    Integrity    Turgor    Weekly Pressure Ulcer Documentation  Pressure  Injury Documentation: No Pressure Injury Noted-Pressure Ulcer Prevention Initiated  Wound Prevention & Protection Methods  Orientation of wound Orientation of Wound Prevention: Posterior  Location of Prevention Location of Wound Prevention: Sacrum/Coccyx  Dressing Present Dressing Present : No  Dressing Status    Wound Offloading Wound Offloading (Prevention Methods): Bed, pressure redistribution/air, Bed, pressure reduction mattress     INTAKE/OUPUT  Date 06/01/21 0700 - 06/02/21 0659 06/02/21 0700 - 06/03/21 0659   Shift 5176-0685 0931-3674 24 Hour Total 2659-6857 1630-8113 24 Hour Total   INTAKE   Shift Total(mL/kg)         OUTPUT   Urine(mL/kg/hr)           Urine Occurrence(s) 2 x 4 x 6 x      Stool           Stool Occurrence(s) 0 x 0 x 0 x      Shift Total(mL/kg)         NET         Weight (kg) 84.4 84.4 84.4 84.4 84.4 84.4       Recommendations:  1. Patient needs and requests: Assist with toileting    2. Diet: Cardiac & thin liquids    3. Pending tests/procedures: none  4. .     5. Functional Level/Equipment: W/C with min assist    6. Estimated Discharge Date: TBD Posted on Whiteboard in Patients Room: [facility] Safety Check    A safety check occurred in the patient's room between off going nurse and oncoming nurse listed above.     The safety check included the below items  Area Items   H  High Alert Medications - Verify all high alert medication drips (heparin, PCA, etc.)   E  Equipment - Suction is set up for ALL patients (with yanker)  - Red plugs utilized for all equipment (IV pumps, etc.)  - WOWs wiped down at end of shift.  - Room stocked with oxygen, suction, and other unit-specific supplies   A  Alarms - Bed alarm is set for fall risk patients  - Ensure chair alarm is in place and activated if patient is up in a chair   L  Lines - Check IV for any infiltration  - Rollins bag is empty if patient has a Rollins   - Tubing and IV bags are labeled   S  Safety   - Room is clean, patient is clean, and equipment is clean. - Hallways are clear from equipment besides carts. - Fall bracelet on for fall risk patients  - Ensure room is clear and free of clutter  - Suction is set up for ALL patients (with joaquinker)  - Hallways are clear from equipment besides carts.    - Isolation precautions followed, supplies available outside room, sign posted

## 2021-06-02 NOTE — INTERDISCIPLINARY ROUNDS
[facility] PHYSICAL REHABILITATION  [address] [place] [address], [address]    INPATIENT REHABILITATION  PRE-TEAM CONFERENCE SUMMARY     Date of Conference: 6/3/2021    Patient Information:        Name: Lola Bell Age / Sex: 62 y.o. / female   CSN: [id number] MRN: [id number]   [address] [place] [address] Date: 5/27/2021 Length of Stay: 6 days     Primary Rehab Diagnosis  1. Impaired Mobility and ADLs  2.  Acute Lacunar Stroke (acute lacunar infarct in the posterior left lentiform nucleus extending into the corona radiata) with residual right hemiparesis    Comorbidities  Patient Active Problem List   Diagnosis Code    Rheumatoid arthritis ([facility] M06.9    History of vitamin D deficiency Z86.39    Cocaine use F14.90    Stage 3b chronic kidney disease (HCC) N18.32    History of Helicobacter pylori infection Z86.19    Abscess of finger L02.519    Gastroesophageal reflux disease K21.9    Acute lacunar stroke (HCC) I63.81    Impaired mobility and ADLs Z74.09, Z78.9    Hemiparesis of right dominant side due to acute cerebrovascular disease (HCC) I67.89, G81.91    Tobacco use disorder F17.200    Current use of aspirin Z79.82    On clopidogrel therapy Z79.01    Hypertensive heart and kidney disease without heart failure and with stage 3b chronic kidney disease (HCC) I13.10, N18.32    Mixed hyperlipidemia E78.2    On statin therapy due to risk of future cardiovascular event Z79.899    Constipation K59.00    History of iron deficiency anemia Z86.2    High serum magnesium R79.89    Secondary hyperparathyroidism of renal origin ([facility] N25.81    Vitamin D deficiency E55.9          Therapy:     FIM SCORES Initial Assessment Weekly Progress Assessment 6/2/2021   Eating Functional Level: 5  Comments:  (issued AE:built up handle utensils,2 handled cup with lid)  6  Using adaptive built up handle utensils   Swallowing     Grooming 5 (seated w/c level at sink using nondominant left hand)  6 using adaptive built up handle toothbrush   Bathing 5 (5 SBA UB, 4.5 CGA LB)  5 supv UB using hemitechnique, dep w/ back  4.5 CGA - 5 SBA with LB seated and in stance w/ RW      Upper Body Dressing Functional Level: 5 (SBA)  Items Applied/Steps Completed: Pullover (4 steps)  Comments:  (instruction for hemitechnique)  5 supv using hemitechnique   Lower Body Dressing Functional Level:  (4.5-4 CGA/Min A seated on bedside commode & in stance w/ RW)  Items Applied/Steps Completed: Elastic waist pants (3 steps), Shoe, right (1 step), Shoe, left (1 step), Sock, left (1 step), Sock, right (1 step), Underpants (3 steps)  Comments:  (cross leg method and hemitechnique)  4 Min A - 4.5 CGA seated using cross leg technique and reacher and in stance w/ RW    Toileting Functional Level:  (4.5 CGA-4 Min A)  Comments:  (4 Min A to hike underwear and pants over hips)  4.5 CGA - 4 Min A   Bladder 1 0   Bowel  0 0   Toilet Transfer [place] Toilet Transfer Score:  (4 Min A using RW to bedside commode)  4.5 CGA using RW for bedside commode transfer   Tub/Shower Transfer [place] Tub or Shower Type: Tub/Shower combination  Tub/Shower Transfer Score:  (NT d/t time constraints)  CGA using w/c, RW, tub transfer bench and grab bar      Comprehension Primary Mode of Comprehension: Auditory  Score: 5     5   Expression Primary Mode of Expression: Verbal  Score: 6  5      Social Interaction Score: 5  5   Problem Solving Score: 4  4   Memory Score: 4  4     FIM SCORES Initial Assessment Weekly Progress Assessment 6/2/2021   Bed/Chair/Wheelchair Transfers Transfer Type: Other  Other: stand step without AD as per PLOF  Transfer Assistance : 3 (Moderate assistance )  Sit to Stand Assistance: Moderate assistance  Car Transfers: Moderate assistance (using RW)  Car Type: car transfer simulator Transfer Type:  Other  Other: stand step txfr with RW  Transfer Assistance : 4 (Contact guard assistance)  Sit to Stand Assistance: Minimal assistance   Bed Mobility Rolling Right 5 (Stand-by assistance)   Rolling Left 5 (Stand-by assistance)   Supine to Sit 5 (Stand-by assistance) (cue for not holding her breath)   Sit to Stand Moderate assistance   Sit to Supine  (SBA)    Rolling Right    NT   Rolling Left    NT   Supine to Sit    NT   Sit to Stand   Minimal assistance   Sit to Supine    NT      Locomotion (W/C) Able to Propel (ft): 70 feet  Functional Level: 2  Curbs/Ramps Assist Required (FIM Score):  (mod A for steering, using rosalba-technique)  Wheelchair Setup Assist Required : 2 (Maximal assistance)  Wheelchair Management: Manages left brake, Manages right brake (extra time, cues) Function  (supervision)  Setup Assistance  4 (Minimal assistance)      Locomotion (W/C distance)   72 feet   Locomotion (Walk) 2 (Maximal assistance) (maximal trunk support, blocking right LE) 4 (Minimal assistance)  Brace/Splint;Gait belt;Walker, rolling   Locomotion (Walk dist.) 1 Feet (ft) 80 Feet (ft) (and 50ft )   Steps/Stairs Steps/Stairs Ambulated (#): 1  Level of Assist : 2 (Maximal assistance) (lifting/lowering assistance required)  Rail Use: Both (therapist assisting at right UE to )  4 6\" steps with BHR and min assist         Nursing:     Neuro:   AAA&O x4            Respiratory:   [x] WNL   [] O2 LPM:   Other:  Peripheral Vascular:   [] TEDS present   [] Edema present ____ Grade   Cardiac:   [x] WNL   [] Other  Genitourinary:   [x] continent   [] incontinent   [] irving  Abdominal _______ LBM  GI: __Cardiac_____ Diet __Thin____ Liquids _____ tube feeds  Musculoskeletal: ____ ROM Transfers _wheelchair____ Assistive Device Used  _Mod___ Level of Assistance  Skin Integumentary:   [] Intact   [] Not Intact   __________Preventative Measures  Details______________________________________________________________  Pain: [] Controlled   [] Not Controlled   Pain Meds:   [] Scheduled   [] PRN        Registered Dietitian / Nutrition:   No data found. Pt reported good appetite and meal intake.  Tolerating diet with >75% intake of meals. Pt asking for additional serving of protein; this Mushtaq Gloss pt request    Supplements:          [] Yes   [x] No      Amount of supplement consumed:  Not applicable     No intake or output data in the 24 hours ending 06/02/21 1326                             Last bowel movement: 5/29      Interdisciplinary Team Goals:     1. Discipline  Physical Therapy    Goal  Pt will perform stand step txfr with RW using right handle splint and SBA with decreased v/c for safe foot placement 9/10 times. Barrier  right LE weakness, slightly impulsive    Intervention  gait training, txfr training, w/c mobility, stair training, family training    Goal written by:   THADDEUS Joseph, PT, DPT     2. Discipline  Occupational Therapy    Goal  increase right hand coordination, dexterity and strength for ADL and functional tasks, improve balance for functional and ADL transfers    Barrier  hemiplegic RUE, decreased balance chronic RA of b/l hands with arthritic changes    Intervention  ADL retraining, there ex, there act and NMR    Goal written by:  Velasquez Rice MS OTR/L     3. Discipline  Speech Therapy    Goal  Patient will recall 3 words after 5 minutes with supervision and mnemonics. Barrier  mild cognitive impairment    Intervention  cognitive retraining    Goal written by:  Shantell Gonzales, CCC-SLP     4. Discipline  Nursing    Goal  Prevent another Stroke    Barrier  knowledge deficit, non compliance with medication    Intervention  Take meds as ordered, keep MD appointments, call 911 if s/s of stroke    Goal written by:  Tamanna Yarbrough LPN     5. Discipline  Clinical Psychology    Goal  Identify risk for recurrence and address healthcare compliance    Barrier  Variable healthcare management prior to stroke    Intervention  Patient/stroke education and support     Goal written by:  Wyatt Vigil, PhD     6.   Discipline  Nutrition / Dietetics    Goal  - PO nutrition intake will continue to meet >75% of patients estimated nutritional needs over the next 7 days. Barrier  none known     Intervention  continue po diet. Add double portion meat once daily    Goal written by:  Christelle Crowder RD       Disposition / Discharge Planning: Follow-up services:  [x] Physical Therapy             [x] Occupational Therapy       [x] Speech Therapy           [] Skilled Nursing      [] Medical Social Worker   [] Aide        [] Outpatient      [] vs   [x] Home Health  [] vs       [] to progress to outpatient       [x] with 24-hour supervision       [] with 24-hour assistance   [] [place]   [facility] recommendations:  RW with right handle splint, 3 in 1 commode, tub transfer bench, grab bar in tub-shower    Estimated discharge date:  6/15/2021   Discharge Location:  [x] Home  [] versus    [] [place]    [] [facility]   [] Other:           Electronic Signatures:      Signature Date Signed   Physical Therapist    Rosalia Gusman PT, DPT  6/2/2021   6/3/2021   Occupational Therapist    Ric Chavira MS OTR/L  6/2/2021   Speech Therapist    Juan Regalado, [address] [facility] [place]  6/2/21   Recreational Therapist    Lynn Hardy, CTRS 6/2/2021   Nursing    Stepan Zuniga LPCRISTINA Harding  6/2/21  6/3/2021   Dietitian    Christelle Crowder RD  6/2/2021   Clinical Psychologist    Valencia Ortiz, PhD  6/2/2021    Physician    Giovanni Wu MD   6/2/2021        BILL Monk  6/2/2021     Opportunity to share with family/caregiver[] YES [] NO    Relationship to patient____________________________________________________      The above information has been reviewed with the patient in a language that they can understand. Opportunity for comments and questions has been provided and a signed attestation has been scanned into the \"media tab\" of the EMR.       Patient Signature: ______________________________________________________    Date Signed: __________________________________________________________

## 2021-06-02 NOTE — PROGRESS NOTES
Problem: Neurolinguistics Impaired (Adult)  Goal: *Speech Goal: (INSERT TEXT)  Description: Long term goals  Patient will:  1. Be oriented x 3 and recall events of the day, supervision. 2. Recall 3 words after 5 minutes, supervision. 3. Name 10-12 items within categories of increasing complexity, supervision. 4. Perform varied word finding tasks with 90% accuracy. 5. Perform problem solving/reasoning tasks with 90% accuracy. 6. Perform basic mathematical calculations, 90% accuracy. Short term goals (by 21)  Patient will:  1. Be oriented x 3 and recall events of the day, supervision. 2. Recall 3 words after 5 minutes, min cues. 3. Name 10-12 items within concrete categories, supervision. 4. Perform varied word finding tasks with 75-85% accuracy. 5. Perform problem solving/reasoning tasks with 70-80% accuracy. 6. Perform basic mathematical calculations, 70-80% accuracy. Note:   Speech language pathology treatment    Patient: Leanne Blum (23 y.o. female)  Date: 2021  Diagnosis: Acute lacunar stroke ([address] [I63.81] Acute lacunar stroke ([address]       Time in: 1130  Time Out:  1200  SUBJECTIVE:   Patient stated This is Dustin. OBJECTIVE:   Mental Status:  Mrs. Kaur was awake and alert during the session. Her significant other was present for family education. Treatment & Interventions:   Patient was seen in her room for a thirty minute treatment/education session. SLP discussed the goals/plan of care and activities presented to address these goals.   The following treatment tasks were presented:  Neuro-Linguistics:   Orientation:  Supervision/min assist  Recent memory: Supervision  Recall 3 words: Initial trial, mod assist     Upon repetition, supervision  ID alternative uses: 80% appropriate responses  Problem solvin% accuracy  Convergent namin% accuracy (ID category given 3 members)    Response & Tolerance to Activities:  Patient's significant other was very responsive to the plan and commented that her performance today was not the same as prior to this CVA. He was caring and reported that he would engage her in tasks for recall and problem solving at home. Pain:  Pain Scale 1: Numeric (0 - 10)  Pain Orientation 1: Left;Right  Pain Description 1: Aching  After treatment:   [x]       Patient left in no apparent distress sitting up in chair  []       Patient left in no apparent distress in bed  [x]       Call bell left within reach  []       Nursing notified  [x]       Caregiver present  []       Bed alarm activated    ASSESSMENT:   Progression toward goals:  [x]       Improving appropriately and progressing toward goals  []       Improving slowly and progressing toward goals  []       Not making progress toward goals and plan of care will be adjusted    PLAN:   Patient continues to benefit from skilled intervention to address the above impairments. Continue treatment per established plan of care.   Discharge Recommendations:  Home Health    Estimated LOS: 6/15/21    MAGGIE Ramachandran  Time Calculation: 30 mins

## 2021-06-02 NOTE — PROGRESS NOTES
Problem: Self Care Deficits Care Plan (Adult)  Goal: *Acute Goals and Plan of Care (Insert Text)  Description: Occupational Therapy Goals   Long Term Goals  Initiated 2021 and to be accomplished within 4 week(s) 2021    1. Patient will perform grooming with modified independence using adaptive equipment as needed. 2. Pt will perform UB bathing with Mod I.  3. Pt will perform LB bathing with Mod I.  4. Pt will perform tub/shower transfer with Mod I using DME. 5. Pt will perform UB dressing with Mod I.  6. Patient will perform upper body dressing and lower body dressing with modified independence. 7. Patient will perform all aspects of toileting with modified independence. 8. Patient will perform toilet transfers with modified independence using RW. Short Term Goals   Initiated 2021 and to be accomplished within 7 day(s) 2021    1. Pt will perform grooming with Supv using adaptive equipment as needed. 2. Pt will perform UB bathing with Supv.  3. Pt will perform LB bathing with Supv.  4. Pt will perform tub/shower transfer with SBA using DME. 5. Pt will perform UB dressing with Supv.  6. Pt will perform LB dressing with Supv.  7. Pt will perform toileting task with SBA. 8. Pt will perform toilet transfer with SBA using RW. Outcome: Progressing Towards Goal   Occupational Therapy TREATMENT    Patient: Lola Bell   62 y.o. Patient identified with name and : yes    Date: 2021    First Tx Session  Time In: 80  Time Out: 36    Second Tx Session  Time In: 1405  Time Out: 1505    Diagnosis: Acute lacunar stroke [facility]) [I63.81]   Precautions: Fall, Skin  Chart, occupational therapy assessment, plan of care, and goals were reviewed. Pain:  Pt reports 0/10 pain or discomfort prior to treatment. Pt reports 0/10 pain or discomfort post treatment. Intervention Provided:       SUBJECTIVE:   Patient stated I like this one better.  regarding standard 3 in 1 commode versus bariatric bedside commode     OBJECTIVE DATA SUMMARY:     THERAPEUTIC EXERCISE Daily Assessment    UB bike x 10 mins with 2 rest breaks to increase strength and functional activity tolerance for improved independence with ADL routine and functional mobility. Flexi bar (yellow) x 2 sets 10 reps each for wrist flex/ext and shoulder adduction/abduction in order to increase strength for improved self care performance, pt tolerated well. Patient performed b/l hand there ex with issue of increased resistance for green foam resistive block, pt provided good return demonstration for gross grasp, pincer pinch and digit adduction, pt verbalized to perform as tolerated to increase strength, coordination of b/l hands for functional tasks and mobility. LOWER BODY DRESSING Daily Assessment    Lower Body Dressing   Dressing Assistance : 4 (Minimal assistance) (don right slip on shoe d/t thickness of slipper sock)  Leg Crossed Method Used: Yes  Position Performed:  (seated in w/c )     MOBILITY/TRANSFERS Daily Assessment    Functional Transfers  Amount of Assistance Required: 4 (Contact guard assistance) (using RW for bedside commode transfer)       ASSESSMENT:  Family education with pt and aram/Dustin. Patient and fiance provided good return demonstration for bedside commode (standard size) using RW and gait belt with CGA, pt reports no need to void, pt noted with improved right hand positioning/grasp during sit <-> stand and placement on RW for balance. Patient and fiance provided visual aide/hand out for DME and AE recommended I.e. tub transfer bench, bedside commode (also can be used as 3 in 1 commode) and built up handle utensils for grading self feeding easier with less spillage and DME suppliers to purchase from. Patient and fiance without further questions at this time and verbalize understanding of OTR's recommendations.  Patient agreeable to participate in shower tasks in upcoming tx sessions to provide assessment and further recommendations for safety awareness and increasing independence for safe d/c home. Patient sitting up in w/c at end of tx session, call bell within reach & pt verbalized understanding to utilize for assist e.g. functional transfers in order to prevent falls, w/c alarm intact. Second tx session: Following UE there ex to increase R hand strength, coordination and dexterity for improved ADL performance and functional transfers, pt tolerated well with no c/o pain and/or discomfort. Patient sitting up in w/c at end of tx session, call bell within reach & pt verbalized understanding to utilize for assist e.g. functional transfers in order to prevent falls, w/c alarm intact. Progression toward goals:  [x]          Improving appropriately and progressing toward goals  []          Improving slowly and progressing toward goals  []          Not making progress toward goals and plan of care will be adjusted     PLAN:  Patient continues to benefit from skilled intervention to address the above impairments. Continue treatment per established plan of care. Discharge Recommendations:  Home Health w/ 24/7 supv and assist e.g. ADLs  Further Equipment Recommendations for Discharge:  bedside commode, grab bars, tub transfer bench, rolling walker, and wheelchair      Activity Tolerance:  fair  Estimated LOS: 1 week    Please refer to the flowsheet for vital signs taken during this treatment. After treatment:   [x]  Patient left in no apparent distress sitting up in chair   []  Patient left in no apparent distress in bed  [x]  Call bell left within reach  [x]  Nursing notified  []  Caregiver present  [x]  chair alarm activated    COMMUNICATION/EDUCATION:   [x] Home safety education was provided and the patient/caregiver indicated understanding. [x] Patient/family have participated as able in goal setting and plan of care. [x] Patient/family agree to work toward stated goals and plan of care.   [] Patient understands intent and goals of therapy, but is neutral about his/her participation. [] Patient is unable to participate in goal setting and plan of care.       Mary Carmen Bruno

## 2021-06-02 NOTE — ROUTINE PROCESS
[facility]    Bedside and Verbal shift change report given to Odalys Gama RN (oncoming nurse) by Deejay Kearney LPN   (offgoing nurse). Report included the following information SBAR, Kardex, MAR and Recent Results.     Situation:   Code Status: Full Code   Reason for Admission: CVA  Hospital Day: 6   Problem List:   Hospital Problems  Date Reviewed: 6/1/2021        Codes Class Noted POA    Secondary hyperparathyroidism of renal origin ([facility] (Chronic) ICD-10-CM: N25.81  ICD-9-CM: 588.81  5/31/2021 Yes        Vitamin D deficiency (Chronic) ICD-10-CM: E55.9  ICD-9-CM: 268.9  5/31/2021 Yes    Overview Signed 5/31/2021  1:17 PM by Andrew Marr MD     Vitamin D 25-Hydroxy (5/31/2021) = 10.5              High serum magnesium ICD-10-CM: R79.89  ICD-9-CM: 790.99  5/28/2021 Yes        Hypertensive heart and kidney disease without heart failure and with stage 3b chronic kidney disease ([facility] (Chronic) ICD-10-CM: I13.10, N18.32  ICD-9-CM: 404.90, 585.3  Unknown Yes        Mixed hyperlipidemia (Chronic) ICD-10-CM: E78.2  ICD-9-CM: 272.2  Unknown Yes        Constipation (Chronic) ICD-10-CM: K59.00  ICD-9-CM: 564.00  Unknown Yes        Current use of aspirin ICD-10-CM: Z79.82  ICD-9-CM: V58.66  5/23/2021 Yes        On clopidogrel therapy ICD-10-CM: Z79.01  ICD-9-CM: V58.61  5/23/2021 Yes        On statin therapy due to risk of future cardiovascular event ICD-10-CM: Z79.899  ICD-9-CM: V58.69  5/22/2021 Yes    Overview Signed 5/27/2021  2:27 PM by Andrew Marr MD     On Atorvastatin             * (Principal) Acute lacunar stroke [facility]) ICD-10-CM: I63.81  ICD-9-CM: 434.91  5/21/2021 Yes    Overview Signed 5/27/2021  2:15 PM by Andrew Marr MD     Acute Lacunar Stroke (acute lacunar infarct in the posterior left lentiform nucleus extending into the corona radiata) with residual right hemiparesis             Impaired mobility and ADLs ICD-10-CM: Z74.09, Z78.9  ICD-9-CM: V49.89  5/21/2021 Yes        Hemiparesis of right dominant side due to acute cerebrovascular disease [facility]) ICD-10-CM: I67.89, G81.91  ICD-9-CM: 436, 342.91  5/21/2021 Yes              Background:   Past Medical History:   Past Medical History:   Diagnosis Date    Abnormal mammogram 2014    left with biopsy    Acute lacunar stroke ([facility] 5/21/2021    Acute Lacunar Stroke (acute lacunar infarct in the posterior left lentiform nucleus extending into the corona radiata) with residual right hemiparesis    Bacterial vaginosis 1/9/15    Dr Kiana Carmona Constipation     Current use of aspirin 5/23/2021    Gastroesophageal reflux disease 6/30/2016    Hemiparesis of right dominant side due to acute cerebrovascular disease ([facility] 5/21/2021    History of Helicobacter pylori infection 7/24/2015    History of iron deficiency anemia 06/2014    History of vitamin D deficiency 6/11/2015    Hot flashes 1/9/15    Dr Kiana Carmona On clopidogrel therapy 5/23/2021    On statin therapy due to risk of future cardiovascular event 5/22/2021    On Atorvastatin    Rheumatoid arthritis ([facility]     Secondary hyperparathyroidism of renal origin ([facility] 5/31/2021    Stage 3b chronic kidney disease ([facility] 7/24/2015    Tobacco use disorder     Vitamin D deficiency 5/31/2021    Vitamin D 25-Hydroxy (5/31/2021) = 10.5       Patient taking anticoagulants yes    Patient has a defibrillator: no     Assessment:   Changes in Assessment throughout shift: none    Patient has central line: no  I     Last Vitals:     Vitals:    06/01/21 1530 06/01/21 2055 06/02/21 0816 06/02/21 1211   BP: (!) 137/95 (!) 155/98 (!) 143/100 130/79   Pulse: 79 76 69    Resp: 18 20 16    Temp: 97.8 °F (36.6 °C) 97.3 °F (36.3 °C) 97.1 °F (36.2 °C)    SpO2: 100% 99% 99%    Weight:       Height:       LMP: 10/15/2014        PAIN    Pain Assessment    Pain Intensity 1: 0 (06/02/21 1211) Pain Intensity 1: 2 (12/29/14 1105)    Pain Location 1: Generalized Pain Location 1: Abdomen    Pain Intervention(s) 1: Medication (see MAR) Pain Intervention(s) 1: Medication (see MAR)  Patient Stated Pain Goal: 0 Patient Stated Pain Goal: 0  o Intervention effective: no pain reported  o Other actions taken for pain: no pain reported     Skin Assessment  Skin color    Condition/Temperature    Integrity    Turgor    Weekly Pressure Ulcer Documentation  Pressure  Injury Documentation: No Pressure Injury Noted-Pressure Ulcer Prevention Initiated  Wound Prevention & Protection Methods  Orientation of wound Orientation of Wound Prevention: Posterior  Location of Prevention Location of Wound Prevention: Sacrum/Coccyx  Dressing Present Dressing Present : No  Dressing Status    Wound Offloading Wound Offloading (Prevention Methods): Bed, pressure redistribution/air, Bed, pressure reduction mattress     INTAKE/OUPUT  Date 06/01/21 0700 - 06/02/21 0659 06/02/21 0700 - 06/03/21 0659   Shift 3620-8113 5275-3741 24 Hour Total 1487-5933 1328-5660 24 Hour Total   INTAKE   Shift Total(mL/kg)         OUTPUT   Urine(mL/kg/hr)           Urine Occurrence(s) 2 x 4 x 6 x 0 x  0 x   Stool           Stool Occurrence(s) 0 x 0 x 0 x 0 x  0 x   Shift Total(mL/kg)         NET         Weight (kg) 84.4 84.4 84.4 84.4 84.4 84.4       Recommendations:  1. Patient needs and requests: Assist with toileting    2. Diet: Cardiac & thin liquids    3. Pending tests/procedures: none  4. .     5. Functional Level/Equipment: W/C with min assist    6. Estimated Discharge Date: TBD Posted on Whiteboard in Patients Room: no       [facility] Safety Check    A safety check occurred in the patient's room between off going nurse and oncoming nurse listed above.     The safety check included the below items  Area Items   H  High Alert Medications - Verify all high alert medication drips (heparin, PCA, etc.)   E  Equipment - Suction is set up for ALL patients (with yanker)  - Red plugs utilized for all equipment (IV pumps, etc.)  - WOWs wiped down at end of shift.  - Room stocked with oxygen, suction, and other unit-specific supplies   A  Alarms - Bed alarm is set for fall risk patients  - Ensure chair alarm is in place and activated if patient is up in a chair   L  Lines - Check IV for any infiltration  - Rollins bag is empty if patient has a Rollins   - Tubing and IV bags are labeled   S  Safety   - Room is clean, patient is clean, and equipment is clean. - Hallways are clear from equipment besides carts. - Fall bracelet on for fall risk patients  - Ensure room is clear and free of clutter  - Suction is set up for ALL patients (with blessing)  - Hallways are clear from equipment besides carts.    - Isolation precautions followed, supplies available outside room, sign posted

## 2021-06-03 LAB
ALBUMIN SERPL-MCNC: 3.3 G/DL (ref 3.4–5)
ANION GAP SERPL CALC-SCNC: 8 MMOL/L (ref 3–18)
BUN SERPL-MCNC: 34 MG/DL (ref 7–18)
BUN/CREAT SERPL: 15 (ref 12–20)
CALCIUM SERPL-MCNC: 9.7 MG/DL (ref 8.5–10.1)
CHLORIDE SERPL-SCNC: 110 MMOL/L (ref 100–111)
CO2 SERPL-SCNC: 22 MMOL/L (ref 21–32)
CREAT SERPL-MCNC: 2.24 MG/DL (ref 0.6–1.3)
GLUCOSE SERPL-MCNC: 96 MG/DL (ref 74–99)
HCT VFR BLD AUTO: 43.5 % (ref 35–45)
HGB BLD-MCNC: 14.1 G/DL (ref 12–16)
PHOSPHATE SERPL-MCNC: 4.5 MG/DL (ref 2.5–4.9)
PLATELET # BLD AUTO: 208 K/UL (ref 135–420)
POTASSIUM SERPL-SCNC: 4.9 MMOL/L (ref 3.5–5.5)
SODIUM SERPL-SCNC: 140 MMOL/L (ref 136–145)

## 2021-06-03 PROCEDURE — 85049 AUTOMATED PLATELET COUNT: CPT

## 2021-06-03 PROCEDURE — 92507 TX SP LANG VOICE COMM INDIV: CPT

## 2021-06-03 PROCEDURE — 97112 NEUROMUSCULAR REEDUCATION: CPT

## 2021-06-03 PROCEDURE — 65310000000 HC RM PRIVATE REHAB

## 2021-06-03 PROCEDURE — 80069 RENAL FUNCTION PANEL: CPT

## 2021-06-03 PROCEDURE — 74011250637 HC RX REV CODE- 250/637: Performed by: INTERNAL MEDICINE

## 2021-06-03 PROCEDURE — 36415 COLL VENOUS BLD VENIPUNCTURE: CPT

## 2021-06-03 PROCEDURE — 85018 HEMOGLOBIN: CPT

## 2021-06-03 PROCEDURE — 74011250636 HC RX REV CODE- 250/636: Performed by: INTERNAL MEDICINE

## 2021-06-03 PROCEDURE — 99232 SBSQ HOSP IP/OBS MODERATE 35: CPT | Performed by: INTERNAL MEDICINE

## 2021-06-03 PROCEDURE — 97116 GAIT TRAINING THERAPY: CPT

## 2021-06-03 PROCEDURE — 97530 THERAPEUTIC ACTIVITIES: CPT

## 2021-06-03 PROCEDURE — 97535 SELF CARE MNGMENT TRAINING: CPT

## 2021-06-03 PROCEDURE — 97110 THERAPEUTIC EXERCISES: CPT

## 2021-06-03 PROCEDURE — 2709999900 HC NON-CHARGEABLE SUPPLY

## 2021-06-03 RX ADMIN — Medication 5000 UNITS: at 08:41

## 2021-06-03 RX ADMIN — METOPROLOL TARTRATE 25 MG: 25 TABLET, FILM COATED ORAL at 08:41

## 2021-06-03 RX ADMIN — Medication 100 MG: at 08:40

## 2021-06-03 RX ADMIN — ATORVASTATIN CALCIUM 80 MG: 40 TABLET, FILM COATED ORAL at 08:40

## 2021-06-03 RX ADMIN — HEPARIN SODIUM 5000 UNITS: 5000 INJECTION INTRAVENOUS; SUBCUTANEOUS at 13:22

## 2021-06-03 RX ADMIN — METOPROLOL TARTRATE 25 MG: 25 TABLET, FILM COATED ORAL at 21:01

## 2021-06-03 RX ADMIN — LOSARTAN POTASSIUM 12.5 MG: 25 TABLET, FILM COATED ORAL at 08:40

## 2021-06-03 RX ADMIN — HEPARIN SODIUM 5000 UNITS: 5000 INJECTION INTRAVENOUS; SUBCUTANEOUS at 21:01

## 2021-06-03 RX ADMIN — CLOPIDOGREL BISULFATE 75 MG: 75 TABLET ORAL at 08:41

## 2021-06-03 RX ADMIN — DOCUSATE SODIUM 50MG AND SENNOSIDES 8.6MG 2 TABLET: 8.6; 5 TABLET, FILM COATED ORAL at 17:01

## 2021-06-03 RX ADMIN — ACETAMINOPHEN 650 MG: 325 TABLET ORAL at 10:52

## 2021-06-03 RX ADMIN — ACETAMINOPHEN 650 MG: 325 TABLET ORAL at 21:01

## 2021-06-03 RX ADMIN — POLYETHYLENE GLYCOL 3350 17 G: 17 POWDER, FOR SOLUTION ORAL at 08:41

## 2021-06-03 RX ADMIN — HEPARIN SODIUM 5000 UNITS: 5000 INJECTION INTRAVENOUS; SUBCUTANEOUS at 06:11

## 2021-06-03 RX ADMIN — ASPIRIN 81 MG: 81 TABLET, CHEWABLE ORAL at 16:11

## 2021-06-03 RX ADMIN — Medication 3 MG: at 21:01

## 2021-06-03 NOTE — PROGRESS NOTES
Problem: Mobility Impaired (Adult and Pediatric)  Goal: *Therapy Goal (Edit Goal, Insert Text)  Description: Physical Therapy Short Term Goals  Initiated 5/28/2021 and to be accomplished within 7 day(s) on 6/4/2021  1. Patient will move from supine to sit and sit to supine , scoot up and down, and roll side to side in bed with supervision/set-up. 2.  Patient will transfer from bed to chair and chair to bed with minimal assistance/contact guard assist using the least restrictive device. 3.  Patient will perform sit to stand with minimal assistance/contact guard assist.  4.  Patient will ambulate with minimal assistance/contact guard assist for 50 feet with the least restrictive device. 5.  Patient will ascend/descend 5 stairs with 1 handrail(s) with moderate assistance . Physical Therapy Long Term Goals  Initiated 5/28/2021 and to be accomplished within 21 day(s) on 6/18/2021  1. Patient will move from supine to sit and sit to supine , scoot up and down, and roll side to side in bed with modified independence. 2.  Patient will transfer from bed to chair and chair to bed with modified independence using the least restrictive device. 3.  Patient will perform sit to stand with modified independence. 4.  Patient will ambulate with modified independence for 150 feet with the least restrictive device. 5.  Patient will ascend/descend 12 stairs with 1 handrail(s) with contact guard assist/standby assistance. Outcome: Progressing Towards Goal   PHYSICAL THERAPY TREATMENT    Patient: Charity White (4 y.o. female)  Date: 6/3/2021  Diagnosis: Acute lacunar stroke [facility]) [I63.81] Acute lacunar stroke [facility])  Precautions: Fall, Skin  Chart, physical therapy assessment, plan of care and goals were reviewed. Time In:1131  Time PEY:2200    Patient seen for: Wheelchair mobility;Transfer training;Gait training    Pain:  Pt pain was reported as no c/o pre-treatment.   Pt pain was reported as no c/o post-treatment. Intervention: NA     Patient identified with name and : yes     SUBJECTIVE:      Pt reports working on right ankle DF in bed. OBJECTIVE DATA SUMMARY:    Objective:     TRANSFERS Daily Assessment     Sit to Stand Assistance: Minimal assistance  Pt performed sit to stand from w/c with min assist for anterior wt shift with pt's B hands on distal femurs to decrease dependence on BUE. Pt continues to compensate using launching fwd momentum to achieve sit to stand. GAIT Daily Assessment    Gait Description (WDL)      Gait Abnormalities Decreased step clearance; Foot drop    Assistive device Brace/Splint;Gait belt;Walker, rolling    Ambulation assistance - level surface 4 (Contact guard assistance)    Distance 70 Feet (ft)    Ambulation assistance- uneven surface      Comments Pt ambulated 70ft with RW with right handle splint and right LE wrapped with GTB in order to provide feedback to decrease right knee recurvatum in wt bearing, with CGA and mod v/c and t/c for erect posture and remaining within base of RW. BALANCE Daily Assessment     Sitting - Static: Good (unsupported)  Sitting - Dynamic: Good (unsupported)  Standing - Static: Fair  Standing - Dynamic : Impaired        WHEELCHAIR MOBILITY Daily Assessment     Able to Propel (ft): 72 feet  Functional Level:  (supervision)  Curbs/Ramps Assist Required (FIM Score): 0 (Not tested)  Wheelchair Setup Assist Required : 5 (Supervision/setup)  Wheelchair Management: Manages left brake;Manages right brake  Pt propelled w/c from room to gym with BLE and left UE with supervision and increased time. Neuro Re-Education:  Pt performed standing BLE tap ups on 6\" step x15 each to challenge right LE in SLS and with hip flexion to tap up.  Pt with GTB on right LE to provide feedback to decrease right knee hyperextension with wt bearing to perform tap ups on left LE.     ASSESSMENT:  Pt demo'd improvement with right knee recurvatum initially during gait with TB wrapping but as pt fatigued recurvatum was recurrent. Pt also continues to demo trunk and hip extension weakness causing flexed posture and increases occurrence of right knee recurvatum. Progression toward goals:  []      Improving appropriately and progressing toward goals  [x]      Improving slowly and progressing toward goals  []      Not making progress toward goals and plan of care will be adjusted      PLAN:  Patient continues to benefit from skilled intervention to address the above impairments. Continue treatment per established plan of care. Discharge Recommendations:  Home Health  Further Equipment Recommendations for Discharge:  rolling walker      Estimated Discharge Date: 6/15/2021    Activity Tolerance:   Fair+  Please refer to the flowsheet for vital signs taken during this treatment.     After treatment:   [] Patient left in no apparent distress in bed  [x] Patient left in no apparent distress sitting up in chair  [] Patient left in no apparent distress in w/c mobilizing under own power  [] Patient left in no apparent distress dining area  [] Patient left in no apparent distress mobilizing under own power  [x] Call bell left within reach  [] Nursing notified  [] Caregiver present  [] Bed alarm activated   [x] Chair alarm activated      Isaura Armijo PTA  6/3/2021

## 2021-06-03 NOTE — INTERDISCIPLINARY ROUNDS
[facility] PHYSICAL REHABILITATION  [address] [place] [address], [address]    INPATIENT REHABILITATION  TEAM CONFERENCE SUMMARY     Date of Conference: 6/3/2021    Patient Information:        Name: Blanca Chan Age / Sex: 62 y.o. / female   CSN: [id number] MRN: [id number]   [facility] Date: 5/27/2021 Length of Stay: 7 days     Primary Rehab Diagnosis  1. Impaired Mobility and ADLs  2.  Acute Lacunar Stroke (acute lacunar infarct in the posterior left lentiform nucleus extending into the corona radiata) with residual right hemiparesis    Comorbidities  Patient Active Problem List   Diagnosis Code    Rheumatoid arthritis ([facility] M06.9    History of vitamin D deficiency Z86.39    Cocaine use F14.90    Stage 3b chronic kidney disease (HCC) N18.32    History of Helicobacter pylori infection Z86.19    Abscess of finger L02.519    Gastroesophageal reflux disease K21.9    Acute lacunar stroke (HCC) I63.81    Impaired mobility and ADLs Z74.09, Z78.9    Hemiparesis of right dominant side due to acute cerebrovascular disease (HCC) I67.89, G81.91    Tobacco use disorder F17.200    Current use of aspirin Z79.82    On clopidogrel therapy Z79.01    Hypertensive heart and kidney disease without heart failure and with stage 3b chronic kidney disease (HCC) I13.10, N18.32    Mixed hyperlipidemia E78.2    On statin therapy due to risk of future cardiovascular event Z79.899    Constipation K59.00    History of iron deficiency anemia Z86.2    High serum magnesium R79.89    [facility] N25.81    Vitamin D deficiency E55.9          Therapy:     FIM SCORES Initial Assessment Weekly Progress Assessment 6/3/2021   Eating Functional Level: 5  Comments:  (issued AE:built up handle utensils,2 handled cup with lid) Feeding/Eating Assistance: 6 (Modified independent)  Adaptive Equipment: Built up fork;Built up knife;Built up spoon (2 handle cup landrum and 2 handle cup with lid/spout)6  Using adaptive built up handle utensils   Swallowing     Grooming 5 (seated w/c level at sink using nondominant left hand) Grooming Assistance : 6 (Modified independent)  Adaptive Equipment: [employer] using adaptive built up handle toothbrush   Bathing 5 (5 SBA UB, 4.5 CGA LB) Bathing Assistance, Upper: 5 (Supervision)  Upper Body : Compensatory technique training;Training to use affected extremity as a gross stabilizer;Training to use affected extremity as a gross motor assistance; Marcel technique training;Training to use affected extremity as a fine motor assistance  Position Performed:  (seated on tub transfer bench)  Adaptive Equipment: Tub bench;Grab bar5 supv UB using hemitechnique, dep w/ back  4.5 CGA - 5 SBA with LB seated and in stance w/ RW  Bathing Assistance, Lower : 4 (Contact guard assistance) (in stance using RW and seated on tub transfer bench)  Adaptive Equipment: Grab bar (using crossing leg method seated on tub transfer bench)  Position Performed: Standing (using grab bar and seated on tub transfer bench)  Adaptive Equipment: Tub bench;Grab bar   Upper Body Dressing Functional Level: 5 (SBA)  Items Applied/Steps Completed: Pullover (4 steps)  Comments:  (instruction for hemitechnique) Dressing Assistance : 5 (Supervision)  Comments:  (hemitechnique)5 supv using hemitechnique   Lower Body Dressing Functional Level:  (4.5-4 CGA/Min A seated on bedside commode & in stance w/ RW)  Items Applied/Steps Completed: Elastic waist pants (3 steps), Shoe, right (1 step), Shoe, left (1 step), Sock, left (1 step), Sock, right (1 step), Underpants (3 steps)  Comments:  (cross leg method and hemitechnique) Dressing Assistance : 4 (Contact guard assistance)  Leg Crossed Method Used: Yes  Position Performed: Standing (using grab bar and seated on tub transfer bench)4 Min A - 4.5 CGA seated using cross leg technique and reacher and in stance w/ RW    Toileting Functional Level:  (4.5 CGA-4 Min A)  Comments:  (4 Min A to hike underwear and pants over hips)  4.5 CGA - 4 Min A   Bladder 1 0   Bowel  0 0   Toilet Transfer [place] Toilet Transfer Score:  (4 Min A using RW to bedside commode)  4.5 CGA using RW for bedside commode transfer   Tub/Shower Transfer [place] Tub or Shower Type: Tub/Shower combination  Tub/Shower Transfer Score:  (NT d/t time constraints) ShowerCGA using w/c, RW, tub transfer bench and grab bar  4 (Contact guard assistance)   Comprehension Primary Mode of Comprehension: Auditory  Score: 5     5   Expression Primary Mode of Expression: Verbal  Score: 6  5      Social Interaction Score: 5  5   Problem Solving Score: 4  4   Memory Score: 4  4     FIM SCORES Initial Assessment Weekly Progress Assessment 6/3/2021   Bed/Chair/Wheelchair Transfers Transfer Type: Other  Other: stand step without AD as per PLOF  Transfer Assistance : 3 (Moderate assistance )  Sit to Stand Assistance: Moderate assistance  Car Transfers:  Moderate assistance (using RW)  Car Type: car transfer simulator     Bed Mobility Rolling Right 5 (Stand-by assistance)   Rolling Left 5 (Stand-by assistance)   Supine to Sit 5 (Stand-by assistance) (cue for not holding her breath)   Sit to Stand Moderate assistance   Sit to Supine  (SBA)    Rolling Right    NT   Rolling Left    NT   Supine to Sit    NT   Sit to Stand       Sit to Supine    NT      Locomotion (W/C) Able to Propel (ft): 70 feet  Functional Level: 2  Curbs/Ramps Assist Required (FIM Score):  (mod A for steering, using rosalba-technique)  Wheelchair Setup Assist Required : 2 (Maximal assistance)  Wheelchair Management: Manages left brake, Manages right brake (extra time, cues) Function    Setup Assistance         Locomotion (W/C distance)       Locomotion (Walk) 2 (Maximal assistance) (maximal trunk support, blocking right LE)        Locomotion (Walk dist.) 1 Feet (ft)     Steps/Stairs Steps/Stairs Ambulated (#): 1  Level of Assist : 2 (Maximal assistance) (lifting/lowering assistance required)  Rail Use: Both (therapist assisting at right UE to )  4 6\" steps with BHR and min assist         Nursing:     Neuro:   AAA&O x4            Respiratory:   [x] WNL   [] O2 LPM:   Other:  Peripheral Vascular:   [] TEDS present   [] Edema present ____ Grade   Cardiac:   [x] WNL   [] Other  Genitourinary:   [x] continent   [] incontinent   [] irving  Abdominal _______ LBM  GI: __Cardiac_____ Diet __Thin____ Liquids _____ tube feeds  Musculoskeletal: ____ ROM Transfers _wheelchair____ Assistive Device Used  _Mod___ Level of Assistance  Skin Integumentary:   [] Intact   [] Not Intact   __________Preventative Measures  Details______________________________________________________________  Pain: [] Controlled   [] Not Controlled   Pain Meds:   [] Scheduled   [] PRN        Registered Dietitian / Nutrition:   No data found. Pt reported good appetite and meal intake. Tolerating diet with >75% intake of meals. Pt asking for additional serving of protein; this [place] pt request    Supplements:          [] Yes   [x] No      Amount of supplement consumed:  Not applicable       Intake/Output Summary (Last 24 hours) at 6/3/2021 1310  Last data filed at 6/3/2021 1303  Gross per 24 hour   Intake 500 ml   Output --   Net 500 ml                                Last bowel movement: 5/29      Interdisciplinary Team Goals:     1. Discipline  Physical Therapy    Goal  Pt will perform stand step txfr with RW using right handle splint and SBA with decreased v/c for safe foot placement 9/10 times. Barrier  right LE weakness, slightly impulsive    Intervention  gait training, txfr training, w/c mobility, stair training, family training    Goal written by:   THADDEUS Hernandez, PT, DPT     2.  Discipline  Occupational Therapy    Goal  increase right hand coordination, dexterity and strength for ADL and functional tasks, improve balance for functional and ADL transfers Barrier  hemiplegic RUE, decreased balance chronic RA of b/l hands with arthritic changes    Intervention  ADL retraining, there ex, there act and NMR    Goal written by:  Vince Morris MS OTR/L     3. Discipline  Speech Therapy    Goal  Patient will recall 3 words after 5 minutes with supervision and mnemonics. Barrier  mild cognitive impairment    Intervention  cognitive retraining    Goal written by:  Christen Jacome CCC-SLP     4. Discipline  Nursing    Goal  Prevent another Stroke    Barrier  knowledge deficit, non compliance with medication    Intervention  Take meds as ordered, keep MD appointments, call 911 if s/s of stroke    Goal written by:  Viraj Juan LPN     5. Discipline  Clinical Psychology    Goal  Identify risk for recurrence and address healthcare compliance    Barrier  Variable healthcare management prior to stroke    Intervention  Patient/stroke education and support     Goal written by:  Romeo Hodges, PhD     6. Discipline  Nutrition / Dietetics    Goal  - PO nutrition intake will continue to meet >75% of patients estimated nutritional needs over the next 7 days. Barrier  none known     Intervention  continue po diet. Add double portion meat once daily    Goal written by:  Javier Pacheco RD       Disposition / Discharge Planning:      Follow-up services:  [x] Physical Therapy             [x] Occupational Therapy       [x] Speech Therapy           [] Skilled Nursing      [] Medical Social Worker   [] Aide        [] Outpatient      [] vs   [x] Home Health  [] vs       [] to progress to outpatient       [x] with 24-hour supervision       [] with 24-hour assistance   [] Timbo ABRAMS recommendations:  RW with right handle splint, 3 in 1 commode, tub transfer bench, grab bar in tub-shower    Estimated discharge date:  6/15/2021   Discharge Location:  [x] Home  [] versus    [] Timbo Santos    [] [address]   [] Other: Interdisciplinary team rounds were held this PM with the following team members:       Name   Physical Therapist    Paul Peguero, PT, DPT   Occupational Therapist    Albertina Hughes MS OTR/L   Speech Therapist    Xiomara Maldonado, [address] [facility]   Recreational Therapist    Cat Walters, [address] [place] [address]   Physician    Kip Wilson MD        Julianne Sanchez MSW       Signed:  Kip Wilson MD    Ambreen 3, 2021

## 2021-06-03 NOTE — PROGRESS NOTES
Problem: Self Care Deficits Care Plan (Adult)  Goal: *Acute Goals and Plan of Care (Insert Text)  Description: Occupational Therapy Goals   Long Term Goals  Initiated 5/28/2021 and to be accomplished within 4 week(s) 6/25/2021    1. Patient will perform grooming with modified independence using adaptive equipment as needed. 2. Pt will perform UB bathing with Mod I.  3. Pt will perform LB bathing with Mod I.  4. Pt will perform tub/shower transfer with Mod I using DME. 5. Pt will perform UB dressing with Mod I.  6. Patient will perform upper body dressing and lower body dressing with modified independence. 7. Patient will perform all aspects of toileting with modified independence. 8. Patient will perform toilet transfers with modified independence using RW. Short Term Goals   Initiated 5/28/2021 and to be accomplished within 7 day(s) 6/3/2021    1. Pt will perform grooming with Supv using adaptive equipment as needed. - met goal, d/c  goal  2. Pt will perform UB bathing with Supv. - met goal, upgrade to Mod I  3. Pt will perform LB bathing with Supv. - progressing, 6/3/2021  4. Pt will perform tub/shower transfer with SBA using DME. - progressing, 6/3/2021  5. Pt will perform UB dressing with Supv. - met goal, upgrade to Mod I  6. Pt will perform LB dressing with Supv. - progressing, 6/3/2021  7. Pt will perform toileting task with SBA. - progressing, 6/3/2021  8. Pt will perform toilet transfer with SBA using RW. - progressing, 6/3/2021    6/3/2021 1113 by Antonette Cushing  Outcome: Progressing Towards Goal    OT WEEKLY PROGRESS NOTE  Patient Name:Snow Kaur   Time Spent With Patient  Time In: 2497  Time Out: 2622  Patient Seen For[de-identified] AM;ADLs;Other (see progress notes)  Time In: 855  Time Out: 18    Medical Diagnosis:  Acute lacunar stroke ([facility] [I63.81] Acute lacunar stroke ([facility] [address] [facility]     Pain at start of tx:0/10 pain or discomfort. Pain at stop of tx:0/10 pain or discomfort.     Patient identified with name and :yes  Subjective:  \"I am doing a lot better. \"          Objective: Patient presents with decreased strength, functional activity tolerance and balance resulting in impaired self care performance skills and functional mobility. Patient is making steady progress towards goals and is very motivated to participated in OT tx intervention in order to reach maximal functional potential towards goals for safe d/c home.       Outcome Measures:      AROM: shoulder, elbows WFL, chronic RA with arthritic changes and contractures noted of b/l hands/digits, improved R hand dexterity and coordination noted during ADL and functional tasks and functional mobility using RW      COGNITION/PERCEPTION Initial Assessment Weekly Progress Assessment 6/3/2021   Premorbid Reading Status Literate     Premorbid Writing Status Functional     Arousal/Alertness       Orientation Level Oriented X4 Oriented X4   Visual Fields       Praxis       Body Scheme       COMPREHENSION MODE Initial Assessment Weekly Progress Assessment 6/3/2021   Primary Mode of Comprehension Auditory     Hearing Aide None     Corrective Lenses       Score 5       EXPRESSION Initial Assessment Weekly Progress Assessment 6/3/2021   Primary Mode of Expression Verbal Verbal   Score 6 6   Comments         SOCIAL INTERACTION/ PRAGMATICS Initial Assessment Weekly Progress Assessment 6/3/2021   Score 5 5   Comments         PROBLEM SOLVING Initial Assessment Weekly Progress Assessment 6/3/2021   Score 4 4   Comments         MEMORY Initial Assessment Weekly Progress Assessment 6/3/2021   Score 4 4   Comments         EATING Initial Assessment Weekly Progress Assessment 6/3/2021   Functional Level 5 Feeding/Eating  Feeding/Eating Assistance: 6 (Modified independent)  Adaptive Equipment: Built up fork;Built up knife;Built up spoon (2 handle cup landrum and 2 handle cup with lid/spout)   Comments  (issued AE:built up handle utensils,2 handled cup with lid)       GROOMING Initial Assessment Weekly Progress Assessment 6/3/2021   Functional Level 5 (seated w/c level at sink using nondominant left hand) Grooming  Grooming Assistance : 6 (Modified independent)  Adaptive Equipment: [employer]   Tasks completed by patient Brushed teeth, Washed face, Washed hands     Comments         BATHING Initial Assessment Weekly Progress Assessment 6/3/2021   Functional Level 5 (5 SBA UB, 4.5 CGA LB)    Upper Body Bathing  Bathing Assistance, Upper: 5 (Supervision)  Upper Body : Compensatory technique training;Training to use affected extremity as a gross stabilizer;Training to use affected extremity as a gross motor assistance; Marcel technique training;Training to use affected extremity as a fine motor assistance  Position Performed:  (seated on tub transfer bench)  Adaptive Equipment: Tub bench;Grab bar  Lower Body Bathing  Bathing Assistance, Lower : 4 (Contact guard assistance) (in stance using RW and seated on tub transfer bench)  Adaptive Equipment: Grab bar (using crossing leg method seated on tub transfer bench)  Position Performed: Standing (using grab bar and seated on tub transfer bench)  Adaptive Equipment: Tub bench;Grab bar   Body parts patient bathed Arm, left, Abdomen, Arm, right, Buttocks, Chest, Lower leg and foot, left, Lower leg and foot, right, Uma area, Thigh, left, Thigh, right     Comments  (seated on bedside commode & standing w/ RW)       TUB/SHOWER TRANSFER INDEPENDENCE Initial Assessment Weekly Progress Assessment 6/3/2021   Score  (NT d/t time constraints) Functional Transfers  Tub or Shower Type: Shower  Amount of Assistance Required: 4 (Contact guard assistance)  Adaptive Equipment: Walker (comment); Tub transfer bench;Grab bars   Comments         UPPER BODY DRESSING/UNDRESSING Initial Assessment Weekly Progress Assessment 6/3/2021   Functional Level 5 (SBA) Upper Body Dressing   Dressing Assistance : 5 (Supervision)  Comments:  (hemitechnique)   Items applied/Steps completed Pullover (4 steps)     Comments  (instruction for hemitechnique)       LOWER BODY DRESSING/UNDRESSING Initial Assessment Weekly Progress Assessment 6/3/2021   Functional Level  (4.5-4 CGA/Min A seated on bedside commode & in stance w/ RW) Lower Body Dressing   Dressing Assistance : 4 (Contact guard assistance)  Leg Crossed Method Used: Yes  Position Performed: Standing (using grab bar and seated on tub transfer bench)   Items applied/Steps completed Elastic waist pants (3 steps), Shoe, right (1 step), Shoe, left (1 step), Sock, left (1 step), Sock, right (1 step), Underpants (3 steps)     Comments  (cross leg method and hemitechnique)       TOILETING Initial Assessment Weekly Progress Assessment 6/3/2021   Functional Level  (4.5 CGA-4 Min A)  4 Min A - 4.5 cga    Comments  (4 Min A to hike underwear and pants over hips)       TOILET TRANSFER INDEPENDENCE Initial Assessment Weekly Progress Assessment 6/3/2021   Transfer score  (4 Min A using RW to bedside commode) Functional Transfers  Tub or Shower Type: Shower  Amount of Assistance Required: 4 (Contact guard assistance)  Adaptive Equipment: Walker (comment); Tub transfer bench;Grab bars   Comments                  ASSESSMENT:  Patient noted with improved functional mobility I.e. shower transfer using RW with aram CHRISTIE present for observation and instruction. Patient utilizing hemitechnique and compensatory techniques during shower/bathing and dressing tasks e.g. crossing leg technique, vc's to utilize reacher for item retrieval from floor surface in order to prevent falls e.g. washcloth fell on floor. Patient performed right hand exercises using green resistive block with good form/technique in order to increase strength, coordination and dexterity for ADL and functional tasks.  Pt sitting up in w/c at end of tx session, call bell within reach & pt verbalized understanding to utilize for assist e.g. functional transfers in order to prevent falls, w/c alarm intact. Progression toward goals:  [x]          Improving appropriately and progressing toward goals  []          Improving slowly and progressing toward goals  []          Not making progress toward goals and plan of care will be adjusted     PLAN:  Patient continues to benefit from skilled intervention to address the above impairments. Continue treatment per established plan of care. Discharge Recommendations:  Home Health w/ 24/7 supv and assist e.g. ADLs  Further Equipment Recommendations for Discharge:  3 in 1 commode, tub transfer bench, and rolling walker     Please refer to the flow sheet for vital signs taken during this treatment. After treatment:   [x]  Patient left in no apparent distress sitting up in chair  []  Patient left in no apparent distress in bed  [x]  Call bell left within reach  [x]  Nursing notified  []  Caregiver present  [x]  chair alarm activated    COMMUNICATION/EDUCATION:   [x] Home safety education was provided and the patient/caregiver indicated understanding. [x] Patient/family have participated as able in goal setting and plan of care. [x] Patient/family agree to work toward stated goals and plan of care. [] Patient understands intent and goals of therapy, but is neutral about his/her participation. [] Patient is unable to participate in goal setting and plan of care. Plan of Care: Please see Care Plan for updated STG/LTGs.    Family Training:    Estimated LOS: 1-2 weeks    Paula Page  6/3/2021

## 2021-06-03 NOTE — PROGRESS NOTES
[facility] PHYSICAL REHABILITATION  [address] [place] [address], [phone]     INPATIENT REHABILITATION  DAILY PROGRESS NOTE     Date: 6/3/2021    Name: Cori Moore Age / Sex: 62 y.o. / female   CSN: [id number] MRN: [id number]   [facility] Date: 5/27/2021 Length of Stay: 7 days     Primary Rehab Diagnosis: Impaired Mobility and ADLs secondary to Acute Lacunar Stroke (acute lacunar infarct in the posterior left lentiform nucleus extending into the corona radiata) with residual right hemiparesis      Subjective:     Patient seen and examined. Blood pressure controlled. Team conference was held at bedside this PM.     Patient's Complaint:   No significant medical complaints    Pain Control: no current joint or muscle symptoms, essentially pain-free      Objective:     Vital Signs:  Patient Vitals for the past 24 hrs:   BP Temp Pulse Resp SpO2   06/03/21 0803 129/80 97.7 °F (36.5 °C) 73 18 100 %   06/02/21 2050 133/84 97.6 °F (36.4 °C) 75 18 100 %   06/02/21 1540 122/79 97.1 °F (36.2 °C) 77 18 99 %        Physical Examination:  GENERAL SURVEY: Patient is awake, alert, oriented x 3, sitting comfortably on the chair, not in acute respiratory distress. HEENT: pink palpebral conjunctivae, anicteric sclerae, no nasoaural discharge, moist oral mucosa  NECK: supple, no jugular venous distention, no palpable lymph nodes  CHEST/LUNGS: symmetrical chest expansion, good air entry, clear breath sounds  HEART: adynamic precordium, good S1 S2, no S3, regular rhythm, no murmurs  ABDOMEN: flat, bowel sounds appreciated, soft, non-tender  EXTREMITIES: pink nailbeds, no edema, full and equal pulses, no calf tenderness   NEUROLOGICAL EXAM: The patient is awake, alert and oriented x3, able to answer questions fairly appropriately, able to follow 1 and 2 step commands. Able to tell time from the wall clock. Cranial nerves II-XII are grossly intact. No gross sensory deficit.   Motor strength is 4 to 4+/5 on the RUE and RLE, 5/5 on the LUE and LLE.       Current Medications:  Current Facility-Administered Medications   Medication Dose Route Frequency    losartan (COZAAR) tablet 12.5 mg  12.5 mg Oral DAILY    polyethylene glycol (MIRALAX) packet 17 g  17 g Oral DAILY    senna-docusate (PERICOLACE) 8.6-50 mg per tablet 2 Tablet  2 Tablet Oral PCD    metoprolol tartrate (LOPRESSOR) tablet 25 mg  25 mg Oral Q12H    cholecalciferol (VITAMIN D3) capsule 5,000 Units  5,000 Units Oral DAILY    hydrALAZINE (APRESOLINE) tablet 10 mg  10 mg Oral TID PRN    nicotine (NICODERM CQ) 14 mg/24 hr patch 1 Patch  1 Patch TransDERmal DAILY    acetaminophen (TYLENOL) tablet 650 mg  650 mg Oral Q4H PRN    bisacodyL (DULCOLAX) tablet 10 mg  10 mg Oral Q48H PRN    heparin (porcine) injection 5,000 Units  5,000 Units SubCUTAneous Q8H    aspirin chewable tablet 81 mg  81 mg Oral DAILY WITH DINNER    atorvastatin (LIPITOR) tablet 80 mg  80 mg Oral DAILY    clopidogreL (PLAVIX) tablet 75 mg  75 mg Oral DAILY WITH BREAKFAST    co-enzyme Q-10 (CO Q-10) capsule 100 mg  100 mg Oral DAILY    melatonin tablet 3 mg  3 mg Oral QHS       Allergies:  No Known Allergies      Functional Progress:    SPEECH AND LANGUAGE PATHOLOGY    ON ADMISSION MOST RECENT   Comprehension (Native Language)  Primary Mode of Comprehension: Auditory  Score: 5 Comprehension (Native Language)  Primary Mode of Comprehension: Auditory  Score: 6     Expression (Native Language)  Primary Mode of Expression: Verbal  Score: 6   Expression (Native Language)  Primary Mode of Expression: Verbal  Score: 5     Social Interaction/Pragmatics  Score: 5 Social Interaction/Pragmatics  Score: 5     Problem Solving  Score: 4   Problem Solving  Score: 3     Memory  Score: 4 Memory  Score: 4       Legend:   7 - Independent   6 - Modified Independent   5 - Standby Assistance / Supervision / Set-up   4 - Minimum Assistance / Contact Guard Assistance   3 - Moderate Assistance   2 - Maximum Assistance   1 - Total Assistance / Dependent       Lab/Data Review:  Recent Results (from the past 24 hour(s))   HGB & HCT    Collection Time: 06/03/21  5:16 AM   Result Value Ref Range    HGB 14.1 12.0 - 16.0 g/dL    HCT 43.5 35.0 - 45.0 %   PLATELET COUNT    Collection Time: 06/03/21  5:16 AM   Result Value Ref Range    PLATELET 645 072 - 521 K/uL   RENAL FUNCTION PANEL    Collection Time: 06/03/21  5:16 AM   Result Value Ref Range    Sodium 140 136 - 145 mmol/L    Potassium 4.9 3.5 - 5.5 mmol/L    Chloride 110 100 - 111 mmol/L    CO2 22 21 - 32 mmol/L    Anion gap 8 3.0 - 18 mmol/L    Glucose 96 74 - 99 mg/dL    BUN 34 (H) 7.0 - 18 MG/DL    Creatinine 2.24 (H) 0.6 - 1.3 MG/DL    BUN/Creatinine ratio 15 12 - 20      GFR est AA 27 (L) >60 ml/min/1.73m2    GFR est non-AA 22 (L) >60 ml/min/1.73m2    Calcium 9.7 8.5 - 10.1 MG/DL    Phosphorus 4.5 2.5 - 4.9 MG/DL    Albumin 3.3 (L) 3.4 - 5.0 g/dL       Assessment:     Primary Rehab Diagnosis  1. Impaired Mobility and ADLs  2.  Acute Lacunar Stroke (acute lacunar infarct in the posterior left lentiform nucleus extending into the corona radiata) with residual right hemiparesis    Comorbidities  Patient Active Problem List   Diagnosis Code    Rheumatoid arthritis ([facility] [address] M06.9    History of vitamin D deficiency Z86.39    Cocaine use F14.90    Stage 3b chronic kidney disease (HCC) N18.32    History of Helicobacter pylori infection Z86.19    Abscess of finger L02.519    Gastroesophageal reflux disease K21.9    Acute lacunar stroke (HCC) I63.81    Impaired mobility and ADLs Z74.09, Z78.9    Hemiparesis of right dominant side due to acute cerebrovascular disease (HCC) I67.89, G81.91    Tobacco use disorder F17.200    Current use of aspirin Z79.82    On clopidogrel therapy Z79.01    Hypertensive heart and kidney disease without heart failure and with stage 3b chronic kidney disease (HCC) I13.10, N18.32    Mixed hyperlipidemia E78.2    On statin therapy due to risk of future cardiovascular event Z79.899    Constipation K59.00    History of iron deficiency anemia Z86.2    High serum magnesium R79.89    Secondary hyperparathyroidism of renal origin ([facility] [address] N25.81    Vitamin D deficiency E55.9        Plan:     1. Justification for continued stay: Good progression towards established rehabilitation goals. 2. Medical Issues being followed closely:    [x]  Fall and safety precautions     []  Wound Care     [x]  Bowel and Bladder Function     [x]  Fluid Electrolyte and Nutrition Balance     []  Pain Control      3. Issues that 24 hour rehabilitation nursing is following:    [x]  Fall and safety precautions     []  Wound Care     [x]  Bowel and Bladder Function     [x]  Fluid Electrolyte and Nutrition Balance     []  Pain Control      [x]  Assistance with and education on in-room safety with transfers to and from the bed, wheelchair, toilet and shower. 4. Acute rehabilitation plan of care:    [x]  Continue current care and rehab. [x]  Physical Therapy           [x]  Occupational Therapy           [x]  Speech Therapy     []  Hold Rehab until further notice     5. Medications:    [x]  MAR Reviewed     [x]  Continue Present Medications     6. DVT Prophylaxis:      []  Enoxaparin     [x]  Unfractionated Heparin     []  Warfarin     []  NOAC     []  NORBERT Stockings     []  Sequential Compression Device     []  None     7. Code status    [x]  Full code     []  Partial code     []  Do not intubate     []  Do not resuscitate     8.  Orders:   > Acute Lacunar Stroke (acute lacunar infarct in the posterior left lentiform nucleus extending into the corona radiata) with residual right hemiparesis   > Dual antiplatelet therapy (DAPT) was started 5/22/2021; Neurology recommending to continue DAPT for 21 days (~6/12/2021), then discontinue Aspirin and continue on Clopidogrel 75 mg PO once daily    > On 5/27/2021, started Melatonin 3 mg PO q HS (for stroke recovery)   > Continue:    > Aspirin 81 PO once daily with dinner (STOP DATE: 6/12/2021)    > Atorvastatin 40 mg PO once daily    > Clopidogrel 75 mg PO once daily with breakfast      > Melatonin 3 mg PO q HS    > Constipation   > On 5/28/2021, discontinued (re: refused by patient last night and this AM):    > Miralax 17 grams in 8 oz water PO once daily    > PeriColace 2 tabs PO once daily with dinner    > Hypertensive heart and kidney disease without heart failure with stage 3b-4 chronic kidney disease   > On 5/31/2021, started Metoprolol tartrate 25 mg PO q 12 hr (9AM, 9PM)   > On 6/2/2021:    > Discontinued Amlodipine 10 mg PO once daily (9AM)    > Started Losartan 12.5 mg PO once daily (9AM)   > Continue:    > Losartan 12.5 mg PO once daily (9AM)    > Metoprolol tartrate 25 mg PO q 12 hr (9AM, 9PM)    > Mixed hyperlipidemia   > On 5/28/2021, started Coenzyme Q10 100 mg PO once daily   > Continue:    > Atorvastatin 40 mg PO once daily    > Coenzyme Q10 100 mg PO once daily    > High serum magnesium   > Mg (5/28/2021, on admission to the ARU) = 3.2   > patient reported she was given 3 doses of Milk of magnesia at [facility] prior to discharge to the ARU   > Avoid magnesium-containing medications/supplements      05/31/21  0545 05/30/21  0706 05/28/21  0613   MG 2.6 2.9* 3.2*     > Stage 3b-4 chronic kidney disease   > BUN/Creatinine (5/28/2021, on admission to the ARU) = 36/2.47   > Retroperitoneal ultrasound (5/28/2021) showed:    > Increased parenchymal echogenicity in both kidneys consistent with medical renal disease. There is a prominent cortical cyst in the mid right kidney. No hydronephrosis or nephrolithiasis.       > Renal cortical thickness is somewhat less than 10 mm in both kidneys.   The left kidney is somewhat small with respect to the right kidney which is low normal in size.   > On 5/29/2021, started IVF: 0.9%  ml.hr x 1 liter once daily after dinner   > PTH (5/31/2021) = 276.1   > Vitamin D 25-Hydroxy (5/31/2021) = 10.1   > Urine microalbumin (5/31/2021) = 15.30   > Microalbumin/Creatinine ratio (5/31/2021) = 165   > Urinalysis (5/31/2021): SG 1.013, protein 30, no casts seen   > Nephrology consult (Dr. Benjie Reich) called on 5/31/2021 for evaluation and comanagement   > CK (5/31/2021) = 107   > Urine creatinine (5/31/2021) = 93.00   > Random urine protein (5/31/2021) = 31   > Random urine sodium (5/31/2021) = 52      06/02/21  0445 06/01/21  0430 05/31/21  0545 05/30/21  0706 05/28/21  0613   BUN 35* 37* 36* 37* 36*   CREA 2.24* 2.27* 2.15* 2.25* 2.47*      > On 6/2/2021:    > Discontinued IVF: 0.9%  ml/hr x 1 liter once daily after dinner    > Started Losartan 12.5 mg PO once daily (9AM)   > Will need to monitor serum potassium level as it was already on the higher side of normal prior to starting Losartan   > BUN/Creatinine (6/3/2021) = 34/2.24    > K (6/3/2021) = 4.9   > Continue Losartan 12.5 mg PO once daily (9AM)    > Tobacco use disorder   > On 5/28/2021, started Nicotine 14 mg/24 hr patch, 1 patch on skin once daily   > Continue Nicotine 14 mg/24 hr patch, 1 patch on skin once daily    > Vitamin D Deficiency   > Vitamin D 25-Hydroxy (5/31/2021) = 10.1   > On 5/31/2021, patient was given Cholecalciferol 50,000 units PO x 1 dose    > On 6/1/2021, started Cholecalciferol 5,000 units PO once daily   > Continue Cholecalciferol 5,000 units PO once daily    > Constipation   > On 6/2/2021, started:    > Pericolace 2 tabs PO once daily after dinner    > Polyethylene glycol 17 grams in 8 oz water PO once daily    > Continue:    > Pericolace 2 tabs PO once daily after dinner    > Polyethylene glycol 17 grams in 8 oz water PO once daily     > Analgesia   > Continue Acetaminophen 650 mg PO q 4 hr PRN for pain     > Diet:   > Specifications: Cardiac   > Solids (consistency): Regular    > Liquids (consistency): Thin    > Fluid restriction: None      9.  Personal Protective Equipment was used while interacting with patient including: goggles and KN95 face mask. Patient was using a surgical mask. 10. Patient's progress in rehabilitation and medical issues discussed with the patient. All questions answered to the best of my ability. Care plan discussed with patient and nurse. 11. Total clinical care time is 30 minutes, including review of chart including all labs, radiology, past medical history, and discussion with patient. Greater than 50% of my time was spent in coordination of care and counseling.       Signed:    Sammi Partida MD    Ambreen 3, 2021

## 2021-06-03 NOTE — PROGRESS NOTES
Problem: Neurolinguistics Impaired (Adult)  Goal: *Speech Goal: (INSERT TEXT)  Description: Long term goals  Patient will:  1. Be oriented x 3 and recall events of the day, supervision. 2. Recall 3 words after 5 minutes, supervision. 3. Name 10-12 items within categories of increasing complexity, supervision. 4. Perform varied word finding tasks with 90% accuracy. 5. Perform problem solving/reasoning tasks with 90% accuracy. 6. Perform basic mathematical calculations, 90% accuracy. Short term goals (by 6/4/21)  Patient will:  1. Be oriented x 3 and recall events of the day, supervision. 2. Recall 3 words after 5 minutes, min cues. 3. Name 10-12 items within concrete categories, supervision. 4. Perform varied word finding tasks with 75-85% accuracy. 5. Perform problem solving/reasoning tasks with 70-80% accuracy. 6. Perform basic mathematical calculations, 70-80% accuracy. Note:   Speech language pathology treatment    Patient: Rosi Love (44 y.o. female)  Date: 6/3/2021  Diagnosis: Acute lacunar stroke ([facility] [I63.81] Acute lacunar stroke ([facility] [address] [facility]  Time in: 1100  Time Out: 1130  SUBJECTIVE:   Patient stated I'm doing good. OBJECTIVE:   Mental Status:  Ms. Paty [facility] was alert and oriented. Treatment & Interventions: The patient was seen for a 30 minute session. The following treatment tasks were presented:   Neuro-Linguistics:   Orientation:    Minimum support  Recent memory:   Independent  Recall 3 words: Moderate support  List items (1 min, things you drink): 13   Drawing conclusions:   80%  Identifying activity from description: 70% independently, 100% given moderate support    Response & Tolerance to Activities:  Ms. Paty Wayne was cooperative and tolerated activities well.     Pain:  Pain Scale 1: Numeric (0 - 10)     Pain Description 1: Aching  After treatment:   [x]       Patient left in no apparent distress sitting up in chair  []       Patient left in no apparent distress in bed  [x] Call bell left within reach  []       Nursing notified  []       Caregiver present  []       Bed alarm activated    ASSESSMENT:   Progression toward goals:  [x]       Improving appropriately and progressing toward goals  []       Improving slowly and progressing toward goals  []       Not making progress toward goals and plan of care will be adjusted    PLAN:   Patient continues to benefit from skilled intervention to address the above impairments. Continue treatment per established plan of care. Discharge Recommendations:   To Be Determined    Estimated LOS: Through 6/15/21    Wesly Soni  Time Calculation: 30 mins

## 2021-06-03 NOTE — ROUTINE PROCESS
SHIFT CHANGE NOTE FOR [facility]    Bedside and Verbal shift change report given to Dwight Washington RN (oncoming nurse) by Alia Ardon   (offgoing nurse). Report included the following information SBAR, Kardex, MAR and Recent Results.     Situation:   Code Status: Full Code   Reason for Admission: CVA  Hospital Day: 7   Problem List:   Hospital Problems  Date Reviewed: 6/2/2021        Codes Class Noted POA    Secondary hyperparathyroidism of renal origin ([facility] (Chronic) ICD-10-CM: N25.81  ICD-9-CM: 588.81  5/31/2021 Yes        Vitamin D deficiency (Chronic) ICD-10-CM: E55.9  ICD-9-CM: 268.9  5/31/2021 Yes    Overview Signed 5/31/2021  1:17 PM by Prosper Shah MD     Vitamin D 25-Hydroxy (5/31/2021) = 10.5              High serum magnesium ICD-10-CM: R79.89  ICD-9-CM: 790.99  5/28/2021 Yes        Hypertensive heart and kidney disease without heart failure and with stage 3b chronic kidney disease ([facility] (Chronic) ICD-10-CM: I13.10, N18.32  ICD-9-CM: 404.90, 585.3  Unknown Yes        Mixed hyperlipidemia (Chronic) ICD-10-CM: E78.2  ICD-9-CM: 272.2  Unknown Yes        Constipation (Chronic) ICD-10-CM: K59.00  ICD-9-CM: 564.00  Unknown Yes        Current use of aspirin ICD-10-CM: Z79.82  ICD-9-CM: V58.66  5/23/2021 Yes        On clopidogrel therapy ICD-10-CM: Z79.01  ICD-9-CM: V58.61  5/23/2021 Yes        On statin therapy due to risk of future cardiovascular event ICD-10-CM: Z79.899  ICD-9-CM: V58.69  5/22/2021 Yes    Overview Signed 5/27/2021  2:27 PM by Prosper Shah MD     On Atorvastatin             * (Principal) Acute lacunar stroke [facility]) ICD-10-CM: I63.81  ICD-9-CM: 434.91  5/21/2021 Yes    Overview Signed 5/27/2021  2:15 PM by Prosper Shah MD     Acute Lacunar Stroke (acute lacunar infarct in the posterior left lentiform nucleus extending into the corona radiata) with residual right hemiparesis             Impaired mobility and ADLs ICD-10-CM: Z74.09, Z78.9  ICD-9-CM: V49.89  5/21/2021 Yes        Hemiparesis of right dominant side due to acute cerebrovascular disease [facility]) ICD-10-CM: I67.89, G81.91  ICD-9-CM: 436, 342.91  5/21/2021 Yes              Background:   Past Medical History:   Past Medical History:   Diagnosis Date    Abnormal mammogram 2014    left with biopsy    Acute lacunar stroke ([facility] 5/21/2021    Acute Lacunar Stroke (acute lacunar infarct in the posterior left lentiform nucleus extending into the corona radiata) with residual right hemiparesis    Bacterial vaginosis 1/9/15    Dr Fernie Pennington Constipation     Current use of aspirin 5/23/2021    Gastroesophageal reflux disease 6/30/2016    Hemiparesis of right dominant side due to acute cerebrovascular disease ([facility] 5/21/2021    History of Helicobacter pylori infection 7/24/2015    History of iron deficiency anemia 06/2014    History of vitamin D deficiency 6/11/2015    Hot flashes 1/9/15    Dr Fernie Pennington On clopidogrel therapy 5/23/2021    On statin therapy due to risk of future cardiovascular event 5/22/2021    On Atorvastatin    Rheumatoid arthritis ([facility]     Secondary hyperparathyroidism of renal origin ([facility] 5/31/2021    Stage 3b chronic kidney disease ([facility] 7/24/2015    Tobacco use disorder     Vitamin D deficiency 5/31/2021    Vitamin D 25-Hydroxy (5/31/2021) = 10.5       Patient taking anticoagulants yes    Patient has a defibrillator: no     Assessment:   Changes in Assessment throughout shift: none    Patient has central line: no  I     Last Vitals:     Vitals:    06/02/21 0816 06/02/21 1211 06/02/21 1540 06/02/21 2050   BP: (!) 143/100 130/79 122/79 133/84   Pulse: 69  77 75   Resp: 16  18 18   Temp: 97.1 °F (36.2 °C)  97.1 °F (36.2 °C) 97.6 °F (36.4 °C)   SpO2: 99%  99% 100%   Weight:       Height:       LMP: 10/15/2014        PAIN    Pain Assessment    Pain Intensity 1: 0 (06/03/21 0409) Pain Intensity 1: 2 (12/29/14 1105)    Pain Location 1: Generalized Pain Location 1: Abdomen    Pain Intervention(s) 1: Medication (see MAR) Pain Intervention(s) 1: Medication (see MAR)  Patient Stated Pain Goal: 0 Patient Stated Pain Goal: 0  o Intervention effective: no pain reported  o Other actions taken for pain: no pain reported     Skin Assessment  Skin color    Condition/Temperature    Integrity    Turgor    Weekly Pressure Ulcer Documentation  Pressure  Injury Documentation: No Pressure Injury Noted-Pressure Ulcer Prevention Initiated  Wound Prevention & Protection Methods  Orientation of wound Orientation of Wound Prevention: Posterior  Location of Prevention Location of Wound Prevention: Sacrum/Coccyx  Dressing Present Dressing Present : No  Dressing Status    Wound Offloading Wound Offloading (Prevention Methods): Bed, pressure redistribution/air, Bed, pressure reduction mattress     INTAKE/OUPUT  Date 06/02/21 0700 - 06/03/21 0659 06/03/21 0700 - 06/04/21 0659   Shift 0211-8341 6162-0198 24 Hour Total 8773-3510 1705-9122 24 Hour Total   INTAKE   Shift Total(mL/kg)         OUTPUT   Urine(mL/kg/hr)           Urine Occurrence(s) 2 x 3 x 5 x      Stool           Stool Occurrence(s) 0 x 0 x 0 x      Shift Total(mL/kg)         NET         Weight (kg) 84.4 84.4 84.4 84.4 84.4 84.4       Recommendations:  1. Patient needs and requests: Assist with toileting    2. Diet: Cardiac & thin liquids    3. Pending tests/procedures: none  4. .     5. Functional Level/Equipment: W/C with min assist    6. Estimated Discharge Date: TBD Posted on Whiteboard in Patients Room: [facility] Safety Check    A safety check occurred in the patient's room between off going nurse and oncoming nurse listed above.     The safety check included the below items  Area Items   H  High Alert Medications - Verify all high alert medication drips (heparin, PCA, etc.)   E  Equipment - Suction is set up for ALL patients (with yanker)  - Red plugs utilized for all equipment (IV pumps, etc.)  - WOWs wiped down at end of shift.  - Room stocked with oxygen, suction, and other unit-specific supplies   A  Alarms - Bed alarm is set for fall risk patients  - Ensure chair alarm is in place and activated if patient is up in a chair   L  Lines - Check IV for any infiltration  - Rollins bag is empty if patient has a Rollins   - Tubing and IV bags are labeled   S  Safety   - Room is clean, patient is clean, and equipment is clean. - Hallways are clear from equipment besides carts. - Fall bracelet on for fall risk patients  - Ensure room is clear and free of clutter  - Suction is set up for ALL patients (with yanker)  - Hallways are clear from equipment besides carts.    - Isolation precautions followed, supplies available outside room, sign posted

## 2021-06-03 NOTE — ROUTINE PROCESS
SHIFT CHANGE NOTE FOR [facility]    Bedside and Verbal shift change report given to Himanshu Benavides RN (oncoming nurse) by Lul Arnold RN   (offgoing nurse). Report included the following information SBAR, Kardex, MAR and Recent Results.     Situation:   Code Status: Full Code   Reason for Admission: CVA  Hospital Day: 7   Problem List:   Hospital Problems  Date Reviewed: 6/3/2021        Codes Class Noted POA    Secondary hyperparathyroidism of renal origin ([facility] (Chronic) ICD-10-CM: N25.81  ICD-9-CM: 588.81  5/31/2021 Yes        Vitamin D deficiency (Chronic) ICD-10-CM: E55.9  ICD-9-CM: 268.9  5/31/2021 Yes    Overview Signed 5/31/2021  1:17 PM by Miguel Vieira MD     Vitamin D 25-Hydroxy (5/31/2021) = 10.5              High serum magnesium ICD-10-CM: R79.89  ICD-9-CM: 790.99  5/28/2021 Yes        Hypertensive heart and kidney disease without heart failure and with stage 3b chronic kidney disease ([facility] (Chronic) ICD-10-CM: I13.10, N18.32  ICD-9-CM: 404.90, 585.3  Unknown Yes        Mixed hyperlipidemia (Chronic) ICD-10-CM: E78.2  ICD-9-CM: 272.2  Unknown Yes        Constipation (Chronic) ICD-10-CM: K59.00  ICD-9-CM: 564.00  Unknown Yes        Current use of aspirin ICD-10-CM: Z79.82  ICD-9-CM: V58.66  5/23/2021 Yes        On clopidogrel therapy ICD-10-CM: Z79.01  ICD-9-CM: V58.61  5/23/2021 Yes        On statin therapy due to risk of future cardiovascular event ICD-10-CM: Z79.899  ICD-9-CM: V58.69  5/22/2021 Yes    Overview Signed 5/27/2021  2:27 PM by Miguel Vieira MD     On Atorvastatin             * (Principal) Acute lacunar stroke [facility]) ICD-10-CM: I63.81  ICD-9-CM: 434.91  5/21/2021 Yes    Overview Signed 5/27/2021  2:15 PM by Miguel Vieira MD     Acute Lacunar Stroke (acute lacunar infarct in the posterior left lentiform nucleus extending into the corona radiata) with residual right hemiparesis             Impaired mobility and ADLs ICD-10-CM: Z74.09, Z78.9  ICD-9-CM: V49.89  5/21/2021 Yes        Hemiparesis of right dominant side due to acute cerebrovascular disease [facility]) ICD-10-CM: I67.89, G81.91  ICD-9-CM: 436, 342.91  5/21/2021 Yes              Background:   Past Medical History:   Past Medical History:   Diagnosis Date    Abnormal mammogram 2014    left with biopsy    Acute lacunar stroke ([facility] 5/21/2021    Acute Lacunar Stroke (acute lacunar infarct in the posterior left lentiform nucleus extending into the corona radiata) with residual right hemiparesis    Bacterial vaginosis 1/9/15    Dr Eduard Shukla Constipation     Current use of aspirin 5/23/2021    Gastroesophageal reflux disease 6/30/2016    Hemiparesis of right dominant side due to acute cerebrovascular disease ([facility] 5/21/2021    History of Helicobacter pylori infection 7/24/2015    History of iron deficiency anemia 06/2014    History of vitamin D deficiency 6/11/2015    Hot flashes 1/9/15    Dr Eduard Shukla On clopidogrel therapy 5/23/2021    On statin therapy due to risk of future cardiovascular event 5/22/2021    On Atorvastatin    Rheumatoid arthritis ([facility]     Secondary hyperparathyroidism of renal origin ([facility] [facility] 5/31/2021    Stage 3b chronic kidney disease ([facility] 7/24/2015    Tobacco use disorder     Vitamin D deficiency 5/31/2021    Vitamin D 25-Hydroxy (5/31/2021) = 10.5       Patient taking anticoagulants yes    Patient has a defibrillator: no     Assessment:   Changes in Assessment throughout shift: none    Patient has central line: no  I     Last Vitals:     Vitals:    06/02/21 1540 06/02/21 2050 06/03/21 0803 06/03/21 1601   BP: 122/79 133/84 129/80 138/80   Pulse: 77 75 73 87   Resp: 18 18 18 17   Temp: 97.1 °F (36.2 °C) 97.6 °F (36.4 °C) 97.7 °F (36.5 °C) 97.1 °F (36.2 °C)   SpO2: 99% 100% 100% 100%   Weight:       Height:       LMP: 10/15/2014        PAIN    Pain Assessment    Pain Intensity 1: 0 (06/03/21 1822) Pain Intensity 1: 2 (12/29/14 1105)    Pain Location 1: Generalized Pain Location 1: Abdomen    Pain Intervention(s) 1: Medication (see MAR) Pain Intervention(s) 1: Medication (see MAR)  Patient Stated Pain Goal: 0 Patient Stated Pain Goal: 0  o Intervention effective: no pain reported  o Other actions taken for pain: no pain reported     Skin Assessment  Skin color    Condition/Temperature    Integrity    Turgor    Weekly Pressure Ulcer Documentation  Pressure  Injury Documentation: No Pressure Injury Noted-Pressure Ulcer Prevention Initiated  Wound Prevention & Protection Methods  Orientation of wound Orientation of Wound Prevention: Posterior  Location of Prevention Location of Wound Prevention: Sacrum/Coccyx  Dressing Present Dressing Present : No  Dressing Status    Wound Offloading Wound Offloading (Prevention Methods): Bed, pressure reduction mattress     INTAKE/OUPUT  Date 06/02/21 0700 - 06/03/21 0659 06/03/21 0700 - 06/04/21 0659   Shift 3493-1230 3909-2258 24 Hour Total 3434-9318 8321-8279 24 Hour Total   INTAKE   P.O.    750  750     P. O.    750  750   Shift Total(mL/kg)    750(8.9)  750(8.9)   OUTPUT   Urine(mL/kg/hr)           Urine Occurrence(s) 2 x 3 x 5 x 0 x  0 x   Stool           Stool Occurrence(s) 0 x 0 x 0 x 0 x  0 x   Shift Total(mL/kg)         NET    750  750   Weight (kg) 84.4 84.4 84.4 84.4 84.4 84.4       Recommendations:  1. Patient needs and requests: Assist with toileting    2. Diet: Cardiac & thin liquids    3. Pending tests/procedures: none  4. .     5. Functional Level/Equipment: W/C with min assist    6. Estimated Discharge Date: TBD Posted on Whiteboard in Patients Room: [facility] Safety Check    A safety check occurred in the patient's room between off going nurse and oncoming nurse listed above.     The safety check included the below items  Area Items   H  High Alert Medications - Verify all high alert medication drips (heparin, PCA, etc.)   E  Equipment - Suction is set up for ALL patients (with joaquinker)  - Red plugs utilized for all equipment (IV pumps, etc.)  - WOWs wiped down at end of shift.  - Room stocked with oxygen, suction, and other unit-specific supplies   A  Alarms - Bed alarm is set for fall risk patients  - Ensure chair alarm is in place and activated if patient is up in a chair   L  Lines - Check IV for any infiltration  - Rollins bag is empty if patient has a Rollins   - Tubing and IV bags are labeled   S  Safety   - Room is clean, patient is clean, and equipment is clean. - Hallways are clear from equipment besides carts. - Fall bracelet on for fall risk patients  - Ensure room is clear and free of clutter  - Suction is set up for ALL patients (with yanker)  - Hallways are clear from equipment besides carts.    - Isolation precautions followed, supplies available outside room, sign posted

## 2021-06-03 NOTE — PROGRESS NOTES
In Patient Progress note      Admit Date: 5/27/2021    Assessment and plan:     #1 Acute kidney injury on chronic kidney disease stage III/IV. Patient's baseline creatinine  close to 2 range. Appears to be having a mild REHANA, I think it is prerenal as it is improving with hydration. Patient also has history of using Sutton 2   inhibitors /multiple NSAID use including BC powder chronically. Her CKD is likely d/t HTN nephrosclerosis and NSAID use . Renal US with no hydro. #2 Hypertension   #3 History of CVA with right-sided hemiparesis  #4 dyslipidemia  #5 mild proteinuria  #6 rheumatoid arthritis  #7 NSAID use  #8 Subnephrotic proteinuria     Plan:     #1 continue  losartan , monitor renal panel , uptitrate   #2 encourage p.o. intake , no need for IVF as volume status ok  #3 no more NSAIDs , counseled patient   #4 follow renal panels daily     Following patient closely,     Please call with questions     Ruth Ann Cisneros MD FASN  Cell [phone]  Pager: [phone]    Subjective:     - No acute over night events. - respiratory - stable  - hemodynamics - stable, no pressrs  - UOP-good   - Nutrition -ok    Objective:     Visit Vitals  /80 (BP 1 Location: Left upper arm, BP Patient Position: Supine)   Pulse 73   Temp 97.7 °F (36.5 °C)   Resp 18   Ht 5' 7\" (1.702 m)   Wt 84.4 kg (186 lb)   LMP 10/15/2014   SpO2 100%   Breastfeeding No   BMI 29.13 kg/m²         Intake/Output Summary (Last 24 hours) at 6/3/2021 1134  Last data filed at 6/3/2021 0941  Gross per 24 hour   Intake 250 ml   Output --   Net 250 ml       Physical Exam:     Patient is in no apparent distress. HEENT: mmm  Neck: no cervical lymphadenopathy or thyromegaly. Lungs: good air entry, clear to auscultation bilaterally. Cardiovascular system: S1, S2, regular rate and rhythm. Abdomen: soft, non tender, non distended. Extremities: no clubbing, cyanosis or edema. Neurologic: Alert, oriented time three.      Data Review:    Recent Labs 06/03/21  0516   HCT 43.5     Recent Labs     06/03/21  0516 06/02/21  0445 06/01/21  0430   BUN 34* 35* 37*   CREA 2.24* 2.24* 2.27*   CA 9.7 9.3 8.6   ALB 3.3*  --   --    K 4.9 5.2 4.3    140 139    112* 111   CO2 22 22 20*   PHOS 4.5  --   --    GLU 96 106* 121*       Elyssa Castillo MD

## 2021-06-04 LAB
ALBUMIN SERPL-MCNC: 3.4 G/DL (ref 3.4–5)
ANION GAP SERPL CALC-SCNC: 6 MMOL/L (ref 3–18)
BUN SERPL-MCNC: 36 MG/DL (ref 7–18)
BUN/CREAT SERPL: 15 (ref 12–20)
CALCIUM SERPL-MCNC: 9.7 MG/DL (ref 8.5–10.1)
CHLORIDE SERPL-SCNC: 108 MMOL/L (ref 100–111)
CO2 SERPL-SCNC: 24 MMOL/L (ref 21–32)
CREAT SERPL-MCNC: 2.45 MG/DL (ref 0.6–1.3)
GLUCOSE SERPL-MCNC: 99 MG/DL (ref 74–99)
PHOSPHATE SERPL-MCNC: 4.3 MG/DL (ref 2.5–4.9)
POTASSIUM SERPL-SCNC: 4.9 MMOL/L (ref 3.5–5.5)
SODIUM SERPL-SCNC: 138 MMOL/L (ref 136–145)

## 2021-06-04 PROCEDURE — 99232 SBSQ HOSP IP/OBS MODERATE 35: CPT | Performed by: INTERNAL MEDICINE

## 2021-06-04 PROCEDURE — 97112 NEUROMUSCULAR REEDUCATION: CPT

## 2021-06-04 PROCEDURE — 74011250637 HC RX REV CODE- 250/637: Performed by: INTERNAL MEDICINE

## 2021-06-04 PROCEDURE — 80069 RENAL FUNCTION PANEL: CPT

## 2021-06-04 PROCEDURE — 74011250636 HC RX REV CODE- 250/636: Performed by: INTERNAL MEDICINE

## 2021-06-04 PROCEDURE — 97110 THERAPEUTIC EXERCISES: CPT

## 2021-06-04 PROCEDURE — 97150 GROUP THERAPEUTIC PROCEDURES: CPT

## 2021-06-04 PROCEDURE — 65310000000 HC RM PRIVATE REHAB

## 2021-06-04 PROCEDURE — 92507 TX SP LANG VOICE COMM INDIV: CPT

## 2021-06-04 PROCEDURE — 97530 THERAPEUTIC ACTIVITIES: CPT

## 2021-06-04 PROCEDURE — 97116 GAIT TRAINING THERAPY: CPT

## 2021-06-04 PROCEDURE — 36415 COLL VENOUS BLD VENIPUNCTURE: CPT

## 2021-06-04 RX ADMIN — HEPARIN SODIUM 5000 UNITS: 5000 INJECTION INTRAVENOUS; SUBCUTANEOUS at 21:31

## 2021-06-04 RX ADMIN — POLYETHYLENE GLYCOL 3350 17 G: 17 POWDER, FOR SOLUTION ORAL at 09:49

## 2021-06-04 RX ADMIN — ACETAMINOPHEN 650 MG: 325 TABLET ORAL at 09:47

## 2021-06-04 RX ADMIN — LOSARTAN POTASSIUM 12.5 MG: 25 TABLET, FILM COATED ORAL at 09:48

## 2021-06-04 RX ADMIN — METOPROLOL TARTRATE 25 MG: 25 TABLET, FILM COATED ORAL at 09:47

## 2021-06-04 RX ADMIN — HEPARIN SODIUM 5000 UNITS: 5000 INJECTION INTRAVENOUS; SUBCUTANEOUS at 06:00

## 2021-06-04 RX ADMIN — HEPARIN SODIUM 5000 UNITS: 5000 INJECTION INTRAVENOUS; SUBCUTANEOUS at 14:37

## 2021-06-04 RX ADMIN — Medication 100 MG: at 09:47

## 2021-06-04 RX ADMIN — CLOPIDOGREL BISULFATE 75 MG: 75 TABLET ORAL at 09:48

## 2021-06-04 RX ADMIN — ASPIRIN 81 MG: 81 TABLET, CHEWABLE ORAL at 17:33

## 2021-06-04 RX ADMIN — ACETAMINOPHEN 650 MG: 325 TABLET ORAL at 21:42

## 2021-06-04 RX ADMIN — Medication 5000 UNITS: at 09:48

## 2021-06-04 RX ADMIN — Medication 3 MG: at 21:30

## 2021-06-04 RX ADMIN — ATORVASTATIN CALCIUM 80 MG: 40 TABLET, FILM COATED ORAL at 09:47

## 2021-06-04 RX ADMIN — METOPROLOL TARTRATE 25 MG: 25 TABLET, FILM COATED ORAL at 21:30

## 2021-06-04 RX ADMIN — DOCUSATE SODIUM 50MG AND SENNOSIDES 8.6MG 2 TABLET: 8.6; 5 TABLET, FILM COATED ORAL at 17:33

## 2021-06-04 NOTE — ROUTINE PROCESS
SHIFT CHANGE NOTE FOR [place]    Bedside and Verbal shift change report given to Alcario Fabry (oncoming nurse) by Yasmine Jon   (offgoing nurse). Report included the following information SBAR, Kardex, MAR and Recent Results.     Situation:   Code Status: Full Code   Reason for Admission: CVA  Hospital Day: 8   Problem List:   Hospital Problems  Date Reviewed: 6/3/2021        Codes Class Noted POA    Secondary hyperparathyroidism of renal origin ([facility] (Chronic) ICD-10-CM: N25.81  ICD-9-CM: 588.81  5/31/2021 Yes        Vitamin D deficiency (Chronic) ICD-10-CM: E55.9  ICD-9-CM: 268.9  5/31/2021 Yes    Overview Signed 5/31/2021  1:17 PM by Harjit Jackson MD     Vitamin D 25-Hydroxy (5/31/2021) = 10.5              High serum magnesium ICD-10-CM: R79.89  ICD-9-CM: 790.99  5/28/2021 Yes        Hypertensive heart and kidney disease without heart failure and with stage 3b chronic kidney disease ([facility] [address] (Chronic) ICD-10-CM: I13.10, N18.32  ICD-9-CM: 404.90, 585.3  Unknown Yes        Mixed hyperlipidemia (Chronic) ICD-10-CM: E78.2  ICD-9-CM: 272.2  Unknown Yes        Constipation (Chronic) ICD-10-CM: K59.00  ICD-9-CM: 564.00  Unknown Yes        Current use of aspirin ICD-10-CM: Z79.82  ICD-9-CM: V58.66  5/23/2021 Yes        On clopidogrel therapy ICD-10-CM: Z79.01  ICD-9-CM: V58.61  5/23/2021 Yes        On statin therapy due to risk of future cardiovascular event ICD-10-CM: Z79.899  ICD-9-CM: V58.69  5/22/2021 Yes    Overview Signed 5/27/2021  2:27 PM by Harjit Jackson MD     On Atorvastatin             * (Principal) Acute lacunar stroke [facility]) ICD-10-CM: I63.81  ICD-9-CM: 434.91  5/21/2021 Yes    Overview Signed 5/27/2021  2:15 PM by Harjit Jackson MD     Acute Lacunar Stroke (acute lacunar infarct in the posterior left lentiform nucleus extending into the corona radiata) with residual right hemiparesis             Impaired mobility and ADLs ICD-10-CM: Z74.09, Z78.9  ICD-9-CM: V49.89  5/21/2021 Yes        Hemiparesis of right dominant side due to acute cerebrovascular disease [facility]) ICD-10-CM: I67.89, G81.91  ICD-9-CM: 436, 342.91  5/21/2021 Yes              Background:   Past Medical History:   Past Medical History:   Diagnosis Date    Abnormal mammogram 2014    left with biopsy    Acute lacunar stroke ([facility] [address] 5/21/2021    Acute Lacunar Stroke (acute lacunar infarct in the posterior left lentiform nucleus extending into the corona radiata) with residual right hemiparesis    Bacterial vaginosis 1/9/15    Dr Marilee Mayo Constipation     Current use of aspirin 5/23/2021    Gastroesophageal reflux disease 6/30/2016    Hemiparesis of right dominant side due to acute cerebrovascular disease ([facility] [address] 5/21/2021    History of Helicobacter pylori infection 7/24/2015    History of iron deficiency anemia 06/2014    History of vitamin D deficiency 6/11/2015    Hot flashes 1/9/15    Dr Marilee Mayo On clopidogrel therapy 5/23/2021    On statin therapy due to risk of future cardiovascular event 5/22/2021    On Atorvastatin    Rheumatoid arthritis ([facility] [address]     Secondary hyperparathyroidism of renal origin ([facility] [address] 5/31/2021    Stage 3b chronic kidney disease ([facility] [address] 7/24/2015    Tobacco use disorder     Vitamin D deficiency 5/31/2021    Vitamin D 25-Hydroxy (5/31/2021) = 10.5       Patient taking anticoagulants yes    Patient has a defibrillator: no     Assessment:   Changes in Assessment throughout shift: none    Patient has central line: no  I     Last Vitals:     Vitals:    06/02/21 2050 06/03/21 0803 06/03/21 1601 06/03/21 2026   BP: 133/84 129/80 138/80 (!) 148/94   Pulse: 75 73 87 84   Resp: 18 18 17 20   Temp: 97.6 °F (36.4 °C) 97.7 °F (36.5 °C) 97.1 °F (36.2 °C) 97.1 °F (36.2 °C)   SpO2: 100% 100% 100% 100%   Weight:       Height:       LMP: 10/15/2014        PAIN    Pain Assessment    Pain Intensity 1: 0 (06/04/21 0404) Pain Intensity 1: 2 (12/29/14 1105)    Pain Location 1: Generalized Pain Location 1: Abdomen    Pain Intervention(s) 1: Medication (see MAR) Pain Intervention(s) 1: Medication (see MAR)  Patient Stated Pain Goal: 0 Patient Stated Pain Goal: 0  o Intervention effective: no pain reported  o Other actions taken for pain: no pain reported     Skin Assessment  Skin color    Condition/Temperature    Integrity    Turgor    Weekly Pressure Ulcer Documentation  Pressure  Injury Documentation: No Pressure Injury Noted-Pressure Ulcer Prevention Initiated  Wound Prevention & Protection Methods  Orientation of wound Orientation of Wound Prevention: Posterior  Location of Prevention Location of Wound Prevention: Sacrum/Coccyx  Dressing Present Dressing Present : No  Dressing Status    Wound Offloading Wound Offloading (Prevention Methods): Bed, pressure redistribution/air, Bed, pressure reduction mattress     INTAKE/OUPUT  Date 06/03/21 0700 - 06/04/21 0659 06/04/21 0700 - 06/05/21 0659   Shift 4924-1270 1945-8625 24 Hour Total 2506-4400 6993-3792 24 Hour Total   INTAKE   P.O. 750  750        P. O. 750  750      Shift Total(mL/kg) 750(8.9)  750(8.9)      OUTPUT   Urine(mL/kg/hr)           Urine Occurrence(s) 0 x 2 x 2 x      Stool           Stool Occurrence(s) 0 x 0 x 0 x      Shift Total(mL/kg)           750      Weight (kg) 84.4 84.4 84.4 84.4 84.4 84.4       Recommendations:  1. Patient needs and requests: Assist with toileting    2. Diet: Cardiac & thin liquids    3. Pending tests/procedures: none  4. .     5. Functional Level/Equipment: W/C with min assist    6. Estimated Discharge Date: TBD Posted on Whiteboard in Patients Room: no       \[facility] [place] [facility]\"" Safety Check    A safety check occurred in the patient's room between off going nurse and oncoming nurse listed above.     The safety check included the below items  Area Items   H  High Alert Medications - Verify all high alert medication drips (heparin, PCA, etc.)   E  Equipment - Suction is set up for ALL patients (with blessing)  - Red plugs utilized for all equipment (IV pumps, etc.)  - WOWs wiped down at end of shift.  - Room stocked with oxygen, suction, and other unit-specific supplies   A  Alarms - Bed alarm is set for fall risk patients  - Ensure chair alarm is in place and activated if patient is up in a chair   L  Lines - Check IV for any infiltration  - Rollins bag is empty if patient has a Rollins   - Tubing and IV bags are labeled   S  Safety   - Room is clean, patient is clean, and equipment is clean. - Hallways are clear from equipment besides carts. - Fall bracelet on for fall risk patients  - Ensure room is clear and free of clutter  - Suction is set up for ALL patients (with yanker)  - Hallways are clear from equipment besides carts.    - Isolation precautions followed, supplies available outside room, sign posted

## 2021-06-04 NOTE — PROGRESS NOTES
Problem: Mobility Impaired (Adult and Pediatric)  Goal: *Therapy Goal (Edit Goal, Insert Text)  Description: Physical Therapy Short Term Goals  Initiated 5/28/2021, re-assessed 6/4/2021 and to be accomplished within 7 day(s) on 6/11/2021  1. Patient will move from supine to sit and sit to supine , scoot up and down, and roll side to side in bed with supervision/set-up. (MET 6/4/2021)  2. Patient will transfer from bed to chair and chair to bed with minimal assistance/contact guard assist using the least restrictive device. (MET 6/4/2021)  3. Patient will perform sit to stand with minimal assistance/contact guard assist.(MET 6/4/2021)  4. Patient will ambulate with minimal assistance/contact guard assist for 50 feet with the least restrictive device. (MET 6/4/2021)  5. Patient will ascend/descend 5 stairs with 1 handrail(s) with moderate assistance . (4 steps with BHR and min assist)    Physical Therapy Long Term Goals  Initiated 5/28/2021 and to be accomplished within 21 day(s) on 6/18/2021  1. Patient will move from supine to sit and sit to supine , scoot up and down, and roll side to side in bed with modified independence. 2.  Patient will transfer from bed to chair and chair to bed with modified independence using the least restrictive device. 3.  Patient will perform sit to stand with modified independence. 4.  Patient will ambulate with modified independence for 150 feet with the least restrictive device. 5.  Patient will ascend/descend 12 stairs with 1 handrail(s) with contact guard assist/standby assistance. Outcome: Progressing Towards Goal     Outcome: Progressing Towards Goal   PHYSICAL THERAPY WEEKLY PROGRESS NOTE    Patient: José Manuel Baca (49 y.o. female)  Date: 6/4/2021  Diagnosis: Acute lacunar stroke [facility]) [I63.81] Acute lacunar stroke [facility])  Precautions: Fall, Skin  Chart, physical therapy assessment, plan of care and goals were reviewed.       Time in:1031  Time out:1201    Patient seen for: Transfer training;Balance activities;Gait training (stair training)      Patient identified with name and : yes     SUBJECTIVE:     Pt reports \"I wish I could have just stayed in bed today, this weather is so dreary. \"    OBJECTIVE DATA SUMMARY:       GROSS ASSESSMENT Weekly Progress Assessment 2021   AROM Generally decreased, functional   Strength Generally decreased, functional   Coordination Generally decreased, functional   Tone Abnormal   Sensation Impaired   PROM Within functional limits       POSTURE Weekly Progress Assessment 2021   Posture (WDL) Exceptions to WDL   Posture Assessment Forward head;Rounded shoulders;Trunk flexion       BALANCE Weekly Progress Assessment 2021    Sitting - Static: Good (unsupported)  Sitting - Dynamic: Good (unsupported)  Standing - Static: Fair  Standing - Dynamic : Impaired     BED/CHAIR/WHEELCHAIR TRANSFERS Initial Assessment Weekly Progress Assessment 2021   Rolling Right 5 (Stand-by assistance) 5 (Stand-by assistance)   Rolling Left 5 (Stand-by assistance) 5 (Stand-by assistance)   Supine to Sit 5 (Stand-by assistance) (cue for not holding her breath) 5 (Stand-by assistance)   Sit to Stand Moderate assistance Minimal assistance   Sit to Supine  (SBA) 5 (Supervision)   Transfer Type Other Other   Transfer Assistance Needed 3 (Moderate assistance ) 4 (Contact guard assistance)   Comments  Performing bed mobility using mat table. Performing stand step transfers without AD as per PLOF, needing moderate assistance and blocking of right knee for sit to stand and stand step transfer, assist with weightshifting appropriately. Using RW, needing moderate support with assist at right hand to  RW appropriately (may benefit from handle splint)  Pt performed sit to stand from w/c with min assist for anterior wt shift to decrease use of momentum to achieve lift off.  Pt performed stand step txfr with RW and CGA for safety and balance. Car Transfer Moderate assistance (using RW) Not tested   Car Type car transfer simulator NA       WHEELCHAIR MOBILITY/MANAGEMENT Initial Assessment Weekly Progress Assessment 6/4/2021   Able to Propel (dist) 70 feet 72 feet   Assistance Required 2 supervision   Curbs/ramps assistance required  (mod A for steering, using rosalba-technique) 0 (Not tested)   Wheelchair set up assistance required 2 (Maximal assistance)  supervision   Wheelchair management Manages left brake, Manages right brake (extra time, cues) Manages left brake;Manages right brake   Comments Patient performing w/c mobility with mod A for steering. Performing with rosalba-technique. Pt propelled w/c with BLE and left UE     WALKING INDEPENDENCE Initial Assessment Weekly Progress Assessment 6/4/2021   Assistive device  (no AD as per PLOF; trialed RW for 38 ft mod A) Brace/Splint;Gait belt;Walker, rolling   Ambulation assistance - level surface 2 (Maximal assistance) (maximal trunk support, blocking right LE) 4 (Contact guard assistance)   Distance 1 Feet (ft) 80 Feet (ft)   Comments  Max A with gait without AD with patient reporting feeling \"dizzy\" with attempt. Vitals monitored (see flowsheet), but patient did not have significant orthostatic changes observed from sitting to standing. Patient also performing gait with RW and needing assistance with right hand  (would benefit from trial of handle splint to facilitate ), able to gait for 38 ft but very narrow VLADIMIR observed with increased path deviations requiring therapist to manage RW. Pt ambulated 80ft with RW with right handle splint and GTB in order to provide feedback to decrease right knee recurvatum in wt bearing, with CGA and mod v/c and t/c for erect posture and remaining within base of RW.     Ambulation assistance - unlevel surfaces  (NT due to safety concern)  NT       GAIT Weekly Progress Assessment 6/4/2021   Gait Description (WDL) Exceptions to WDL   Gait Abnormalities Decreased step clearance; Foot drop; Hemiplegic;Scissoring     STEPS/STAIRS Initial Assessment Weekly Progress Assessment 6/4/2021   Steps/Stairs ambulated 1 4 (6\" steps)   Assistance Required  2 (Maximal assistance) (lifting/lowering assistance required) 4 (Minimal assistance)   Rail Use Both (therapist assisting at right UE to ) Both   Comments  Patient performing one step with assistance for lifting and lowering right LE appropriately as well as to facilitate  right UE. Pt negotiated up and down 4 6\" steps with BHR and min assist for balance and v/c for proper LE sequencing. Curbs/Ramps  (NT due to safety concern)  NT       Neuro Re-Education:  Pt participated in group to play table top board game in standing to challenge static standing balance, tolerance and equal wt shift. Pt stood 2 trials, [id number] and [id number], without AD with CGA for balance and min/mod v/c for wt shift to right LE and extending right knee for wt bearing. Pt also required several v/c to prevent wt bearing on rolling table. Pt performed standing BLE tap ups on 6\" step x15 each to challenge right LE in SLS and with hip flexion to tap up. Pt with GTB on right LE to provide feedback to decrease right knee hyperextension with wt bearing to perform tap ups on left LE.      ASSESSMENT:  Pt has met 4/5 STGs and made progress with txfrs and gait using RW with right handle splint. Pt continues to require CGA and v/c for posture and improved right LE movement quality. Pt will benefit from continued skilled intervention to improve right LE strength and movement quality with gait and txfrs. Progression toward goals:  []      Improving appropriately and progressing toward goals  [x]      Improving slowly and progressing toward goals  []      Not making progress toward goals and plan of care will be adjusted     PLAN:  Patient continues to benefit from skilled intervention to address the above impairments.   Continue treatment per established plan of care. Discharge Recommendations:  Home Health  Further Equipment Recommendations for Discharge:  rolling walker with right handle splint     Estimated Discharge Date: 6/15/2021    Activity Tolerance:   Fair+  Please refer to the flowsheet for vital signs taken during this treatment.   After treatment:   [] Patient left in no apparent distress in bed  [x] Patient left in no apparent distress sitting up in chair, passed off to OT  [] Patient left in no apparent distress in w/c mobilizing under own power  [] Patient left in no apparent distress dining area  [] Patient left in no apparent distress mobilizing under own power  [] Call bell left within reach  [] Nursing notified  [] Caregiver present  [] Bed alarm activated   [] Chair alarm activated      Leo Taylor, EDER  6/4/2021

## 2021-06-04 NOTE — PROGRESS NOTES
[facility] PHYSICAL REHABILITATION  [address] [place] [address], [address]     INPATIENT REHABILITATION  DAILY PROGRESS NOTE     Date: 6/4/2021    Name: Emerita Lamar Age / Sex: 62 y.o. / female   CSN: [id number] MRN: [id number]   [facility] Date: 5/27/2021 Length of Stay: 8 days     Primary Rehab Diagnosis: Impaired Mobility and ADLs secondary to Acute Lacunar Stroke (acute lacunar infarct in the posterior left lentiform nucleus extending into the corona radiata) with residual right hemiparesis      Subjective:     Patient seen and examined. Blood pressure controlled. Patient's Complaint:   No significant medical complaints    Pain Control: no current joint or muscle symptoms, essentially pain-free      Objective:     Vital Signs:  Patient Vitals for the past 24 hrs:   BP Temp Pulse Resp SpO2   06/04/21 0756 132/85 97.1 °F (36.2 °C) 82 19 99 %   06/03/21 2026 (!) 148/94 97.1 °F (36.2 °C) 84 20 100 %   06/03/21 1601 138/80 97.1 °F (36.2 °C) 87 17 100 %        Physical Examination:  GENERAL SURVEY: Patient is awake, alert, oriented x 3, sitting comfortably on the chair, not in acute respiratory distress. HEENT: pink palpebral conjunctivae, anicteric sclerae, no nasoaural discharge, moist oral mucosa  NECK: supple, no jugular venous distention, no palpable lymph nodes  CHEST/LUNGS: symmetrical chest expansion, good air entry, clear breath sounds  HEART: adynamic precordium, good S1 S2, no S3, regular rhythm, no murmurs  ABDOMEN: flat, bowel sounds appreciated, soft, non-tender  EXTREMITIES: pink nailbeds, no edema, full and equal pulses, no calf tenderness   NEUROLOGICAL EXAM: The patient is awake, alert and oriented x3, able to answer questions fairly appropriately, able to follow 1 and 2 step commands. Able to tell time from the wall clock. Cranial nerves II-XII are grossly intact. No gross sensory deficit. Motor strength is 4 to 4+/5 on the RUE and RLE, 5/5 on the LUE and LLE.       Current Medications:  Current Facility-Administered Medications   Medication Dose Route Frequency    losartan (COZAAR) tablet 12.5 mg  12.5 mg Oral DAILY    polyethylene glycol (MIRALAX) packet 17 g  17 g Oral DAILY    senna-docusate (PERICOLACE) 8.6-50 mg per tablet 2 Tablet  2 Tablet Oral PCD    metoprolol tartrate (LOPRESSOR) tablet 25 mg  25 mg Oral Q12H    cholecalciferol (VITAMIN D3) capsule 5,000 Units  5,000 Units Oral DAILY    hydrALAZINE (APRESOLINE) tablet 10 mg  10 mg Oral TID PRN    nicotine (NICODERM CQ) 14 mg/24 hr patch 1 Patch  1 Patch TransDERmal DAILY    acetaminophen (TYLENOL) tablet 650 mg  650 mg Oral Q4H PRN    bisacodyL (DULCOLAX) tablet 10 mg  10 mg Oral Q48H PRN    heparin (porcine) injection 5,000 Units  5,000 Units SubCUTAneous Q8H    aspirin chewable tablet 81 mg  81 mg Oral DAILY WITH DINNER    atorvastatin (LIPITOR) tablet 80 mg  80 mg Oral DAILY    clopidogreL (PLAVIX) tablet 75 mg  75 mg Oral DAILY WITH BREAKFAST    co-enzyme Q-10 (CO Q-10) capsule 100 mg  100 mg Oral DAILY    melatonin tablet 3 mg  3 mg Oral QHS       Allergies:  No Known Allergies      Lab/Data Review:  Recent Results (from the past 24 hour(s))   RENAL FUNCTION PANEL    Collection Time: 06/04/21  5:59 AM   Result Value Ref Range    Sodium 138 136 - 145 mmol/L    Potassium 4.9 3.5 - 5.5 mmol/L    Chloride 108 100 - 111 mmol/L    CO2 24 21 - 32 mmol/L    Anion gap 6 3.0 - 18 mmol/L    Glucose 99 74 - 99 mg/dL    BUN 36 (H) 7.0 - 18 MG/DL    Creatinine 2.45 (H) 0.6 - 1.3 MG/DL    BUN/Creatinine ratio 15 12 - 20      GFR est AA 24 (L) >60 ml/min/1.73m2    GFR est non-AA 20 (L) >60 ml/min/1.73m2    Calcium 9.7 8.5 - 10.1 MG/DL    Phosphorus 4.3 2.5 - 4.9 MG/DL    Albumin 3.4 3.4 - 5.0 g/dL       Assessment:     Primary Rehab Diagnosis  1. Impaired Mobility and ADLs  2.  Acute Lacunar Stroke (acute lacunar infarct in the posterior left lentiform nucleus extending into the corona radiata) with residual right hemiparesis    Comorbidities  Patient Active Problem List   Diagnosis Code    Rheumatoid arthritis ([facility] M06.9    History of vitamin D deficiency Z86.39    Cocaine use F14.90    Stage 3b chronic kidney disease ([facility] N18.32    History of Helicobacter pylori infection Z86.19    Abscess of finger L02.519    Gastroesophageal reflux disease K21.9    Acute lacunar stroke (HCC) I63.81    Impaired mobility and ADLs Z74.09, Z78.9    Hemiparesis of right dominant side due to acute cerebrovascular disease (HCC) I67.89, G81.91    Tobacco use disorder F17.200    Current use of aspirin Z79.82    On clopidogrel therapy Z79.01    Hypertensive heart and kidney disease without heart failure and with stage 3b chronic kidney disease (HCC) I13.10, N18.32    Mixed hyperlipidemia E78.2    On statin therapy due to risk of future cardiovascular event Z79.899    Constipation K59.00    History of iron deficiency anemia Z86.2    High serum magnesium R79.89    Secondary hyperparathyroidism of renal origin ([facility] N25.81    Vitamin D deficiency E55.9        Plan:     1. Justification for continued stay: Good progression towards established rehabilitation goals. 2. Medical Issues being followed closely:    [x]  Fall and safety precautions     []  Wound Care     [x]  Bowel and Bladder Function     [x]  Fluid Electrolyte and Nutrition Balance     []  Pain Control      3. Issues that 24 hour rehabilitation nursing is following:    [x]  Fall and safety precautions     []  Wound Care     [x]  Bowel and Bladder Function     [x]  Fluid Electrolyte and Nutrition Balance     []  Pain Control      [x]  Assistance with and education on in-room safety with transfers to and from the bed, wheelchair, toilet and shower. 4. Acute rehabilitation plan of care:    [x]  Continue current care and rehab.            [x]  Physical Therapy           [x]  Occupational Therapy           [x]  Speech Therapy     []  Hold Rehab until further notice 5. Medications:    [x]  MAR Reviewed     [x]  Continue Present Medications     6. DVT Prophylaxis:      []  Enoxaparin     [x]  Unfractionated Heparin     []  Warfarin     []  NOAC     []  NORBERT Stockings     []  Sequential Compression Device     []  None     7. Code status    [x]  Full code     []  Partial code     []  Do not intubate     []  Do not resuscitate     8.  Orders:   > Acute Lacunar Stroke (acute lacunar infarct in the posterior left lentiform nucleus extending into the corona radiata) with residual right hemiparesis   > Dual antiplatelet therapy (DAPT) was started 5/22/2021; Neurology recommending to continue DAPT for 21 days (~6/12/2021), then discontinue Aspirin and continue on Clopidogrel 75 mg PO once daily    > On 5/27/2021, started Melatonin 3 mg PO q HS (for stroke recovery)   > Continue:    > Aspirin 81 PO once daily with dinner (STOP DATE: 6/12/2021)    > Atorvastatin 40 mg PO once daily    > Clopidogrel 75 mg PO once daily with breakfast      > Melatonin 3 mg PO q HS    > Constipation   > On 5/28/2021, discontinued (re: refused by patient last night and this AM):    > Miralax 17 grams in 8 oz water PO once daily    > PeriColace 2 tabs PO once daily with dinner   > On 6/2/2021, started:    > Pericolace 2 tabs PO once daily after dinner    > Polyethylene glycol 17 grams in 8 oz water PO once daily    > Continue:    > Pericolace 2 tabs PO once daily after dinner    > Polyethylene glycol 17 grams in 8 oz water PO once daily     > Hypertensive heart and kidney disease without heart failure with stage 3b-4 chronic kidney disease   > On 5/31/2021, started Metoprolol tartrate 25 mg PO q 12 hr (9AM, 9PM)   > On 6/2/2021:    > Discontinued Amlodipine 10 mg PO once daily (9AM)    > Started Losartan 12.5 mg PO once daily (9AM)   > Continue:    > Losartan 12.5 mg PO once daily (9AM)    > Metoprolol tartrate 25 mg PO q 12 hr (9AM, 9PM)    > Mixed hyperlipidemia   > On 5/28/2021, started Coenzyme Q10 100 mg PO once daily   > Continue:    > Atorvastatin 40 mg PO once daily    > Coenzyme Q10 100 mg PO once daily    > High serum magnesium   > Mg (5/28/2021, on admission to the ARU) = 3.2   > patient reported she was given 3 doses of Milk of magnesia at [facility] prior to discharge to the ARU   > Avoid magnesium-containing medications/supplements      05/31/21  0545 05/30/21  0706 05/28/21  0613   MG 2.6 2.9* 3.2*     > Stage 3b-4 chronic kidney disease   > BUN/Creatinine (5/28/2021, on admission to the ARU) = 36/2.47   > Retroperitoneal ultrasound (5/28/2021) showed:    > Increased parenchymal echogenicity in both kidneys consistent with medical renal disease. There is a prominent cortical cyst in the mid right kidney. No hydronephrosis or nephrolithiasis.       > Renal cortical thickness is somewhat less than 10 mm in both kidneys.   The left kidney is somewhat small with respect to the right kidney which is low normal in size.   > On 5/29/2021, started IVF: 0.9%  ml.hr x 1 liter once daily after dinner   > PTH (5/31/2021) = 276.1   > Vitamin D 25-Hydroxy (5/31/2021) = 10.1   > Urine microalbumin (5/31/2021) = 15.30   > Microalbumin/Creatinine ratio (5/31/2021) = 165   > Urinalysis (5/31/2021): SG 1.013, protein 30, no casts seen   > Nephrology consult (Dr. Manpreet Sosa) called on 5/31/2021 for evaluation and comanagement   > CK (5/31/2021) = 107   > Urine creatinine (5/31/2021) = 93.00   > Random urine protein (5/31/2021) = 31   > Random urine sodium (5/31/2021) = 52      06/02/21  0445 06/01/21  0430 05/31/21  0545 05/30/21  0706 05/28/21  0613   BUN 35* 37* 36* 37* 36*   CREA 2.24* 2.27* 2.15* 2.25* 2.47*      > On 6/2/2021:    > Discontinued IVF: 0.9%  ml/hr x 1 liter once daily after dinner    > Started Losartan 12.5 mg PO once daily (9AM)   > Will need to monitor serum potassium level as it was already on the higher side of normal prior to starting Losartan   > BUN/Creatinine (6/3/2021) = 34/2.24    > K (6/3/2021) = 4.9      06/04/21  0559 06/03/21  0516   K 4.9 4.9       06/04/21  0559 06/03/21  0516   BUN 36* 34*   CREA 2.45* 2.24*      > Continue Losartan 12.5 mg PO once daily (9AM)    > Tobacco use disorder   > On 5/28/2021, started Nicotine 14 mg/24 hr patch, 1 patch on skin once daily   > Continue Nicotine 14 mg/24 hr patch, 1 patch on skin once daily    > Vitamin D Deficiency   > Vitamin D 25-Hydroxy (5/31/2021) = 10.1   > On 5/31/2021, patient was given Cholecalciferol 50,000 units PO x 1 dose    > On 6/1/2021, started Cholecalciferol 5,000 units PO once daily   > Continue Cholecalciferol 5,000 units PO once daily    > Analgesia   > Continue Acetaminophen 650 mg PO q 4 hr PRN for pain     > Diet:   > Specifications: Cardiac   > Solids (consistency): Regular    > Liquids (consistency): Thin    > Fluid restriction: None      9. Personal Protective Equipment was used while interacting with patient including: goggles and KN95 face mask. Patient was using a surgical mask. 10. Patient's progress in rehabilitation and medical issues discussed with the patient. All questions answered to the best of my ability. Care plan discussed with patient and nurse. 11. Total clinical care time is 30 minutes, including review of chart including all labs, radiology, past medical history, and discussion with patient. Greater than 50% of my time was spent in coordination of care and counseling.       Signed:    Raya Starr MD    June 4, 2021

## 2021-06-04 NOTE — PROGRESS NOTES
Problem: Self Care Deficits Care Plan (Adult)  Goal: *Acute Goals and Plan of Care (Insert Text)  Description: Occupational Therapy Goals   Long Term Goals  Initiated 2021 and to be accomplished within 4 week(s) 2021    1. Patient will perform grooming with modified independence using adaptive equipment as needed. 2. Pt will perform UB bathing with Mod I.  3. Pt will perform LB bathing with Mod I.  4. Pt will perform tub/shower transfer with Mod I using DME. 5. Pt will perform UB dressing with Mod I.  6. Patient will perform upper body dressing and lower body dressing with modified independence. 7. Patient will perform all aspects of toileting with modified independence. 8. Patient will perform toilet transfers with modified independence using RW. Short Term Goals   Initiated 2021 and to be accomplished within 7 day(s) 6/3/2021    1. Pt will perform grooming with Supv using adaptive equipment as needed. - met goal, d/c  goal  2. Pt will perform UB bathing with Supv. - met goal, upgrade to Mod I  3. Pt will perform LB bathing with Supv. - progressing, 6/3/2021  4. Pt will perform tub/shower transfer with SBA using DME. - progressing, 6/3/2021  5. Pt will perform UB dressing with Supv. - met goal, upgrade to Mod I  6. Pt will perform LB dressing with Supv. - progressing, 6/3/2021  7. Pt will perform toileting task with SBA. - progressing, 6/3/2021  8. Pt will perform toilet transfer with SBA using RW. - progressing, 6/3/2021       Outcome: Progressing Towards Goal   Occupational Therapy TREATMENT    Patient: Howard Figueroa   62 y.o. Patient identified with name and : yes    Date: 2021    First Tx Session  Time In: 1200  Time Out[de-identified] 200    Second Tx Session  Time In: 1330  Time Out[de-identified] 1430    Diagnosis: Acute lacunar stroke [facility]) [I63.81]   Precautions: Fall, Skin  Chart, occupational therapy assessment, plan of care, and goals were reviewed.      Pain:  Pt reports 0/10 pain or discomfort prior to treatment. Pt reports 0/10 pain or discomfort post treatment. Intervention Provided: NA      SUBJECTIVE:   Patient stated I've been smoking since I was about 23years old.     OBJECTIVE DATA SUMMARY:     THERAPEUTIC ACTIVITY Daily Assessment    During first session, pt participated in reaching activity to challenge sitting balance ans to increase core strength. Pt played the game Sorry with another pt. Pt was required to reach forward, outside VLADIMIR, to retrieve/discard cards and to move game pieces around board. Pt incorporated right UE into task to facilitate increased use. During second session, provided information to pt on Oval-8 splints and where to find. 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 Splints could potentally facilitate increased functional use of B hands. Pt verbally demonstrating understanding of information provided. THERAPEUTIC EXERCISE Daily Assessment    During second session, pt participated in BUE strengthening exercises for carryover to functional tasks. Pt had 1# weight attached to right wrist and 2# weight attached to left wrist 2/2 decreased ability to grasp items with B hands. Pt performed shoulder press, chest press, bicep curls, forward rows and backward rows (2 sets x 10 reps each exercise). Pt required rest breaks between sets. Pt participated in cardio endurance exercise to facilitate increased activity tolerance. Pt used arm cycle for 10 minutes with no rest breaks and minimal resistance. Pt demonstrated difficulty with maintaining appropriate  in right hand. MOBILITY/TRANSFERS Daily Assessment     Pt self-propelled w/c room <> gym with Supervision. ASSESSMENT:  Pt actively participating in both treatment sessions this date and is eager to return to [facility]. Pt demonstrating increased activity tolerance and increased BUE strength.  Pt demonstrates decreased ability to perform gripping tasks with B hands 2/2 arthritis. Progression toward goals:  [x]          Improving appropriately and progressing toward goals  []          Improving slowly and progressing toward goals  []          Not making progress toward goals and plan of care will be adjusted     PLAN:  Patient continues to benefit from skilled intervention to address the above impairments. Continue treatment per established plan of care. Discharge Recommendations:  Home Health w/ 24/7 supv and assist   Further Equipment Recommendations for Discharge:  bedside commode and transfer bench     Activity Tolerance:  Fair      Estimated LOS:6/15/2021    Please refer to the flowsheet for vital signs taken during this treatment. After treatment:   [x]  Patient left in no apparent distress sitting up in chair returning to room after session  []  Patient left in no apparent distress in bed  []  Call bell left within reach  []  Nursing notified  []  Caregiver present  []  Bed alarm activated    COMMUNICATION/EDUCATION:   [] Home safety education was provided and the patient/caregiver indicated understanding. [] Patient/family have participated as able in goal setting and plan of care. [x] Patient/family agree to work toward stated goals and plan of care. [] Patient understands intent and goals of therapy, but is neutral about his/her participation. [] Patient is unable to participate in goal setting and plan of care.       OLIVIA Holbrook/SAVANNAH

## 2021-06-04 NOTE — PROGRESS NOTES
Problem: Neurolinguistics Impaired (Adult)  Goal: *Speech Goal: (INSERT TEXT)  Description: Long term goals  Patient will:  1. Be oriented x 3 and recall events of the day, supervision. 2. Recall 3 words after 5 minutes, supervision. 3. Name 10-12 items within categories of increasing complexity, supervision. 4. Perform varied word finding tasks with 90% accuracy. 5. Perform problem solving/reasoning tasks with 90% accuracy. 6. Perform basic mathematical calculations, 90% accuracy. Short term goals (by 6/4/21)  Patient will:  1. Be oriented x 3 and recall events of the day, supervision. 2. Recall 3 words after 5 minutes, min cues. 3. Name 10-12 items within concrete categories, supervision. 4. Perform varied word finding tasks with 75-85% accuracy. 5. Perform problem solving/reasoning tasks with 70-80% accuracy. 6. Perform basic mathematical calculations, 70-80% accuracy. Note:   Speech language pathology treatment    Patient: Anuja Jean (16 y.o. female)  Date: 6/4/2021  Diagnosis: Acute lacunar stroke ([facility] [I63.81] Acute lacunar stroke ([facility]  Time in: 1430  Time Out: 1500  SUBJECTIVE:   Patient stated It's hard to think right now. OBJECTIVE:   Mental Status:  Ms. Ruben Pal was alert, oriented, and conversant. Treatment & Interventions: The patient was seen for a 30 minute speech session. The following treatment tasks were presented:   Neuro-Linguistics:   Orientation:     Minimum support  Recent memory:    Supervision  Recall 3 words: Moderate support  Listing items that are cold:   5 items independently and 5 additional items given moderate support  Naming occupations from descriptions: 40% independently, 70% given moderate support  ID problems:     90%    Response & Tolerance to Activities:  Ms. Ruben Pal was cooperative and tolerated activities well.      Pain:  Pain Scale 1: Visual  After treatment:   [x]       Patient left in no apparent distress sitting up in chair  [] Patient left in no apparent distress in bed  [x]       Call bell left within reach  []       Nursing notified  []       Caregiver present  []       Bed alarm activated    ASSESSMENT:   Progression toward goals:  []       Improving appropriately and progressing toward goals  [x]       Improving slowly and progressing toward goals  []       Not making progress toward goals and plan of care will be adjusted    PLAN:   Patient continues to benefit from skilled intervention to address the above impairments. Continue treatment per established plan of care. Discharge Recommendations:   To Be Determined    Estimated LOS: Through 6/15/21    Christopher Chavez  Time Calculation: 30 mins

## 2021-06-04 NOTE — PROGRESS NOTES
Slight elevation in creatinine likely from starting ARB  Encourage intake   Recheck renal panel in AM  BP in good range     Please call with questions    Jay Sanon MD FASN  Cell [phone]  Pager: [phone]

## 2021-06-05 LAB
ALBUMIN SERPL-MCNC: 3.2 G/DL (ref 3.4–5)
ANION GAP SERPL CALC-SCNC: 7 MMOL/L (ref 3–18)
BUN SERPL-MCNC: 40 MG/DL (ref 7–18)
BUN/CREAT SERPL: 14 (ref 12–20)
CALCIUM SERPL-MCNC: 9.1 MG/DL (ref 8.5–10.1)
CHLORIDE SERPL-SCNC: 110 MMOL/L (ref 100–111)
CO2 SERPL-SCNC: 23 MMOL/L (ref 21–32)
CREAT SERPL-MCNC: 2.85 MG/DL (ref 0.6–1.3)
GLUCOSE SERPL-MCNC: 100 MG/DL (ref 74–99)
PHOSPHATE SERPL-MCNC: 4.6 MG/DL (ref 2.5–4.9)
POTASSIUM SERPL-SCNC: 4.8 MMOL/L (ref 3.5–5.5)
SODIUM SERPL-SCNC: 140 MMOL/L (ref 136–145)

## 2021-06-05 PROCEDURE — 74011250636 HC RX REV CODE- 250/636: Performed by: INTERNAL MEDICINE

## 2021-06-05 PROCEDURE — 36415 COLL VENOUS BLD VENIPUNCTURE: CPT

## 2021-06-05 PROCEDURE — 2709999900 HC NON-CHARGEABLE SUPPLY

## 2021-06-05 PROCEDURE — 74011250636 HC RX REV CODE- 250/636: Performed by: FAMILY MEDICINE

## 2021-06-05 PROCEDURE — 65310000000 HC RM PRIVATE REHAB

## 2021-06-05 PROCEDURE — 74011250637 HC RX REV CODE- 250/637: Performed by: INTERNAL MEDICINE

## 2021-06-05 PROCEDURE — 80069 RENAL FUNCTION PANEL: CPT

## 2021-06-05 RX ORDER — SODIUM CHLORIDE 9 MG/ML
75 INJECTION, SOLUTION INTRAVENOUS CONTINUOUS
Status: DISCONTINUED | OUTPATIENT
Start: 2021-06-05 | End: 2021-06-07

## 2021-06-05 RX ORDER — HYDRALAZINE HYDROCHLORIDE 25 MG/1
25 TABLET, FILM COATED ORAL
Status: DISCONTINUED | OUTPATIENT
Start: 2021-06-05 | End: 2021-06-14 | Stop reason: HOSPADM

## 2021-06-05 RX ORDER — LUBIPROSTONE 24 UG/1
24 CAPSULE, GELATIN COATED ORAL 2 TIMES DAILY WITH MEALS
Status: DISCONTINUED | OUTPATIENT
Start: 2021-06-06 | End: 2021-06-07

## 2021-06-05 RX ADMIN — ASPIRIN 81 MG: 81 TABLET, CHEWABLE ORAL at 17:10

## 2021-06-05 RX ADMIN — Medication 3 MG: at 21:42

## 2021-06-05 RX ADMIN — DOCUSATE SODIUM 50MG AND SENNOSIDES 8.6MG 2 TABLET: 8.6; 5 TABLET, FILM COATED ORAL at 17:10

## 2021-06-05 RX ADMIN — ATORVASTATIN CALCIUM 80 MG: 40 TABLET, FILM COATED ORAL at 08:37

## 2021-06-05 RX ADMIN — BISACODYL 10 MG: 5 TABLET, COATED ORAL at 17:11

## 2021-06-05 RX ADMIN — POLYETHYLENE GLYCOL 3350 17 G: 17 POWDER, FOR SOLUTION ORAL at 09:00

## 2021-06-05 RX ADMIN — Medication 100 MG: at 08:36

## 2021-06-05 RX ADMIN — HEPARIN SODIUM 5000 UNITS: 5000 INJECTION INTRAVENOUS; SUBCUTANEOUS at 13:52

## 2021-06-05 RX ADMIN — LOSARTAN POTASSIUM 12.5 MG: 25 TABLET, FILM COATED ORAL at 08:36

## 2021-06-05 RX ADMIN — CLOPIDOGREL BISULFATE 75 MG: 75 TABLET ORAL at 08:36

## 2021-06-05 RX ADMIN — SODIUM CHLORIDE 75 ML/HR: 900 INJECTION, SOLUTION INTRAVENOUS at 15:43

## 2021-06-05 RX ADMIN — METOPROLOL TARTRATE 25 MG: 25 TABLET, FILM COATED ORAL at 08:37

## 2021-06-05 RX ADMIN — HEPARIN SODIUM 5000 UNITS: 5000 INJECTION INTRAVENOUS; SUBCUTANEOUS at 05:32

## 2021-06-05 RX ADMIN — HEPARIN SODIUM 5000 UNITS: 5000 INJECTION INTRAVENOUS; SUBCUTANEOUS at 21:42

## 2021-06-05 RX ADMIN — METOPROLOL TARTRATE 25 MG: 25 TABLET, FILM COATED ORAL at 21:42

## 2021-06-05 RX ADMIN — Medication 5000 UNITS: at 08:36

## 2021-06-05 RX ADMIN — ACETAMINOPHEN 650 MG: 325 TABLET ORAL at 08:35

## 2021-06-05 RX ADMIN — ACETAMINOPHEN 650 MG: 325 TABLET ORAL at 22:09

## 2021-06-05 NOTE — PROGRESS NOTES
Reviewed labs and discussed with Dr. Caitlyn Charles , Possibly patient is dry but also maybe related with recent starting losartan. Will discontinue losartan, start normal Saline at 75 mL an hour while not in therapy and recheck renal panel in the morning. Patients blood pressure does go about 442 systolic we can use hydralazine PRN. Please call with questions,    Yashira Rizvi MD fasperico  Cell number: [phone].

## 2021-06-05 NOTE — ROUTINE PROCESS
[facility]    Bedside and Verbal shift change report given to Lorraine Kumari RN (oncoming nurse) by Racheal Boogie LPN   (offgoing nurse). Report included the following information SBAR, Kardex, MAR and Recent Results.     Situation:   Code Status: Full Code   Reason for Admission: CVA  Hospital Day: 8   Problem List:   Hospital Problems  Date Reviewed: 6/4/2021        Codes Class Noted POA    Secondary hyperparathyroidism of renal origin ([facility] (Chronic) ICD-10-CM: N25.81  ICD-9-CM: 588.81  5/31/2021 Yes        Vitamin D deficiency (Chronic) ICD-10-CM: E55.9  ICD-9-CM: 268.9  5/31/2021 Yes    Overview Signed 5/31/2021  1:17 PM by Alma Paige MD     Vitamin D 25-Hydroxy (5/31/2021) = 10.5              High serum magnesium ICD-10-CM: R79.89  ICD-9-CM: 790.99  5/28/2021 Yes        Hypertensive heart and kidney disease without heart failure and with stage 3b chronic kidney disease ([facility] (Chronic) ICD-10-CM: I13.10, N18.32  ICD-9-CM: 404.90, 585.3  Unknown Yes        Mixed hyperlipidemia (Chronic) ICD-10-CM: E78.2  ICD-9-CM: 272.2  Unknown Yes        Constipation (Chronic) ICD-10-CM: K59.00  ICD-9-CM: 564.00  Unknown Yes        Current use of aspirin ICD-10-CM: Z79.82  ICD-9-CM: V58.66  5/23/2021 Yes        On clopidogrel therapy ICD-10-CM: Z79.01  ICD-9-CM: V58.61  5/23/2021 Yes        On statin therapy due to risk of future cardiovascular event ICD-10-CM: Z79.899  ICD-9-CM: V58.69  5/22/2021 Yes    Overview Signed 5/27/2021  2:27 PM by Alma Paige MD     On Atorvastatin             * (Principal) Acute lacunar stroke [facility]) ICD-10-CM: I63.81  ICD-9-CM: 434.91  5/21/2021 Yes    Overview Signed 5/27/2021  2:15 PM by Alma Paige MD     Acute Lacunar Stroke (acute lacunar infarct in the posterior left lentiform nucleus extending into the corona radiata) with residual right hemiparesis             Impaired mobility and ADLs ICD-10-CM: Z74.09, Z78.9  ICD-9-CM: V49.89  5/21/2021 Yes Hemiparesis of right dominant side due to acute cerebrovascular disease [facility]) ICD-10-CM: I67.89, G81.91  ICD-9-CM: 436, 342.91  5/21/2021 Yes              Background:   Past Medical History:   Past Medical History:   Diagnosis Date    Abnormal mammogram 2014    left with biopsy    Acute lacunar stroke ([facility] 5/21/2021    Acute Lacunar Stroke (acute lacunar infarct in the posterior left lentiform nucleus extending into the corona radiata) with residual right hemiparesis    Bacterial vaginosis 1/9/15    Dr Marbella Gee Constipation     Current use of aspirin 5/23/2021    Gastroesophageal reflux disease 6/30/2016    Hemiparesis of right dominant side due to acute cerebrovascular disease ([facility] 5/21/2021    History of Helicobacter pylori infection 7/24/2015    History of iron deficiency anemia 06/2014    History of vitamin D deficiency 6/11/2015    Hot flashes 1/9/15    Dr Marbella Gee On clopidogrel therapy 5/23/2021    On statin therapy due to risk of future cardiovascular event 5/22/2021    On Atorvastatin    Rheumatoid arthritis ([facility]     Secondary hyperparathyroidism of renal origin ([facility] 5/31/2021    Stage 3b chronic kidney disease ([facility] 7/24/2015    Tobacco use disorder     Vitamin D deficiency 5/31/2021    Vitamin D 25-Hydroxy (5/31/2021) = 10.5       Patient taking anticoagulants yes    Patient has a defibrillator: no     Assessment:   Changes in Assessment throughout shift: none    Patient has central line: no  I     Last Vitals:     Vitals:    06/03/21 1601 06/03/21 2026 06/04/21 0756 06/04/21 1521   BP: 138/80 (!) 148/94 132/85 119/82   Pulse: 87 84 82 83   Resp: 17 20 19 17   Temp: 97.1 °F (36.2 °C) 97.1 °F (36.2 °C) 97.1 °F (36.2 °C) 97 °F (36.1 °C)   SpO2: 100% 100% 99% 98%   Weight:       Height:       LMP: 10/15/2014        PAIN    Pain Assessment    Pain Intensity 1: 0 (06/04/21 2000) Pain Intensity 1: 2 (12/29/14 1105)    Pain Location 1: Generalized Pain Location 1: Abdomen    Pain Intervention(s) 1: Medication (see MAR) Pain Intervention(s) 1: Medication (see MAR)  Patient Stated Pain Goal: 0 Patient Stated Pain Goal: 0  o Intervention effective: no pain reported  o Other actions taken for pain: no pain reported     Skin Assessment  Skin color    Condition/Temperature    Integrity    Turgor    Weekly Pressure Ulcer Documentation  Pressure  Injury Documentation: No Pressure Injury Noted-Pressure Ulcer Prevention Initiated  Wound Prevention & Protection Methods  Orientation of wound Orientation of Wound Prevention: Posterior  Location of Prevention Location of Wound Prevention: Sacrum/Coccyx  Dressing Present Dressing Present : No  Dressing Status    Wound Offloading Wound Offloading (Prevention Methods): Bed, pressure redistribution/air, Bed, pressure reduction mattress     INTAKE/OUPUT  Date 06/03/21 1900 - 06/04/21 0659 06/04/21 0700 - 06/05/21 0659   Shift 0114-9808 24 Hour Total 2062-6298 4658-1514 24 Hour Total   INTAKE   P.O.  750        P. O.  750      Shift Total(mL/kg)  750(8.9)      OUTPUT   Urine(mL/kg/hr)          Urine Occurrence(s) 2 x 2 x 0 x 1 x 1 x   Stool          Stool Occurrence(s) 0 x 0 x 0 x 0 x 0 x   Shift Total(mL/kg)        NET  750      Weight (kg) 84.4 84.4 84.4 84.4 84.4       Recommendations:  1. Patient needs and requests: Assist with toileting    2. Diet: Cardiac & thin liquids    3. Pending tests/procedures: none  4. .     5. Functional Level/Equipment: W/C with min assist    6. Estimated Discharge Date: TBD Posted on Whiteboard in Patients Room: [facility] Safety Check    A safety check occurred in the patient's room between off going nurse and oncoming nurse listed above.     The safety check included the below items  Area Items   H  High Alert Medications - Verify all high alert medication drips (heparin, PCA, etc.)   E  Equipment - Suction is set up for ALL patients (with yanker)  - Red plugs utilized for all equipment (IV pumps, etc.)  - WOWs wiped down at end of shift.  - Room stocked with oxygen, suction, and other unit-specific supplies   A  Alarms - Bed alarm is set for fall risk patients  - Ensure chair alarm is in place and activated if patient is up in a chair   L  Lines - Check IV for any infiltration  - Rollins bag is empty if patient has a Rollins   - Tubing and IV bags are labeled   S  Safety   - Room is clean, patient is clean, and equipment is clean. - Hallways are clear from equipment besides carts. - Fall bracelet on for fall risk patients  - Ensure room is clear and free of clutter  - Suction is set up for ALL patients (with yanker)  - Hallways are clear from equipment besides carts.    - Isolation precautions followed, supplies available outside room, sign posted

## 2021-06-05 NOTE — ROUTINE PROCESS
SHIFT CHANGE NOTE FOR [facility]    Bedside and Verbal shift change report given to Shiela Menendez LPN (oncoming nurse) by Mandy Castro (offgoing nurse). Report included the following information SBAR, Kardex, MAR and Recent Results.     Situation:   Code Status: Full Code   Hospital Day: 9   Problem List:   Hospital Problems  Date Reviewed: 6/4/2021        Codes Class Noted POA    Secondary hyperparathyroidism [facility] renal [facility] ([facility] (Chronic) ICD-10-CM: N25.81  ICD-9-CM: 588.81  5/31/2021 Yes        Vitamin D deficiency (Chronic) ICD-10-CM: E55.9  ICD-9-CM: 268.9  5/31/2021 Yes    Overview Signed 5/31/2021  1:17 PM by Mariaelena Garcia MD     Vitamin D 25-Hydroxy (5/31/2021) = 10.5              High serum magnesium ICD-10-CM: R79.89  ICD-9-CM: 790.99  5/28/2021 Yes        Hypertensive heart and kidney disease without heart failure and with stage 3b chronic kidney disease ([facility] (Chronic) ICD-10-CM: I13.10, N18.32  ICD-9-CM: 404.90, 585.3  Unknown Yes        Mixed hyperlipidemia (Chronic) ICD-10-CM: E78.2  ICD-9-CM: 272.2  Unknown Yes        Constipation (Chronic) ICD-10-CM: K59.00  ICD-9-CM: 564.00  Unknown Yes        Current use of aspirin ICD-10-CM: Z79.82  ICD-9-CM: V58.66  5/23/2021 Yes        On clopidogrel therapy ICD-10-CM: Z79.01  ICD-9-CM: V58.61  5/23/2021 Yes        On statin therapy due to risk of future cardiovascular event ICD-10-CM: Z79.899  ICD-9-CM: V58.69  5/22/2021 Yes    Overview Signed 5/27/2021  2:27 PM by Mariaelena Garcia MD     On Atorvastatin             * (Principal) Acute lacunar stroke [facility]) ICD-10-CM: I63.81  ICD-9-CM: 434.91  5/21/2021 Yes    Overview Signed 5/27/2021  2:15 PM by Mariaelena Garcia MD     Acute Lacunar Stroke (acute lacunar infarct in the posterior left lentiform nucleus extending into the corona radiata) with residual right hemiparesis             Impaired mobility and ADLs ICD-10-CM: Z74.09, Z78.9  ICD-9-CM: V49.89  5/21/2021 Yes        Hemiparesis of right dominant side due to acute cerebrovascular disease [facility]) ICD-10-CM: I67.89, G81.91  ICD-9-CM: 902, 342.91  5/21/2021 Yes              Background:   Past Medical History:   Past Medical History:   Diagnosis Date    Abnormal mammogram 2014    left with biopsy    Acute lacunar stroke ([facility] 5/21/2021    Acute Lacunar Stroke (acute lacunar infarct in the posterior left lentiform nucleus extending into the corona radiata) with residual right hemiparesis    Bacterial vaginosis 1/9/15    Dr Hollie Brody Constipation     Current use of aspirin 5/23/2021    Gastroesophageal reflux disease 6/30/2016    Hemiparesis of right dominant side due to acute cerebrovascular disease ([facility] 5/21/2021    History of Helicobacter pylori infection 7/24/2015    History of iron deficiency anemia 06/2014    History of vitamin D deficiency 6/11/2015    Hot flashes 1/9/15    Dr Hollie Brody On clopidogrel therapy 5/23/2021    On statin therapy due to risk of future cardiovascular event 5/22/2021    On Atorvastatin    Rheumatoid arthritis ([facility]     Secondary hyperparathyroidism of renal origin ([facility] 5/31/2021    Stage 3b chronic kidney disease ([facility] 7/24/2015    Tobacco use disorder     Vitamin D deficiency 5/31/2021    Vitamin D 25-Hydroxy (5/31/2021) = 10.5         Assessment:   Changes in Assessment throughout shift: No change to previous assessment    Patient has a central line: no   Patient has Rollins Cath: no      Last Vitals:     Vitals:    06/04/21 2100 06/05/21 0724 06/05/21 0833 06/05/21 1604   BP: (!) 140/89 120/81 130/79 123/86   Pulse: 76 68 78 76   Resp: 18 20  16   Temp: 97 °F (36.1 °C) 97.9 °F (36.6 °C)  97.7 °F (36.5 °C)   SpO2: 100% 99%  96%   Weight:       Height:       LMP: 10/15/2014        PAIN    Pain Assessment    Pain Intensity 1: 0 (06/05/21 1621) Pain Intensity 1: 2 (12/29/14 1105)    Pain Location 1: Leg, Hand Pain Location 1: Abdomen    Pain Intervention(s) 1: Medication (see MAR) Pain Intervention(s) 1: Medication (see MAR)  Patient Stated Pain Goal: 0 Patient Stated Pain Goal: 0  o Intervention effective: yes  o Other actions taken for pain: Medication (see MAR)     Skin Assessment  Skin color    Condition/Temperature    Integrity    Turgor    Weekly Pressure Ulcer Documentation  Pressure  Injury Documentation: No Pressure Injury Noted-Pressure Ulcer Prevention Initiated  Wound Prevention & Protection Methods  Orientation of wound Orientation of Wound Prevention: Posterior  Location of Prevention Location of Wound Prevention: Sacrum/Coccyx  Dressing Present Dressing Present : No  Dressing Status    Wound Offloading Wound Offloading (Prevention Methods): Bed, pressure redistribution/air     INTAKE/OUPUT  Date 06/04/21 0700 - 06/05/21 0659 06/05/21 0700 - 06/06/21 0659   Shift 5619-8161 4697-8289 24 Hour Total 8120-1105 2059-0091 24 Hour Total   INTAKE   Shift Total(mL/kg)         OUTPUT   Urine(mL/kg/hr)           Urine Occurrence(s) 0 x 2 x 2 x 3 x  3 x   Stool           Stool Occurrence(s) 0 x 0 x 0 x 0 x  0 x   Shift Total(mL/kg)         NET         Weight (kg) 84.4 84.4 84.4 84.4 84.4 84.4       Recommendations:  1. Patient needs and requests: Would like a new bed    2. Pending tests/procedures: None at this time     3. Functional Level/Equipment: Partial (one person) / Bed (comment)    Fall Precautions:   Fall risk precautions were reinforced with the patient; she was instructed to call for help prior to getting up. The following fall risk precautions were continued: bed/ chair alarms, door signage, yellow bracelet and socks as well as update of the Manzanares Juvencio tool in the patient's room. Leigh Ann Score: 4    HEALS Safety Check    A safety check occurred in the patient's room between off going nurse and oncoming nurse listed above.     The safety check included the below items  Area Items   H  High Alert Medications - Verify all high alert medication drips (heparin, PCA, etc.)   E  Equipment - Suction is set up for ALL patients (with blessing)  - Red plugs utilized for all equipment (IV pumps, etc.)  - WOWs wiped down at end of shift.  - Room stocked with oxygen, suction, and other unit-specific supplies   A  Alarms - Bed alarm is set for fall risk patients  - Ensure chair alarm is in place and activated if patient is up in a chair   L  Lines - Check IV for any infiltration  - Rollins bag is empty if patient has a Rollins   - Tubing and IV bags are labeled   S  Safety   - Room is clean, patient is clean, and equipment is clean. - Hallways are clear from equipment besides carts. - Fall bracelet on for fall risk patients  - Ensure room is clear and free of clutter  - Suction is set up for ALL patients (with blessing)  - Hallways are clear from equipment besides carts.    - Isolation precautions followed, supplies available outside room, sign posted     Mayo Santos

## 2021-06-05 NOTE — PROGRESS NOTES
Problem: Pressure Injury - Risk of  Goal: *Prevention of pressure injury  Description: Document John Scale and appropriate interventions in the flowsheet. Outcome: Progressing Towards Goal  Note: Pressure Injury Interventions:  Sensory Interventions: Assess changes in LOC, Pressure redistribution bed/mattress (bed type)    Moisture Interventions: Maintain skin hydration (lotion/cream), Absorbent underpads    Activity Interventions: Pressure redistribution bed/mattress(bed type)    Mobility Interventions: Pressure redistribution bed/mattress (bed type), HOB 30 degrees or less, Float heels    Nutrition Interventions: Document food/fluid/supplement intake    Friction and Shear Interventions: HOB 30 degrees or less                Problem: Patient Education: Go to Patient Education Activity  Goal: Patient/Family Education  Outcome: Progressing Towards Goal     Problem: Falls - Risk of  Goal: *Absence of Falls  Description: Document Leigh Ann Fall Risk and appropriate interventions in the flowsheet.   Outcome: Progressing Towards Goal  Note: Fall Risk Interventions:  Mobility Interventions: Bed/chair exit alarm, Patient to call before getting OOB    Mentation Interventions: Adequate sleep, hydration, pain control, Bed/chair exit alarm    Medication Interventions: Bed/chair exit alarm, Patient to call before getting OOB    Elimination Interventions: Bed/chair exit alarm, Call light in reach, Patient to call for help with toileting needs    History of Falls Interventions: Bed/chair exit alarm         Problem: Patient Education: Go to Patient Education Activity  Goal: Patient/Family Education  Outcome: Progressing Towards Goal     Problem: Patient Education: Go to Patient Education Activity  Goal: Patient/Family Education  Outcome: Progressing Towards Goal     Problem: Patient Education: Go to Patient Education Activity  Goal: Patient/Family Education  Outcome: Progressing Towards Goal     Problem: Patient Education: Go to Patient Education Activity  Goal: Patient/Family Education  Outcome: Progressing Towards Goal

## 2021-06-05 NOTE — REHAB NOTE
[facility]    Bedside and Verbal shift change report given to CLAY Morrow (oncoming nurse) by Aleyda Ferrell (offgoing nurse). Report included the following information SBAR, Kardex, MAR and Recent Results.     Situation:   Code Status: Full Code   Reason for Admission: CVA  Hospital Day: 9   Problem List:   Hospital Problems  Date Reviewed: 6/4/2021        Codes Class Noted POA    Secondary hyperparathyroidism of renal origin ([facility] (Chronic) ICD-10-CM: N25.81  ICD-9-CM: 588.81  5/31/2021 Yes        Vitamin D deficiency (Chronic) ICD-10-CM: E55.9  ICD-9-CM: 268.9  5/31/2021 Yes    Overview Signed 5/31/2021  1:17 PM by Kirk Obregon MD     Vitamin D 25-Hydroxy (5/31/2021) = 10.5              High serum magnesium ICD-10-CM: R79.89  ICD-9-CM: 790.99  5/28/2021 Yes        Hypertensive heart and kidney disease without heart failure and with stage 3b chronic kidney disease ([facility] (Chronic) ICD-10-CM: I13.10, N18.32  ICD-9-CM: 404.90, 585.3  Unknown Yes        Mixed hyperlipidemia (Chronic) ICD-10-CM: E78.2  ICD-9-CM: 272.2  Unknown Yes        Constipation (Chronic) ICD-10-CM: K59.00  ICD-9-CM: 564.00  Unknown Yes        Current use of aspirin ICD-10-CM: Z79.82  ICD-9-CM: V58.66  5/23/2021 Yes        On clopidogrel therapy ICD-10-CM: Z79.01  ICD-9-CM: V58.61  5/23/2021 Yes        On statin therapy due to risk of future cardiovascular event ICD-10-CM: Z79.899  ICD-9-CM: V58.69  5/22/2021 Yes    Overview Signed 5/27/2021  2:27 PM by Kirk Obregon MD     On Atorvastatin             * (Principal) Acute lacunar stroke [facility]) ICD-10-CM: I63.81  ICD-9-CM: 434.91  5/21/2021 Yes    Overview Signed 5/27/2021  2:15 PM by Kirk Obregon MD     Acute Lacunar Stroke (acute lacunar infarct in the posterior left lentiform nucleus extending into the corona radiata) with residual right hemiparesis             Impaired mobility and ADLs ICD-10-CM: Z74.09, Z78.9  ICD-9-CM: V49.89  5/21/2021 Yes        Hemiparesis of right dominant side due to acute cerebrovascular disease [facility]) ICD-10-CM: I67.89, G81.91  ICD-9-CM: 436, 342.91  5/21/2021 Yes              Background:   Past Medical History:   Past Medical History:   Diagnosis Date    Abnormal mammogram 2014    left with biopsy    Acute lacunar stroke ([facility] 5/21/2021    Acute Lacunar Stroke (acute lacunar infarct in the posterior left lentiform nucleus extending into the corona radiata) with residual right hemiparesis    Bacterial vaginosis 1/9/15    Dr Sravan Wong Constipation     Current use of aspirin 5/23/2021    Gastroesophageal reflux disease 6/30/2016    Hemiparesis of right dominant side due to acute cerebrovascular disease ([facility] 5/21/2021    History of Helicobacter pylori infection 7/24/2015    History of iron deficiency anemia 06/2014    History of vitamin D deficiency 6/11/2015    Hot flashes 1/9/15    Dr Sravan Wong On clopidogrel therapy 5/23/2021    On statin therapy due to risk of future cardiovascular event 5/22/2021    On Atorvastatin    Rheumatoid arthritis ([facility]     Secondary hyperparathyroidism of renal origin ([facility] 5/31/2021    Stage 3b chronic kidney disease ([facility] 7/24/2015    Tobacco use disorder     Vitamin D deficiency 5/31/2021    Vitamin D 25-Hydroxy (5/31/2021) = 10.5       Patient taking anticoagulants -Heparin   Patient has a defibrillator: no    Assessment:   Changes in Assessment throughout shift: none     Patient has central line: no Reason na  Insertion date: na Last dressing date:na   Patient has Rollins Cath: no Reasons if yes: na   Insertion date:na     Last Vitals:     Vitals:    06/03/21 2026 06/04/21 0756 06/04/21 1521 06/04/21 2100   BP: (!) 148/94 132/85 119/82 (!) 140/89   Pulse: 84 82 83 76   Resp: 20 19 17 18   Temp: 97.1 °F (36.2 °C) 97.1 °F (36.2 °C) 97 °F (36.1 °C) 97 °F (36.1 °C)   SpO2: 100% 99% 98% 100%   Weight:       Height:       LMP: 10/15/2014        PAIN    Pain Assessment    Pain Intensity 1: 0 (06/05/21 0000) Pain Intensity 1: 2 (12/29/14 1105)    Pain Location 1: Arm Pain Location 1: Abdomen    Pain Intervention(s) 1: Medication (see MAR) Pain Intervention(s) 1: Medication (see MAR)  Patient Stated Pain Goal: 0 Patient Stated Pain Goal: 0  o Intervention effective: na  o Other actions taken for pain: na     Skin Assessment  Skin color    Condition/Temperature    Integrity    Turgor    Weekly Pressure Ulcer Documentation  Pressure  Injury Documentation: No Pressure Injury Noted-Pressure Ulcer Prevention Initiated  Wound Prevention & Protection Methods  Orientation of wound Orientation of Wound Prevention: Posterior  Location of Prevention Location of Wound Prevention: Buttocks, Sacrum/Coccyx  Dressing Present Dressing Present : No  Dressing Status    Wound Offloading Wound Offloading (Prevention Methods): Bed, pressure redistribution/air     INTAKE/OUPUT  Date 06/04/21 0700 - 06/05/21 0659 06/05/21 0700 - 06/06/21 0659   Shift 8312-0095 7681-6008 24 Hour Total 7465-0057 8130-7797 24 Hour Total   INTAKE   Shift Total(mL/kg)         OUTPUT   Urine(mL/kg/hr)           Urine Occurrence(s) 0 x 2 x 2 x      Stool           Stool Occurrence(s) 0 x 0 x 0 x      Shift Total(mL/kg)         NET         Weight (kg) 84.4 84.4 84.4 84.4 84.4 84.4       Recommendations:  1. Patient needs and requests: assist with toileting    2. Diet: Cardiac    3. Pending tests/procedures: renal panel    4. Functional Level/Equipment: assist x 1/ wheelchair    5. Estimated Discharge Date: 6/15/21 Posted on Whiteboard in [address] [place] [address] Room: yes    HEALS Safety Check    A safety check occurred in the patient's room between off going nurse and oncoming nurse listed above.     The safety check included the below items  Area Items   H  High Alert Medications - Verify all high alert medication drips (heparin, PCA, etc.)   E  Equipment - Suction is set up for ALL patients (with joaquinker)  - Red plugs utilized for all equipment (IV pumps, etc.)  - WOWs wiped down at end of shift.  - Room stocked with oxygen, suction, and other unit-specific supplies   A  Alarms - Bed alarm is set for fall risk patients  - Ensure chair alarm is in place and activated if patient is up in a chair   L  Lines - Check IV for any infiltration  - Rollins bag is empty if patient has a Rollins   - Tubing and IV bags are labeled   S  Safety   - Room is clean, patient is clean, and equipment is clean. - Hallways are clear from equipment besides carts. - Fall bracelet on for fall risk patients  - Ensure room is clear and free of clutter  - Suction is set up for ALL patients (with yanker)  - Hallways are clear from equipment besides carts.    - Isolation precautions followed, supplies available outside room, sign posted

## 2021-06-06 LAB
ALBUMIN SERPL-MCNC: 3 G/DL (ref 3.4–5)
ANION GAP SERPL CALC-SCNC: 4 MMOL/L (ref 3–18)
BUN SERPL-MCNC: 40 MG/DL (ref 7–18)
BUN/CREAT SERPL: 17 (ref 12–20)
CALCIUM SERPL-MCNC: 9.7 MG/DL (ref 8.5–10.1)
CHLORIDE SERPL-SCNC: 111 MMOL/L (ref 100–111)
CO2 SERPL-SCNC: 23 MMOL/L (ref 21–32)
CREAT SERPL-MCNC: 2.41 MG/DL (ref 0.6–1.3)
GLUCOSE SERPL-MCNC: 104 MG/DL (ref 74–99)
PHOSPHATE SERPL-MCNC: 3.9 MG/DL (ref 2.5–4.9)
POTASSIUM SERPL-SCNC: 4.7 MMOL/L (ref 3.5–5.5)
SODIUM SERPL-SCNC: 138 MMOL/L (ref 136–145)

## 2021-06-06 PROCEDURE — 36415 COLL VENOUS BLD VENIPUNCTURE: CPT

## 2021-06-06 PROCEDURE — 97110 THERAPEUTIC EXERCISES: CPT

## 2021-06-06 PROCEDURE — 74011250636 HC RX REV CODE- 250/636: Performed by: FAMILY MEDICINE

## 2021-06-06 PROCEDURE — 74011250637 HC RX REV CODE- 250/637: Performed by: FAMILY MEDICINE

## 2021-06-06 PROCEDURE — 74011250636 HC RX REV CODE- 250/636: Performed by: INTERNAL MEDICINE

## 2021-06-06 PROCEDURE — 74011250637 HC RX REV CODE- 250/637: Performed by: INTERNAL MEDICINE

## 2021-06-06 PROCEDURE — 65310000000 HC RM PRIVATE REHAB

## 2021-06-06 PROCEDURE — 97530 THERAPEUTIC ACTIVITIES: CPT

## 2021-06-06 PROCEDURE — 80069 RENAL FUNCTION PANEL: CPT

## 2021-06-06 PROCEDURE — 97535 SELF CARE MNGMENT TRAINING: CPT

## 2021-06-06 PROCEDURE — 2709999900 HC NON-CHARGEABLE SUPPLY

## 2021-06-06 RX ADMIN — Medication 100 MG: at 08:02

## 2021-06-06 RX ADMIN — ATORVASTATIN CALCIUM 80 MG: 40 TABLET, FILM COATED ORAL at 08:02

## 2021-06-06 RX ADMIN — ACETAMINOPHEN 650 MG: 325 TABLET ORAL at 21:44

## 2021-06-06 RX ADMIN — DOCUSATE SODIUM 50MG AND SENNOSIDES 8.6MG 2 TABLET: 8.6; 5 TABLET, FILM COATED ORAL at 17:23

## 2021-06-06 RX ADMIN — ACETAMINOPHEN 650 MG: 325 TABLET ORAL at 13:27

## 2021-06-06 RX ADMIN — Medication 5000 UNITS: at 08:02

## 2021-06-06 RX ADMIN — HEPARIN SODIUM 5000 UNITS: 5000 INJECTION INTRAVENOUS; SUBCUTANEOUS at 21:01

## 2021-06-06 RX ADMIN — ASPIRIN 81 MG: 81 TABLET, CHEWABLE ORAL at 17:23

## 2021-06-06 RX ADMIN — Medication 3 MG: at 21:01

## 2021-06-06 RX ADMIN — LUBIPROSTONE 24 MCG: 24 CAPSULE, GELATIN COATED ORAL at 17:23

## 2021-06-06 RX ADMIN — POLYETHYLENE GLYCOL 3350 17 G: 17 POWDER, FOR SOLUTION ORAL at 08:02

## 2021-06-06 RX ADMIN — HEPARIN SODIUM 5000 UNITS: 5000 INJECTION INTRAVENOUS; SUBCUTANEOUS at 13:27

## 2021-06-06 RX ADMIN — METOPROLOL TARTRATE 25 MG: 25 TABLET, FILM COATED ORAL at 21:01

## 2021-06-06 RX ADMIN — LUBIPROSTONE 24 MCG: 24 CAPSULE, GELATIN COATED ORAL at 08:02

## 2021-06-06 RX ADMIN — METOPROLOL TARTRATE 25 MG: 25 TABLET, FILM COATED ORAL at 08:02

## 2021-06-06 RX ADMIN — HEPARIN SODIUM 5000 UNITS: 5000 INJECTION INTRAVENOUS; SUBCUTANEOUS at 04:59

## 2021-06-06 RX ADMIN — SODIUM CHLORIDE 75 ML/HR: 900 INJECTION, SOLUTION INTRAVENOUS at 16:30

## 2021-06-06 RX ADMIN — CLOPIDOGREL BISULFATE 75 MG: 75 TABLET ORAL at 08:02

## 2021-06-06 NOTE — PROGRESS NOTES
Progress Note    Patient: Ras Cortez MRN: [id number]  CSN: [id number]    YOB: 1960  Age: 62 y.o. Sex: female    DOA: 5/27/2021 LOS:  LOS: 10 days                    Subjective:     Primary Rehab Diagnosis: Impaired Mobility and ADLs secondary to Acute Lacunar Stroke (acute lacunar infarct in the posterior left lentiform nucleus extending into the corona radiata) with residual right hemiparesis    No acute patient or nursing concerns. Patient reports had bm this am and is feeling well afterwards. Thinks she was not drinking much fluids due to constipation and was having urinary frequency resolved as well. Review of systems  General: No fevers or chills. Cardiovascular: No chest pain or pressure. No palpitations. Pulmonary: No shortness of breath, cough or wheeze. Gastrointestinal: No abdominal pain, nausea, vomiting or diarrhea.  +constipation, reports last BM was 6 days ago  Genitourinary: No urinary frequency, urgency, hesitancy or dysuria. Musculoskeletal: No joint or muscle pain, no back pain, no recent trauma. Neurologic:  right hemiparesis    Objective:     Physical Exam:  Visit Vitals  /77 (BP 1 Location: Left upper arm, BP Patient Position: Supine)   Pulse 98   Temp 97.1 °F (36.2 °C)   Resp 20   Ht 5' 7\" (1.702 m)   Wt 84.4 kg (186 lb)   SpO2 98%   Breastfeeding No   BMI 29.13 kg/m²        General:         Alert, cooperative, no acute distress    HEENT: NC, Atraumatic. PERRLA, anicteric sclerae. Lungs: CTA Bilaterally. No Wheezing/Rhonchi/Rales. Heart:  Regular  rhythm,  No murmur, No Rubs, No Gallops  Abdomen: Soft, Non distended, Non tender.  +Bowel sounds, no HSM  Extremities: No edema  Psych:   Not anxious or agitated.   Neurologic:  The patient is awake, alert and oriented x3, able to answer questions fairly appropriately, able to follow 1 and 2 step commands.   Cranial nerves II-XII are grossly intact.  No gross sensory deficit.  Motor strength is 4 to 4+/5 on the RUE and RLE, 5/5 on the LUE and LLE. Intake and Output:  Current Shift:  No intake/output data recorded. Last three shifts:  No intake/output data recorded. Labs: Results:       Chemistry Recent Labs     06/06/21  0616 06/05/21  0609 06/04/21  0559   * 100* 99    140 138   K 4.7 4.8 4.9    110 108   CO2 23 23 24   BUN 40* 40* 36*   CREA 2.41* 2.85* 2.45*   CA 9.7 9.1 9.7   AGAP 4 7 6   BUCR 17 14 15   ALB 3.0* 3.2* 3.4      CBC w/Diff No results for input(s): WBC, RBC, HGB, HCT, PLT, GRANS, LYMPH, EOS, HGBEXT, HCTEXT, PLTEXT, HGBEXT, HCTEXT, PLTEXT in the last 72 hours. Cardiac Enzymes No results for input(s): CPK, CKND1, GABBY in the last 72 hours. No lab exists for component: CKRMB, TROIP   Coagulation No results for input(s): PTP, INR, APTT, INREXT, INREXT in the last 72 hours. Lipid Panel Lab Results   Component Value Date/Time    Cholesterol, total 188 06/25/2016 12:00 AM    HDL Cholesterol 30 (L) 06/25/2016 12:00 AM    LDL, calculated 106 (H) 06/25/2016 12:00 AM    VLDL, calculated 52 (H) 06/25/2016 12:00 AM    Triglyceride 260 (H) 06/25/2016 12:00 AM    CHOL/HDL Ratio 5.6 (H) 06/11/2015 09:01 AM      BNP No results for input(s): BNPP in the last 72 hours.    Liver Enzymes Recent Labs     06/06/21  0616   ALB 3.0*      Thyroid Studies Lab Results   Component Value Date/Time    TSH 0.942 06/25/2016 12:00 AM          Procedures/imaging: see electronic medical records for all procedures/Xrays and details which were not copied into this note but were reviewed prior to creation of Plan    Medications:   Current Facility-Administered Medications   Medication Dose Route Frequency    0.9% sodium chloride infusion  75 mL/hr IntraVENous CONTINUOUS    hydrALAZINE (APRESOLINE) tablet 25 mg  25 mg Oral TID PRN    lubiPROStone (AMITIZA) capsule 24 mcg  24 mcg Oral BID WITH MEALS    polyethylene glycol (MIRALAX) packet 17 g  17 g Oral DAILY    senna-docusate (PERICOLACE) 8.6-50 mg per tablet 2 Tablet  2 Tablet Oral PCD    metoprolol tartrate (LOPRESSOR) tablet 25 mg  25 mg Oral Q12H    cholecalciferol (VITAMIN D3) capsule 5,000 Units  5,000 Units Oral DAILY    nicotine (NICODERM CQ) 14 mg/24 hr patch 1 Patch  1 Patch TransDERmal DAILY    acetaminophen (TYLENOL) tablet 650 mg  650 mg Oral Q4H PRN    bisacodyL (DULCOLAX) tablet 10 mg  10 mg Oral Q48H PRN    heparin (porcine) injection 5,000 Units  5,000 Units SubCUTAneous Q8H    aspirin chewable tablet 81 mg  81 mg Oral DAILY WITH DINNER    atorvastatin (LIPITOR) tablet 80 mg  80 mg Oral DAILY    clopidogreL (PLAVIX) tablet 75 mg  75 mg Oral DAILY WITH BREAKFAST    co-enzyme Q-10 (CO Q-10) capsule 100 mg  100 mg Oral DAILY    melatonin tablet 3 mg  3 mg Oral QHS       Assessment/Plan     Principal Problem:    Acute lacunar stroke (HCC) (5/21/2021)      Overview: Acute Lacunar Stroke (acute lacunar infarct in the posterior left       lentiform nucleus extending into the corona radiata) with residual right       hemiparesis    Active Problems:    Impaired mobility and ADLs (5/21/2021)      Hemiparesis of right dominant side due to acute cerebrovascular disease (HCC) (5/21/2021)      Current use of aspirin (5/23/2021)      On clopidogrel therapy (5/23/2021)      Hypertensive heart and kidney disease without heart failure and with stage 3b chronic kidney disease (HCC) ()      Mixed hyperlipidemia ()      On statin therapy due to risk of future cardiovascular event (5/22/2021)      Overview:  On Atorvastatin      Constipation ()      High serum magnesium (5/28/2021)      Secondary hyperparathyroidism of renal origin ([facility] [address] (5/31/2021)      Vitamin D deficiency (5/31/2021)      Overview: Vitamin D 25-Hydroxy (5/31/2021) = 10.5       Acute Lacunar Stroke (acute lacunar infarct in the posterior left lentiform nucleus extending into the corona radiata) with residual right hemiparesis  Continue:                            > Aspirin 81 PO once daily with dinner (STOP DATE: 6/12/2021)                          > Atorvastatin 40 mg PO once daily                          > Clopidogrel 75 mg PO once daily with breakfast                            > Melatonin 3 mg PO q HS     Constipation  Continue miralax, pericolace.   Continue amitiza  Monitor        Hypertensive heart and kidney disease without heart failure with stage 3b-4 chronic kidney disease  BP controlled, conitnue Metoprolol tartrate 25 mg PO q 12 hr and hydralazine prn     Mixed hyperlipidemia   Continue Atorvastatin 40 mg PO once daily and Coenzyme Q10 100 mg PO once daily     High serum magnesium  Resolved  Avoid magnesium-containing medications/supplements       Stage 3b-4 chronic kidney disease  Nephrology consult (Dr. Gaurav Sorensen) called on 5/31/2021 for evaluation and comanagement  Improved, reviewed nephrology recs from today, Dr. Veronica Bruce recommending continue IVF @75ml/hr, will re-order, encourage PO hydration  Recheck renal function in am       Tobacco use disorder  Continue Nicotine 14 mg/24 hr patch, 1 patch on skin once daily     Vitamin D Deficiency  Continue Cholecalciferol 5,000 units PO once daily    Miguel Ángel Peñaloza MD  6/6/2021 1611

## 2021-06-06 NOTE — PROGRESS NOTES
Problem: Self Care Deficits Care Plan (Adult)  Goal: *Acute Goals and Plan of Care (Insert Text)  Description: Occupational Therapy Goals   Long Term Goals  Initiated 2021 and to be accomplished within 4 week(s) 2021    1. Patient will perform grooming with modified independence using adaptive equipment as needed. 2. Pt will perform UB bathing with Mod I.  3. Pt will perform LB bathing with Mod I.  4. Pt will perform tub/shower transfer with Mod I using DME. 5. Pt will perform UB dressing with Mod I.  6. Patient will perform upper body dressing and lower body dressing with modified independence. 7. Patient will perform all aspects of toileting with modified independence. 8. Patient will perform toilet transfers with modified independence using RW. Short Term Goals   Initiated 2021 and to be accomplished within 7 day(s) 6/3/2021    1. Pt will perform grooming with Supv using adaptive equipment as needed. - met goal, d/c  goal  2. Pt will perform UB bathing with Supv. - met goal, upgrade to Mod I  3. Pt will perform LB bathing with Supv. - progressing, 6/3/2021  4. Pt will perform tub/shower transfer with SBA using DME. - progressing, 6/3/2021  5. Pt will perform UB dressing with Supv. - met goal, upgrade to Mod I  6. Pt will perform LB dressing with Supv. - progressing, 6/3/2021  7. Pt will perform toileting task with SBA. - progressing, 6/3/2021  8. Pt will perform toilet transfer with SBA using RW. - progressing, 6/3/2021  Occupational Therapy TREATMENT    Patient: Cori Moore   62 y.o. Patient identified with name and : Yes    Date: 2021    First Tx Session  Time In: 36  Time Out[de-identified] 1230    Diagnosis: Acute lacunar stroke [facility]) [I63.81]   Precautions: Fall, Skin  Chart, occupational therapy assessment, plan of care, and goals were reviewed. Pain:  Pt reports 0/10 pain or discomfort prior to treatment. Pt reports 0/10 pain or discomfort post treatment. Intervention Provided: N/A      SUBJECTIVE:   Patient stated I have Rheumatoid Arthritis .     OBJECTIVE DATA SUMMARY:       THERAPEUTIC EXERCISE Daily Assessment    Sci Fit up to 10 minutes at 1.5 level to increase strength for ADLs. TheraFlex(yellow) 3x10 wrist extension/flexion      LOWER BODY DRESSING Daily Assessment     Pt donned socks with SBA and sneakers with Tomas     MOBILITY/TRANSFERS Daily Assessment     Sit to stand with hands on lap with Tomas. Stand and turn with RW requiring CGA. ASSESSMENT:  Pt increasing strength and balance for self care and safe transfers. Progression toward goals:  [x]          Improving appropriately and progressing toward goals  []          Improving slowly and progressing toward goals  []          Not making progress toward goals and plan of care will be adjusted     PLAN:  Patient continues to benefit from skilled intervention to address the above impairments. Continue treatment per established plan of care. Discharge Recommendations:  Home Health with asst   Further Equipment Recommendations for Discharge: TBD     Activity Tolerance:  Fair      Estimated LOS:    Please refer to the flowsheet for vital signs taken during this treatment. After treatment:   [x]  Patient left in no apparent distress sitting up in chair   []  Patient left in no apparent distress in bed  []  Call bell left within reach  []  Nursing notified  []  Caregiver present  []  Bed alarm activated    COMMUNICATION/EDUCATION:   [] Home safety education was provided and the patient/caregiver indicated understanding. [x] Patient/family have participated as able in goal setting and plan of care. [] Patient/family agree to work toward stated goals and plan of care. [] Patient understands intent and goals of therapy, but is neutral about his/her participation. [] Patient is unable to participate in goal setting and plan of care.       Agustin Osgood, OT      Outcome: Progressing Towards Goal

## 2021-06-06 NOTE — ROUTINE PROCESS
SHIFT CHANGE NOTE FOR [place]    Bedside and Verbal shift change report given to CATY Hines (oncoming nurse) by Steve Cardoza (offgoing nurse). Report included the following information SBAR, Kardex, MAR and Recent Results.     Situation:   Code Status: Full Code   Hospital Day: 10   Problem List:   Hospital Problems  Date Reviewed: 6/4/2021        Codes Class Noted POA    Secondary hyperparathyroidism of renal origin ([facility] (Chronic) ICD-10-CM: N25.81  ICD-9-CM: 588.81  5/31/2021 Yes        Vitamin D deficiency (Chronic) ICD-10-CM: E55.9  ICD-9-CM: 268.9  5/31/2021 Yes    Overview Signed 5/31/2021  1:17 PM by Noah Dunn MD     Vitamin D 25-Hydroxy (5/31/2021) = 10.5              High serum magnesium ICD-10-CM: R79.89  ICD-9-CM: 790.99  5/28/2021 Yes        Hypertensive heart and kidney disease without heart failure and with stage 3b chronic kidney disease ([facility] (Chronic) ICD-10-CM: I13.10, N18.32  ICD-9-CM: 404.90, 585.3  Unknown Yes        Mixed hyperlipidemia (Chronic) ICD-10-CM: E78.2  ICD-9-CM: 272.2  Unknown Yes        Constipation (Chronic) ICD-10-CM: K59.00  ICD-9-CM: 564.00  Unknown Yes        Current use of aspirin ICD-10-CM: Z79.82  ICD-9-CM: V58.66  5/23/2021 Yes        On clopidogrel therapy ICD-10-CM: Z79.01  ICD-9-CM: V58.61  5/23/2021 Yes        On statin therapy due to risk of future cardiovascular event ICD-10-CM: Z79.899  ICD-9-CM: V58.69  5/22/2021 Yes    Overview Signed 5/27/2021  2:27 PM by Noah Dunn MD     On Atorvastatin             * (Principal) Acute lacunar stroke [facility]) ICD-10-CM: I63.81  ICD-9-CM: 434.91  5/21/2021 Yes    Overview Signed 5/27/2021  2:15 PM by Noah Dunn MD     Acute Lacunar Stroke (acute lacunar infarct in the posterior left lentiform nucleus extending into the corona radiata) with residual right hemiparesis             Impaired mobility and ADLs ICD-10-CM: Z74.09, Z78.9  ICD-9-CM: V49.89  5/21/2021 Yes        Hemiparesis of right dominant side due to acute cerebrovascular disease [facility]) ICD-10-CM: I67.89, G81.91  ICD-9-CM: 830, 342.91  5/21/2021 Yes              Background:   Past Medical History:   Past Medical History:   Diagnosis Date    Abnormal mammogram 2014    left with biopsy    [facility] 5/21/2021    Acute Lacunar Stroke (acute lacunar infarct in the posterior left lentiform nucleus extending into the corona radiata) with residual right hemiparesis    Bacterial vaginosis 1/9/15    Dr Fernie Pennington Constipation     Current use of aspirin 5/23/2021    Gastroesophageal reflux disease 6/30/2016    Hemiparesis of right dominant side due to acute cerebrovascular disease ([facility] [address] 5/21/2021    History of Helicobacter pylori infection 7/24/2015    History of iron deficiency anemia 06/2014    History of vitamin D deficiency 6/11/2015    Hot flashes 1/9/15    Dr Fernie Pennington On clopidogrel therapy 5/23/2021    On statin therapy due to risk of future cardiovascular event 5/22/2021    On Atorvastatin    Rheumatoid arthritis ([facility] [address] [facility]     Secondary hyperparathyroidism of renal origin ([facility] [address] 5/31/2021    Stage 3b chronic kidney disease ([facility] 7/24/2015    Tobacco use disorder     Vitamin D deficiency 5/31/2021    Vitamin D 25-Hydroxy (5/31/2021) = 10.5         Assessment:   Changes in Assessment throughout shift: Changes documented below    Patient has a central line: no   Patient has Rollins Cath: no      Last Vitals:     Vitals:    06/05/21 0833 06/05/21 1604 06/05/21 2130 06/06/21 0705   BP: 130/79 123/86 (!) 143/91 128/77   Pulse: 78 76 85 98   Resp:  16 18 20   Temp:  97.7 °F (36.5 °C) 97.7 °F (36.5 °C) 97.1 °F (36.2 °C)   SpO2:  96% 96% 98%   Weight:       Height:       LMP: 10/15/2014        PAIN    Pain Assessment    Pain Intensity 1: 0 (06/06/21 0743) Pain Intensity 1: 2 (12/29/14 1105)    Pain Location 1: Generalized Pain Location 1: Abdomen    Pain Intervention(s) 1: Medication (see MAR) Pain Intervention(s) 1: Medication (see MAR)  Patient Stated Pain Goal: 0 Patient Stated Pain Goal: 0  o Intervention effective: yes  o Other actions taken for pain: Medication (see MAR)     Skin Assessment  Skin color    Condition/Temperature    Integrity    Turgor    Weekly Pressure Ulcer Documentation  Pressure  Injury Documentation: No Pressure Injury Noted-Pressure Ulcer Prevention Initiated  Wound Prevention & Protection Methods  Orientation of wound Orientation of Wound Prevention: Posterior  Location of Prevention Location of Wound Prevention: Sacrum/Coccyx  Dressing Present Dressing Present : No  Dressing Status    Wound Offloading Wound Offloading (Prevention Methods): Bed, pressure redistribution/air     INTAKE/OUPUT  Date 06/05/21 0700 - 06/06/21 0659 06/06/21 0700 - 06/07/21 0659   Shift 7356-0950 1492-7986 24 Hour Total 9282-3462 2426-2314 24 Hour Total   INTAKE   Shift Total(mL/kg)         OUTPUT   Urine(mL/kg/hr)           Urine Occurrence(s) 3 x 4 x 7 x 0 x  0 x   Stool           Stool Occurrence(s) 0 x 0 x 0 x 0 x  0 x   Shift Total(mL/kg)         NET         Weight (kg) 84.4 84.4 84.4 84.4 84.4 84.4       Recommendations:  1. Patient needs and requests: Would like a new bed    2. Pending tests/procedures: None at this time     3. Functional Level/Equipment: Partial (one person) / Bed (comment)    Fall Precautions:   Fall risk precautions were reinforced with the patient; she was instructed to call for help prior to getting up. The following fall risk precautions were continued: bed/ chair alarms, door signage, yellow bracelet and socks as well as update of the [place] tool in the patient's room. Leigh Ann Score: 4    HEALS Safety Check    A safety check occurred in the patient's room between off going nurse and oncoming nurse listed above.     The safety check included the below items  Area Items   H  High Alert Medications - Verify all high alert medication drips (heparin, PCA, etc.)   E  Equipment - Suction is set up for ALL patients (with blessing)  - Red plugs utilized for all equipment (IV pumps, etc.)  - WOWs wiped down at end of shift.  - Room stocked with oxygen, suction, and other unit-specific supplies   A  Alarms - Bed alarm is set for fall risk patients  - Ensure chair alarm is in place and activated if patient is up in a chair   L  Lines - Check IV for any infiltration  - Rollins bag is empty if patient has a Rollins   - Tubing and IV bags are labeled   S  Safety   - Room is clean, patient is clean, and equipment is clean. - Hallways are clear from equipment besides carts. - Fall bracelet on for fall risk patients  - Ensure room is clear and free of clutter  - Suction is set up for ALL patients (with blessing)  - Hallways are clear from equipment besides carts.    - Isolation precautions followed, supplies available outside room, sign posted     Rubio Soriano

## 2021-06-06 NOTE — ROUTINE PROCESS
[facility]    Bedside and Verbal shift change report given to [facility] (oncoming nurse) by Racheal Boogie LPN (offgoing nurse). Report included the following information SBAR, Kardex, MAR and Recent Results.     Situation:   Code Status: Full Code   Hospital Day: 10   Problem List:   Hospital Problems  Date Reviewed: 6/4/2021        Codes Class Noted POA    [facility] (Ch[facility]) ICD-10-CM: N25.81  ICD-9-CM: 588.81  5/31/2021 Yes        Vitamin D deficiency (Chronic) ICD-10-CM: E55.9  ICD-9-CM: 268.9  5/31/2021 Yes    Overview Signed 5/31/2021  1:17 PM by Alma Paige MD     Vitamin D 25-Hydroxy (5/31/2021) = 10.5              High serum magnesium ICD-10-CM: R79.89  ICD-9-CM: 790.99  5/28/2021 Yes        Hypertensive heart and kidney disease without heart failure and with stage 3b chronic kidney disease ([facility] (Chronic) ICD-10-CM: I13.10, N18.32  ICD-9-CM: 404.90, 585.3  Unknown Yes        Mixed hyperlipidemia (Chronic) ICD-10-CM: E78.2  ICD-9-CM: 272.2  Unknown Yes        Constipation (Chronic) ICD-10-CM: K59.00  ICD-9-CM: 564.00  Unknown Yes        Current use of aspirin ICD-10-CM: Z79.82  ICD-9-CM: V58.66  5/23/2021 Yes        On clopidogrel therapy ICD-10-CM: Z79.01  ICD-9-CM: V58.61  5/23/2021 Yes        On statin therapy due to risk of future cardiovascular event ICD-10-CM: Z79.899  ICD-9-CM: V58.69  5/22/2021 Yes    Overview Signed 5/27/2021  2:27 PM by Alma Paige MD     On Atorvastatin             * (Principal) Acute lacunar stroke [facility]) ICD-10-CM: I63.81  ICD-9-CM: 434.91  5/21/2021 Yes    Overview Signed 5/27/2021  2:15 PM by Alma Paige MD     Acute Lacunar Stroke (acute lacunar infarct in the posterior left lentiform nucleus extending into the corona radiata) with residual right hemiparesis             Impaired mobility and ADLs ICD-10-CM: Z74.09, Z78.9  ICD-9-CM: V49.89  5/21/2021 Yes        Hemiparesis of right dominant side due to acute cerebrovascular disease [facility]) ICD-10-CM: I67.89, G81.91  ICD-9-CM: 436, 342.91  5/21/2021 Yes              Background:   Past Medical History:   Past Medical History:   Diagnosis Date    Abnormal mammogram 2014    left with biopsy    [facility]/21/2021    Acute Lacunar Stroke (acute lacunar infarct in the posterior left lentiform nucleus extending into the corona radiata) with residual right hemiparesis    Bacterial vaginosis 1/9/15    Dr Dolph Closs Constipation     Current use of aspirin 5/23/2021    Gastroesophageal reflux disease 6/30/2016    Hemiparesis of right dominant side due to acute cerebrovascular disease ([facility] 5/21/2021    History of Helicobacter pylori infection 7/24/2015    History of iron deficiency anemia 06/2014    History of vitamin D deficiency 6/11/2015    Hot flashes 1/9/15    Dr Dolph Closs On clopidogrel therapy 5/23/2021    On statin therapy due to risk of future cardiovascular event 5/22/2021    On Atorvastatin    Rheumatoid arthritis ([facility]     Secondary hyperparathyroidism of renal origin ([facility] 5/31/2021    Stage 3b chronic kidney disease [facility] 7/24/2015    Tobacco use disorder     Vitamin D deficiency 5/31/2021    Vitamin D 25-Hydroxy (5/31/2021) = 10.5         Assessment:   Changes in Assessment throughout shift: No change to previous assessment    Patient has a central line: no   Patient has Rollins Cath: no      Last Vitals:     Vitals:    06/05/21 0724 06/05/21 0833 06/05/21 1604 06/05/21 2130   BP: 120/81 130/79 123/86 (!) 143/91   Pulse: 68 78 76 85   Resp: 20  16 18   Temp: 97.9 °F (36.6 °C)  97.7 °F (36.5 °C) 97.7 °F (36.5 °C)   SpO2: 99%  96% 96%   Weight:       Height:       LMP: 10/15/2014        PAIN    Pain Assessment    Pain Intensity 1: 0 (06/06/21 0405) Pain Intensity 1: 2 (12/29/14 1105)    Pain Location 1: Generalized Pain Location 1: Abdomen    Pain Intervention(s) 1: Medication (see MAR) Pain Intervention(s) 1: Medication (see MAR)  Patient Stated Pain Goal: 0 Patient Stated Pain Goal: 0  o Intervention effective: yes  o Other actions taken for pain: Medication (see MAR)     Skin Assessment  Skin color    Condition/Temperature    Integrity    Turgor    Weekly Pressure Ulcer Documentation  Pressure  Injury Documentation: No Pressure Injury Noted-Pressure Ulcer Prevention Initiated  Wound Prevention & Protection Methods  Orientation of wound Orientation of Wound Prevention: Posterior  Location of Prevention Location of Wound Prevention: Sacrum/Coccyx  Dressing Present Dressing Present : No  Dressing Status    Wound Offloading Wound Offloading (Prevention Methods): Bed, pressure redistribution/air     INTAKE/OUPUT  Date 06/05/21 0700 - 06/06/21 0659 06/06/21 0700 - 06/07/21 0659   Shift 6689-8977 4762-4748 24 Hour Total 6955-0572 8546-7358 24 Hour Total   INTAKE   Shift Total(mL/kg)         OUTPUT   Urine(mL/kg/hr)           Urine Occurrence(s) 3 x 0 x 3 x      Stool           Stool Occurrence(s) 0 x 0 x 0 x      Shift Total(mL/kg)         NET         Weight (kg) 84.4 84.4 84.4 84.4 84.4 84.4       Recommendations:  1. Patient needs and requests: met    2. Pending tests/procedures: labs as ordered    3. Functional Level/Equipment: Partial (one person) /      Fall Precautions:   Fall risk precautions were reinforced with the patient; she was instructed to call for help prior to getting up. The following fall risk precautions were continued: bed/ chair alarms, door signage, yellow bracelet and socks as well as update of the Daksha Pepper tool in the patient's room. Leigh Ann Score: 4    HEALS Safety Check    A safety check occurred in the patient's room between off going nurse and oncoming nurse listed above.     The safety check included the below items  Area Items   H  High Alert Medications - Verify all high alert medication drips (heparin, PCA, etc.)   E  Equipment - Suction is set up for ALL patients (with blessing)  - Red plugs utilized for all equipment (IV pumps, etc.)  - WOWs wiped down at end of shift.  - Room stocked with oxygen, suction, and other unit-specific supplies   A  Alarms - Bed alarm is set for fall risk patients  - Ensure chair alarm is in place and activated if patient is up in a chair   L  Lines - Check IV for any infiltration  - Rollins bag is empty if patient has a Rollins   - Tubing and IV bags are labeled   S  Safety   - Room is clean, patient is clean, and equipment is clean. - Hallways are clear from equipment besides carts. - Fall bracelet on for fall risk patients  - Ensure room is clear and free of clutter  - Suction is set up for ALL patients (with yanker)  - Hallways are clear from equipment besides carts.    - Isolation precautions followed, supplies available outside room, sign posted     Scout Gonzalez LPN

## 2021-06-06 NOTE — PROGRESS NOTES
Noted improvement in creatine  Continue IV fluids at same rate  Blood pressure in good range,  Encourage PO intake    Call me with questions,    Gigi Yoon MD, SERGIO

## 2021-06-06 NOTE — PROGRESS NOTES
Problem: Pressure Injury - Risk of  Goal: *Prevention of pressure injury  Description: Document John Scale and appropriate interventions in the flowsheet. Outcome: Progressing Towards Goal  Note: Pressure Injury Interventions:  Sensory Interventions: Assess changes in LOC    Moisture Interventions: Absorbent underpads    Activity Interventions: Increase time out of bed, Pressure redistribution bed/mattress(bed type)    Mobility Interventions: Pressure redistribution bed/mattress (bed type)    Nutrition Interventions: Document food/fluid/supplement intake    Friction and Shear Interventions: Minimize layers                Problem: Patient Education: Go to Patient Education Activity  Goal: Patient/Family Education  Outcome: Progressing Towards Goal     Problem: Falls - Risk of  Goal: *Absence of Falls  Description: Document Leigh Ann Fall Risk and appropriate interventions in the flowsheet.   Outcome: Progressing Towards Goal  Note: Fall Risk Interventions:  Mobility Interventions: Bed/chair exit alarm, Patient to call before getting OOB    Mentation Interventions: Bed/chair exit alarm    Medication Interventions: Bed/chair exit alarm, Patient to call before getting OOB    Elimination Interventions: Bed/chair exit alarm, Call light in reach, Patient to call for help with toileting needs    History of Falls Interventions: Bed/chair exit alarm         Problem: Patient Education: Go to Patient Education Activity  Goal: Patient/Family Education  Outcome: Progressing Towards Goal     Problem: Patient Education: Go to Patient Education Activity  Goal: Patient/Family Education  Outcome: Progressing Towards Goal     Problem: Patient Education: Go to Patient Education Activity  Goal: Patient/Family Education  Outcome: Progressing Towards Goal     Problem: Patient Education: Go to Patient Education Activity  Goal: Patient/Family Education  Outcome: Progressing Towards Goal

## 2021-06-07 LAB
ALBUMIN SERPL-MCNC: 3.1 G/DL (ref 3.4–5)
ANION GAP SERPL CALC-SCNC: 5 MMOL/L (ref 3–18)
BUN SERPL-MCNC: 36 MG/DL (ref 7–18)
BUN/CREAT SERPL: 17 (ref 12–20)
CALCIUM SERPL-MCNC: 9.8 MG/DL (ref 8.5–10.1)
CHLORIDE SERPL-SCNC: 111 MMOL/L (ref 100–111)
CO2 SERPL-SCNC: 22 MMOL/L (ref 21–32)
CREAT SERPL-MCNC: 2.16 MG/DL (ref 0.6–1.3)
GLUCOSE SERPL-MCNC: 99 MG/DL (ref 74–99)
HCT VFR BLD AUTO: 39.9 % (ref 35–45)
HGB BLD-MCNC: 12.9 G/DL (ref 12–16)
PHOSPHATE SERPL-MCNC: 4.1 MG/DL (ref 2.5–4.9)
PLATELET # BLD AUTO: 258 K/UL (ref 135–420)
POTASSIUM SERPL-SCNC: 5 MMOL/L (ref 3.5–5.5)
SODIUM SERPL-SCNC: 138 MMOL/L (ref 136–145)

## 2021-06-07 PROCEDURE — 99232 SBSQ HOSP IP/OBS MODERATE 35: CPT | Performed by: INTERNAL MEDICINE

## 2021-06-07 PROCEDURE — 74011250636 HC RX REV CODE- 250/636: Performed by: INTERNAL MEDICINE

## 2021-06-07 PROCEDURE — 65310000000 HC RM PRIVATE REHAB

## 2021-06-07 PROCEDURE — 36415 COLL VENOUS BLD VENIPUNCTURE: CPT

## 2021-06-07 PROCEDURE — 97110 THERAPEUTIC EXERCISES: CPT

## 2021-06-07 PROCEDURE — 97530 THERAPEUTIC ACTIVITIES: CPT

## 2021-06-07 PROCEDURE — 97535 SELF CARE MNGMENT TRAINING: CPT

## 2021-06-07 PROCEDURE — 80069 RENAL FUNCTION PANEL: CPT

## 2021-06-07 PROCEDURE — 74011250637 HC RX REV CODE- 250/637: Performed by: INTERNAL MEDICINE

## 2021-06-07 PROCEDURE — 85018 HEMOGLOBIN: CPT

## 2021-06-07 PROCEDURE — 97116 GAIT TRAINING THERAPY: CPT

## 2021-06-07 PROCEDURE — 97150 GROUP THERAPEUTIC PROCEDURES: CPT

## 2021-06-07 PROCEDURE — 85049 AUTOMATED PLATELET COUNT: CPT

## 2021-06-07 PROCEDURE — 92507 TX SP LANG VOICE COMM INDIV: CPT

## 2021-06-07 PROCEDURE — 2709999900 HC NON-CHARGEABLE SUPPLY

## 2021-06-07 RX ORDER — LUBIPROSTONE 8 UG/1
8 CAPSULE, GELATIN COATED ORAL 2 TIMES DAILY WITH MEALS
Status: DISCONTINUED | OUTPATIENT
Start: 2021-06-07 | End: 2021-06-14 | Stop reason: HOSPADM

## 2021-06-07 RX ORDER — SODIUM CHLORIDE 9 MG/ML
75 INJECTION, SOLUTION INTRAVENOUS
Status: DISCONTINUED | OUTPATIENT
Start: 2021-06-07 | End: 2021-06-07

## 2021-06-07 RX ORDER — HEPARIN SODIUM 5000 [USP'U]/ML
5000 INJECTION, SOLUTION INTRAVENOUS; SUBCUTANEOUS EVERY 12 HOURS
Status: DISCONTINUED | OUTPATIENT
Start: 2021-06-07 | End: 2021-06-14 | Stop reason: HOSPADM

## 2021-06-07 RX ORDER — SODIUM CHLORIDE 9 MG/ML
1000 INJECTION, SOLUTION INTRAVENOUS
Status: DISCONTINUED | OUTPATIENT
Start: 2021-06-07 | End: 2021-06-09

## 2021-06-07 RX ADMIN — ASPIRIN 81 MG: 81 TABLET, CHEWABLE ORAL at 16:52

## 2021-06-07 RX ADMIN — HEPARIN SODIUM 5000 UNITS: 5000 INJECTION INTRAVENOUS; SUBCUTANEOUS at 17:12

## 2021-06-07 RX ADMIN — Medication 100 MG: at 08:03

## 2021-06-07 RX ADMIN — CLOPIDOGREL BISULFATE 75 MG: 75 TABLET ORAL at 08:02

## 2021-06-07 RX ADMIN — ACETAMINOPHEN 650 MG: 325 TABLET ORAL at 01:07

## 2021-06-07 RX ADMIN — METOPROLOL TARTRATE 25 MG: 25 TABLET, FILM COATED ORAL at 21:01

## 2021-06-07 RX ADMIN — Medication 5000 UNITS: at 08:02

## 2021-06-07 RX ADMIN — Medication 3 MG: at 20:55

## 2021-06-07 RX ADMIN — ATORVASTATIN CALCIUM 80 MG: 40 TABLET, FILM COATED ORAL at 08:02

## 2021-06-07 RX ADMIN — ACETAMINOPHEN 650 MG: 325 TABLET ORAL at 14:10

## 2021-06-07 RX ADMIN — SODIUM CHLORIDE 1000 ML: 900 INJECTION, SOLUTION INTRAVENOUS at 17:15

## 2021-06-07 RX ADMIN — HEPARIN SODIUM 5000 UNITS: 5000 INJECTION INTRAVENOUS; SUBCUTANEOUS at 06:24

## 2021-06-07 RX ADMIN — METOPROLOL TARTRATE 25 MG: 25 TABLET, FILM COATED ORAL at 08:03

## 2021-06-07 NOTE — PROGRESS NOTES
In Patient Progress note      Admit Date: 5/27/2021    Assessment and plan:     #1 Acute kidney injury on chronic kidney disease stage III/IV. Patient's baseline creatinine  close to 2 range. Appears to be having a mild REHANA, I think it is prerenal as it is improving with hydration. Patient also has history of using Sutton 2   inhibitors /multiple NSAID use including BC powder chronically. Her CKD is likely d/t HTN nephrosclerosis and NSAID use . Renal US with no hydro. #2 Hypertension   #3 History of CVA with right-sided hemiparesis  #4 dyslipidemia  #5 mild proteinuria  #6 rheumatoid arthritis  #7 NSAID use  #8 Subnephrotic proteinuria    Creatinine bump from poor fluid intake/losartan  Losartan was held and IVF started  Creatinine better     Plan:     #1IV fluids at night for 12 hrs, encourage fluid intake   #2 no more NSAIDs , counseled patient   #3 follow renal panels daily     Following patient closely,     Please call with questions     Rosa Jurado MD FASN  Cell [phone]  Pager: [phone]    Subjective:     - No acute over night events. - respiratory - stable  - hemodynamics - stable, no pressrs  - UOP-good   - Nutrition -ok    Objective:     Visit Vitals  /81 (BP 1 Location: Left upper arm, BP Patient Position: Supine)   Pulse 63   Temp 97.2 °F (36.2 °C)   Resp 18   Ht 5' 7\" (1.702 m)   Wt 84.4 kg (186 lb)   LMP 10/15/2014   SpO2 100%   Breastfeeding No   BMI 29.13 kg/m²       No intake or output data in the 24 hours ending 06/07/21 1145    Physical Exam:     Patient is in no apparent distress. HEENT: mmm  Neck: no cervical lymphadenopathy or thyromegaly. Lungs: good air entry, clear to auscultation bilaterally. Cardiovascular system: S1, S2, regular rate and rhythm. Abdomen: soft, non tender, non distended. Extremities: no clubbing, cyanosis or edema. Neurologic: Alert, oriented time three.      Data Review:    Recent Labs     06/07/21  0555   HCT 39.9     Recent Labs 06/07/21  0555 06/06/21  0616 06/05/21  0609   BUN 36* 40* 40*   CREA 2.16* 2.41* 2.85*   CA 9.8 9.7 9.1   ALB 3.1* 3.0* 3.2*   K 5.0 4.7 4.8    138 140    111 110   CO2 22 23 23   PHOS 4.1 3.9 4.6   GLU 99 104* 100*       Avila Em MD

## 2021-06-07 NOTE — ROUTINE PROCESS
[facility]    Bedside and Verbal shift change report given to [facility] RN (oncoming nurse) by Stephen Friedman RN (offgoing nurse). Report included the following information SBAR, Kardex, MAR and Recent Results.     Situation:   Code Status: Full Code   Hospital Day: 11   Problem List:   Hospital Problems  Date Reviewed: 6/7/2021        Codes Class Noted POA    Secondary hyperparathyroidism of renal origin ([facility] (Chronic) ICD-10-CM: N25.81  ICD-9-CM: 588.81  5/31/2021 Yes        Vitamin D deficiency (Chronic) ICD-10-CM: E55.9  ICD-9-CM: 268.9  5/31/2021 Yes    Overview Signed 5/31/2021  1:17 PM by Keanu Mayes MD     Vitamin D 25-Hydroxy (5/31/2021) = 10.5              High serum magnesium ICD-10-CM: R79.89  ICD-9-CM: 790.99  5/28/2021 Yes        Hypertensive heart and kidney disease without heart failure and with stage 3b chronic kidney disease ([email or web address] (Chronic) ICD-10-CM: I13.10, N18.32  ICD-9-CM: 404.90, 585.3  Unknown Yes        Mixed hyperlipidemia (Chronic) ICD-10-CM: E78.2  ICD-9-CM: 272.2  Unknown Yes        Constipation (Chronic) ICD-10-CM: K59.00  ICD-9-CM: 564.00  Unknown Yes        Current use of aspirin ICD-10-CM: Z79.82  ICD-9-CM: V58.66  5/23/2021 Yes        On clopidogrel therapy ICD-10-CM: Z79.01  ICD-9-CM: V58.61  5/23/2021 Yes        On statin therapy due to risk of future cardiovascular event ICD-10-CM: Z79.899  ICD-9-CM: V58.69  5/22/2021 Yes    Overview Signed 5/27/2021  2:27 PM by Keanu Mayes MD     On Atorvastatin             * (Principal) Acute lacunar stroke [facility]) ICD-10-CM: I63.81  ICD-9-CM: 434.91  5/21/2021 Yes    Overview Signed 5/27/2021  2:15 PM by Keanu Mayes MD     Acute Lacunar Stroke (acute lacunar infarct in the posterior left lentiform nucleus extending into the corona radiata) with residual right hemiparesis             Impaired mobility and ADLs ICD-10-CM: Z74.09, Z78.9  ICD-9-CM: V49.89  5/21/2021 Yes        Hemiparesis of right dominant side due to acute cerebrovascular disease [facility]) ICD-10-CM: I67.89, G81.91  ICD-9-CM: 436, 342.91  5/21/2021 Yes              Background:   Past Medical History:   Past Medical History:   Diagnosis Date    Abnormal mammogram 2014    left with biopsy    Acute lacunar stroke ([facility] 5/21/2021    Acute Lacunar Stroke (acute lacunar infarct in the posterior left lentiform nucleus extending into the corona radiata) with residual right hemiparesis    Bacterial vaginosis 1/9/15    Dr Preeti Ritchie Constipation     Current use of aspirin 5/23/2021    Gastroesophageal reflux disease 6/30/2016    Hemiparesis of right dominant side due to acute cerebrovascular disease ([facility] 5/21/2021    History of Helicobacter pylori infection 7/24/2015    History of iron deficiency anemia 06/2014    History of vitamin D deficiency 6/11/2015    Hot flashes 1/9/15    Dr Preeti Ritchie On clopidogrel therapy 5/23/2021    On statin therapy due to risk of future cardiovascular event 5/22/2021    On Atorvastatin    Rheumatoid arthritis ([facility]     Secondary hyperparathyroidism of renal origin ([facility] 5/31/2021    Stage 3b chronic kidney disease ([facility] 7/24/2015    Tobacco use disorder     Vitamin D deficiency 5/31/2021    Vitamin D 25-Hydroxy (5/31/2021) = 10.5         Assessment:   Changes in Assessment throughout shift: No change to previous assessmentnone     Patient has a central line: no Reasons if yes: na  Insertion date:na Last dressing date:na   Patient has Irving Cath: no Reasons if yes: na   Insertion date:na  Shift irving care completed: NO     Last Vitals:     Vitals:    06/06/21 1548 06/06/21 2100 06/07/21 0725 06/07/21 1620   BP: 135/86 (!) 138/90 132/81 127/88   Pulse: 75 83 63 81   Resp: 18 18 18 16   Temp: 97.2 °F (36.2 °C) 98 °F (36.7 °C) 97.2 °F (36.2 °C) 97.7 °F (36.5 °C)   SpO2: 97% 97% 100% 95%   Weight:       Height:       LMP: 10/15/2014        PAIN    Pain Assessment    Pain Intensity 1: 0 (06/07/21 1600) Pain Intensity 1: 2 (12/29/14 0365)    Pain Location 1: Generalized Pain Location 1: Abdomen    Pain Intervention(s) 1: Medication (see MAR) Pain Intervention(s) 1: Medication (see MAR)  Patient Stated Pain Goal: 0 Patient Stated Pain Goal: 0  o Intervention effective: yes  o Other actions taken for pain: Medication (see MAR)     Skin Assessment  Skin color    Condition/Temperature    Integrity    Turgor    Weekly Pressure Ulcer Documentation  Pressure  Injury Documentation: No Pressure Injury Noted-Pressure Ulcer Prevention Initiated  Wound Prevention & Protection Methods  Orientation of wound Orientation of Wound Prevention: Posterior  Location of Prevention Location of Wound Prevention: Sacrum/Coccyx  Dressing Present Dressing Present : No  Dressing Status    Wound Offloading Wound Offloading (Prevention Methods): Bed, pressure reduction mattress     INTAKE/OUPUT  Date 06/06/21 1900 - 06/07/21 0659 06/07/21 0700 - 06/08/21 0659   Shift 9425-4265 24 Hour Total 5252-7055 6030-7486 24 Hour Total   INTAKE   P.O.   960  960     P. O.   960  960   I. V.(mL/kg/hr)   8249(5)  2000     Volume (0.9% sodium chloride infusion)   2000  2000   Shift Total(mL/kg)   7039(59.8)  1212(28.1)   OUTPUT   Urine(mL/kg/hr)          Urine Occurrence(s) 4 x 7 x 3 x  3 x   Stool          Stool Occurrence(s) 3 x 4 x 0 x  0 x   Shift Total(mL/kg)        NET   2960  2960   Weight (kg) 84.4 84.4 84.4 84.4 84.4       Recommendations:  1. Patient needs and requests: met    2. Pending tests/procedures: lab as ordered     3. Functional Level/Equipment: Partial (one person) /      Fall Precautions:   Fall risk precautions were reinforced with the patient; she was instructed to call for help prior to getting up. The following fall risk precautions were continued: bed/ chair alarms, door signage, yellow bracelet and socks as well as update of the Trixie Steven tool in the patient's room.    Leigh Ann Score: 4    HEALS Safety Check    A safety check occurred in the patient's room between off going nurse and oncoming nurse listed above. The safety check included the below items  Area Items   H  High Alert Medications - Verify all high alert medication drips (heparin, PCA, etc.)   E  Equipment - Suction is set up for ALL patients (with blessing)  - Red plugs utilized for all equipment (IV pumps, etc.)  - WOWs wiped down at end of shift.  - Room stocked with oxygen, suction, and other unit-specific supplies   A  Alarms - Bed alarm is set for fall risk patients  - Ensure chair alarm is in place and activated if patient is up in a chair   L  Lines - Check IV for any infiltration  - Rollins bag is empty if patient has a Rollins   - Tubing and IV bags are labeled   S  Safety   - Room is clean, patient is clean, and equipment is clean. - Hallways are clear from equipment besides carts. - Fall bracelet on for fall risk patients  - Ensure room is clear and free of clutter  - Suction is set up for ALL patients (with blessing)  - Hallways are clear from equipment besides carts.    - Isolation precautions followed, supplies available outside room, sign posted     Leatha Jimenez RN

## 2021-06-07 NOTE — PROGRESS NOTES
Problem: Neurolinguistics Impaired (Adult)  Goal: *Speech Goal: (INSERT TEXT)  Description: Long term goals  Patient will:  1. Be oriented x 3 and recall events of the day, supervision. 2. Recall 3 words after 5 minutes, supervision. 3. Name 10-12 items within categories of increasing complexity, supervision. 4. Perform varied word finding tasks with 90% accuracy. 5. Perform problem solving/reasoning tasks with 90% accuracy. 6. Perform basic mathematical calculations, 90% accuracy. Short term goals (by 21)  Patient will:  1. Be oriented x 3 and recall events of the day, supervision. 2. Recall 3 words after 5 minutes, min cues. 3. Name 10-12 items within concrete categories, supervision. 4. Perform varied word finding tasks with 75-85% accuracy. 5. Perform problem solving/reasoning tasks with 70-80% accuracy. 6. Perform basic mathematical calculations, 70-80% accuracy. Note:   Speech language pathology treatment    Patient: Carlos Eduardo Lopez (88 y.o. female)  Date: 2021  Diagnosis: Acute lacunar stroke ([facility] [I63.81] Acute lacunar stroke ([facility]  Time in: 1000  Time Out: 1030  SUBJECTIVE:   Patient stated I've been forgetting a lot since my stroke. OBJECTIVE:   Mental Status:  Ms. Argenis Holt was alert and oriented. Treatment & Interventions: The patient was seen for a 30 minute speech session. The following treatment tasks were presented:  Neuro-Linguistics:   Orientation:   Independent  Recent memory:  Independent  Recall 3 words:  Minimum assistance  List breakfast foods:  4 items independently, an additional 8 items given minimum support  Name object described: 90%  Problem solvin%      Response & Tolerance to Activities:  Ms. Argenis Holt was cooperative and tolerated activities well.     Pain:  Pain Scale 1: Numeric (0 - 10)     Pain Description 1: Aching  After treatment:   []       Patient left in no apparent distress sitting up in chair  [x]       Patient left in no apparent distress in bed  [x]       Call bell left within reach  []       Nursing notified  []       Caregiver present  []       Bed alarm activated    ASSESSMENT:   Progression toward goals:  [x]       Improving appropriately and progressing toward goals  []       Improving slowly and progressing toward goals  []       Not making progress toward goals and plan of care will be adjusted    PLAN:   Patient continues to benefit from skilled intervention to address the above impairments. Continue treatment per established plan of care. Discharge Recommendations:   To Be Determined    Estimated LOS: Through 6/15/21    Omer Chiang Speech-Language Pathology Student  Time Calculation: 30 mins

## 2021-06-07 NOTE — PROGRESS NOTES
completed a  follow up visit with and Spiritual assessment of patient in room 190 today and offered Pastoral care support once again. Patient says that she is doing ok today and that she prayes along with us when the prayers are announced. She feels that is such a nice touch and means a lot to her. Chaplains will continue to follow and will provide pastoral care on an as needed/requested basis    Chaplain Carlos Eduardo Vidales   Board Certified [address] [place] [address]   [phone]

## 2021-06-07 NOTE — PROGRESS NOTES
Problem: Mobility Impaired (Adult and Pediatric)  Goal: *Therapy Goal (Edit Goal, Insert Text)  Description: Physical Therapy Short Term Goals  Initiated 5/28/2021, re-assessed 6/4/2021 and to be accomplished within 7 day(s) on 6/11/2021  1. Patient will move from supine to sit and sit to supine , scoot up and down, and roll side to side in bed with supervision/set-up. (MET 6/4/2021)  2. Patient will transfer from bed to chair and chair to bed with minimal assistance/contact guard assist using the least restrictive device. (MET 6/4/2021)  Updated goal: Patient will transfer from bed to chair and chair to bed with standby assist using the least restrictive device. 3.  Patient will perform sit to stand with minimal assistance/contact guard assist.(MET 6/4/2021)  Updated goal: Patient will perform sit to stand with standby assistance. 4.  Patient will ambulate with minimal assistance/contact guard assist for 50 feet with the least restrictive device. (MET 6/4/2021)  Updated goal: Patient will ambulate with SBA for 150 ft with the least restrictive AD with cues for neutral knee extension with loading/midstance phase of gait <25% of the time. 5.  Patient will ascend/descend 5 stairs with 1 handrail(s) with moderate assistance . (4 steps with BHR and min assist)  Updated goal: Patient will ascend/descend 5 stairs with 1 handrail with CGA. Physical Therapy Long Term Goals  Initiated 5/28/2021 and to be accomplished within 21 day(s) on 6/18/2021  1. Patient will move from supine to sit and sit to supine , scoot up and down, and roll side to side in bed with modified independence. 2.  Patient will transfer from bed to chair and chair to bed with modified independence using the least restrictive device. 3.  Patient will perform sit to stand with modified independence. 4.  Patient will ambulate with modified independence for 150 feet with the least restrictive device.    5.  Patient will ascend/descend 15 stairs with 1 handrail(s) with contact guard assist/standby assistance. Outcome: Progressing Towards Goal   PHYSICAL THERAPY TREATMENT    Patient: Tamiko Pedroza (7 y.o. female)  Date: 2021  Diagnosis: Acute lacunar stroke [facility]) [I63.81] Acute lacunar stroke [facility])  Precautions: Fall, Skin  Chart, physical therapy assessment, plan of care and goals were reviewed. Time In:1510  Time [id number]    Patient seen for: Transfer training;Gait training; Therapeutic exercise (stair training, bed mobility)    Pain:  Pt pain was reported as no c/o pre-treatment. Pt pain was reported as no c/o post-treatment. Intervention: NA     Patient identified with name and : yes     SUBJECTIVE:      Pt reports \"I think I like the cane better, I feel better with it. \"     OBJECTIVE DATA SUMMARY:    Objective:     BED/MAT MOBILITY Daily Assessment     Supine to Sit : 5 (Supervision)  Sit to Supine : 5 (Supervision)  Pt performed bed mobility on left side of mat table with supervision for sit<->supine. TRANSFERS Daily Assessment     Transfer Type: Other  Other: stand step txfr with [facility]  Transfer Assistance : 4 (Contact guard assistance)  Sit to Stand Assistance: Contact guard assistance  Pt performed sit to stand from w/c with CGA for anterior wt shift and v/c to prevent rocking for momentum, with improvement but slight launch. Pt performed stand step txfr w/c<->mat table with SBQC and CGA for balance. GAIT Daily Assessment    Gait Description (WDL)      Gait Abnormalities Decreased step clearance; Step to gait    Assistive device [facility]ait belt;[facility]    Ambulation assistance - level surface 4 (Contact guard assistance)    Distance 32 Feet (ft) (x2 trials)    Ambulation assistance- uneven surface      Comments Pt ambulated 32ft with SBQC on left and CGA for balance, v/c for erect posture and increased focus on right heel strike and preventing recurvatum. Pt demo'd slower pacing and step to pattern. STEPS/STAIRS Daily Assessment     Steps/Stairs ambulated 2 (6\" steps)    Assistance Required 4 (Minimal assistance)    Rail Use Right  [facility] MARTHA on left)    Comments  Pt negotiated up and down 2 6\" steps with right HR and SBQC on left with min assist for balance and v/c for proper sequencing. Curbs/Ramps  NT        BALANCE Daily Assessment     Sitting - Static: Good (unsupported)  Sitting - Dynamic: Good (unsupported)  Standing - Static: Fair  Standing - Dynamic : Impaired        WHEELCHAIR MOBILITY Daily Assessment     Able to Propel (ft): 72 feet  Wheelchair Management: Manages left brake;Manages right brake        THERAPEUTIC EXERCISES Daily Assessment      Supine: bridging, hooklying hip add with ball and abd with RTB 2x15 each        ASSESSMENT:  Pt demo'd improved pacing and control with use of SBQC on left. Progression toward goals:  []      Improving appropriately and progressing toward goals  [x]      Improving slowly and progressing toward goals  []      Not making progress toward goals and plan of care will be adjusted      PLAN:  Patient continues to benefit from skilled intervention to address the above impairments. Continue treatment per established plan of care. Discharge Recommendations:  Home Health  Further Equipment Recommendations for Discharge:  TBD      Estimated Discharge Date: 6/15/2021    Activity Tolerance:   Fair+  Please refer to the flowsheet for vital signs taken during this treatment.     After treatment:   [] Patient left in no apparent distress in bed  [x] Patient left in no apparent distress sitting up in chair  [] Patient left in no apparent distress in w/c mobilizing under own power  [] Patient left in no apparent distress dining area  [] Patient left in no apparent distress mobilizing under own power  [x] Call bell left within reach  [] Nursing notified  [] Caregiver present  [] Bed alarm activated   [x] Chair alarm activated      Judi Gil PTA  6/7/2021

## 2021-06-07 NOTE — PROGRESS NOTES
[facility] PHYSICAL REHABILITATION  [address] [place] [address], [address]     INPATIENT REHABILITATION  DAILY PROGRESS NOTE     Date: 6/7/2021    Name: José Manuel Baca Age / Sex: 62 y.o. / female   CSN: [id number] MRN: [id number]   [facility] Date: 5/27/2021 Length of Stay: 11 days     Primary Rehab Diagnosis: Impaired Mobility and ADLs secondary to Acute Lacunar Stroke (acute lacunar infarct in the posterior left lentiform nucleus extending into the corona radiata) with residual right hemiparesis      Subjective:     Patient seen and examined. Blood pressure controlled. Patient's Complaint:   No significant medical complaints    Pain Control: no current joint or muscle symptoms, essentially pain-free      Objective:     Vital Signs:  Patient Vitals for the past 24 hrs:   BP Temp Pulse Resp SpO2   06/07/21 0725 132/81 97.2 °F (36.2 °C) 63 18 100 %   06/06/21 2100 (!) 138/90 98 °F (36.7 °C) 83 18 97 %   06/06/21 1548 135/86 97.2 °F (36.2 °C) 75 18 97 %        Physical Examination:  GENERAL SURVEY: Patient is awake, alert, oriented x 3, sitting comfortably on the chair, not in acute respiratory distress. HEENT: pink palpebral conjunctivae, anicteric sclerae, no nasoaural discharge, moist oral mucosa  NECK: supple, no jugular venous distention, no palpable lymph nodes  CHEST/LUNGS: symmetrical chest expansion, good air entry, clear breath sounds  HEART: adynamic precordium, good S1 S2, no S3, regular rhythm, no murmurs  ABDOMEN: flat, bowel sounds appreciated, soft, non-tender  EXTREMITIES: pink nailbeds, no edema, full and equal pulses, no calf tenderness   NEUROLOGICAL EXAM: The patient is awake, alert and oriented x3, able to answer questions fairly appropriately, able to follow 1 and 2 step commands. Able to tell time from the wall clock. Cranial nerves II-XII are grossly intact. No gross sensory deficit. Motor strength is 4 to 4+/5 on the RUE and RLE, 5/5 on the LUE and LLE.       Current Medications:  Current Facility-Administered Medications   Medication Dose Route Frequency    0.9% sodium chloride infusion  75 mL/hr IntraVENous QHS    hydrALAZINE (APRESOLINE) tablet 25 mg  25 mg Oral TID PRN    lubiPROStone (AMITIZA) capsule 24 mcg  24 mcg Oral BID WITH MEALS    polyethylene glycol (MIRALAX) packet 17 g  17 g Oral DAILY    senna-docusate (PERICOLACE) 8.6-50 mg per tablet 2 Tablet  2 Tablet Oral PCD    metoprolol tartrate (LOPRESSOR) tablet 25 mg  25 mg Oral Q12H    cholecalciferol (VITAMIN D3) capsule 5,000 Units  5,000 Units Oral DAILY    nicotine (NICODERM CQ) 14 mg/24 hr patch 1 Patch  1 Patch TransDERmal DAILY    acetaminophen (TYLENOL) tablet 650 mg  650 mg Oral Q4H PRN    bisacodyL (DULCOLAX) tablet 10 mg  10 mg Oral Q48H PRN    heparin (porcine) injection 5,000 Units  5,000 Units SubCUTAneous Q8H    aspirin chewable tablet 81 mg  81 mg Oral DAILY WITH DINNER    atorvastatin (LIPITOR) tablet 80 mg  80 mg Oral DAILY    clopidogreL (PLAVIX) tablet 75 mg  75 mg Oral DAILY WITH BREAKFAST    co-enzyme Q-10 (CO Q-10) capsule 100 mg  100 mg Oral DAILY    melatonin tablet 3 mg  3 mg Oral QHS       Allergies:  No Known Allergies      Lab/Data Review:  Recent Results (from the past 24 hour(s))   HGB & HCT    Collection Time: 06/07/21  5:55 AM   Result Value Ref Range    HGB 12.9 12.0 - 16.0 g/dL    HCT 39.9 35.0 - 45.0 %   PLATELET COUNT    Collection Time: 06/07/21  5:55 AM   Result Value Ref Range    PLATELET 016 330 - 910 K/uL   RENAL FUNCTION PANEL    Collection Time: 06/07/21  5:55 AM   Result Value Ref Range    Sodium 138 136 - 145 mmol/L    Potassium 5.0 3.5 - 5.5 mmol/L    Chloride 111 100 - 111 mmol/L    CO2 22 21 - 32 mmol/L    Anion gap 5 3.0 - 18 mmol/L    Glucose 99 74 - 99 mg/dL    BUN 36 (H) 7.0 - 18 MG/DL    Creatinine 2.16 (H) 0.6 - 1.3 MG/DL    BUN/Creatinine ratio 17 12 - 20      GFR est AA 28 (L) >60 ml/min/1.73m2    GFR est non-AA 23 (L) >60 ml/min/1.73m2 Calcium 9.8 8.5 - 10.1 MG/DL    Phosphorus 4.1 2.5 - 4.9 MG/DL    Albumin 3.1 (L) 3.4 - 5.0 g/dL       Assessment:     Primary Rehab Diagnosis  1. Impaired Mobility and ADLs  2. Acute Lacunar Stroke (acute lacunar infarct in the posterior left lentiform nucleus extending into the corona radiata) with residual right hemiparesis    Comorbidities  Patient Active Problem List   Diagnosis Code    Rheumatoid arthritis ([facility] M06.9    History of vitamin D deficiency Z86.39    Cocaine use F14.90    Stage 3b chronic kidney disease (HCC) N18.32    History of Helicobacter pylori infection Z86.19    Abscess of finger L02.519    Gastroesophageal reflux disease K21.9    Acute lacunar stroke (HCC) I63.81    Impaired mobility and ADLs Z74.09, Z78.9    Hemiparesis of right dominant side due to acute cerebrovascular disease (HCC) I67.89, G81.91    Tobacco use disorder F17.200    Current use of aspirin Z79.82    On clopidogrel therapy Z79.01    Hypertensive heart and kidney disease without heart failure and with stage 3b chronic kidney disease (HCC) I13.10, N18.32    Mixed hyperlipidemia E78.2    On statin therapy due to risk of future cardiovascular event Z79.899    Constipation K59.00    History of iron deficiency anemia Z86.2    High serum magnesium R79.89    Secondary hyperparathyroidism of renal origin ([facility] N25.81    Vitamin D deficiency E55.9        Plan:     1. Justification for continued stay: Good progression towards established rehabilitation goals. 2. Medical Issues being followed closely:    [x]  Fall and safety precautions     []  Wound Care     [x]  Bowel and Bladder Function     [x]  Fluid Electrolyte and Nutrition Balance     []  Pain Control      3.  Issues that 24 hour rehabilitation nursing is following:    [x]  Fall and safety precautions     []  Wound Care     [x]  Bowel and Bladder Function     [x]  Fluid Electrolyte and Nutrition Balance     []  Pain Control      [x]  Assistance with and education on in-room safety with transfers to and from the bed, wheelchair, toilet and shower. 4. Acute rehabilitation plan of care:    [x]  Continue current care and rehab. [x]  Physical Therapy           [x]  Occupational Therapy           [x]  Speech Therapy     []  Hold Rehab until further notice     5. Medications:    [x]  MAR Reviewed     [x]  Continue Present Medications     6. DVT Prophylaxis:      []  Enoxaparin     [x]  Unfractionated Heparin     []  Warfarin     []  NOAC     []  NORBERT Stockings     []  Sequential Compression Device     []  None     7. Code status    [x]  Full code     []  Partial code     []  Do not intubate     []  Do not resuscitate     8.  Orders:   > Acute Lacunar Stroke (acute lacunar infarct in the posterior left lentiform nucleus extending into the corona radiata) with residual right hemiparesis   > Dual antiplatelet therapy (DAPT) was started 5/22/2021; Neurology recommending to continue DAPT for 21 days (~6/12/2021), then discontinue Aspirin and continue on Clopidogrel 75 mg PO once daily    > On 5/27/2021, started Melatonin 3 mg PO q HS (for stroke recovery)   > Continue:    > Aspirin 81 PO once daily with dinner (STOP DATE: 6/12/2021)    > Atorvastatin 40 mg PO once daily    > Clopidogrel 75 mg PO once daily with breakfast      > Melatonin 3 mg PO q HS    > Constipation   > On 5/28/2021, discontinued (re: refused by patient last night and this AM):    > Miralax 17 grams in 8 oz water PO once daily    > PeriColace 2 tabs PO once daily with dinner   > On 6/2/2021, started:    > Pericolace 2 tabs PO once daily after dinner    > Polyethylene glycol 17 grams in 8 oz water PO once daily    > On 6/6/2021, started Lubiprostone 24 mcg PO BID with meals   > Decrease Lubiprostone from 24 mcg to 8 mcg PO BID with meals --> Discontinue   > Continue:    > Pericolace 2 tabs PO once daily after dinner    > Polyethylene glycol 17 grams in 8 oz water PO once daily     > Hypertensive heart and kidney disease without heart failure with stage 3b-4 chronic kidney disease   > On 5/31/2021, started Metoprolol tartrate 25 mg PO q 12 hr (9AM, 9PM)   > On 6/2/2021:    > Discontinued Amlodipine 10 mg PO once daily (9AM)    > Started Losartan 12.5 mg PO once daily (9AM)   > On 6/5/2021:    > Discontinued Losartan 12.5 mg PO once daily (9AM)    > Started Hydralazine 25 mg PO TID PRN for SBP greater than 160 mmHg   > Continue:    > Metoprolol tartrate 25 mg PO q 12 hr (9AM, 9PM)    > Hydralazine 25 mg PO TID PRN for SBP greater than 160 mmHg    > Mixed hyperlipidemia   > On 5/28/2021, started Coenzyme Q10 100 mg PO once daily   > Continue:    > Atorvastatin 40 mg PO once daily    > Coenzyme Q10 100 mg PO once daily    > High serum magnesium   > Mg (5/28/2021, on admission to the ARU) = 3.2   > patient reported she was given 3 doses of Milk of magnesia at [facility] prior to discharge to the ARU   > Avoid magnesium-containing medications/supplements      05/31/21  0545 05/30/21  0706 05/28/21  0613   MG 2.6 2.9* 3.2*     > Stage 3b-4 chronic kidney disease   > BUN/Creatinine (5/28/2021, on admission to the ARU) = 36/2.47   > Retroperitoneal ultrasound (5/28/2021) showed:    > Increased parenchymal echogenicity in both kidneys consistent with medical renal disease. There is a prominent cortical cyst in the mid right kidney. No hydronephrosis or nephrolithiasis.       > Renal cortical thickness is somewhat less than 10 mm in both kidneys.   The left kidney is somewhat small with respect to the right kidney which is low normal in size.   > On 5/29/2021, started IVF: 0.9%  ml.hr x 1 liter once daily after dinner   > PTH (5/31/2021) = 276.1   > Vitamin D 25-Hydroxy (5/31/2021) = 10.1   > Urine microalbumin (5/31/2021) = 15.30   > Microalbumin/Creatinine ratio (5/31/2021) = 165   > Urinalysis (5/31/2021): SG 1.013, protein 30, no casts seen   > Nephrology consult (Dr. Erica Gutierrez) called on 5/31/2021 for evaluation and comanagement   > CK (5/31/2021) = 107   > Urine creatinine (5/31/2021) = 93.00   > Random urine protein (5/31/2021) = 31   > Random urine sodium (5/31/2021) = 52      06/02/21  0445 06/01/21  0430 05/31/21  0545 05/30/21  0706 05/28/21  0613   BUN 35* 37* 36* 37* 36*   CREA 2.24* 2.27* 2.15* 2.25* 2.47*      > On 6/2/2021:    > Discontinued IVF: 0.9%  ml/hr x 1 liter once daily after dinner    > Started Losartan 12.5 mg PO once daily (9AM)      06/05/21  0609 06/04/21  0559 06/03/21  0516   K 4.8 4.9 4.9       06/05/21  0609 06/04/21  0559 06/03/21  0516   BUN 40* 36* 34*   CREA 2.85* 2.45* 2.24*      > On 6/5/2021:    > Discontinued Losartan 12.5 mg PO once daily (9AM)    > Started IVF: 0.9% NS at 75 ml/hr continuous      06/07/21  0555 06/06/21  0616   K 5.0 4.7         06/07/21  0555 06/06/21  0616   BUN 36* 40*   CREA 2.16* 2.41*      > Change IVF from 0.9% NS at 75 ml/hr continuous to 75 ml/hr x 1 liter once daily after dinner    > Tobacco use disorder   > On 5/28/2021, started Nicotine 14 mg/24 hr patch, 1 patch on skin once daily   > Continue Nicotine 14 mg/24 hr patch, 1 patch on skin once daily    > Vitamin D Deficiency   > Vitamin D 25-Hydroxy (5/31/2021) = 10.1   > On 5/31/2021, patient was given Cholecalciferol 50,000 units PO x 1 dose    > On 6/1/2021, started Cholecalciferol 5,000 units PO once daily   > Continue Cholecalciferol 5,000 units PO once daily    > Analgesia   > Continue Acetaminophen 650 mg PO q 4 hr PRN for pain     > Diet:   > Specifications: Cardiac   > Solids (consistency): Regular    > Liquids (consistency): Thin    > Fluid restriction: None      9. Personal Protective Equipment was used while interacting with patient including: goggles and [id number] face mask. Patient was using a surgical mask. 10. Patient's progress in rehabilitation and medical issues discussed with the patient. All questions answered to the best of my ability.  Care plan discussed with patient and nurse.    11. Total clinical care time is 30 minutes, including review of chart including all labs, radiology, past medical history, and discussion with patient. Greater than 50% of my time was spent in coordination of care and counseling.       Signed:    Anna Lam MD    June 7, 2021

## 2021-06-07 NOTE — ROUTINE PROCESS
SHIFT CHANGE NOTE FOR [place]    Bedside and Verbal shift change report given to [address] (oncoming nurse) by Racheal Boogie LPN (offgoing nurse). Report included the following information SBAR, Kardex, MAR and Recent Results.     Situation:   Code Status: Full Code   Hospital Day: 11   Problem List:   Hospital Problems  Date Reviewed: 6/4/2021        Codes Class Noted POA    Secondary hyperparathyroidism of renal origin ([facility] (Chronic) ICD-10-CM: N25.81  ICD-9-CM: 588.81  5/31/2021 Yes        Vitamin D deficiency (Chronic) ICD-10-CM: E55.9  ICD-9-CM: 268.9  5/31/2021 Yes    Overview Signed 5/31/2021  1:17 PM by Alma Paige MD     Vitamin D 25-Hydroxy (5/31/2021) = 10.5              High serum magnesium ICD-10-CM: R79.89  ICD-9-CM: 790.99  5/28/2021 Yes        Hypertensive heart and kidney disease without heart failure and with stage 3b chronic kidney disease ([facility] [address] (Chronic) ICD-10-CM: I13.10, N18.32  ICD-9-CM: 404.90, 585.3  Unknown Yes        Mixed hyperlipidemia (Chronic) ICD-10-CM: E78.2  ICD-9-CM: 272.2  Unknown Yes        Constipation (Chronic) ICD-10-CM: K59.00  ICD-9-CM: 564.00  Unknown Yes        Current use of aspirin ICD-10-CM: Z79.82  ICD-9-CM: V58.66  5/23/2021 Yes        On clopidogrel therapy ICD-10-CM: Z79.01  ICD-9-CM: V58.61  5/23/2021 Yes        On statin therapy due to risk of future cardiovascular event ICD-10-CM: Z79.899  ICD-9-CM: V58.69  5/22/2021 Yes    Overview Signed 5/27/2021  2:27 PM by Alma Paige MD     On Atorvastatin             * (Principal) Acute lacunar stroke [facility]) ICD-10-CM: I63.81  ICD-9-CM: 434.91  5/21/2021 Yes    Overview Signed 5/27/2021  2:15 PM by Alma Paige MD     Acute Lacunar Stroke (acute lacunar infarct in the posterior left lentiform nucleus extending into the corona radiata) with residual right hemiparesis             Impaired mobility and ADLs ICD-10-CM: Z74.09, Z78.9  ICD-9-CM: V49.89  5/21/2021 Yes        Hemiparesis of right dominant side due to acute cerebrovascular disease [facility]) ICD-10-CM: I67.89, G81.91  ICD-9-CM: 436, 342.91  5/21/2021 Yes              Background:   Past Medical History:   Past Medical History:   Diagnosis Date    Abnormal mammogram 2014    left with biopsy    Acute lacunar stroke ([facility] [address] 5/21/2021    Acute Lacunar Stroke (acute lacunar infarct in the posterior left lentiform nucleus extending into the corona radiata) with residual right hemiparesis    Bacterial vaginosis 1/9/15    Dr Eliud Bush Constipation     Current use of aspirin 5/23/2021    Gastroesophageal reflux disease 6/30/2016    Hemiparesis of right dominant side due to acute cerebrovascular disease ([facility] [address] 5/21/2021    History of Helicobacter pylori infection 7/24/2015    History of iron deficiency anemia 06/2014    History of vitamin D deficiency 6/11/2015    Hot flashes 1/9/15    Dr Eliud Bush On clopidogrel therapy 5/23/2021    On statin therapy due to risk of future cardiovascular event 5/22/2021    On Atorvastatin    Rheumatoid arthritis ([facility] [address]     Secondary hyperparathyroidism of renal origin ([facility] [address] 5/31/2021    Stage 3b chronic kidney disease ([facility] [address] 7/24/2015    Tobacco use disorder     Vitamin D deficiency 5/31/2021    Vitamin D 25-Hydroxy (5/31/2021) = 10.5         Assessment:   Changes in Assessment throughout shift:  none     Patient has a central line: no Reasons if yes: na  Insertion date:na Last dressing date:na   Patient has Irving Cath: no Reasons if yes: na   Insertion date:na  Shift irving care completed: NO     Last Vitals:     Vitals:    06/05/21 2130 06/06/21 0705 06/06/21 1548 06/06/21 2100   BP: (!) 143/91 128/77 135/86 (!) 138/90   Pulse: 85 98 75 83   Resp: 18 20 18 18   Temp: 97.7 °F (36.5 °C) 97.1 °F (36.2 °C) 97.2 °F (36.2 °C) 98 °F (36.7 °C)   SpO2: 96% 98% 97% 97%   Weight:       Height:       LMP: 10/15/2014        PAIN    Pain Assessment    Pain Intensity 1: 0 (06/07/21 0200) Pain Intensity 1: 2 (12/29/14 1105)    Pain Location 1: Abdomen Pain Location 1: Abdomen    Pain Intervention(s) 1: Medication (see MAR) Pain Intervention(s) 1: Medication (see MAR)  Patient Stated Pain Goal: 0 Patient Stated Pain Goal: 0  o Intervention effective: yes  o Other actions taken for pain: Medication (see MAR)     Skin Assessment  Skin color    Condition/Temperature    Integrity    Turgor    Weekly Pressure Ulcer Documentation  Pressure  Injury Documentation: No Pressure Injury Noted-Pressure Ulcer Prevention Initiated  Wound Prevention & Protection Methods  Orientation of wound Orientation of Wound Prevention: Posterior  Location of Prevention Location of Wound Prevention: Sacrum/Coccyx  Dressing Present Dressing Present : No  Dressing Status    Wound Offloading Wound Offloading (Prevention Methods): Bed, pressure redistribution/air     INTAKE/OUPUT  Date 06/06/21 0700 - 06/07/21 0659 06/07/21 0700 - 06/08/21 0659   Shift 3999-5168 8811-2412 24 Hour Total 8785-6245 8854-2253 24 Hour Total   INTAKE   Shift Total(mL/kg)         OUTPUT   Urine(mL/kg/hr)           Urine Occurrence(s) 3 x 3 x 6 x      Stool           Stool Occurrence(s) 1 x 2 x 3 x      Shift Total(mL/kg)         NET         Weight (kg) 84.4 84.4 84.4 84.4 84.4 84.4       Recommendations:  1. Patient needs and requests: met    2. Pending tests/procedures: lab as ordered     3. Functional Level/Equipment: Partial (one person) /      Fall Precautions:   Fall risk precautions were reinforced with the patient; she was instructed to call for help prior to getting up. The following fall risk precautions were continued: bed/ chair alarms, door signage, yellow bracelet and socks as well as update of the L[place] Broad tool in the patient's room. Leigh Ann Score:      HEALS Safety Check    A safety check occurred in the patient's room between off going nurse and oncoming nurse listed above.     The safety check included the below items  Area Items   H  High Alert Medications - Verify all high alert medication drips (heparin, PCA, etc.)   E  Equipment - Suction is set up for ALL patients (with blessing)  - Red plugs utilized for all equipment (IV pumps, etc.)  - WOWs wiped down at end of shift.  - Room stocked with oxygen, suction, and other unit-specific supplies   A  Alarms - Bed alarm is set for fall risk patients  - Ensure chair alarm is in place and activated if patient is up in a chair   L  Lines - Check IV for any infiltration  - Rollins bag is empty if patient has a Rollins   - Tubing and IV bags are labeled   S  Safety   - Room is clean, patient is clean, and equipment is clean. - Hallways are clear from equipment besides carts. - Fall bracelet on for fall risk patients  - Ensure room is clear and free of clutter  - Suction is set up for ALL patients (with blessing)  - Hallways are clear from equipment besides carts.    - Isolation precautions followed, supplies available outside room, sign posted     Scout Gonzalez LPN

## 2021-06-07 NOTE — PROGRESS NOTES
Problem: Self Care Deficits Care Plan (Adult)  Goal: *Acute Goals and Plan of Care (Insert Text)  Description: Occupational Therapy Goals   Long Term Goals  Initiated 2021 and to be accomplished within 4 week(s) 2021    1. Patient will perform grooming with modified independence using adaptive equipment as needed. 2. Pt will perform UB bathing with Mod I.  3. Pt will perform LB bathing with Mod I.  4. Pt will perform tub/shower transfer with Mod I using DME. 5. Pt will perform UB dressing with Mod I.  6. Patient will perform upper body dressing and lower body dressing with modified independence. 7. Patient will perform all aspects of toileting with modified independence. 8. Patient will perform toilet transfers with modified independence using RW. Short Term Goals   Initiated 2021 and to be accomplished within 7 day(s) 6/3/2021    1. Pt will perform grooming with Supv using adaptive equipment as needed. - met goal, d/c  goal  2. Pt will perform UB bathing with Supv. - met goal, upgrade to Mod I  3. Pt will perform LB bathing with Supv. - progressing, 6/3/2021  4. Pt will perform tub/shower transfer with SBA using DME. - progressing, 6/3/2021  5. Pt will perform UB dressing with Supv. - met goal, upgrade to Mod I  6. Pt will perform LB dressing with Supv. - progressing, 6/3/2021  7. Pt will perform toileting task with SBA. - progressing, 6/3/2021  8. Pt will perform toilet transfer with SBA using RW. - progressing, 6/3/2021  Outcome: Progressing Towards Goal  Occupational Therapy TREATMENT    Patient: Whit Jonas   62 y.o. Patient identified with name and : yes    Date: 2021    First Tx Session  Time In: 1030  Time Out: 36    Second Tx Session  Time In: 1430  Time Out: 1500    Diagnosis: Acute lacunar stroke [facility]) [I63.81]   Precautions: Fall, Skin  Chart, occupational therapy assessment, plan of care, and goals were reviewed.      Pain:  Pt reports 0/10 pain or discomfort prior to treatment. Pt reports 0/10 pain or discomfort post treatment. Intervention Provided:       SUBJECTIVE:   Patient stated My right hand has gotten a lot stronger.     OBJECTIVE DATA SUMMARY:     THERAPEUTIC ACTIVITY Daily Assessment    Patient participated in There act seated w/c level at reaching with BUEs ipsilaterally and contralaterally for retrieval and placement of graded resistant clamps onto dowel tree with good tolerance for improved RUE ROM and strength, sitting balance and core strength while incorporating trunk flexion and rotation for increased independence with ADL self care tasks and functional mobility, pt tolerated well with rest breaks incorporating b/l hand/digit stretches. THERAPEUTIC EXERCISE Daily Assessment    Sci-Fit at level 1 resistance seated w/c level x 10 mins without rest break in order to increase UB and core strength and functional activity tolerance for improved ADL independence and functional mobility, pt tolerated well.      IADL Daily Assessment         NMRE Daily Assessment         FEEDING/EATING Daily Assessment          GROOMING Daily Assessment    Grooming  Grooming Assistance : 4 (Contact guard assistance)  Comments:  (in stance at sink w/ RW washing hands)       TOILETING Daily Assessment    Toileting  Toileting Assistance (FIM Score):  (4.5 CGA)  Adaptive Equipment: Walker;Grab bars (3 in 1 commode over toilet)       LOWER BODY DRESSING Daily Assessment    Lower Body Dressing   Dressing Assistance : 5 (Supervision)  Leg Crossed Method Used: Yes  Position Performed: Seated edge of bed  Comments:  (don slip on shoes)     MOBILITY/TRANSFERS Daily Assessment    Functional Transfers  Amount of Assistance Required: 4 (Contact guard assistance) (using RW)       ASSESSMENT:  Patient provided verbal cues for correct hand positioning in prep for sit to stand with right hand digits in extension versus flexion to push up from bed surface, pt provided good return demonstration. Patient mentioned right hand splint recently discussed with BAKER, pt verbalized understanding right hand splint recommended for wear at nighttime to prevent flexion contractures and prevent worsening arthritic changes from RA in order to increase functional use of affected right hand during day time for ADLs, functional tasks and functional transfers. Patient noted with improved balance during toilet transfer using RW and washing hands in stance at sink with RW. Patient sitting up in w/c at end of tx session, call bell within reach & pt verbalized understanding to utilize for assist e.g. functional transfers in order to prevent falls, call bell within reach, chair alarm intact. Group tx x 2 patient's with good participation for use of BUEs incorporating gross and fine motor skills during Jumbo checkers seated w/c level at tabletop and balloon batting, increased dexterity and coordination noted with affected RUE. Patient sitting up in w/c at end of tx session with PT. Progression toward goals:  [x]          Improving appropriately and progressing toward goals  []          Improving slowly and progressing toward goals  []          Not making progress toward goals and plan of care will be adjusted     PLAN:  Patient continues to benefit from skilled intervention to address the above impairments. Continue treatment per established plan of care. Discharge Recommendations:  Home Health with 24/7 supv and assist e.g. ADLs  Further Equipment Recommendations for Discharge:  bedside commode, grab bars, transfer bench, rolling walker, and wheelchair      Activity Tolerance:  fair  Estimated LOS: 1 week    Please refer to the flowsheet for vital signs taken during this treatment.   After treatment:   [x]  Patient left in no apparent distress sitting up in chair   []  Patient left in no apparent distress in bed  [x]  Call bell left within reach  [x]  Nursing notified  []  Caregiver present  [x]  chair alarm activated    COMMUNICATION/EDUCATION:   [x] Home safety education was provided and the patient/caregiver indicated understanding. [x] Patient/family have participated as able in goal setting and plan of care. [x] Patient/family agree to work toward stated goals and plan of care. [] Patient understands intent and goals of therapy, but is neutral about his/her participation. [] Patient is unable to participate in goal setting and plan of care.       Yevgeniy Dean

## 2021-06-08 LAB
ALBUMIN SERPL-MCNC: 3.1 G/DL (ref 3.4–5)
ANION GAP SERPL CALC-SCNC: 4 MMOL/L (ref 3–18)
BUN SERPL-MCNC: 34 MG/DL (ref 7–18)
BUN/CREAT SERPL: 17 (ref 12–20)
CALCIUM SERPL-MCNC: 9.7 MG/DL (ref 8.5–10.1)
CHLORIDE SERPL-SCNC: 113 MMOL/L (ref 100–111)
CO2 SERPL-SCNC: 23 MMOL/L (ref 21–32)
CREAT SERPL-MCNC: 2.02 MG/DL (ref 0.6–1.3)
GLUCOSE SERPL-MCNC: 96 MG/DL (ref 74–99)
PHOSPHATE SERPL-MCNC: 4.1 MG/DL (ref 2.5–4.9)
POTASSIUM SERPL-SCNC: 4.9 MMOL/L (ref 3.5–5.5)
SODIUM SERPL-SCNC: 140 MMOL/L (ref 136–145)

## 2021-06-08 PROCEDURE — 92507 TX SP LANG VOICE COMM INDIV: CPT

## 2021-06-08 PROCEDURE — 74011250637 HC RX REV CODE- 250/637: Performed by: INTERNAL MEDICINE

## 2021-06-08 PROCEDURE — 65310000000 HC RM PRIVATE REHAB

## 2021-06-08 PROCEDURE — 99232 SBSQ HOSP IP/OBS MODERATE 35: CPT | Performed by: INTERNAL MEDICINE

## 2021-06-08 PROCEDURE — 97110 THERAPEUTIC EXERCISES: CPT

## 2021-06-08 PROCEDURE — 97112 NEUROMUSCULAR REEDUCATION: CPT

## 2021-06-08 PROCEDURE — 97530 THERAPEUTIC ACTIVITIES: CPT

## 2021-06-08 PROCEDURE — 97116 GAIT TRAINING THERAPY: CPT

## 2021-06-08 PROCEDURE — 36415 COLL VENOUS BLD VENIPUNCTURE: CPT

## 2021-06-08 PROCEDURE — 74011250636 HC RX REV CODE- 250/636: Performed by: INTERNAL MEDICINE

## 2021-06-08 PROCEDURE — 80069 RENAL FUNCTION PANEL: CPT

## 2021-06-08 PROCEDURE — 2709999900 HC NON-CHARGEABLE SUPPLY

## 2021-06-08 RX ADMIN — ACETAMINOPHEN 650 MG: 325 TABLET ORAL at 22:24

## 2021-06-08 RX ADMIN — HEPARIN SODIUM 5000 UNITS: 5000 INJECTION INTRAVENOUS; SUBCUTANEOUS at 05:46

## 2021-06-08 RX ADMIN — POLYETHYLENE GLYCOL 3350 17 G: 17 POWDER, FOR SOLUTION ORAL at 09:17

## 2021-06-08 RX ADMIN — HEPARIN SODIUM 5000 UNITS: 5000 INJECTION INTRAVENOUS; SUBCUTANEOUS at 17:26

## 2021-06-08 RX ADMIN — LUBIPROSTONE 8 MCG: 8 CAPSULE, GELATIN COATED ORAL at 17:26

## 2021-06-08 RX ADMIN — Medication 100 MG: at 08:06

## 2021-06-08 RX ADMIN — Medication 3 MG: at 22:20

## 2021-06-08 RX ADMIN — CLOPIDOGREL BISULFATE 75 MG: 75 TABLET ORAL at 08:06

## 2021-06-08 RX ADMIN — METOPROLOL TARTRATE 25 MG: 25 TABLET, FILM COATED ORAL at 08:06

## 2021-06-08 RX ADMIN — LUBIPROSTONE 8 MCG: 8 CAPSULE, GELATIN COATED ORAL at 08:06

## 2021-06-08 RX ADMIN — SODIUM CHLORIDE 1000 ML: 900 INJECTION, SOLUTION INTRAVENOUS at 17:39

## 2021-06-08 RX ADMIN — ATORVASTATIN CALCIUM 80 MG: 40 TABLET, FILM COATED ORAL at 08:06

## 2021-06-08 RX ADMIN — Medication 5000 UNITS: at 08:06

## 2021-06-08 RX ADMIN — ASPIRIN 81 MG: 81 TABLET, CHEWABLE ORAL at 17:26

## 2021-06-08 RX ADMIN — DOCUSATE SODIUM 50MG AND SENNOSIDES 8.6MG 2 TABLET: 8.6; 5 TABLET, FILM COATED ORAL at 17:26

## 2021-06-08 RX ADMIN — METOPROLOL TARTRATE 25 MG: 25 TABLET, FILM COATED ORAL at 22:20

## 2021-06-08 NOTE — PROGRESS NOTES
[facility] PHYSICAL REHABILITATION  [address] [place] [address], [address]     INPATIENT REHABILITATION  DAILY PROGRESS NOTE     Date: 6/8/2021    Name: Master Smart Age / Sex: 62 y.o. / female   CSN: [id number] MRN: [id number]   [facility] Date: 5/27/2021 Length of Stay: 12 days     Primary Rehab Diagnosis: Impaired Mobility and ADLs secondary to Acute Lacunar Stroke (acute lacunar infarct in the posterior left lentiform nucleus extending into the corona radiata) with residual right hemiparesis      Subjective:     Patient seen and examined. Blood pressure controlled. Patient's Complaint:   No significant medical complaints    Pain Control: no current joint or muscle symptoms, essentially pain-free      Objective:     Vital Signs:  Patient Vitals for the past 24 hrs:   BP Temp Pulse Resp SpO2   06/08/21 0752 123/86 -- 70 18 98 %   06/07/21 2101 137/88 97.9 °F (36.6 °C) 81 18 99 %   06/07/21 1620 127/88 97.7 °F (36.5 °C) 81 16 95 %        Physical Examination:  GENERAL SURVEY: Patient is awake, alert, oriented x 3, sitting comfortably on the chair, not in acute respiratory distress. HEENT: pink palpebral conjunctivae, anicteric sclerae, no nasoaural discharge, moist oral mucosa  NECK: supple, no jugular venous distention, no palpable lymph nodes  CHEST/LUNGS: symmetrical chest expansion, good air entry, clear breath sounds  HEART: adynamic precordium, good S1 S2, no S3, regular rhythm, no murmurs  ABDOMEN: flat, bowel sounds appreciated, soft, non-tender  EXTREMITIES: pink nailbeds, no edema, full and equal pulses, no calf tenderness   NEUROLOGICAL EXAM: The patient is awake, alert and oriented x3, able to answer questions fairly appropriately, able to follow 1 and 2 step commands. Able to tell time from the wall clock. Cranial nerves II-XII are grossly intact. No gross sensory deficit. Motor strength is 4 to 4+/5 on the RUE and RLE, 5/5 on the LUE and LLE.       Current Medications:  Current Facility-Administered Medications   Medication Dose Route Frequency    0.9% sodium chloride infusion 1,000 mL  1,000 mL IntraVENous PCD    lubiPROStone (AMITIZA) capsule 8 mcg  8 mcg Oral BID WITH MEALS    heparin (porcine) injection 5,000 Units  5,000 Units SubCUTAneous Q12H    hydrALAZINE (APRESOLINE) tablet 25 mg  25 mg Oral TID PRN    polyethylene glycol (MIRALAX) packet 17 g  17 g Oral DAILY    senna-docusate (PERICOLACE) 8.6-50 mg per tablet 2 Tablet  2 Tablet Oral PCD    metoprolol tartrate (LOPRESSOR) tablet 25 mg  25 mg Oral Q12H    cholecalciferol (VITAMIN D3) capsule 5,000 Units  5,000 Units Oral DAILY    nicotine (NICODERM CQ) 14 mg/24 hr patch 1 Patch  1 Patch TransDERmal DAILY    acetaminophen (TYLENOL) tablet 650 mg  650 mg Oral Q4H PRN    bisacodyL (DULCOLAX) tablet 10 mg  10 mg Oral Q48H PRN    aspirin chewable tablet 81 mg  81 mg Oral DAILY WITH DINNER    atorvastatin (LIPITOR) tablet 80 mg  80 mg Oral DAILY    clopidogreL (PLAVIX) tablet 75 mg  75 mg Oral DAILY WITH BREAKFAST    co-enzyme Q-10 (CO Q-10) capsule 100 mg  100 mg Oral DAILY    melatonin tablet 3 mg  3 mg Oral QHS       Allergies:  No Known Allergies      Functional Progress:    OCCUPATIONAL THERAPY    ON ADMISSION MOST RECENT   Eating  Functional Level: 5   Eating  Functional Level: 5     Grooming  Functional Level: 5 (seated w/c level at sink using nondominant left hand)   Grooming  Functional Level: 5 (seated w/c level at sink using nondominant left hand)     Bathing  Functional Level: 5 (5 SBA UB, 4.5 CGA LB)   Bathing  Functional Level: 5 (5 SBA UB, 4.5 CGA LB)     Upper Body Dressing  Functional Level: 5 (SBA)   Upper Body Dressing  Functional Level: 5 (SBA)     Lower Body Dressing  Functional Level:  (4.5-4 CGA/Min A seated on bedside commode & in stance w/ RW)   Lower Body Dressing  Functional Level:  (4.5-4 CGA/Min A seated on bedside commode & in stance w/ RW)     Toileting  Functional Level:  (4.5 CGA-4 Min A)   Toileting  Functional Level: 4     Toilet Transfers  Toilet Transfer Score:  (4 Min A using RW to bedside commode)   Toilet Transfers  Toilet Transfer Score: 4     Tub /Shower Transfers  Tub/Shower Transfer Score:  (NT d/t time constraints)   Tub/Shower Transfers  Tub/Shower Transfer Score:  (NT d/t time constraints)       Legend:   7 - Independent   6 - Modified Independent   5 - Standby Assistance / Supervision / Set-up   4 - Minimum Assistance / Contact Guard Assistance   3 - Moderate Assistance   2 - Maximum Assistance   1 - Total Assistance / Dependent       Lab/Data Review:  Recent Results (from the past 24 hour(s))   RENAL FUNCTION PANEL    Collection Time: 06/08/21  5:03 AM   Result Value Ref Range    Sodium 140 136 - 145 mmol/L    Potassium 4.9 3.5 - 5.5 mmol/L    Chloride 113 (H) 100 - 111 mmol/L    CO2 23 21 - 32 mmol/L    Anion gap 4 3.0 - 18 mmol/L    Glucose 96 74 - 99 mg/dL    BUN 34 (H) 7.0 - 18 MG/DL    Creatinine 2.02 (H) 0.6 - 1.3 MG/DL    BUN/Creatinine ratio 17 12 - 20      GFR est AA 31 (L) >60 ml/min/1.73m2    GFR est non-AA 25 (L) >60 ml/min/1.73m2    Calcium 9.7 8.5 - 10.1 MG/DL    Phosphorus 4.1 2.5 - 4.9 MG/DL    Albumin 3.1 (L) 3.4 - 5.0 g/dL       Assessment:     Primary Rehab Diagnosis  1. Impaired Mobility and ADLs  2.  Acute Lacunar Stroke (acute lacunar infarct in the posterior left lentiform nucleus extending into the corona radiata) with residual right hemiparesis    Comorbidities  Patient Active Problem List   Diagnosis Code    Rheumatoid arthritis ([facility] [address] M06.9    History of vitamin D deficiency Z86.39    Cocaine use F14.90    Stage 3b chronic kidney disease (HCC) N18.32    History of Helicobacter pylori infection Z86.19    Abscess of finger L02.519    Gastroesophageal reflux disease K21.9    Acute lacunar stroke (HCC) I63.81    Impaired mobility and ADLs Z74.09, Z78.9    Hemiparesis of right dominant side due to acute cerebrovascular disease (HCC) I67.89, G81.91    Tobacco use disorder F17.200    Current use of aspirin Z79.82    On clopidogrel therapy Z79.01    Hypertensive heart and kidney disease without heart failure and with stage 3b chronic kidney disease (HCC) I13.10, N18.32    Mixed hyperlipidemia E78.2    On statin therapy due to risk of future cardiovascular event Z79.899    Constipation K59.00    History of iron deficiency anemia Z86.2    High serum magnesium R79.89    Secondary hyperparathyroidism of renal origin ([facility] [address] N25.81    Vitamin D deficiency E55.9        Plan:     1. Justification for continued stay: Good progression towards established rehabilitation goals. 2. Medical Issues being followed closely:    [x]  Fall and safety precautions     []  Wound Care     [x]  Bowel and Bladder Function     [x]  Fluid Electrolyte and Nutrition Balance     []  Pain Control      3. Issues that 24 hour rehabilitation nursing is following:    [x]  Fall and safety precautions     []  Wound Care     [x]  Bowel and Bladder Function     [x]  Fluid Electrolyte and Nutrition Balance     []  Pain Control      [x]  Assistance with and education on in-room safety with transfers to and from the bed, wheelchair, toilet and shower. 4. Acute rehabilitation plan of care:    [x]  Continue current care and rehab. [x]  Physical Therapy           [x]  Occupational Therapy           [x]  Speech Therapy     []  Hold Rehab until further notice     5. Medications:    [x]  MAR Reviewed     [x]  Continue Present Medications     6. DVT Prophylaxis:      []  Enoxaparin     [x]  Unfractionated Heparin     []  Warfarin     []  NOAC     []  NORBERT Stockings     []  Sequential Compression Device     []  None     7. Code status    [x]  Full code     []  Partial code     []  Do not intubate     []  Do not resuscitate     8.  Orders:   > Acute Lacunar Stroke (acute lacunar infarct in the posterior left lentiform nucleus extending into the corona radiata) with residual right hemiparesis   > Dual antiplatelet therapy (DAPT) was started 5/22/2021; Neurology recommending to continue DAPT for 21 days (~6/12/2021), then discontinue Aspirin and continue on Clopidogrel 75 mg PO once daily    > On 5/27/2021, started Melatonin 3 mg PO q HS (for stroke recovery)   > Continue:    > Aspirin 81 PO once daily with dinner (STOP DATE: 6/12/2021)    > Atorvastatin 40 mg PO once daily    > Clopidogrel 75 mg PO once daily with breakfast      > Melatonin 3 mg PO q HS    > Constipation   > On 5/28/2021, discontinued (re: refused by patient last night and this AM):    > Miralax 17 grams in 8 oz water PO once daily    > PeriColace 2 tabs PO once daily with dinner   > On 6/2/2021, started:    > Pericolace 2 tabs PO once daily after dinner    > Polyethylene glycol 17 grams in 8 oz water PO once daily    > On 6/6/2021, started Lubiprostone 24 mcg PO BID with meals   > On 6/7/2021, decreased Lubiprostone from 24 mcg to 8 mcg PO BID with meals   > Continue:    > Lubiprostone 8 mcg PO BID with meals    > Pericolace 2 tabs PO once daily after dinner    > Polyethylene glycol 17 grams in 8 oz water PO once daily     > Hypertensive heart and kidney disease without heart failure with stage 3b-4 chronic kidney disease   > On 5/31/2021, started Metoprolol tartrate 25 mg PO q 12 hr (9AM, 9PM)   > On 6/2/2021:    > Discontinued Amlodipine 10 mg PO once daily (9AM)    > Started Losartan 12.5 mg PO once daily (9AM)   > On 6/5/2021:    > Discontinued Losartan 12.5 mg PO once daily (9AM)    > Started Hydralazine 25 mg PO TID PRN for SBP greater than 160 mmHg   > Continue:    > Metoprolol tartrate 25 mg PO q 12 hr (9AM, 9PM)    > Hydralazine 25 mg PO TID PRN for SBP greater than 160 mmHg    > Mixed hyperlipidemia   > On 5/28/2021, started Coenzyme Q10 100 mg PO once daily   > Continue:    > Atorvastatin 40 mg PO once daily    > Coenzyme Q10 100 mg PO once daily    > High serum magnesium   > Mg (5/28/2021, on admission to the ARU) = 3.2   > patient reported she was given 3 doses of Milk of magnesia at [facility] prior to discharge to the ARU   > Avoid magnesium-containing medications/supplements      05/31/21  0545 05/30/21  0706 05/28/21  0613   MG 2.6 2.9* 3.2*     > Stage 3b-4 chronic kidney disease   > BUN/Creatinine (5/28/2021, on admission to the ARU) = 36/2.47   > Retroperitoneal ultrasound (5/28/2021) showed:    > Increased parenchymal echogenicity in both kidneys consistent with medical renal disease. There is a prominent cortical cyst in the mid right kidney. No hydronephrosis or nephrolithiasis.       > Renal cortical thickness is somewhat less than 10 mm in both kidneys.   The left kidney is somewhat small with respect to the right kidney which is low normal in size.   > On 5/29/2021, started IVF: 0.9%  ml.hr x 1 liter once daily after dinner   > PTH (5/31/2021) = 276.1   > Vitamin D 25-Hydroxy (5/31/2021) = 10.1   > Urine microalbumin (5/31/2021) = 15.30   > Microalbumin/Creatinine ratio (5/31/2021) = 165   > Urinalysis (5/31/2021): SG 1.013, protein 30, no casts seen   > Nephrology consult (Dr. Steve Urrutia) called on 5/31/2021 for evaluation and comanagement   > CK (5/31/2021) = 107   > Urine creatinine (5/31/2021) = 93.00   > Random urine protein (5/31/2021) = 31   > Random urine sodium (5/31/2021) = 52      06/02/21  0445 06/01/21  0430 05/31/21  0545 05/30/21  0706 05/28/21  0613   BUN 35* 37* 36* 37* 36*   CREA 2.24* 2.27* 2.15* 2.25* 2.47*      > On 6/2/2021:    > Discontinued IVF: 0.9%  ml/hr x 1 liter once daily after dinner    > Started Losartan 12.5 mg PO once daily (9AM)      06/05/21  0609 06/04/21  0559 06/03/21  0516   K 4.8 4.9 4.9       06/05/21  0609 06/04/21  0559 06/03/21 0516   BUN 40* 36* 34*   CREA 2.85* 2.45* 2.24*      > On 6/5/2021:    > Discontinued Losartan 12.5 mg PO once daily (9AM)    > Started IVF: 0.9% NS at 75 ml/hr continuous      06/07/21  0555 06/06/21 0616   K 5.0 4.7 06/07/21  0555 06/06/21  0616   BUN 36* 40*   CREA 2.16* 2.41*      > On 6/7/2021, changed IVF from 0.9% NS at 75 ml/hr continuous to 75 ml/hr x 1 liter once daily after dinner   > BUN/Creatinine (6/8/2021) = 34/2.02   > K (6/8/2021) = 4.9   > Continue IVF: 0.9% NS at 75 ml/hr x 1 liter once daily after dinner    > Tobacco use disorder   > On 5/28/2021, started Nicotine 14 mg/24 hr patch, 1 patch on skin once daily   > Continue Nicotine 14 mg/24 hr patch, 1 patch on skin once daily    > Vitamin D Deficiency   > Vitamin D 25-Hydroxy (5/31/2021) = 10.1   > On 5/31/2021, patient was given Cholecalciferol 50,000 units PO x 1 dose    > On 6/1/2021, started Cholecalciferol 5,000 units PO once daily   > Continue Cholecalciferol 5,000 units PO once daily    > Analgesia   > Continue Acetaminophen 650 mg PO q 4 hr PRN for pain     > Diet:   > Specifications: Cardiac   > Solids (consistency): Regular    > Liquids (consistency): Thin    > Fluid restriction: None      9. Personal Protective Equipment was used while interacting with patient including: goggles and KN95 face mask. Patient was using a surgical mask. 10. Patient's progress in rehabilitation and medical issues discussed with the patient. All questions answered to the best of my ability. Care plan discussed with patient and nurse. 11. Total clinical care time is 30 minutes, including review of chart including all labs, radiology, past medical history, and discussion with patient. Greater than 50% of my time was spent in coordination of care and counseling.       Signed:    Toy Anderson MD    June 8, 2021

## 2021-06-08 NOTE — PROGRESS NOTES
Problem: Self Care Deficits Care Plan (Adult)  Goal: *Acute Goals and Plan of Care (Insert Text)  Description: Occupational Therapy Goals   Long Term Goals  Initiated 2021 and to be accomplished within 4 week(s) 2021    1. Patient will perform grooming with modified independence using adaptive equipment as needed. 2. Pt will perform UB bathing with Mod I.  3. Pt will perform LB bathing with Mod I.  4. Pt will perform tub/shower transfer with Mod I using DME. 5. Pt will perform UB dressing with Mod I.  6. Patient will perform upper body dressing and lower body dressing with modified independence. 7. Patient will perform all aspects of toileting with modified independence. 8. Patient will perform toilet transfers with modified independence using RW. Short Term Goals   Initiated 2021 and to be accomplished within 7 day(s) 6/3/2021    1. Pt will perform grooming with Supv using adaptive equipment as needed. - met goal, d/c  goal  2. Pt will perform UB bathing with Supv. - met goal, upgrade to Mod I  3. Pt will perform LB bathing with Supv. - progressing, 6/3/2021  4. Pt will perform tub/shower transfer with SBA using DME. - progressing, 6/3/2021  5. Pt will perform UB dressing with Supv. - met goal, upgrade to Mod I  6. Pt will perform LB dressing with Supv. - progressing, 6/3/2021  7. Pt will perform toileting task with SBA. - progressing, 6/3/2021  8. Pt will perform toilet transfer with SBA using RW. - progressing, 6/3/2021     Outcome: Progressing Towards Goal    Occupational Therapy TREATMENT    Patient: Anuja Jean   62 y.o. Patient identified with name and : yes    Date: 2021    First Tx Session  Time In: 1030  Time Out: 1130    Diagnosis: Acute lacunar stroke [facility]) [I63.81]   Precautions: Fall, Skin  Chart, occupational therapy assessment, plan of care, and goals were reviewed. Pain:  Pt reports 0/10 pain or discomfort prior to treatment.     Pt reports 0/10 pain or discomfort post treatment. Intervention Provided:       SUBJECTIVE:   Patient stated I think I do better with the cane.     OBJECTIVE DATA SUMMARY:     THERAPEUTIC ACTIVITY Daily Assessment    Standing balance and tolerance with RUE reaching while incorporating fine motor dexterity & coordination in stance w/ SBA at tabletop with LBQC x 2 sets x 5 mins using graded resistive clamp for retrieval of small pieces of foam from tabletop and placement into cup, pt participated well with improved RUE function in order to support ADL performance skills. THERAPEUTIC EXERCISE Daily Assessment    Sci-Fit at level 1 resistance seated w/c level x 10 mins with 4 rest breaks in order to increase UB and core strength and functional activity tolerance for improved ADL independence and functional mobility, pt tolerated well. ASSESSMENT:  Patient declined OT ADL tx session d/t pt report that she already completed ADL routine at bed level with fiance assist for set up. Functional transfer using LBQC from edge of bed to w/c w/ CGA-SBA with no LOB. Pt self propelled w/c to rehab w/ Mod I using BUEs and BLEs. Patient participating in PT tx session following completion of OT tx session. Progression toward goals:  [x]          Improving appropriately and progressing toward goals  []          Improving slowly and progressing toward goals  []          Not making progress toward goals and plan of care will be adjusted     PLAN:  Patient continues to benefit from skilled intervention to address the above impairments. Continue treatment per established plan of care. Discharge Recommendations:  Home Health  Further Equipment Recommendations for Discharge:  bedside commode, grab bars, transfer bench, and rolling walker     Activity Tolerance:  fair    Estimated LOS: 1 week    Please refer to the flowsheet for vital signs taken during this treatment.   After treatment:   [x]  Patient left in no apparent distress sitting up in chair []  Patient left in no apparent distress in bed  [x]  Call bell left within reach  [x]  Nursing notified  []  Caregiver present  [x]  chair alarm activated    COMMUNICATION/EDUCATION:   [x] Home safety education was provided and the patient/caregiver indicated understanding. [x] Patient/family have participated as able in goal setting and plan of care. [x] Patient/family agree to work toward stated goals and plan of care. [] Patient understands intent and goals of therapy, but is neutral about his/her participation. [] Patient is unable to participate in goal setting and plan of care.       Maeve Robles

## 2021-06-08 NOTE — PROGRESS NOTES
In Patient Progress note      Admit Date: 5/27/2021    Assessment and plan:     #1 Acute kidney injury on chronic kidney disease stage III/IV. Patient's baseline creatinine  close to 2 range. Appears to be having a mild REHANA, I think it is prerenal as it is improving with hydration. Patient also has history of using Sutton 2   inhibitors /multiple NSAID use including BC powder chronically. Her CKD is likely d/t HTN nephrosclerosis and NSAID use . Renal US with no hydro. #2 Hypertension   #3 History of CVA with right-sided hemiparesis  #4 dyslipidemia  #5 mild proteinuria  #6 rheumatoid arthritis  #7 NSAID use  #8 Subnephrotic proteinuria    Losartan was held and IVF started at night   Creatinine better     Plan:     #1IV fluids at night for 12 hrs, encourage fluid intake   #2 no more NSAIDs , counseled patient   #3 follow renal panels daily     Following patient closely,     Please call with questions     Jay Sanon MD FAS  Cell [phone]  Pager: [phone]    Subjective:     - No acute over night events. - respiratory - stable  - hemodynamics - stable, no pressrs  - UOP-good   - Nutrition -ok    Objective:     Visit Vitals  /86 (BP 1 Location: Left upper arm, BP Patient Position: At rest)   Pulse 70   Temp 97.9 °F (36.6 °C)   Resp 18   Ht 5' 7\" (1.702 m)   Wt 84.4 kg (186 lb)   LMP 10/15/2014   SpO2 98%   Breastfeeding No   BMI 29.13 kg/m²         Intake/Output Summary (Last 24 hours) at 6/8/2021 1639  Last data filed at 6/8/2021 0906  Gross per 24 hour   Intake 250 ml   Output --   Net 250 ml       Physical Exam:     Patient is in no apparent distress. HEENT: mmm  Neck: no cervical lymphadenopathy or thyromegaly. Lungs: good air entry, clear to auscultation bilaterally. Cardiovascular system: S1, S2, regular rate and rhythm. Abdomen: soft, non tender, non distended. Extremities: no clubbing, cyanosis or edema. Neurologic: Alert, oriented time three.      Data Review:    Recent Labs 06/07/21  0555   HCT 39.9     Recent Labs     06/08/21  0503 06/07/21  0555 06/06/21  0616   BUN 34* 36* 40*   CREA 2.02* 2.16* 2.41*   CA 9.7 9.8 9.7   ALB 3.1* 3.1* 3.0*   K 4.9 5.0 4.7    138 138   * 111 111   CO2 23 22 23   PHOS 4.1 4.1 3.9   GLU 96 99 104*       Kelin Stevens MD

## 2021-06-08 NOTE — ROUTINE PROCESS
[facility]    Bedside and Verbal shift change report given to Anai Kohli, RN (oncoming nurse) by Hany Diane RN (offgoing nurse). Report included the following information SBAR, Kardex, MAR and Recent Results.     Situation:   Code Status: Full Code   Hospital Day: 12   Problem List:   Hospital Problems  Date Reviewed: 6/8/2021        Codes Class Noted POA    Secondary hyperparathyroidism of renal origin ([facility] (Chronic) ICD-10-CM: N25.81  ICD-9-CM: 588.81  5/31/2021 Yes        Vitamin D deficiency (Chronic) ICD-10-CM: E55.9  ICD-9-CM: 268.9  5/31/2021 Yes    Overview Signed 5/31/2021  1:17 PM by Radha De Leon MD     Vitamin D 25-Hydroxy (5/31/2021) = 10.5              High serum magnesium ICD-10-CM: R79.89  ICD-9-CM: 790.99  5/28/2021 Yes        Hypertensive heart and kidney disease without heart failure and with stage 3b chronic kidney disease ([facility] [address] [address] (Chronic) ICD-10-CM: I13.10, N18.32  ICD-9-CM: 404.90, 585.3  Unknown Yes        Mixed hyperlipidemia (Chronic) ICD-10-CM: E78.2  ICD-9-CM: 272.2  Unknown Yes        Constipation (Chronic) ICD-10-CM: K59.00  ICD-9-CM: 564.00  Unknown Yes        Current use of aspirin ICD-10-CM: Z79.82  ICD-9-CM: V58.66  5/23/2021 Yes        On clopidogrel therapy ICD-10-CM: Z79.01  ICD-9-CM: V58.61  5/23/2021 Yes        On statin therapy due to risk of future cardiovascular event ICD-10-CM: Z79.899  ICD-9-CM: V58.69  5/22/2021 Yes    Overview Signed 5/27/2021  2:27 PM by Radha De Leon MD     On Atorvastatin             * (Principal) Acute lacunar stroke [facility]) ICD-10-CM: I63.81  ICD-9-CM: 434.91  5/21/2021 Yes    Overview Signed 5/27/2021  2:15 PM by Radha De Leon MD     Acute Lacunar Stroke (acute lacunar infarct in the posterior left lentiform nucleus extending into the corona radiata) with residual right hemiparesis             Impaired mobility and ADLs ICD-10-CM: Z74.09, Z78.9  ICD-9-CM: V49.89  5/21/2021 Yes        Hemiparesis of right dominant side due to acute cerebrovascular disease [facility]) ICD-10-CM: I67.89, G81.91  ICD-9-CM: 222, 342.91  5/21/2021 Yes              Background:   Past Medical History:   Past Medical History:   Diagnosis Date    Abnormal mammogram 2014    left with biopsy    Acute lacunar stroke ([facility] [address] [facility] 5/21/2021    Acute Lacunar Stroke (acute lacunar infarct in the posterior left lentiform nucleus extending into the corona radiata) with residual right hemiparesis    Bacterial vaginosis 1/9/15    Dr Ana Luisa Barcenas Constipation     Current use of aspirin 5/23/2021    Gastroesophageal reflux disease 6/30/2016    Hemiparesis of right dominant side due to acute cerebrovascular disease ([facility] [address] 5/21/2021    History of Helicobacter pylori infection 7/24/2015    History of iron deficiency anemia 06/2014    History of vitamin D deficiency 6/11/2015    Hot flashes 1/9/15    Dr Ana Luisa Barcenas On clopidogrel therapy 5/23/2021    On statin therapy due to risk of future cardiovascular event 5/22/2021    On Atorvastatin    Rheumatoid arthritis ([facility] [facility]     Secondary hyperparathyroidism of renal origin ([facility] [address] [facility] 5/31/2021    Stage 3b chronic kidney disease ([facility] [address] 7/24/2015    Tobacco use disorder     Vitamin D deficiency 5/31/2021    Vitamin D 25-Hydroxy (5/31/2021) = 10.5         Assessment:   Changes in Assessment throughout shift: No change to previous assessmentnone     Patient has a central line: no Reasons if yes: na  Insertion date:na Last dressing date:na   Patient has Irving Cath: no Reasons if yes: na   Insertion date:na  Shift irving care completed: NO     Last Vitals:     Vitals:    06/07/21 1620 06/07/21 2101 06/08/21 0752 06/08/21 1657   BP: 127/88 137/88 123/86 130/87   Pulse: 81 81 70 82   Resp: 16 18 18 18   Temp: 97.7 °F (36.5 °C) 97.9 °F (36.6 °C)  97.4 °F (36.3 °C)   SpO2: 95% 99% 98% 98%   Weight:       Height:       LMP: 10/15/2014        PAIN    Pain Assessment    Pain Intensity 1: 0 (06/08/21 1827) Pain Intensity 1: 2 (12/29/14 110)    Pain Location 1: Generalized Pain Location 1: Abdomen    Pain Intervention(s) 1: Medication (see MAR) Pain Intervention(s) 1: Medication (see MAR)  Patient Stated Pain Goal: 0 Patient Stated Pain Goal: 0  o Intervention effective: yes  o Other actions taken for pain:       Skin Assessment  Skin color    Condition/Temperature    Integrity    Turgor    Weekly Pressure Ulcer Documentation  Pressure  Injury Documentation: No Pressure Injury Noted-Pressure Ulcer Prevention Initiated  Wound Prevention & Protection Methods  Orientation of wound Orientation of Wound Prevention: Posterior  Location of Prevention Location of Wound Prevention: Buttocks, Sacrum/Coccyx  Dressing Present Dressing Present : No  Dressing Status    Wound Offloading Wound Offloading (Prevention Methods): Bed, pressure reduction mattress     INTAKE/OUPUT  Date 06/07/21 1900 - 06/08/21 0659 06/08/21 0700 - 06/09/21 0659   Shift 0651-0304 24 Hour Total 2398-7210 4331-1854 24 Hour Total   INTAKE   P.O.  960 500  500     P. O.  960 500  500   I. V.(mL/kg/hr)  2000        Volume (0.9% sodium chloride infusion)  2000      Shift Total(mL/kg)  2947(35.7) 500(5.9)  500(5.9)   OUTPUT   Urine(mL/kg/hr)          Urine Occurrence(s) 3 x 6 x 1 x  1 x   Stool          Stool Occurrence(s) 0 x 0 x 0 x  0 x   Shift Total(mL/kg)        NET  2960 500  500   Weight (kg) 84.4 84.4 84.4 84.4 84.4       Recommendations:  1. Patient needs and requests: assist with toileting    2. Pending tests/procedures: lab as ordered     3. Functional Level/Equipment: Partial (one person) / Bed (comment)wheelchair    Fall Precautions:   Fall risk precautions were reinforced with the patient; she was instructed to call for help prior to getting up. The following fall risk precautions were continued: bed/ chair alarms, door signage, yellow bracelet and socks as well as update of the Philera Salas tool in the patient's room.    Leigh Ann Score: 4    HEALS Safety Check    A safety check occurred in the patient's room between off going nurse and oncoming nurse listed above. The safety check included the below items  Area Items   H  High Alert Medications - Verify all high alert medication drips (heparin, PCA, etc.)   E  Equipment - Suction is set up for ALL patients (with blessing)  - Red plugs utilized for all equipment (IV pumps, etc.)  - WOWs wiped down at end of shift.  - Room stocked with oxygen, suction, and other unit-specific supplies   A  Alarms - Bed alarm is set for fall risk patients  - Ensure chair alarm is in place and activated if patient is up in a chair   L  Lines - Check IV for any infiltration  - Rollins bag is empty if patient has a Rollins   - Tubing and IV bags are labeled   S  Safety   - Room is clean, patient is clean, and equipment is clean. - Hallways are clear from equipment besides carts. - Fall bracelet on for fall risk patients  - Ensure room is clear and free of clutter  - Suction is set up for ALL patients (with blessing)  - Hallways are clear from equipment besides carts.    - Isolation precautions followed, supplies available outside room, sign posted     Seble Domingo RN

## 2021-06-08 NOTE — PROGRESS NOTES
Problem: Neurolinguistics Impaired (Adult)  Goal: *Speech Goal: (INSERT TEXT)  Description: Long term goals  Patient will:  1. Be oriented x 3 and recall events of the day, supervision. 2. Recall 3 words after 5 minutes, supervision. 3. Name 10-12 items within categories of increasing complexity, supervision. 4. Perform varied word finding tasks with 90% accuracy. 5. Perform problem solving/reasoning tasks with 90% accuracy. 6. Perform basic mathematical calculations, 90% accuracy. Short term goals (by 6/11/21)  Patient will:  1. Be oriented x 3 and recall events of the day, supervision. 2. Recall 3 words after 5 minutes, supervision. 3. Name 10-12 items within concrete categories, min assist-supervision. 4. Perform varied word finding tasks with 75-85% accuracy. 5. Perform problem solving/reasoning tasks with 80-90% accuracy. 6. Perform basic mathematical calculations, 70-80% accuracy. Note:   Speech language pathology treatment    Patient: Malina Moore (4 y.o. female)  Date: 6/8/2021  Diagnosis: Acute lacunar stroke ([facility] [address] [I63.81] Acute lacunar stroke ([address]       Time in: 1330  Time Out:  1400  SUBJECTIVE:   Patient stated That's about all I got. OBJECTIVE:   Mental Status:  Ms. Yosi Bates was awake and alert when seen today. Treatment & Interventions:   Patient was seen for a thirty minute session in her room this afternoon. The following treatment tasks were presented:  Neuro-Linguistics:  Orientation:   Independent  Recent memory:  Independent  Recall 3 words:  Supervision  Discussing advantages: 90% appropriate responses  Discussing disadvantages: 80% appropriate responses  Naming \"fruit\":   Patient listed 9 independently      4 more with min cues    Response & Tolerance to Activities:  Ms. Lyn Ramirez is consistently engaged and cooperative in treatment tasks.       Pain:  Pain Scale 1: Numeric (0 - 10)  No report of pain     After treatment:   [x]       Patient left in no apparent distress sitting up in chair  []       Patient left in no apparent distress in bed  []       Call bell left within reach  []       Nursing notified  []       Caregiver present  []       Bed alarm activated    ASSESSMENT:   Progression toward goals:  [x]       Improving appropriately and progressing toward goals  []       Improving slowly and progressing toward goals  []       Not making progress toward goals and plan of care will be adjusted    PLAN:   Patient continues to benefit from skilled intervention to address the above impairments. Continue treatment per established plan of care.   Discharge Recommendations:  Home Health    Estimated LOS: Through 6/15/21    MAGGIE Esteban  Time Calculation: 30 mins

## 2021-06-08 NOTE — PROGRESS NOTES
SW received a call from patient regarding increasing her disability. SW educated her that the only thing that can be provided is education and resources. SW encouraged her that being she already has an open claim case to call the number she has and follow the steps for an increase, and any medical documentation, SW encouraged her to request a copy of her medical records. DHARMESH contacted pt daughter to assist with scheduling her family education meeting. DHARMESH called [employer] [phone]. The initial meeting is tomorrow 6/9 at 9:30am and the discharge one is 6/14 at 9:30am. DHARMESH updated the team and times were accepted. She apologize for missing the one on 6/1. DHARMESH will continue to follow.        Nando KHAN, LCSW, CCM  Doctoral Candidate, Doctor of Social Work

## 2021-06-08 NOTE — ROUTINE PROCESS
[facility]    Bedside and Verbal shift change report given to Haylie Gates, RN (oncoming nurse) by Hannah Cardoso RN (offgoing nurse). Report included the following information SBAR, Kardex, MAR and Recent Results.     Situation:   Code Status: Full Code   Hospital Day: 12   Problem List:   Hospital Problems  Date Reviewed: 6/7/2021        Codes Class Noted POA    Secondary hyperparathyroidism of renal origin ([facility] (Chronic) ICD-10-CM: N25.81  ICD-9-CM: 588.81  5/31/2021 Yes        Vitamin D deficiency (Chronic) ICD-10-CM: E55.9  ICD-9-CM: 268.9  5/31/2021 Yes    Overview Signed 5/31/2021  1:17 PM by Luis Weiss MD     Vitamin D 25-Hydroxy (5/31/2021) = 10.5              High serum magnesium ICD-10-CM: R79.89  ICD-9-CM: 790.99  5/28/2021 Yes        Hypertensive heart and kidney disease without heart failure and with stage 3b chronic kidney disease ([facility] [address] (Chronic) ICD-10-CM: I13.10, N18.32  ICD-9-CM: 404.90, 585.3  Unknown Yes        Mixed hyperlipidemia (Chronic) ICD-10-CM: E78.2  ICD-9-CM: 272.2  Unknown Yes        Constipation (Chronic) ICD-10-CM: K59.00  ICD-9-CM: 564.00  Unknown Yes        Current use of aspirin ICD-10-CM: Z79.82  ICD-9-CM: V58.66  5/23/2021 Yes        On clopidogrel therapy ICD-10-CM: Z79.01  ICD-9-CM: V58.61  5/23/2021 Yes        On statin therapy due to risk of future cardiovascular event ICD-10-CM: Z79.899  ICD-9-CM: V58.69  5/22/2021 Yes    Overview Signed 5/27/2021  2:27 PM by Luis Weiss MD     On Atorvastatin             * (Principal) Acute lacunar stroke [facility]) ICD-10-CM: I63.81  ICD-9-CM: 434.91  5/21/2021 Yes    Overview Signed 5/27/2021  2:15 PM by Luis Weiss MD     Acute Lacunar Stroke (acute lacunar infarct in the posterior left lentiform nucleus extending into the corona radiata) with residual right hemiparesis             Impaired mobility and ADLs ICD-10-CM: Z74.09, Z78.9  ICD-9-CM: V49.89  5/21/2021 Yes        Hemiparesis of right dominant side due to acute cerebrovascular disease [facility]) ICD-10-CM: I67.89, G81.91  ICD-9-CM: 054, 342.91  5/21/2021 Yes              Background:   Past Medical History:   Past Medical History:   Diagnosis Date    Abnormal mammogram 2014    left with biopsy    Acute lacunar stroke ([facility] [address] 5/21/2021    Acute Lacunar Stroke (acute lacunar infarct in the posterior left lentiform nucleus extending into the corona radiata) with residual right hemiparesis    Bacterial vaginosis 1/9/15    Dr Eduard Shukla Constipation     Current use of aspirin 5/23/2021    Gastroesophageal reflux disease 6/30/2016    Hemiparesis of right dominant side due to acute cerebrovascular disease ([facility] [address] [facility] 5/21/2021    History of Helicobacter pylori infection 7/24/2015    History of iron deficiency anemia 06/2014    History of vitamin D deficiency 6/11/2015    Hot flashes 1/9/15    Dr Eduard Shukla On clopidogrel therapy 5/23/2021    On statin therapy due to risk of future cardiovascular event 5/22/2021    On Atorvastatin    Rheumatoid arthritis ([facility] [address] [facility]     Secondary hyperparathyroidism of renal origin ([facility] [address] 5/31/2021    Stage 3b chronic kidney disease ([facility] [address] 7/24/2015    Tobacco use disorder     Vitamin D deficiency 5/31/2021    Vitamin D 25-Hydroxy (5/31/2021) = 10.5         Assessment:   Changes in Assessment throughout shift: No change to previous assessmentnone     Patient has a central line: no Reasons if yes: na  Insertion date:na Last dressing date:na   Patient has Irving Cath: no Reasons if yes: na   Insertion date:na  Shift irving care completed: NO     Last Vitals:     Vitals:    06/06/21 2100 06/07/21 0725 06/07/21 1620 06/07/21 2101   BP: (!) 138/90 132/81 127/88 137/88   Pulse: 83 63 81 81   Resp: 18 18 16 18   Temp: 98 °F (36.7 °C) 97.2 °F (36.2 °C) 97.7 °F (36.5 °C) 97.9 °F (36.6 °C)   SpO2: 97% 100% 95% 99%   Weight:       Height:       LMP: 10/15/2014        PAIN    Pain Assessment    Pain Intensity 1: 0 (06/08/21 0415) Pain Intensity 1: 2 (12/29/14 5745)    Pain Location 1: Generalized Pain Location 1: Abdomen    Pain Intervention(s) 1: Medication (see MAR) Pain Intervention(s) 1: Medication (see MAR)  Patient Stated Pain Goal: 0 Patient Stated Pain Goal: 0  o Intervention effective: yes  o Other actions taken for pain:       Skin Assessment  Skin color    Condition/Temperature    Integrity    Turgor    Weekly Pressure Ulcer Documentation  Pressure  Injury Documentation: No Pressure Injury Noted-Pressure Ulcer Prevention Initiated  Wound Prevention & Protection Methods  Orientation of wound Orientation of Wound Prevention: Posterior  Location of Prevention Location of Wound Prevention: Buttocks, Sacrum/Coccyx  Dressing Present Dressing Present : No  Dressing Status    Wound Offloading Wound Offloading (Prevention Methods): Bed, pressure redistribution/air     INTAKE/OUPUT  Date 06/07/21 0700 - 06/08/21 0659 06/08/21 0700 - 06/09/21 0659   Shift 0928-5021 0634-0256 24 Hour Total 7634-1717 1296-3559 24 Hour Total   INTAKE   P.O. 960  960        P. O. 960  960      I. V.(mL/kg/hr) 6051(3)  2000        Volume (0.9% sodium chloride infusion) 2000  2000      Shift Total(mL/kg) 0747(53.6)  3948(01.4)      OUTPUT   Urine(mL/kg/hr)           Urine Occurrence(s) 3 x 3 x 6 x      Stool           Stool Occurrence(s) 0 x 0 x 0 x      Shift Total(mL/kg)         NET 2960  2960      Weight (kg) 84.4 84.4 84.4 84.4 84.4 84.4       Recommendations:  1. Patient needs and requests: assist with toileting    2. Pending tests/procedures: lab as ordered     3. Functional Level/Equipment: Partial (one person) /  wheelchair    Fall Precautions:   Fall risk precautions were reinforced with the patient; she was instructed to call for help prior to getting up. The following fall risk precautions were continued: bed/ chair alarms, door signage, yellow bracelet and socks as well as update of the Onalee Gum tool in the patient's room.    Leigh Ann Score: 4    HEALS Safety Check    A safety check occurred in the patient's room between off going nurse and oncoming nurse listed above. The safety check included the below items  Area Items   H  High Alert Medications - Verify all high alert medication drips (heparin, PCA, etc.)   E  Equipment - Suction is set up for ALL patients (with blessing)  - Red plugs utilized for all equipment (IV pumps, etc.)  - WOWs wiped down at end of shift.  - Room stocked with oxygen, suction, and other unit-specific supplies   A  Alarms - Bed alarm is set for fall risk patients  - Ensure chair alarm is in place and activated if patient is up in a chair   L  Lines - Check IV for any infiltration  - Rollins bag is empty if patient has a Rollins   - Tubing and IV bags are labeled   S  Safety   - Room is clean, patient is clean, and equipment is clean. - Hallways are clear from equipment besides carts. - Fall bracelet on for fall risk patients  - Ensure room is clear and free of clutter  - Suction is set up for ALL patients (with blessing)  - Hallways are clear from equipment besides carts.    - Isolation precautions followed, supplies available outside room, sign posted     Rosy Rolon, RN

## 2021-06-08 NOTE — PROGRESS NOTES
Problem: Mobility Impaired (Adult and Pediatric)  Goal: *Therapy Goal (Edit Goal, Insert Text)  Description: Physical Therapy Short Term Goals  Initiated 5/28/2021, re-assessed 6/4/2021 and to be accomplished within 7 day(s) on 6/11/2021  1. Patient will move from supine to sit and sit to supine , scoot up and down, and roll side to side in bed with supervision/set-up. (MET 6/4/2021)  2. Patient will transfer from bed to chair and chair to bed with minimal assistance/contact guard assist using the least restrictive device. (MET 6/4/2021)  Updated goal: Patient will transfer from bed to chair and chair to bed with standby assist using the least restrictive device. 3.  Patient will perform sit to stand with minimal assistance/contact guard assist.(MET 6/4/2021)  Updated goal: Patient will perform sit to stand with standby assistance. 4.  Patient will ambulate with minimal assistance/contact guard assist for 50 feet with the least restrictive device. (MET 6/4/2021)  Updated goal: Patient will ambulate with SBA for 150 ft with the least restrictive AD with cues for neutral knee extension with loading/midstance phase of gait <25% of the time. 5.  Patient will ascend/descend 5 stairs with 1 handrail(s) with moderate assistance . (4 steps with BHR and min assist)  Updated goal: Patient will ascend/descend 5 stairs with 1 handrail with CGA. Physical Therapy Long Term Goals  Initiated 5/28/2021 and to be accomplished within 21 day(s) on 6/18/2021  1. Patient will move from supine to sit and sit to supine , scoot up and down, and roll side to side in bed with modified independence. 2.  Patient will transfer from bed to chair and chair to bed with modified independence using the least restrictive device. 3.  Patient will perform sit to stand with modified independence. 4.  Patient will ambulate with modified independence for 150 feet with the least restrictive device.    5.  Patient will ascend/descend 15 stairs with 1 handrail(s) with contact guard assist/standby assistance. Outcome: Progressing Towards Goal   PHYSICAL THERAPY TREATMENT    Patient: Tamiko Pedroza (89 y.o. female)  Date: 2021  Diagnosis: Acute lacunar stroke [facility]) [I63.81] Acute lacunar stroke [facility])  Precautions: Fall, Skin  Chart, physical therapy assessment, plan of care and goals were reviewed. Time In:1134  Time ZQK:[id number]    Patient seen for: Gait training;Transfer training;Balance activities    Pain:  Pt pain was reported as no c/o pre-treatment. Pt pain was reported as no c/o post-treatment. Intervention: NA     Patient identified with name and : yes     SUBJECTIVE:      Pt reports \"I like using the cane better than the walker. \"     OBJECTIVE DATA SUMMARY:    Objective:     TRANSFERS Daily Assessment     Sit to Stand Assistance: Contact guard assistance   Pt performed sit to stand from w/c with B hands on distal femurs to decrease compensation and dependence on BUE. Pt required v/c for proper feet placement to improve equal wt bearing. GAIT Daily Assessment    Gait Description (WDL)      Gait Abnormalities Decreased step clearance; Hemiplegic; Step to gait    Assistive device Cane, quad;Gait belt    Ambulation assistance - level surface 4 (Contact guard assistance)    Distance 80 Feet (ft) (x2 trials)    Ambulation assistance- uneven surface      Comments Pt ambulated 2x80ft with SBQC and CGA for balance. Pt performed step to pattern and required v/c initially for reminder of proper sequencing. Pt required mod v/c to control right knee hyperextension and for erect posture and hip extension.          BALANCE Daily Assessment     Sitting - Static: Good (unsupported)  Sitting - Dynamic: Good (unsupported)  Standing - Static: Fair  Standing - Dynamic : Impaired      Neuro Re-Education:  Pt performed BLE tap ups on 8\" step x15 each to challenge SLS on right LE with controlling right knee hyperextension and right LE hip flexion when tapping up on step. Pt performed alternating tap ups x10 each to challenge wt shift and SLS balance, with SBQC on left and CGA/min assist for balance. ASSESSMENT:  Pt continues to demo decreased right hyperextension control and maintains flexed posture when not given constant cues. Progression toward goals:  []      Improving appropriately and progressing toward goals  [x]      Improving slowly and progressing toward goals  []      Not making progress toward goals and plan of care will be adjusted      PLAN:  Patient continues to benefit from skilled intervention to address the above impairments. Continue treatment per established plan of care. Discharge Recommendations:  Home Health  Further Equipment Recommendations for Discharge:  quad cane and wheelchair 18 inch      Estimated Discharge Date: 6/15/2021    Activity Tolerance:   Fair+  Please refer to the flowsheet for vital signs taken during this treatment.     After treatment:   [] Patient left in no apparent distress in bed  [] Patient left in no apparent distress sitting up in chair  [x] Patient left in no apparent distress in w/c mobilizing under own power  [] Patient left in no apparent distress dining area  [] Patient left in no apparent distress mobilizing under own power  [] Call bell left within reach  [] Nursing notified  [] Caregiver present  [] Bed alarm activated   [x] Chair alarm activated      Makenzie Alejandre, EDER  6/8/2021

## 2021-06-09 LAB
ALBUMIN SERPL-MCNC: 2.9 G/DL (ref 3.4–5)
ANION GAP SERPL CALC-SCNC: 4 MMOL/L (ref 3–18)
BUN SERPL-MCNC: 30 MG/DL (ref 7–18)
BUN/CREAT SERPL: 15 (ref 12–20)
CALCIUM SERPL-MCNC: 9.7 MG/DL (ref 8.5–10.1)
CHLORIDE SERPL-SCNC: 114 MMOL/L (ref 100–111)
CO2 SERPL-SCNC: 23 MMOL/L (ref 21–32)
CREAT SERPL-MCNC: 1.95 MG/DL (ref 0.6–1.3)
GLUCOSE SERPL-MCNC: 100 MG/DL (ref 74–99)
PHOSPHATE SERPL-MCNC: 3.7 MG/DL (ref 2.5–4.9)
POTASSIUM SERPL-SCNC: 4.8 MMOL/L (ref 3.5–5.5)
SODIUM SERPL-SCNC: 141 MMOL/L (ref 136–145)

## 2021-06-09 PROCEDURE — 74011250636 HC RX REV CODE- 250/636: Performed by: INTERNAL MEDICINE

## 2021-06-09 PROCEDURE — 97535 SELF CARE MNGMENT TRAINING: CPT

## 2021-06-09 PROCEDURE — 97116 GAIT TRAINING THERAPY: CPT

## 2021-06-09 PROCEDURE — 97110 THERAPEUTIC EXERCISES: CPT

## 2021-06-09 PROCEDURE — 36415 COLL VENOUS BLD VENIPUNCTURE: CPT

## 2021-06-09 PROCEDURE — 80069 RENAL FUNCTION PANEL: CPT

## 2021-06-09 PROCEDURE — 65310000000 HC RM PRIVATE REHAB

## 2021-06-09 PROCEDURE — 74011000250 HC RX REV CODE- 250: Performed by: INTERNAL MEDICINE

## 2021-06-09 PROCEDURE — 99232 SBSQ HOSP IP/OBS MODERATE 35: CPT | Performed by: INTERNAL MEDICINE

## 2021-06-09 PROCEDURE — 2709999900 HC NON-CHARGEABLE SUPPLY

## 2021-06-09 PROCEDURE — 74011250637 HC RX REV CODE- 250/637: Performed by: INTERNAL MEDICINE

## 2021-06-09 PROCEDURE — 92507 TX SP LANG VOICE COMM INDIV: CPT

## 2021-06-09 PROCEDURE — 97112 NEUROMUSCULAR REEDUCATION: CPT

## 2021-06-09 RX ORDER — SODIUM CHLORIDE 450 MG/100ML
1000 INJECTION, SOLUTION INTRAVENOUS ONCE
Status: COMPLETED | OUTPATIENT
Start: 2021-06-09 | End: 2021-06-10

## 2021-06-09 RX ADMIN — Medication 5000 UNITS: at 08:33

## 2021-06-09 RX ADMIN — ACETAMINOPHEN 650 MG: 325 TABLET ORAL at 21:20

## 2021-06-09 RX ADMIN — CLOPIDOGREL BISULFATE 75 MG: 75 TABLET ORAL at 08:32

## 2021-06-09 RX ADMIN — SODIUM CHLORIDE 1000 ML: 450 INJECTION, SOLUTION INTRAVENOUS at 18:14

## 2021-06-09 RX ADMIN — Medication 3 MG: at 21:17

## 2021-06-09 RX ADMIN — HEPARIN SODIUM 5000 UNITS: 5000 INJECTION INTRAVENOUS; SUBCUTANEOUS at 06:04

## 2021-06-09 RX ADMIN — METOPROLOL TARTRATE 25 MG: 25 TABLET, FILM COATED ORAL at 08:32

## 2021-06-09 RX ADMIN — LUBIPROSTONE 8 MCG: 8 CAPSULE, GELATIN COATED ORAL at 08:32

## 2021-06-09 RX ADMIN — METOPROLOL TARTRATE 25 MG: 25 TABLET, FILM COATED ORAL at 21:17

## 2021-06-09 RX ADMIN — Medication 100 MG: at 08:33

## 2021-06-09 RX ADMIN — ASPIRIN 81 MG: 81 TABLET, CHEWABLE ORAL at 17:09

## 2021-06-09 RX ADMIN — DOCUSATE SODIUM 50MG AND SENNOSIDES 8.6MG 2 TABLET: 8.6; 5 TABLET, FILM COATED ORAL at 17:09

## 2021-06-09 RX ADMIN — ATORVASTATIN CALCIUM 80 MG: 40 TABLET, FILM COATED ORAL at 08:33

## 2021-06-09 RX ADMIN — LUBIPROSTONE 8 MCG: 8 CAPSULE, GELATIN COATED ORAL at 17:09

## 2021-06-09 RX ADMIN — HEPARIN SODIUM 5000 UNITS: 5000 INJECTION INTRAVENOUS; SUBCUTANEOUS at 17:09

## 2021-06-09 NOTE — PROGRESS NOTES
Problem: Mobility Impaired (Adult and Pediatric)  Goal: *Therapy Goal (Edit Goal, Insert Text)  Description: Physical Therapy Short Term Goals  Initiated 5/28/2021, re-assessed 6/4/2021 and to be accomplished within 7 day(s) on 6/11/2021  1. Patient will move from supine to sit and sit to supine , scoot up and down, and roll side to side in bed with supervision/set-up. (MET 6/4/2021)  2. Patient will transfer from bed to chair and chair to bed with minimal assistance/contact guard assist using the least restrictive device. (MET 6/4/2021)  Updated goal: Patient will transfer from bed to chair and chair to bed with standby assist using the least restrictive device. 3.  Patient will perform sit to stand with minimal assistance/contact guard assist.(MET 6/4/2021)  Updated goal: Patient will perform sit to stand with standby assistance. 4.  Patient will ambulate with minimal assistance/contact guard assist for 50 feet with the least restrictive device. (MET 6/4/2021)  Updated goal: Patient will ambulate with SBA for 150 ft with the least restrictive AD with cues for neutral knee extension with loading/midstance phase of gait <25% of the time. 5.  Patient will ascend/descend 5 stairs with 1 handrail(s) with moderate assistance . (4 steps with BHR and min assist)  Updated goal: Patient will ascend/descend 5 stairs with 1 handrail with CGA. Physical Therapy Long Term Goals  Initiated 5/28/2021 and to be accomplished within 21 day(s) on 6/18/2021  1. Patient will move from supine to sit and sit to supine , scoot up and down, and roll side to side in bed with modified independence. 2.  Patient will transfer from bed to chair and chair to bed with modified independence using the least restrictive device. 3.  Patient will perform sit to stand with modified independence. 4.  Patient will ambulate with modified independence for 150 feet with the least restrictive device.    5.  Patient will ascend/descend 15 stairs with 1 handrail(s) with contact guard assist/standby assistance. Outcome: Progressing Towards Goal   PHYSICAL THERAPY TREATMENT    Patient: Shira Lindsey (70 y.o. female)  Date: 2021  Diagnosis: Acute lacunar stroke [facility]) [I63.81] Acute lacunar stroke [facility])  Precautions: Fall, Skin  Chart, physical therapy assessment, plan of care and goals were reviewed. Time TL:3366  Time Out:1000    Patient seen for: Family training;Gait training;Transfer training (stair training)    Time In:1110  Time Out:1140    Patient seen for: Gait training;Transfer training;Balance activities      Pain:  Pt pain was reported as no c/o pre-treatment. Pt pain was reported as no c/o post-treatment. Intervention: NA     Patient identified with name and : yes     SUBJECTIVE:      Pt's daughter present for family training and reports she has no steps and pt will be d/cing to her house. OBJECTIVE DATA SUMMARY:    Objective:     TRANSFERS Daily Assessment     Sit to Stand Assistance: Contact guard assistance   Pt performed sit to stand from w/c pushing up from distal B femurs with CGA for anterior wt shift. Pt continues to require v/c to reduce rocking/launching for momentum with sit to stand. GAIT Daily Assessment    Gait Description (WDL)      Gait Abnormalities Decreased step clearance; Hemiplegic    Assistive device Cane, quad;Gait belt    Ambulation assistance - level surface 4 (Contact guard assistance)    Distance 75 Feet (ft) 80 Feet (ft)    Ambulation assistance- uneven surface  NT    Comments Pt ambulated 75ft with SBQC and CGA with pt's daughter present and educated on cuing pt to slow pacing, erect posture and control right knee hyperextension. Pt ambulated 80ft with SBQC and CGA with min v/c for erect posture and focus on decreased right knee hyperextension.          STEPS/STAIRS Daily Assessment     Steps/Stairs ambulated 4 (6\" steps)    Assistance Required 4 (Minimal assistance)    Rail Use Right (and SBQC on left )    Comments   Pt negotiated up and down 4 6\" steps with pt's daughter present and performed return demo to provide pt with assistance with steps. Pt required min assist for balance using right HR and SBQC on left side with step to pattern and min v/c for proper sequencing. Curbs/Ramps  NT        BALANCE Daily Assessment     Sitting - Static: Good (unsupported)  Sitting - Dynamic: Good (unsupported)  Standing - Static: Fair  Standing - Dynamic : Impaired      Neuro Re-Education:  Pt performed alternating BLE tap ups on 8\" step x15 each with SBQC on left and CGA to challenge balance with wt shifting, right LE wt bearing in SLS and controlling right knee recurvatum and right hip flexion strengthening. ASSESSMENT:  Pt continues to demo impulsiveness with sit to stand and txfrs. Pt demo's decreased focus on right knee hyperextension control   Progression toward goals:  []      Improving appropriately and progressing toward goals  [x]      Improving slowly and progressing toward goals  []      Not making progress toward goals and plan of care will be adjusted      PLAN:  Patient continues to benefit from skilled intervention to address the above impairments. Continue treatment per established plan of care. Discharge Recommendations:  Home Health  Further Equipment Recommendations for Discharge:  quad cane, gait belt and wheelchair 18 inch      Estimated Discharge Date: 6/15/2021    Activity Tolerance:   Fair+  Please refer to the flowsheet for vital signs taken during this treatment.     After treatment:   [] Patient left in no apparent distress in bed  [x] Patient left in no apparent distress sitting up in chair  [] Patient left in no apparent distress in w/c mobilizing under own power  [] Patient left in no apparent distress dining area  [] Patient left in no apparent distress mobilizing under own power  [x] Call bell left within reach  [] Nursing notified  [] Caregiver present  [] Bed alarm activated   [x] Chair alarm activated      Sergio De La Paz, PTA  6/9/2021

## 2021-06-09 NOTE — ROUTINE PROCESS
[facility]    Bedside and Verbal shift change report given to Camila Reynaga RN (oncoming nurse) by Lizabeth Hodgkins, LPN (offgoing nurse). Report included the following information SBAR, Kardex, MAR and Recent Results.     Situation:   Code Status: Full Code   Hospital Day: 12   Problem List:   Hospital Problems  Date Reviewed: 6/8/2021        Codes Class Noted POA    Secondary hyperparathyroidism of renal origin ([facility] (Chronic) ICD-10-CM: N25.81  ICD-9-CM: 588.81  5/31/2021 Yes        Vitamin D deficiency (Chronic) ICD-10-CM: E55.9  ICD-9-CM: 268.9  5/31/2021 Yes    Overview Signed 5/31/2021  1:17 PM by Ruby Guardado MD     Vitamin D 25-Hydroxy (5/31/2021) = 10.5              High serum magnesium ICD-10-CM: R79.89  ICD-9-CM: 790.99  5/28/2021 Yes        Hypertensive heart and kidney disease without heart failure and with stage 3b chronic kidney disease ([facility] [facility] (Chronic) ICD-10-CM: I13.10, N18.32  ICD-9-CM: 404.90, 585.3  Unknown Yes        Mixed hyperlipidemia (Chronic) ICD-10-CM: E78.2  ICD-9-CM: 272.2  Unknown Yes        Constipation (Chronic) ICD-10-CM: K59.00  ICD-9-CM: 564.00  Unknown Yes        Current use of aspirin ICD-10-CM: Z79.82  ICD-9-CM: V58.66  5/23/2021 Yes        On clopidogrel therapy ICD-10-CM: Z79.01  ICD-9-CM: V58.61  5/23/2021 Yes        On statin therapy due to risk of future cardiovascular event ICD-10-CM: Z79.899  ICD-9-CM: V58.69  5/22/2021 Yes    Overview Signed 5/27/2021  2:27 PM by Ruby Guardado MD     On Atorvastatin             * (Principal) Acute lacunar stroke [facility]) ICD-10-CM: I63.81  ICD-9-CM: 434.91  5/21/2021 Yes    Overview Signed 5/27/2021  2:15 PM by Ruby Guardado MD     Acute Lacunar Stroke (acute lacunar infarct in the posterior left lentiform nucleus extending into the corona radiata) with residual right hemiparesis             Impaired mobility and ADLs ICD-10-CM: Z74.09, Z78.9  ICD-9-CM: V49.89  5/21/2021 Yes        Hemiparesis of right dominant side due to acute cerebrovascular disease [facility]) ICD-10-CM: I67.89, G81.91  ICD-9-CM: 436, 342.91  5/21/2021 Yes              Background:   Past Medical History:   Past Medical History:   Diagnosis Date    Abnormal mammogram 2014    left with biopsy    Acute lacunar stroke ([facility] [address] [facility] 5/21/2021    Acute Lacunar Stroke (acute lacunar infarct in the posterior left lentiform nucleus extending into the corona radiata) with residual right hemiparesis    Bacterial vaginosis 1/9/15    Dr Wolf Galvin Constipation     Current use of aspirin 5/23/2021    Gastroesophageal reflux disease 6/30/2016    Hemiparesis of right dominant side due to acute cerebrovascular disease ([facility] [address] 5/21/2021    History of Helicobacter pylori infection 7/24/2015    History of iron deficiency anemia 06/2014    History of vitamin D deficiency 6/11/2015    Hot flashes 1/9/15    Dr Wolf Galvin On clopidogrel therapy 5/23/2021    On statin therapy due to risk of future cardiovascular event 5/22/2021    On Atorvastatin    Rheumatoid arthritis ([facility]     Secondary hyperparathyroidism of renal origin ([facility] 5/31/2021    Stage 3b chronic kidney disease ([facility] [facility] 7/24/2015    Tobacco use disorder     Vitamin D deficiency 5/31/2021    Vitamin D 25-Hydroxy (5/31/2021) = 10.5         Assessment:   Changes in Assessment throughout shift: No change to previous assessmentnone     Patient has a central line: no Reasons if yes: na  Insertion date:na Last dressing date:na   Patient has Irving Cath: no Reasons if yes: na   Insertion date:na  Shift irving care completed: NO     Last Vitals:     Vitals:    06/07/21 2101 06/08/21 0752 06/08/21 1657 06/08/21 2130   BP: 137/88 123/86 130/87 139/89   Pulse: 81 70 82 79   Resp: 18 18 18 18   Temp: 97.9 °F (36.6 °C)  97.4 °F (36.3 °C) 97.1 °F (36.2 °C)   SpO2: 99% 98% 98% 100%   Weight:       Height:       LMP: 10/15/2014        PAIN    Pain Assessment    Pain Intensity 1: 2 (06/08/21 2317) Pain Intensity 1: 2 (12/29/14 1105)    Pain Location 1: Generalized Pain Location 1: Abdomen    Pain Intervention(s) 1: Medication (see MAR) Pain Intervention(s) 1: Medication (see MAR)  Patient Stated Pain Goal: 0 Patient Stated Pain Goal: 0  o Intervention effective: yes  o Other actions taken for pain: Medication (see MAR)     Skin Assessment  Skin color    Condition/Temperature    Integrity    Turgor    Weekly Pressure Ulcer Documentation  Pressure  Injury Documentation: No Pressure Injury Noted-Pressure Ulcer Prevention Initiated  Wound Prevention & Protection Methods  Orientation of wound Orientation of Wound Prevention: Posterior  Location of Prevention Location of Wound Prevention: Buttocks, Sacrum/Coccyx  Dressing Present Dressing Present : No  Dressing Status    Wound Offloading Wound Offloading (Prevention Methods): Bed, pressure reduction mattress     INTAKE/OUPUT  Date 06/07/21 1900 - 06/08/21 0659 06/08/21 0700 - 06/09/21 0659   Shift 3603-3955 24 Hour Total 1904-4436 7388-4576 24 Hour Total   INTAKE   P.O.  960 500  500     P. O.  960 500  500   I. V.(mL/kg/hr)  2000        Volume (0.9% sodium chloride infusion)  2000      Shift Total(mL/kg)  5457(09.2) 500(5.9)  500(5.9)   OUTPUT   Urine(mL/kg/hr)          Urine Occurrence(s) 3 x 6 x 1 x 0 x 1 x   Stool          Stool Occurrence(s) 0 x 0 x 0 x 0 x 0 x   Shift Total(mL/kg)        NET  2960 500  500   Weight (kg) 84.4 84.4 84.4 84.4 84.4       Recommendations:  1. Patient needs and requests: assist with toileting    2. Pending tests/procedures: lab as ordered     3. Functional Level/Equipment: Partial (one person) / Bed (comment)wheelchair    Fall Precautions:   Fall risk precautions were reinforced with the patient; she was instructed to call for help prior to getting up. The following fall risk precautions were continued: bed/ chair alarms, door signage, yellow bracelet and socks as well as update of the Philera Salas tool in the patient's room.    Leigh Ann Score: 4    HEALS Safety Check    A safety check occurred in the patient's room between off going nurse and oncoming nurse listed above. The safety check included the below items  Area Items   H  High Alert Medications - Verify all high alert medication drips (heparin, PCA, etc.)   E  Equipment - Suction is set up for ALL patients (with blessing)  - Red plugs utilized for all equipment (IV pumps, etc.)  - WOWs wiped down at end of shift.  - Room stocked with oxygen, suction, and other unit-specific supplies   A  Alarms - Bed alarm is set for fall risk patients  - Ensure chair alarm is in place and activated if patient is up in a chair   L  Lines - Check IV for any infiltration  - Rollins bag is empty if patient has a Rollins   - Tubing and IV bags are labeled   S  Safety   - Room is clean, patient is clean, and equipment is clean. - Hallways are clear from equipment besides carts. - Fall bracelet on for fall risk patients  - Ensure room is clear and free of clutter  - Suction is set up for ALL patients (with blessing)  - Hallways are clear from equipment besides carts.    - Isolation precautions followed, supplies available outside room, sign posted     Rishi Jaquez LPN

## 2021-06-09 NOTE — PROGRESS NOTES
[facility] PHYSICAL REHABILITATION  [address] [place] [address], [address]     INPATIENT REHABILITATION  DAILY PROGRESS NOTE     Date: 6/9/2021    Name: Emerita Lamar Age / Sex: 62 y.o. / female   CSN: [id number] MRN: [id number]   [facility] Date: 5/27/2021 Length of Stay: 13 days     Primary Rehab Diagnosis: Impaired Mobility and ADLs secondary to Acute Lacunar Stroke (acute lacunar infarct in the posterior left lentiform nucleus extending into the corona radiata) with residual right hemiparesis      Subjective:     Patient seen and examined. Blood pressure controlled. Patient's Complaint:   No significant medical complaints    Pain Control: no current joint or muscle symptoms, essentially pain-free      Objective:     Vital Signs:  Patient Vitals for the past 24 hrs:   BP Temp Pulse Resp SpO2   06/09/21 0739 135/87 97.4 °F (36.3 °C) 73 16 99 %   06/08/21 2130 139/89 97.1 °F (36.2 °C) 79 18 100 %   06/08/21 1657 130/87 97.4 °F (36.3 °C) 82 18 98 %        Physical Examination:  GENERAL SURVEY: Patient is awake, alert, oriented x 3, sitting comfortably on the chair, not in acute respiratory distress. HEENT: pink palpebral conjunctivae, anicteric sclerae, no nasoaural discharge, moist oral mucosa  NECK: supple, no jugular venous distention, no palpable lymph nodes  CHEST/LUNGS: symmetrical chest expansion, good air entry, clear breath sounds  HEART: adynamic precordium, good S1 S2, no S3, regular rhythm, no murmurs  ABDOMEN: flat, bowel sounds appreciated, soft, non-tender  EXTREMITIES: pink nailbeds, no edema, full and equal pulses, no calf tenderness   NEUROLOGICAL EXAM: The patient is awake, alert and oriented x3, able to answer questions fairly appropriately, able to follow 1 and 2 step commands. Able to tell time from the wall clock. Cranial nerves II-XII are grossly intact. No gross sensory deficit. Motor strength is 4 to 4+/5 on the RUE and RLE, 5/5 on the LUE and LLE.       Current Medications:  Current Facility-Administered Medications   Medication Dose Route Frequency    0.9% sodium chloride infusion 1,000 mL  1,000 mL IntraVENous PCD    lubiPROStone (AMITIZA) capsule 8 mcg  8 mcg Oral BID WITH MEALS    heparin (porcine) injection 5,000 Units  5,000 Units SubCUTAneous Q12H    hydrALAZINE (APRESOLINE) tablet 25 mg  25 mg Oral TID PRN    polyethylene glycol (MIRALAX) packet 17 g  17 g Oral DAILY    senna-docusate (PERICOLACE) 8.6-50 mg per tablet 2 Tablet  2 Tablet Oral PCD    metoprolol tartrate (LOPRESSOR) tablet 25 mg  25 mg Oral Q12H    cholecalciferol (VITAMIN D3) capsule 5,000 Units  5,000 Units Oral DAILY    nicotine (NICODERM CQ) 14 mg/24 hr patch 1 Patch  1 Patch TransDERmal DAILY    acetaminophen (TYLENOL) tablet 650 mg  650 mg Oral Q4H PRN    bisacodyL (DULCOLAX) tablet 10 mg  10 mg Oral Q48H PRN    aspirin chewable tablet 81 mg  81 mg Oral DAILY WITH DINNER    atorvastatin (LIPITOR) tablet 80 mg  80 mg Oral DAILY    clopidogreL (PLAVIX) tablet 75 mg  75 mg Oral DAILY WITH BREAKFAST    co-enzyme Q-10 (CO Q-10) capsule 100 mg  100 mg Oral DAILY    melatonin tablet 3 mg  3 mg Oral QHS       Allergies:  No Known Allergies      Functional Progress:    PHYSICAL THERAPY    ON ADMISSION MOST RECENT   Wheelchair Mobility/Management  Able to Propel (ft): 70 feet  Functional Level: 2  Curbs/Ramps Assist Required (FIM Score):  (mod A for steering, using rosalba-technique)  Wheelchair Setup Assist Required : 2 (Maximal assistance)  Wheelchair Management: Manages left brake, Manages right brake (extra time, cues) Wheelchair Mobility/Management  Able to Propel (ft): 72 feet  Functional Level:  (supervision)  Curbs/Ramps Assist Required (FIM Score): 0 (Not tested)  Wheelchair Setup Assist Required : 5 (Supervision/setup)  Wheelchair Management: Manages left brake, Manages right brake     Gait  Amount of Assistance: 2 (Maximal assistance) (maximal trunk support, blocking right LE)  Distance (ft): 1 Feet (ft)  Assistive Device:  (no AD as per PLOF; trialed RW for 38 ft mod A) Gait  Amount of Assistance: 4 (Contact guard assistance)  Distance (ft): 80 Feet (ft)  Assistive Device: Cane, quad, Gait belt     Balance-Sitting/Standing  Sitting - Static: Good (unsupported)  Sitting - Dynamic: Good (unsupported)  Standing - Static: Fair, Occasional, Poor  Standing - Dynamic : Impaired Balance-Sitting/Standing  Sitting - Static: Good (unsupported)  Sitting - Dynamic: Good (unsupported)  Standing - Static: Fair  Standing - Dynamic : Impaired     Bed/Mat Mobility  Rolling Right : 5 (Stand-by assistance)  Rolling Left : 5 (Stand-by assistance)  Supine to Sit : 5 (Stand-by assistance) (cue for not holding her breath)  Sit to Supine :  (SBA) Bed/Mat Mobility  Rolling Right : 5 (Stand-by assistance)  Rolling Left : 5 (Stand-by assistance)  Supine to Sit : 5 (Supervision)  Sit to Supine : 5 (Supervision)     Transfers  Transfer Type: Other  Other: stand step without AD as per PLOF  Transfer Assistance : 3 (Moderate assistance )  Sit to Stand Assistance: Moderate assistance  Car Transfers: Moderate assistance (using RW)  Car Type: car transfer simulator Transfers  Transfer Type:  Other  Other: stand step txfr with [facility]  Transfer Assistance : 4 (Contact guard assistance)  Sit to Stand Assistance: Contact guard assistance  Car Transfers: Not tested  Car Type: NA     Steps or Stairs  Steps/Stairs Ambulated (#): 1  Level of Assist : 2 (Maximal assistance) (lifting/lowering assistance required)  Rail Use: Both (therapist assisting at right UE to ) Steps or Stairs  Steps/Stairs Ambulated (#): 4 (6\" steps)  Level of Assist : 4 (Minimal assistance)  Rail Use: Right  (and SBQC on left )         Lab/Data Review:  Recent Results (from the past 24 hour(s))   RENAL FUNCTION PANEL    Collection Time: 06/09/21  4:50 AM   Result Value Ref Range    Sodium 141 136 - 145 mmol/L    Potassium 4.8 3.5 - 5.5 mmol/L Chloride 114 (H) 100 - 111 mmol/L    CO2 23 21 - 32 mmol/L    Anion gap 4 3.0 - 18 mmol/L    Glucose 100 (H) 74 - 99 mg/dL    BUN 30 (H) 7.0 - 18 MG/DL    Creatinine 1.95 (H) 0.6 - 1.3 MG/DL    BUN/Creatinine ratio 15 12 - 20      GFR est AA 32 (L) >60 ml/min/1.73m2    GFR est non-AA 26 (L) >60 ml/min/1.73m2    Calcium 9.7 8.5 - 10.1 MG/DL    Phosphorus 3.7 2.5 - 4.9 MG/DL    Albumin 2.9 (L) 3.4 - 5.0 g/dL       Assessment:     Primary Rehab Diagnosis  1. Impaired Mobility and ADLs  2. Acute Lacunar Stroke (acute lacunar infarct in the posterior left lentiform nucleus extending into the corona radiata) with residual right hemiparesis    Comorbidities  Patient Active Problem List   Diagnosis Code    Rheumatoid arthritis ([facility] M06.9    History of vitamin D deficiency Z86.39    Cocaine use F14.90    Stage 3b chronic kidney disease (HCC) N18.32    History of Helicobacter pylori infection Z86.19    Abscess of finger L02.519    Gastroesophageal reflux disease K21.9    Acute lacunar stroke (HCC) I63.81    Impaired mobility and ADLs Z74.09, Z78.9    Hemiparesis of right dominant side due to acute cerebrovascular disease (HCC) I67.89, G81.91    Tobacco use disorder F17.200    Current use of aspirin Z79.82    On clopidogrel therapy Z79.01    Hypertensive heart and kidney disease without heart failure and with stage 3b chronic kidney disease (HCC) I13.10, N18.32    Mixed hyperlipidemia E78.2    On statin therapy due to risk of future cardiovascular event Z79.899    Constipation K59.00    History of iron deficiency anemia Z86.2    High serum magnesium R79.89    Secondary hyperparathyroidism of renal origin ([facility] N25.81    Vitamin D deficiency E55.9        Plan:     1. Justification for continued stay: Good progression towards established rehabilitation goals.     2. Medical Issues being followed closely:    [x]  Fall and safety precautions     []  Wound Care     [x]  Bowel and Bladder Function     [x]  Fluid Electrolyte and Nutrition Balance     []  Pain Control      3. Issues that 24 hour rehabilitation nursing is following:    [x]  Fall and safety precautions     []  Wound Care     [x]  Bowel and Bladder Function     [x]  Fluid Electrolyte and Nutrition Balance     []  Pain Control      [x]  Assistance with and education on in-room safety with transfers to and from the bed, wheelchair, toilet and shower. 4. Acute rehabilitation plan of care:    [x]  Continue current care and rehab. [x]  Physical Therapy           [x]  Occupational Therapy           [x]  Speech Therapy     []  Hold Rehab until further notice     5. Medications:    [x]  MAR Reviewed     [x]  Continue Present Medications     6. DVT Prophylaxis:      []  Enoxaparin     [x]  Unfractionated Heparin     []  Warfarin     []  NOAC     []  NORBERT Stockings     []  Sequential Compression Device     []  None     7. Code status    [x]  Full code     []  Partial code     []  Do not intubate     []  Do not resuscitate     8.  Orders:   > Acute Lacunar Stroke (acute lacunar infarct in the posterior left lentiform nucleus extending into the corona radiata) with residual right hemiparesis   > Dual antiplatelet therapy (DAPT) was started 5/22/2021; Neurology recommending to continue DAPT for 21 days (~6/12/2021), then discontinue Aspirin and continue on Clopidogrel 75 mg PO once daily    > On 5/27/2021, started Melatonin 3 mg PO q HS (for stroke recovery)   > Continue:    > Aspirin 81 PO once daily with dinner (STOP DATE: 6/12/2021)    > Atorvastatin 40 mg PO once daily    > Clopidogrel 75 mg PO once daily with breakfast      > Melatonin 3 mg PO q HS    > Constipation   > On 5/28/2021, discontinued (re: refused by patient last night and this AM):    > Miralax 17 grams in 8 oz water PO once daily    > PeriColace 2 tabs PO once daily with dinner   > On 6/2/2021, started:    > Pericolace 2 tabs PO once daily after dinner    > Polyethylene glycol 17 grams in 8 oz water PO once daily    > On 6/6/2021, started Lubiprostone 24 mcg PO BID with meals   > On 6/7/2021, decreased Lubiprostone from 24 mcg to 8 mcg PO BID with meals   > Continue:    > Lubiprostone 8 mcg PO BID with meals    > Pericolace 2 tabs PO once daily after dinner    > Polyethylene glycol 17 grams in 8 oz water PO once daily     > Hypertensive heart and kidney disease without heart failure with stage 3b-4 chronic kidney disease   > On 5/31/2021, started Metoprolol tartrate 25 mg PO q 12 hr (9AM, 9PM)   > On 6/2/2021:    > Discontinued Amlodipine 10 mg PO once daily (9AM)    > Started Losartan 12.5 mg PO once daily (9AM)   > On 6/5/2021:    > Discontinued Losartan 12.5 mg PO once daily (9AM)    > Started Hydralazine 25 mg PO TID PRN for SBP greater than 160 mmHg   > Continue:    > Metoprolol tartrate 25 mg PO q 12 hr (9AM, 9PM)    > Hydralazine 25 mg PO TID PRN for SBP greater than 160 mmHg    > Mixed hyperlipidemia   > On 5/28/2021, started Coenzyme Q10 100 mg PO once daily   > Continue:    > Atorvastatin 40 mg PO once daily    > Coenzyme Q10 100 mg PO once daily    > High serum magnesium   > Mg (5/28/2021, on admission to the ARU) = 3.2   > patient reported she was given 3 doses of Milk of magnesia at [facility] prior to discharge to the ARU   > Avoid magnesium-containing medications/supplements      05/31/21  0545 05/30/21  0706 05/28/21  0613   MG 2.6 2.9* 3.2*     > Stage 3b-4 chronic kidney disease   > BUN/Creatinine (5/28/2021, on admission to the ARU) = 36/2.47   > Retroperitoneal ultrasound (5/28/2021) showed:    > Increased parenchymal echogenicity in both kidneys consistent with medical renal disease. There is a prominent cortical cyst in the mid right kidney. No hydronephrosis or nephrolithiasis.       > Renal cortical thickness is somewhat less than 10 mm in both kidneys.   The left kidney is somewhat small with respect to the right kidney which is low normal in size.   > On 5/29/2021, started IVF: 0.9%  ml.hr x 1 liter once daily after dinner   > PTH (5/31/2021) = 276.1   > Vitamin D 25-Hydroxy (5/31/2021) = 10.1   > Urine microalbumin (5/31/2021) = 15.30   > Microalbumin/Creatinine ratio (5/31/2021) = 165   > Urinalysis (5/31/2021): SG 1.013, protein 30, no casts seen   > Nephrology consult (Dr. Suman Wilkes) called on 5/31/2021 for evaluation and comanagement   > CK (5/31/2021) = 107   > Urine creatinine (5/31/2021) = 93.00   > Random urine protein (5/31/2021) = 31   > Random urine sodium (5/31/2021) = 52      06/02/21  0445 06/01/21  0430 05/31/21  0545 05/30/21  0706 05/28/21  0613   BUN 35* 37* 36* 37* 36*   CREA 2.24* 2.27* 2.15* 2.25* 2.47*      > On 6/2/2021:    > Discontinued IVF: 0.9%  ml/hr x 1 liter once daily after dinner    > Started Losartan 12.5 mg PO once daily (9AM)      06/05/21  0609 06/04/21  0559 06/03/21  0516   K 4.8 4.9 4.9       06/05/21  0609 06/04/21  0559 06/03/21  0516   BUN 40* 36* 34*   CREA 2.85* 2.45* 2.24*      > On 6/5/2021:    > Discontinued Losartan 12.5 mg PO once daily (9AM)    > Started IVF: 0.9% NS at 75 ml/hr continuous      06/07/21  0555 06/06/21  0616   K 5.0 4.7         06/07/21  0555 06/06/21  0616   BUN 36* 40*   CREA 2.16* 2.41*      > On 6/7/2021, changed IVF from 0.9% NS at 75 ml/hr continuous to 75 ml/hr x 1 liter once daily after dinner      06/09/21  0450 06/08/21  0503   K 4.8 4.9       06/09/21  0450 06/08/21  0503   BUN 30* 34*   CREA 1.95* 2.02*      > Discontinue IVF: 0.9% NS at 75 ml/hr x 1 liter once daily after dinner   > Start IVF: 0.45%  ml/hr x 1 liter    > Tobacco use disorder   > On 5/28/2021, started Nicotine 14 mg/24 hr patch, 1 patch on skin once daily   > Continue Nicotine 14 mg/24 hr patch, 1 patch on skin once daily    > Vitamin D Deficiency   > Vitamin D 25-Hydroxy (5/31/2021) = 10.1   > On 5/31/2021, patient was given Cholecalciferol 50,000 units PO x 1 dose    > On 6/1/2021, started Cholecalciferol 5,000 units PO once daily   > Continue Cholecalciferol 5,000 units PO once daily    > Analgesia   > Continue Acetaminophen 650 mg PO q 4 hr PRN for pain     > Diet:   > Specifications: Cardiac   > Solids (consistency): Regular    > Liquids (consistency): Thin    > Fluid restriction: None      9. Personal Protective Equipment was used while interacting with patient including: goggles and KN95 face mask. Patient was using a surgical mask. 10. Patient's progress in rehabilitation and medical issues discussed with the patient. All questions answered to the best of my ability. Care plan discussed with patient and nurse. 11. Total clinical care time is 30 minutes, including review of chart including all labs, radiology, past medical history, and discussion with patient. Greater than 50% of my time was spent in coordination of care and counseling.       Signed:    Casey Casey MD    June 9, 2021

## 2021-06-09 NOTE — INTERDISCIPLINARY ROUNDS
[address]  [address] [place] [address], [address]     INPATIENT REHABILITATION  DISCHARGE RECOMMENDATION SHEET    Date: 6/9/2021     Name: Shira Lindsey Age / Sex: 62 y.o. / female   CSN: [id number] MRN: [id number]   [facility] Date: 5/27/2021 Discharge Date: 6/15        [address]      Height  []  Straight cane  [] DMV Handicap Placard    []  #14 ½ marcel height  []  Forearm crutches OUTPATIENT    []  Marcel height  []  Axillary crutches  []  PT    [x]  Standard height  [x]  LBQC  []  OT    []  Reclining high back  []  SBQC  []  ST       Weight  HOME HEALTH    [x]  Standard weight WALKERS  [x]  PT    []  Lightweight  []  Standard height  [x]  OT    []  High-strength lightweight  []  Small adult  []  ST    []  Heavy Duty   []  Tall walker  []  Nursing    []  Patient is unable to self-propel a standard weight wheelchair  []  Rolling walker with 5 single fixed wheels  []  Wound care  Location of wound:  Specify needs:      []  Anticoagulation management       Width  []  Bariatric walker  []  Diabetes management    []  Narrow (16)   []  Insulin management    [x]  Standard (18) ACCESSORIES  []  No insulin management    []  Wide (20)  []  Rear sure glide brakes  []  BP management    []  Other  []  10 rear wheel brake  []  CHF Telehealth       Arms  []  Tall leg extensions  []  Post-CABG care/monitoring    [x]  Standard  []  Other:  []  Colostomy care    []  Desk/Tray   []  Tube feeding    []  Removable BATHROOM EQUIPMENT  []  PICC line care      Foot/Leg Rests  [x]  Bedside commode  []  Rollins catheter care    [x]  Removable  []  Extra wide bedside commode  []  Other:     []  Elevating  []  3:1 commode WITH drop arms  []  Medical Social Worker      Other  [x]  Tub transfer bench  []  Aide    [x]  Brake extensions  []  Shower chair     []  Padded gloves       []  Antitippers           CUSHIONS OTHER     [x]  Foam cushion  []  Seat belt     []  Gel cushion []  Gait belt     []  Porfirio Hilario II  []  Transfer board - Size:     []  Roho  []  Oxygen     []  Other  []  CPM      []  [address]  []  Ramp     []  Hospital bed  []  Hip kit     []  Special mattress  []  Reacher     []  Trapeze bar

## 2021-06-09 NOTE — ROUTINE PROCESS
[facility]    Bedside and Verbal shift change report given to Jennifer Soriano RN (oncoming nurse) by Maritza Guzmán RN (offgoing nurse). Report included the following information SBAR, Kardex, MAR and Recent Results.     Situation:   Code Status: Full Code   Hospital Day: 13   Problem List:   Hospital Problems  Date Reviewed: 6/8/2021        Codes Class Noted POA    Secondary hyperparathyroidism of renal origin ([facility] (Chronic) ICD-10-CM: N25.81  ICD-9-CM: 588.81  5/31/2021 Yes        Vitamin D deficiency (Chronic) ICD-10-CM: E55.9  ICD-9-CM: 268.9  5/31/2021 Yes    Overview Signed 5/31/2021  1:17 PM by Shavonne Blair MD     Vitamin D 25-Hydroxy (5/31/2021) = 10.5              High serum magnesium ICD-10-CM: R79.89  ICD-9-CM: 790.99  5/28/2021 Yes        Hypertensive heart and kidney disease without heart failure and with stage 3b chronic kidney disease ([facility] (Chronic) ICD-10-CM: I13.10, N18.32  ICD-9-CM: 404.90, 585.3  Unknown Yes        Mixed hyperlipidemia (Chronic) ICD-10-CM: E78.2  ICD-9-CM: 272.2  Unknown Yes        Constipation (Chronic) ICD-10-CM: K59.00  ICD-9-CM: 564.00  Unknown Yes        Current use of aspirin ICD-10-CM: Z79.82  ICD-9-CM: V58.66  5/23/2021 Yes        On clopidogrel therapy ICD-10-CM: Z79.01  ICD-9-CM: V58.61  5/23/2021 Yes        On statin therapy due to risk of future cardiovascular event ICD-10-CM: Z79.899  ICD-9-CM: V58.69  5/22/2021 Yes    Overview Signed 5/27/2021  2:27 PM by Shavonne Blair MD     On Atorvastatin             * (Principal) Acute lacunar stroke [facility]) ICD-10-CM: I63.81  ICD-9-CM: 434.91  5/21/2021 Yes    Overview Signed 5/27/2021  2:15 PM by Shavonne Blair MD     Acute Lacunar Stroke (acute lacunar infarct in the posterior left lentiform nucleus extending into the corona radiata) with residual right hemiparesis             Impaired mobility and ADLs ICD-10-CM: Z74.09, Z78.9  ICD-9-CM: V49.89  5/21/2021 Yes        Hemiparesis of right dominant side due to acute cerebrovascular disease [facility]) ICD-10-CM: I67.89, G81.91  ICD-9-CM: 690, 342.91  5/21/2021 Yes              Background:   Past Medical History:   Past Medical History:   Diagnosis Date    Abnormal mammogram 2014    left with biopsy    Acute lacunar stroke ([facility] 5/21/2021    Acute Lacunar Stroke (acute lacunar infarct in the posterior left lentiform nucleus extending into the corona radiata) with residual right hemiparesis    Bacterial vaginosis 1/9/15    Dr Eliud Bush Constipation     Current use of aspirin 5/23/2021    Gastroesophageal reflux disease 6/30/2016    Hemiparesis of right dominant side due to acute cerebrovascular disease ([facility] 5/21/2021    History of Helicobacter pylori infection 7/24/2015    History of iron deficiency anemia 06/2014    History of vitamin D deficiency 6/11/2015    Hot flashes 1/9/15    Dr Eliud Bush On clopidogrel therapy 5/23/2021    On statin therapy due to risk of future cardiovascular event 5/22/2021    On Atorvastatin    Rheumatoid arthritis ([facility]     Secondary hyperparathyroidism of renal origin ([facility] 5/31/2021    Stage 3b chronic kidney disease ([facility] 7/24/2015    Tobacco use disorder     Vitamin D deficiency 5/31/2021    Vitamin D 25-Hydroxy (5/31/2021) = 10.5         Assessment:   Changes in Assessment throughout shift: No change to previous assessmentnone     Patient has a central line: no Reasons if yes: na  Insertion date:na Last dressing date:na   Patient has Irving Cath: no Reasons if yes: na   Insertion date:na  Shift irving care completed: NO     Last Vitals:     Vitals:    06/07/21 2101 06/08/21 0752 06/08/21 1657 06/08/21 2130   BP: 137/88 123/86 130/87 139/89   Pulse: 81 70 82 79   Resp: 18 18 18 18   Temp: 97.9 °F (36.6 °C)  97.4 °F (36.3 °C) 97.1 °F (36.2 °C)   SpO2: 99% 98% 98% 100%   Weight:       Height:       LMP: 10/15/2014        PAIN    Pain Assessment    Pain Intensity 1: 0 (06/09/21 0400) Pain Intensity 1: 2 (12/29/14 1105)    Pain Location 1: Generalized Pain Location 1: Abdomen    Pain Intervention(s) 1: Medication (see MAR) Pain Intervention(s) 1: Medication (see MAR)  Patient Stated Pain Goal: 0 Patient Stated Pain Goal: 0  o Intervention effective: yes  o Other actions taken for pain: Medication (see MAR)     Skin Assessment  Skin color    Condition/Temperature    Integrity    Turgor    Weekly Pressure Ulcer Documentation  Pressure  Injury Documentation: No Pressure Injury Noted-Pressure Ulcer Prevention Initiated  Wound Prevention & Protection Methods  Orientation of wound Orientation of Wound Prevention: Posterior  Location of Prevention Location of Wound Prevention: Buttocks, Sacrum/Coccyx  Dressing Present Dressing Present : No  Dressing Status    Wound Offloading Wound Offloading (Prevention Methods): Bed, pressure reduction mattress     INTAKE/OUPUT  Date 06/08/21 0700 - 06/09/21 0659 06/09/21 0700 - 06/10/21 0659   Shift 0883-4109 4208-6642 24 Hour Total 9728-4082 9994-1713 24 Hour Total   INTAKE   P.O. 500  500        P. O. 500  500      Shift Total(mL/kg) 500(5.9)  500(5.9)      OUTPUT   Urine(mL/kg/hr)           Urine Occurrence(s) 1 x 1 x 2 x      Stool           Stool Occurrence(s) 0 x 0 x 0 x      Shift Total(mL/kg)           500      Weight (kg) 84.4 84.4 84.4 84.4 84.4 84.4       Recommendations:  1. Patient needs and requests: assist with toileting    2. Pending tests/procedures: lab as ordered     3. Functional Level/Equipment: Partial (one person) /  wheelchair    Fall Precautions:   Fall risk precautions were reinforced with the patient; she was instructed to call for help prior to getting up. The following fall risk precautions were continued: bed/ chair alarms, door signage, yellow bracelet and socks as well as update of the [place] tool in the patient's room. Leigh Ann Score: 4    HEALS Safety Check    A safety check occurred in the patient's room between off going nurse and oncoming nurse listed above.     The safety check included the below items  Area Items   H  High Alert Medications - Verify all high alert medication drips (heparin, PCA, etc.)   E  Equipment - Suction is set up for ALL patients (with blessing)  - Red plugs utilized for all equipment (IV pumps, etc.)  - WOWs wiped down at end of shift.  - Room stocked with oxygen, suction, and other unit-specific supplies   A  Alarms - Bed alarm is set for fall risk patients  - Ensure chair alarm is in place and activated if patient is up in a chair   L  Lines - Check IV for any infiltration  - Rollins bag is empty if patient has a Rollins   - Tubing and IV bags are labeled   S  Safety   - Room is clean, patient is clean, and equipment is clean. - Hallways are clear from equipment besides carts. - Fall bracelet on for fall risk patients  - Ensure room is clear and free of clutter  - Suction is set up for ALL patients (with blessing)  - Hallways are clear from equipment besides carts.    - Isolation precautions followed, supplies available outside room, sign posted     Jagruti Hinds RN

## 2021-06-09 NOTE — INTERDISCIPLINARY ROUNDS
[facility] PHYSICAL REHABILITATION  [address] [place] [address], [address]    INPATIENT REHABILITATION  PRE-TEAM CONFERENCE SUMMARY     Date of Conference: 6/10/2021    Patient Information:        Name: Ras Cortez Age / Sex: 62 y.o. / female   CSN: [id number] MRN: [id number]   [facility] Date: 5/27/2021 Length of Stay: 13 days     Primary Rehabilitation Diagnosis  1. Impaired Mobility and ADLs  2.  Acute Lacunar Stroke (acute lacunar infarct in the posterior left lentiform nucleus extending into the corona radiata) with residual right hemiparesis    Comorbidities  Patient Active Problem List   Diagnosis Code    [facility] M06.9    History of vitamin D deficiency Z86.39    Cocaine use F14.90    Stage 3b chronic kidney disease (HCC) N18.32    History of Helicobacter pylori infection Z86.19    Abscess of finger L02.519    Gastroesophageal reflux disease K21.9    Acute lacunar stroke (HCC) I63.81    Impaired mobility and ADLs Z74.09, Z78.9    Hemiparesis of right dominant side due to acute cerebrovascular disease (HCC) I67.89, G81.91    Tobacco use disorder F17.200    Current use of aspirin Z79.82    On clopidogrel therapy Z79.01    Hypertensive heart and kidney disease without heart failure and with stage 3b chronic kidney disease (HCC) I13.10, N18.32    Mixed hyperlipidemia E78.2    On statin therapy due to risk of future cardiovascular event Z79.899    Constipation K59.00    History of iron deficiency anemia Z86.2    High serum magnesium R79.89    Secondary hyperparathyroidism of renal origin [facility] N25.81    Vitamin D deficiency E55.9          Therapy:     FIM SCORES Initial Assessment Weekly Progress Assessment 6/9/2021   Eating Functional Level: 5  Comments:  (issued AE:built up handle utensils,2 handled cup with lid)  6   Swallowing     Grooming 5 (seated w/c level at sink using nondominant left hand) Grooming Assistance : 3 (Moderate assistance )  Comments: (washing hands in stance at sink w/ RW)4.5 CGA in stance w/ QC   Bathing 5 (5 SBA UB, 4.5 CGA LB) Bathing Assistance, Upper: 5 (Supervision)  Position Performed:  (seated w/c level )  Adaptive Equipment: Long handled sponge (simulation with long handle bathsponge to wash back) 5 Supv w/ UB  4.5-5 CGA-Supv w/ LB    Bathing Assistance, Lower : 5 (Supervision)  Adaptive Equipment: Long handled sponge (simulation with long handle bathsponge to wash LEs & feet )  Position Performed:  Other (comment) (seated w/c level)  Adaptive Equipment: Long handled sponge  Comments:  (using cross leg method)   Upper Body Dressing Functional Level: 5 (SBA)  Items Applied/Steps Completed: Pullover (4 steps)  Comments:  (instruction for hemitechnique)  5 Supv using hemitechnique   Lower Body Dressing Functional Level:  (4.5-4 CGA/Min A seated on bedside commode & in stance w/ RW)  Items Applied/Steps Completed: Elastic waist pants (3 steps), Shoe, right (1 step), Shoe, left (1 step), Sock, left (1 step), Sock, right (1 step), Underpants (3 steps)  Comments:  (cross leg method and hemitechnique) Dressing Assistance : 5 (Supervision)  Leg Crossed Method Used: Yes  Position Performed:  (seated in w/c )  Comments:  (don and doff socks and slip on shoes)5-4.5 Supv-CGA    Toileting Functional Level:  (4.5 CGA-4 Min A)  Comments:  (4 Min A to hike underwear and pants over hips) Toileting Assistance (FIM Score):  (4.5 CGA)  Adaptive Equipment: Walker;Grab bars (3 in 1 commode over toilet)4.5 CGA using QC    Bladder 1 0   Bowel  0 0   Toilet Transfer [place] Toilet Transfer Score:  (4 Min A using RW to bedside commode) 4 (Contact guard assistance) (w/ QC)4,5 CGA using QC    Tub/Shower Transfer [place] Tub or Shower Type: Tub/Shower combination  Tub/Shower Transfer Score:  (NT d/t time constraints) Tub/Shower combination4.5 CGA using w/c, QC, grab bars and tub transfer bench  4 (Contact guard assistance)   Comprehension Primary Mode of Comprehension: Auditory  Score: 5     5   Expression Primary Mode of Expression: Verbal  Score: 6  6      Social Interaction Score: 5  5   Problem Solving Score: 4  4   Memory Score: 4  4     FIM SCORES Initial Assessment Weekly Progress Assessment 6/9/2021   Bed/Chair/Wheelchair Transfers Transfer Type: Other  Other: stand step without AD as per PLOF  Transfer Assistance : 3 (Moderate assistance )  Sit to Stand Assistance: Moderate assistance  Car Transfers:  Moderate assistance (using RW)  Car Type: car transfer simulator Sit to Stand Assistance: Contact guard assistance   txfr type: stand step txfr with [facility]  txfr assistance: CGA   Bed Mobility Rolling Right 5 (Stand-by assistance)   Rolling Left 5 (Stand-by assistance)   Supine to Sit 5 (Stand-by assistance) (cue for not holding her breath)   Sit to Stand Moderate assistance   Sit to Supine  (SBA)    Rolling Right    S   Rolling Left    S     Supine to Sit    S   Sit to Stand   Contact guard assistance   Sit to Supine    S      Locomotion (W/C) Able to Propel (ft): 70 feet  Functional Level: 2  Curbs/Ramps Assist Required (FIM Score):  (mod A for steering, using rosalba-technique)  Wheelchair Setup Assist Required : 2 (Maximal assistance)  Wheelchair Management: Manages left brake, Manages right brake (extra time, cues) Function  5  Setup Assistance: Supervision         Locomotion (W/C distance)    72ft   Locomotion (Walk) 2 (Maximal assistance) (maximal trunk support, blocking right LE) 4 (Contact guard assistance)  Cane, quad;Gait belt   Locomotion (Walk dist.) 1 Feet (ft) 80 Feet (ft)   Steps/Stairs Steps/Stairs Ambulated (#): 1  Level of Assist : 2 (Maximal assistance) (lifting/lowering assistance required)  Rail Use: Both (therapist assisting at right UE to )  4 6\" steps with right HR and SBQC on left with min assist         Nursing:     Neuro:   AAA&O x 4           Respiratory:   [] WNL   [] O2 LPM:   Other:  Peripheral Vascular:   [] TEDS present   [] Edema present ____ Grade   Cardiac:   [x] WNL   [] Other  Genitourinary:   [] continent   [] incontinent   [] irving  Abdominal _______ LBM  GI: _cardiac ___ Diet _thin_____ Liquids _____ tube feeds  Musculoskeletal: ____ ROM Transfers _____ Assistive Device Used  ____ Level of Assistance  Skin Integumentary:   [] Intact   [] Not Intact   __________Preventative Measures  Details______________________________________________________________  Pain: [x] Controlled   [] Not Controlled   Pain Meds:   [] Scheduled   [] PRN        Registered Dietitian / Nutrition:   No data found. Pt reported good appetite and meal intake. Eating until satisfied. Tolerating diet. Denied having any nutrition concerns/ questions at this time. Noted 51-75% meal intake per chart documentation      Supplements:          [] Yes   [x] No      Amount of supplement consumed:  Not applicable       Intake/Output Summary (Last 24 hours) at 6/9/2021 1720  Last data filed at 6/9/2021 1316  Gross per 24 hour   Intake 970 ml   Output --   Net 970 ml                                Last bowel movement: 6/7      Interdisciplinary Team Goals:     1. Discipline  Physical Therapy    Goal  Pt will consistently perform stand step txfr with SBQC and SBA without scissoring feet. Barrier  impulsive, right side weakness, decreased attention to right LE    Intervention  txfr training, gait training, stair training, balance, therapeutic exercise. Goal written by:   THADDEUS Hawk     2. Discipline  Occupational Therapy    Goal  Improve RUE strength, dexterity and coordination for ADL & functional tasks    Barrier  R side hemiplegia, chronic arthritic changes d/t RW of b/l hands, impaired balance and strength    Intervention  ADL retraining, there ex, there act and NMR    Goal written by:  Hortencia Mora MS OTR/L     3. Discipline  Speech Therapy    Goal  Patient will list 10 items within concrete categories with supervision.     Barrier  mild cognitive impairment    Intervention  cognitive retraining    Goal written by:  Irma Morales, CCC-SLP     4. Discipline  Nursing    Goal  keep pt . Hydrated to improve kidney function    Barrier  disease process    Intervention  encourge to drink 2 lit fluid/24 hours per nephrologist    Goal written by:  Alfred Escalante     5. Discipline  Clinical Psychology    Goal  Increase insight about all stroke recovery and healthcare compliance    Barrier  Limited insight and continued health issues    Intervention  Patient/stroke education and support     Goal written by:  Lissa Pal, PhD     6. Discipline  Nutrition / Dietetics    Goal  - PO nutrition intake will meet >75% of patient estimated nutritional needs within the next 7 days. Barrier  none known     Intervention  continue po diet    Goal written by:  Patrick Martin RD       Disposition / Discharge Planning:      Follow-up services:  [x] Physical Therapy             [x] Occupational Therapy       [x] Speech Therapy           [] Skilled Nursing      [] Medical Social Worker   [] Aide        [] Outpatient      [] vs   [x] Home Health  [] vs       [] to progress to outpatient       [] with 24-hour supervision       [x] with 24-hour assistance   [] [place]   [facility] recommendations:  tub transfer bench, grab bar on wall in tub-shower, 3 in 1 commode, SBQC, w/c    Estimated discharge date:  6/15/2021   Discharge Location:  [] Home  [] versus    [] [facility]    [] [address]   [] Other:           Electronic Signatures:      Signature Date Signed   Physical Therapist    THADDEUS Hudson PT, DPT  6/9/2021  6/10/2021   Occupational Therapist    Manuel Bose MS OTR/L  6/9/2021   Speech Therapist   Irma Morales, [address]  6/9/21   Recreational Therapist    Deb Moreno, CTRS 6/9/2021   Nursing    Alfred Escalante  6/9/2021   Dietitian    Patrick Martin, [address] [place]  6/9/2021   Clinical Psychologist    Julianne Hardwick Jennifer Colbert, PhD  6/9/2021    Physician    Pipe Reynaga MD   6/9/2021       BILL Watts  6/9/2021     Opportunity to share with family/caregiver[] YES [] NO    Relationship to patient____________________________________________________      The above information has been reviewed with the patient in a language that they can understand. Opportunity for comments and questions has been provided and a signed attestation has been scanned into the \"media tab\" of the EMR.       Patient Signature: ______________________________________________________    Date Signed: __________________________________________________________

## 2021-06-09 NOTE — ROUTINE PROCESS
[facility]    Bedside and Verbal shift change report given to Anai Kohli, RN (oncoming nurse) by Hany Diane RN (offgoing nurse). Report included the following information SBAR, Kardex, MAR and Recent Results.     Situation:   Code Status: Full Code   Hospital Day: 13   Problem List:   Hospital Problems  Date Reviewed: 6/9/2021        Codes Class Noted POA    Secondary hyperparathyroidism of renal origin ([facility] (Chronic) ICD-10-CM: N25.81  ICD-9-CM: 588.81  5/31/2021 Yes        Vitamin D deficiency (Chronic) ICD-10-CM: E55.9  ICD-9-CM: 268.9  5/31/2021 Yes    Overview Signed 5/31/2021  1:17 PM by Radha De Leon MD     Vitamin D 25-Hydroxy (5/31/2021) = 10.5              High serum magnesium ICD-10-CM: R79.89  ICD-9-CM: 790.99  5/28/2021 Yes        Hypertensive heart and kidney disease without heart failure and with stage 3b chronic kidney disease ([facility] (Chronic) ICD-10-CM: I13.10, N18.32  ICD-9-CM: 404.90, 585.3  Unknown Yes        Mixed hyperlipidemia (Chronic) ICD-10-CM: E78.2  ICD-9-CM: 272.2  Unknown Yes        Constipation (Chronic) ICD-10-CM: K59.00  ICD-9-CM: 564.00  Unknown Yes        Current use of aspirin ICD-10-CM: Z79.82  ICD-9-CM: V58.66  5/23/2021 Yes        On clopidogrel therapy ICD-10-CM: Z79.01  ICD-9-CM: V58.61  5/23/2021 Yes        On statin therapy due to risk of future cardiovascular event ICD-10-CM: Z79.899  ICD-9-CM: V58.69  5/22/2021 Yes    Overview Signed 5/27/2021  2:27 PM by Radha De Leon MD     On Atorvastatin             * (Principal) Acute lacunar stroke [facility]) ICD-10-CM: I63.81  ICD-9-CM: 434.91  5/21/2021 Yes    Overview Signed 5/27/2021  2:15 PM by Radha De Leon MD     Acute Lacunar Stroke (acute lacunar infarct in the posterior left lentiform nucleus extending into the corona radiata) with residual right hemiparesis             Impaired mobility and ADLs ICD-10-CM: Z74.09, Z78.9  ICD-9-CM: V49.89  5/21/2021 Yes        Hemiparesis of right dominant side due to acute cerebrovascular disease [facility]) ICD-10-CM: I67.89, G81.91  ICD-9-CM: 436, 342.91  5/21/2021 Yes              Background:   Past Medical History:   Past Medical History:   Diagnosis Date    Abnormal mammogram 2014    left with biopsy    [facility] 5/21/2021    Acute Lacunar Stroke (acute lacunar infarct in the posterior left lentiform nucleus extending into the corona radiata) with residual right hemiparesis    Bacterial vaginosis 1/9/15    Dr Dahlia Garcia Constipation     Current use of aspirin 5/23/2021    Gastroesophageal reflux disease 6/30/2016    Hemiparesis of right dominant side due to acute cerebrovascular [facility] 5/21/2021    History of Helicobacter pylori infection 7/24/2015    History of iron deficiency anemia 06/2014    History of vitamin D deficiency 6/11/2015    Hot flashes 1/9/15    Dr Dahlia Garcia On clopidogrel therapy 5/23/2021    On statin therapy due to risk of future cardiovascular event 5/22/2021    On Atorvastatin    [facility] ([facility]     Secondary hyperparathyroidism of renal origin ([facility] 5/31/2021    Stage 3b chronic kidney disease ([facility] 7/24/2015    Tobacco use disorder     Vitamin D deficiency 5/31/2021    Vitamin D 25-Hydroxy (5/31/2021) = 10.5         Assessment:   Changes in Assessment throughout shift: No change to previous assessmentnone     Patient has a central line: no Reasons if yes: na  Insertion date:na Last dressing date:na   Patient has Irving Cath: no Reasons if yes: na   Insertion date:na  Shift irving care completed: NO     Last Vitals:     Vitals:    06/08/21 1657 06/08/21 2130 06/09/21 0739 06/09/21 1602   BP: 130/87 139/89 135/87 (!) 141/102   Pulse: 82 79 73 69   Resp: 18 18 16 16   Temp: 97.4 °F (36.3 °C) 97.1 °F (36.2 °C) 97.4 °F (36.3 °C) 98.1 °F (36.7 °C)   SpO2: 98% 100% 99% 100%   Weight:       Height:       LMP: 10/15/2014        PAIN    Pain Assessment    Pain Intensity 1: 0 (06/09/21 1816) Pain Intensity 1: 2 (12/29/14 7142)    Pain Location 1: Generalized Pain Location 1: Abdomen    Pain Intervention(s) 1: Medication (see MAR) Pain Intervention(s) 1: Medication (see MAR)  Patient Stated Pain Goal: 0 Patient Stated Pain Goal: 0  o Intervention effective: yes  o Other actions taken for pain:       Skin Assessment  Skin color    Condition/Temperature    Integrity    Turgor    Weekly Pressure Ulcer Documentation  Pressure  Injury Documentation: No Pressure Injury Noted-Pressure Ulcer Prevention Initiated  Wound Prevention & Protection Methods  Orientation of wound Orientation of Wound Prevention: Posterior  Location of Prevention Location of Wound Prevention: Buttocks, Sacrum/Coccyx  Dressing Present Dressing Present : No  Dressing Status    Wound Offloading Wound Offloading (Prevention Methods): Bed, pressure reduction mattress     INTAKE/OUPUT  Date 06/08/21 0700 - 06/09/21 0659 06/09/21 0700 - 06/10/21 0659   Shift 5487-4978 5256-7338 24 Hour Total 6827-5173 9013-1101 24 Hour Total   INTAKE   P.O. 500  500 1120  1120     P. O. 500  500 1120  1120   Shift Total(mL/kg) 500(5.9)  500(5.9) 1120(13.3)  1120(13.3)   OUTPUT   Urine(mL/kg/hr)           Urine Occurrence(s) 1 x 1 x 2 x 1 x  1 x   Stool           Stool Occurrence(s) 0 x 0 x 0 x 0 x  0 x   Shift Total(mL/kg)           500 1120  1120   Weight (kg) 84.4 84.4 84.4 84.4 84.4 84.4       Recommendations:  1. Patient needs and requests: assist with toileting    2. Pending tests/procedures: lab as ordered     3. Functional Level/Equipment: Partial (one person) / Bed (comment)wheelchair    Fall Precautions:   Fall risk precautions were reinforced with the patient; she was instructed to call for help prior to getting up. The following fall risk precautions were continued: bed/ chair alarms, door signage, yellow bracelet and socks as well as update of the Rosa Hoit tool in the patient's room.    Leigh Ann Score: 4    HEALS Safety Check    A safety check occurred in the patient's room between off going nurse and oncoming nurse listed above. The safety check included the below items  Area Items   H  High Alert Medications - Verify all high alert medication drips (heparin, PCA, etc.)   E  Equipment - Suction is set up for ALL patients (with blessing)  - Red plugs utilized for all equipment (IV pumps, etc.)  - WOWs wiped down at end of shift.  - Room stocked with oxygen, suction, and other unit-specific supplies   A  Alarms - Bed alarm is set for fall risk patients  - Ensure chair alarm is in place and activated if patient is up in a chair   L  Lines - Check IV for any infiltration  - Rollins bag is empty if patient has a Rollins   - Tubing and IV bags are labeled   S  Safety   - Room is clean, patient is clean, and equipment is clean. - Hallways are clear from equipment besides carts. - Fall bracelet on for fall risk patients  - Ensure room is clear and free of clutter  - Suction is set up for ALL patients (with blessing)  - Hallways are clear from equipment besides carts.    - Isolation precautions followed, supplies available outside room, sign posted     Luther Montanez RN

## 2021-06-09 NOTE — PROGRESS NOTES
Problem: Pressure Injury - Risk of  Goal: *Prevention of pressure injury  Description: Document John Scale and appropriate interventions in the flowsheet. Outcome: Progressing Towards Goal  Note: Pressure Injury Interventions:  Sensory Interventions: Assess changes in LOC    Moisture Interventions: Maintain skin hydration (lotion/cream)    Activity Interventions: Pressure redistribution bed/mattress(bed type), Increase time out of bed    Mobility Interventions: Pressure redistribution bed/mattress (bed type)    Nutrition Interventions: Document food/fluid/supplement intake    Friction and Shear Interventions: HOB 30 degrees or less                Problem: Patient Education: Go to Patient Education Activity  Goal: Patient/Family Education  Outcome: Progressing Towards Goal     Problem: Falls - Risk of  Goal: *Absence of Falls  Description: Document Leigh Ann Fall Risk and appropriate interventions in the flowsheet.   Outcome: Progressing Towards Goal  Note: Fall Risk Interventions:  Mobility Interventions: Bed/chair exit alarm    Mentation Interventions: Bed/chair exit alarm    Medication Interventions: Bed/chair exit alarm, Patient to call before getting OOB    Elimination Interventions: Bed/chair exit alarm, Call light in reach, Patient to call for help with toileting needs    History of Falls Interventions: Bed/chair exit alarm         Problem: Patient Education: Go to Patient Education Activity  Goal: Patient/Family Education  Outcome: Progressing Towards Goal     Problem: Patient Education: Go to Patient Education Activity  Goal: Patient/Family Education  Outcome: Progressing Towards Goal     Problem: Patient Education: Go to Patient Education Activity  Goal: Patient/Family Education  Outcome: Progressing Towards Goal     Problem: Patient Education: Go to Patient Education Activity  Goal: Patient/Family Education  Outcome: Progressing Towards Goal

## 2021-06-09 NOTE — PROGRESS NOTES
Problem: Neurolinguistics Impaired (Adult)  Goal: *Speech Goal: (INSERT TEXT)  Description: Long term goals  Patient will:  1. Be oriented x 3 and recall events of the day, supervision. 2. Recall 3 words after 5 minutes, supervision. 3. Name 10-12 items within categories of increasing complexity, supervision. 4. Perform varied word finding tasks with 90% accuracy. 5. Perform problem solving/reasoning tasks with 90% accuracy. 6. Perform basic mathematical calculations, 90% accuracy. Short term goals (by 21)  Patient will:  1. Be oriented x 3 and recall events of the day, supervision. 2. Recall 3 words after 5 minutes, supervision. 3. Name 10-12 items within concrete categories, min assist-supervision. 4. Perform varied word finding tasks with 75-85% accuracy. 5. Perform problem solving/reasoning tasks with 80-90% accuracy. 6. Perform basic mathematical calculations, 70-80% accuracy. Outcome: Progressing Towards Goal   Speech language pathology treatment    Patient: Tamiko Pedroza (33 y.o. female)  Date: 2021  Diagnosis: Acute lacunar stroke ([facility] [I63.81] Acute lacunar stroke ([facility]       Session 1:  Time In: 6134  Time Out[de-identified]  5998    Session 2:  Time In: 2:00  Time Out:  2:35    ASSESSMENT:    SESSION 1 (5512-8495)    1)Delayed Recall: Teaching session for WRAP to apply comp strategies for delayed recall. Given 5 min delay with mod skilled instruction and A to apply appropriate comp strategies. Recall 2/3 words with no improvement despite min-mod cues. Given picture rentention task, pt utilized \"picture it\" and repetition: 70% acc ANAIS and increasing to 100% acc given min-modA    2) Divergent Naming: Foods: 11 ANAIS and increasing to 13 items with Johana;  Items of clothin items ANAIS and increasing to 10 items; Transportation: 90% acc when provided with min cues/ redirection    3) Word findin% acc given Johana/redirection      SESSION 2 (2:00-2:30)    1) Mathematical calculation problem solving (time): 50% acc ANAIS and increasing to 90% acc given min-modA    2) Problem solving/reasonin% acc ANAIS and increasing to 83% acc given modA    Progression toward goals:  []       Improving appropriately and progressing toward goals  [x]       Improving slowly and progressing toward goals  []       Not making progress toward goals and plan of care will be adjusted     PLAN:  Patient continues to benefit from skilled intervention to address the above impairments. Continue treatment per established plan of care. Discharge Recommendations: To Be Determined     SUBJECTIVE:   Patient stated Why do I have all my therapies twice today? OBJECTIVE:   Mental Status:  Neurologic State: Alert  Orientation Level: Oriented X4  Cognition: Follows commands  Perception: Appears intact  Perseveration: No perseveration noted  Safety/Judgement: Awareness of environment  Treatment & Interventions: Motor Speech:       Language Comprehension and Expression:  Auditory Comprehension   Hearing Aid: None  Verbal Expression  Primary Mode of Expression: Verbal       Neuro-Linguistics:       Language Co        Voice:        Response & Tolerance to Activities: Pt was receptive/cooperative. She attempted to completed all therapeutic tasks to the best of her ability. Pain:  Pre-Treatment:    0  Post-Treatment:  0    After treatment:   []       Patient assisted to gym for next session. [x]       Patient left in no apparent distress sitting up in chair. []       Patient left in no apparent distress in bed. [x]       Call bell left within reach. []       Nursing notified. []       Caregiver present. []       Bed alarm activated.     Estimated LOS: targeted this date 6/15/21  Discharge recommendations:  MAGGIE Lorenzo MA, CCC-SLP  Speech-Language Pathologist    Time Calculation: 60 mins

## 2021-06-09 NOTE — PROGRESS NOTES
Problem: Self Care Deficits Care Plan (Adult)  Goal: *Acute Goals and Plan of Care (Insert Text)  Description: Occupational Therapy Goals   Long Term Goals  Initiated 2021 and to be accomplished within 4 week(s) 2021    1. Patient will perform grooming with modified independence using adaptive equipment as needed. 2. Pt will perform UB bathing with Mod I.  3. Pt will perform LB bathing with Mod I.  4. Pt will perform tub/shower transfer with Mod I using DME. 5. Pt will perform UB dressing with Mod I.  6. Patient will perform upper body dressing and lower body dressing with modified independence. 7. Patient will perform all aspects of toileting with modified independence. 8. Patient will perform toilet transfers with modified independence using RW. Short Term Goals   Initiated 2021 and to be accomplished within 7 day(s) 6/3/2021    1. Pt will perform grooming with Supv using adaptive equipment as needed. - met goal, d/c  goal  2. Pt will perform UB bathing with Supv. - met goal, upgrade to Mod I  3. Pt will perform LB bathing with Supv. - progressing, 6/3/2021  4. Pt will perform tub/shower transfer with SBA using DME. - progressing, 6/3/2021  5. Pt will perform UB dressing with Supv. - met goal, upgrade to Mod I  6. Pt will perform LB dressing with Supv. - progressing, 6/3/2021  7. Pt will perform toileting task with SBA. - progressing, 6/3/2021  8. Pt will perform toilet transfer with SBA using RW. - progressing, 6/3/2021  Outcome: Progressing Towards Goal   Occupational Therapy TREATMENT    Patient: Blanca Chan   62 y.o. Patient identified with name and : yes    Date: 2021    First Tx Session  Time In: 56  Time Out: 56    Second Tx Session  Time In:1500  Time Out:1530  Diagnosis: Acute lacunar stroke [facility]) [I63.81]   Precautions: Fall, Skin  Chart, occupational therapy assessment, plan of care, and goals were reviewed.      Pain:  Pt reports 0/10 pain or discomfort prior to treatment. Pt reports 0/10 pain or discomfort post treatment. Intervention Provided:       SUBJECTIVE:   Patient stated I'm going to be staying at my daughter's house.     OBJECTIVE DATA SUMMARY:       GROOMING Daily Assessment    Grooming  Grooming Assistance : 3 (Moderate assistance )  Comments:  (washing hands in stance at sink w/ RW)       TOILETING Daily Assessment    Toileting  Toileting Assistance (FIM Score):  (4.5 CGA)  Adaptive Equipment: Walker;Grab bars (3 in 1 commode over toilet)       MOBILITY/TRANSFERS Daily Assessment    Functional Transfers  Amount of Assistance Required: 4 (Contact guard assistance) (w/ QC)  Tub or Shower Type: Tub/Shower combination  Amount of Assistance Required: 4 (Contact guard assistance)  Adaptive Equipment: Tub transfer bench;Grab bars; Walker (comment); Wheelchair       ASSESSMENT:  First tx session: Family education with pt and daughter/Jessica. Instruction with good return demonstration provided by pt and dtr for tub-shower unit transfer using QC, tub transfer bench, grab bar with CGA. Instruction with good return demonstration provided by pt and dtr for toilet transfer and tasks using QC and 3 in 1 commode over toilet with CGA. Instruction provided for level of assist for ADLs e.g. bathing, dressing and grooming tasks using hemitechnique (dress right side first and undress last; crossing leg method for LB ADLs. Following toileting, patient completed functional mobility using QC with dtr to sink to wash hands w/ CGA, no LOB noted and dtr provided good return demonstration with correct hand grasp on gait belt for balance correction as needed. Handouts/visual aide provided e.g. 3 in 1 commode, tub transfer bench, 2 handle cup with lid, built up handle utensils for self feeding.  Patient and dtr verbalize understanding of recommendations of grab bar installation in tub-shower unit for balance and fall prevention, hand held shower head on hose installation for energy conservation technique and fall prevention, dtr reports she has a non-slip mat for tub-shower unit. Dtr reports she does not work and will provide pt assist e.g. ADLs, IADLs, meal prep, home making tasks and functional transfers. Patient and dtr report pt's aram/Dustin has information provided re: right hand splint to order for contracture mgmt for wear at night-time. Pt provided good return demonstration for dtr for hand HEP using green foam resistive block. Patient and dtr w/o further questions at this time. Patient sitting up in w/c at end of tx session, call bell within reach & pt verbalized understanding to utilize for assist e.g. functional transfers in order to prevent falls, chair alarm intact. Second tx session: UB bike x 10 mins with 1 rest break in order to increase strength and functional activity tolerance for improved independence with ADL routine and functional mobility. Balloon batting with 1# wrist weights reaching overhead and out of base of support while seated w/c level to increase UB and core strength, sitting balance and functional activity tolerance for improved independence with ADL routine and functional transfers, pt tolerated well with rest breaks as needed. Patient sitting up in w/c at end of tx session, call bell within reach & pt verbalized understanding to utilize for assist e.g. functional transfers in order to prevent falls, chair alarm intact. Progression toward goals:  [x]          Improving appropriately and progressing toward goals  []          Improving slowly and progressing toward goals  []          Not making progress toward goals and plan of care will be adjusted     PLAN:  Patient continues to benefit from skilled intervention to address the above impairments. Continue treatment per established plan of care.   Discharge Recommendations:  Home Health w/ 24/7 supv and assist e.g. ADLs  Further Equipment Recommendations for Discharge:  quad cane, 3 in 1 commode, grab bar in tub-shower, tub transfer bench, and wheelchair      Activity Tolerance:  fair  Estimated LOS: 1 week    Please refer to the flowsheet for vital signs taken during this treatment. After treatment:   [x]  Patient left in no apparent distress sitting up in chair   []  Patient left in no apparent distress in bed  [x]  Call bell left within reach  [x]  Nursing notified  [x]  Caregiver/daughter Ray Gonzalez present  [x]  chair alarm activated    COMMUNICATION/EDUCATION:   [x] Home safety education was provided and the patient/caregiver indicated understanding. [x] Patient/family have participated as able in goal setting and plan of care. [x] Patient/family agree to work toward stated goals and plan of care. [] Patient understands intent and goals of therapy, but is neutral about his/her participation. [] Patient is unable to participate in goal setting and plan of care.       Alena Lundborg

## 2021-06-10 LAB
ALBUMIN SERPL-MCNC: 3 G/DL (ref 3.4–5)
ANION GAP SERPL CALC-SCNC: 6 MMOL/L (ref 3–18)
BUN SERPL-MCNC: 33 MG/DL (ref 7–18)
BUN/CREAT SERPL: 18 (ref 12–20)
CALCIUM SERPL-MCNC: 9.8 MG/DL (ref 8.5–10.1)
CHLORIDE SERPL-SCNC: 113 MMOL/L (ref 100–111)
CO2 SERPL-SCNC: 21 MMOL/L (ref 21–32)
CREAT SERPL-MCNC: 1.83 MG/DL (ref 0.6–1.3)
GLUCOSE SERPL-MCNC: 86 MG/DL (ref 74–99)
HCT VFR BLD AUTO: 40.9 % (ref 35–45)
HGB BLD-MCNC: 13.1 G/DL (ref 12–16)
PHOSPHATE SERPL-MCNC: 4.1 MG/DL (ref 2.5–4.9)
PLATELET # BLD AUTO: 240 K/UL (ref 135–420)
POTASSIUM SERPL-SCNC: 4.7 MMOL/L (ref 3.5–5.5)
SODIUM SERPL-SCNC: 140 MMOL/L (ref 136–145)

## 2021-06-10 PROCEDURE — 97530 THERAPEUTIC ACTIVITIES: CPT

## 2021-06-10 PROCEDURE — 97110 THERAPEUTIC EXERCISES: CPT

## 2021-06-10 PROCEDURE — 97116 GAIT TRAINING THERAPY: CPT

## 2021-06-10 PROCEDURE — 92507 TX SP LANG VOICE COMM INDIV: CPT

## 2021-06-10 PROCEDURE — 80069 RENAL FUNCTION PANEL: CPT

## 2021-06-10 PROCEDURE — 97112 NEUROMUSCULAR REEDUCATION: CPT

## 2021-06-10 PROCEDURE — 65310000000 HC RM PRIVATE REHAB

## 2021-06-10 PROCEDURE — 36415 COLL VENOUS BLD VENIPUNCTURE: CPT

## 2021-06-10 PROCEDURE — 74011250636 HC RX REV CODE- 250/636: Performed by: INTERNAL MEDICINE

## 2021-06-10 PROCEDURE — 99232 SBSQ HOSP IP/OBS MODERATE 35: CPT | Performed by: INTERNAL MEDICINE

## 2021-06-10 PROCEDURE — 2709999900 HC NON-CHARGEABLE SUPPLY

## 2021-06-10 PROCEDURE — 85018 HEMOGLOBIN: CPT

## 2021-06-10 PROCEDURE — 74011000250 HC RX REV CODE- 250: Performed by: INTERNAL MEDICINE

## 2021-06-10 PROCEDURE — 85049 AUTOMATED PLATELET COUNT: CPT

## 2021-06-10 PROCEDURE — 74011250637 HC RX REV CODE- 250/637: Performed by: INTERNAL MEDICINE

## 2021-06-10 RX ORDER — NIFEDIPINE 30 MG/1
60 TABLET, EXTENDED RELEASE ORAL DAILY
Status: DISCONTINUED | OUTPATIENT
Start: 2021-06-11 | End: 2021-06-11

## 2021-06-10 RX ORDER — NIFEDIPINE 30 MG/1
30 TABLET, EXTENDED RELEASE ORAL DAILY
Status: DISCONTINUED | OUTPATIENT
Start: 2021-06-10 | End: 2021-06-10

## 2021-06-10 RX ORDER — SODIUM CHLORIDE 450 MG/100ML
1000 INJECTION, SOLUTION INTRAVENOUS ONCE
Status: COMPLETED | OUTPATIENT
Start: 2021-06-10 | End: 2021-06-11

## 2021-06-10 RX ADMIN — LUBIPROSTONE 8 MCG: 8 CAPSULE, GELATIN COATED ORAL at 16:07

## 2021-06-10 RX ADMIN — HEPARIN SODIUM 5000 UNITS: 5000 INJECTION INTRAVENOUS; SUBCUTANEOUS at 17:53

## 2021-06-10 RX ADMIN — LUBIPROSTONE 8 MCG: 8 CAPSULE, GELATIN COATED ORAL at 08:57

## 2021-06-10 RX ADMIN — SODIUM CHLORIDE 1000 ML: 450 INJECTION, SOLUTION INTRAVENOUS at 17:07

## 2021-06-10 RX ADMIN — Medication 5000 UNITS: at 08:57

## 2021-06-10 RX ADMIN — BISACODYL 10 MG: 5 TABLET, COATED ORAL at 21:28

## 2021-06-10 RX ADMIN — METOPROLOL TARTRATE 25 MG: 25 TABLET, FILM COATED ORAL at 08:57

## 2021-06-10 RX ADMIN — HEPARIN SODIUM 5000 UNITS: 5000 INJECTION INTRAVENOUS; SUBCUTANEOUS at 06:31

## 2021-06-10 RX ADMIN — CLOPIDOGREL BISULFATE 75 MG: 75 TABLET ORAL at 08:57

## 2021-06-10 RX ADMIN — Medication 3 MG: at 21:28

## 2021-06-10 RX ADMIN — METOPROLOL TARTRATE 25 MG: 25 TABLET, FILM COATED ORAL at 21:28

## 2021-06-10 RX ADMIN — ACETAMINOPHEN 650 MG: 325 TABLET ORAL at 16:15

## 2021-06-10 RX ADMIN — NIFEDIPINE 30 MG: 30 TABLET, FILM COATED, EXTENDED RELEASE ORAL at 16:07

## 2021-06-10 RX ADMIN — POLYETHYLENE GLYCOL 3350 17 G: 17 POWDER, FOR SOLUTION ORAL at 08:56

## 2021-06-10 RX ADMIN — DOCUSATE SODIUM 50MG AND SENNOSIDES 8.6MG 2 TABLET: 8.6; 5 TABLET, FILM COATED ORAL at 17:53

## 2021-06-10 RX ADMIN — ATORVASTATIN CALCIUM 80 MG: 40 TABLET, FILM COATED ORAL at 08:57

## 2021-06-10 RX ADMIN — Medication 100 MG: at 08:57

## 2021-06-10 RX ADMIN — ASPIRIN 81 MG: 81 TABLET, CHEWABLE ORAL at 16:07

## 2021-06-10 NOTE — PROGRESS NOTES
Problem: Self Care Deficits Care Plan (Adult)  Goal: *Acute Goals and Plan of Care (Insert Text)  Description: Occupational Therapy Goals   Long Term Goals  Initiated 5/28/2021 and to be accomplished within 4 week(s) 6/25/2021    1. Patient will perform grooming with modified independence using adaptive equipment as needed. 2. Pt will perform UB bathing with Mod I.  3. Pt will perform LB bathing with Mod I.  4. Pt will perform tub/shower transfer with Mod I using DME. 5. Pt will perform UB dressing with Mod I.  6. Patient will perform upper body dressing and lower body dressing with modified independence. 7. Patient will perform all aspects of toileting with modified independence. 8. Patient will perform toilet transfers with modified independence using RW. Short Term Goals   Initiated 5/28/2021 and to be accomplished within 7 day(s) 6/3/2021    1. Pt will perform grooming with Supv using adaptive equipment as needed. - met goal, d/c  goal  2. Pt will perform UB bathing with Supv. - met goal, upgrade to Mod I, progressing 6/10/21  3. Pt will perform LB bathing with Supv. - progressing, 6/3/2021, progressing 6/10/21  4. Pt will perform tub/shower transfer with SBA using DME. - progressing, 6/3/2021, progressing 6/10/21  5. Pt will perform UB dressing with Supv. - met goal, upgrade to Mod I, progressing 6/10/21  6. Pt will perform LB dressing with Supv. - progressing, 6/3/2021, progressing 6/10/21  7. Pt will perform toileting task with SBA. - progressing, 6/3/2021, progressing 6/10/21  8.  Pt will perform toilet transfer with SBA using RW. - progressing, 6/3/2021, progressing 6/10/21  Outcome: Progressing Towards Goal  OT WEEKLY PROGRESS NOTE  Patient Name:Snow Kaur   Time Spent With Patient  Time In: 0679  Time Out: 0561  Patient Seen For[de-identified] AM;Other (see progress notes)  Time In: 9564  Time Out: 1423    Medical Diagnosis:  Acute lacunar stroke ([facility] [address] [I63.81] Acute lacunar stroke ([address]     Pain at start of tx:0/10 pain or discomfort. Pain at stop of tx:0/10 pain or discomfort. Patient identified with name and :yes  Subjective: \"I want to buy those splints for my fingers for my left hand\". Objective: Patient presents with decreased strength, functional activity tolerance and balance resulting in impaired self care performance skills and functional mobility. Patient is making steady progress towards goals and is highly motivated to participate in OT skilled intervention in order to maximal functional potential with ADLs and functional mobility.     Outcome Measures:      AROM: BUEs WFL, b/l hands w/ chronic arthritic changes d/t RA and acute R side hemiplegie      COGNITION/PERCEPTION Initial Assessment Weekly Progress Assessment 6/10/2021   Premorbid Reading Status Literate     Premorbid Writing Status Functional     Arousal/Alertness       Orientation Level Oriented X4 Oriented X4   Visual Fields       Praxis       Body Scheme       COMPREHENSION MODE Initial Assessment Weekly Progress Assessment 6/10/2021   Primary Mode of Comprehension Auditory Auditory   Hearing Aide None     Corrective Lenses       Score 5 6     EXPRESSION Initial Assessment Weekly Progress Assessment 6/10/2021   Primary Mode of Expression Verbal Verbal   Score 6 6   Comments         SOCIAL INTERACTION/ PRAGMATICS Initial Assessment Weekly Progress Assessment 6/10/2021   Score 5 5   Comments         PROBLEM SOLVING Initial Assessment Weekly Progress Assessment 6/10/2021   Score 4 4   Comments         MEMORY Initial Assessment Weekly Progress Assessment 6/10/2021   Score 4 4   Comments         EATING Initial Assessment Weekly Progress Assessment 6/10/2021   Functional Level 5  6 using adaptive utensils and assist with set up as needed   Comments  (issued AE:built up handle utensils,2 handled cup with lid)       GROOMING Initial Assessment Weekly Progress Assessment 6/10/2021   Functional Level 5 (seated w/c level at sink using nondominant left hand) SBA washing hands at sink w/ RW  Mod I oral hygiene seated w/c level at sink    Tasks completed by patient Brushed teeth, Washed face, Washed hands     Comments         BATHING Initial Assessment Weekly Progress Assessment 6/10/2021   Functional Level 5 (5 SBA UB, 4.5 CGA LB) 5 SBA           Body parts patient bathed Arm, left, Abdomen, Arm, right, Buttocks, Chest, Lower leg and foot, left, Lower leg and foot, right, Uma area, Thigh, left, Thigh, right     Comments  (seated on bedside commode & standing w/ RW)       TUB/SHOWER TRANSFER INDEPENDENCE Initial Assessment Weekly Progress Assessment 6/10/2021   Score  (NT d/t time constraints)  4.5 CGA using w/c, QC, tub transfer bench and grab bar   Comments         UPPER BODY DRESSING/UNDRESSING Initial Assessment Weekly Progress Assessment 6/10/2021   Functional Level 5 (SBA) 5 Supv    Items applied/Steps completed Pullover (4 steps)     Comments  (instruction for hemitechnique)       LOWER BODY DRESSING/UNDRESSING Initial Assessment Weekly Progress Assessment 6/10/2021   Functional Level  (4.5-4 CGA/Min A seated on bedside commode & in stance w/ RW) 5 SBA    Items applied/Steps completed Elastic waist pants (3 steps), Shoe, right (1 step), Shoe, left (1 step), Sock, left (1 step), Sock, right (1 step), Underpants (3 steps)     Comments  (cross leg method and hemitechnique)       TOILETING Initial Assessment Weekly Progress Assessment 6/10/2021   Functional Level  (4.5 CGA-4 Min A)  4.5 CGA using RW   Comments  (4 Min A to hike underwear and pants over hips)       TOILET TRANSFER INDEPENDENCE Initial Assessment Weekly Progress Assessment 6/10/2021   Transfer score  (4 Min A using RW to bedside commode) 4.5 CGA using QC    Comments                  ASSESSMENT:  UB bike x 15 mins with 2 rest breaks to increase strength and functional activity tolerance for improved independence with ADL routine and functional mobility.  Standing activity 2x with CGA using QC x 5 mins each participating in RUE reaching in various planes while reaching out of base of support, no LOB noted and w/c behind for seated rest break as needed. Patient provided assist for measurement of right hand circumfrance of PIPs for Oval-8 finger splint sizes, information for ordering splints for digits 1,2,3 & 4 with sizes provided. Patient verbalized understanding to monitor skin integrity with wear of finger splint and notify OT home health services for accuracy with carryover for wear schedule and further recommendations. Patient self propelled w/c back to room Mod I. Patient verbalized understanding to utilize call bell to request assist e.g. functional transfers in order to prevent falls, wc alarm intact. Progression toward goals:  [x]          Improving appropriately and progressing toward goals  []          Improving slowly and progressing toward goals  []          Not making progress toward goals and plan of care will be adjusted     PLAN:  Patient continues to benefit from skilled intervention to address the above impairments. Continue treatment per established plan of care. Discharge Recommendations:  Home Health w/ 24/7 supv and assist e.g. ADLs  Further Equipment Recommendations for Discharge:  quad cane, bedside commode, grab bars, tub transfer bench, and wheelchair      Please refer to the flow sheet for vital signs taken during this treatment. After treatment:   [x]  Patient left in no apparent distress sitting up in chair  []  Patient left in no apparent distress in bed  [x]  Call bell left within reach  [x]  Nursing notified  []  Caregiver present  [x]  chair alarm activated    COMMUNICATION/EDUCATION:   [x] Home safety education was provided and the patient/caregiver indicated understanding. [x] Patient/family have participated as able in goal setting and plan of care. [x] Patient/family agree to work toward stated goals and plan of care.   [] Patient understands intent and goals of therapy, but is neutral about his/her participation. [] Patient is unable to participate in goal setting and plan of care. Plan of Care: Please see Care Plan for updated STG/LTGs.    Family Training:  ongoing  Estimated LOS: ~ 1 week     Wilfredo Dorantes  6/10/2021

## 2021-06-10 NOTE — PROGRESS NOTES
Problem: Pressure Injury - Risk of  Goal: *Prevention of pressure injury  Description: Document John Scale and appropriate interventions in the flowsheet. Outcome: Progressing Towards Goal  Note: Pressure Injury Interventions:  Sensory Interventions: Assess changes in LOC    Moisture Interventions: Minimize layers    Activity Interventions: Increase time out of bed, Pressure redistribution bed/mattress(bed type)    Mobility Interventions: Pressure redistribution bed/mattress (bed type)    Nutrition Interventions: Document food/fluid/supplement intake    Friction and Shear Interventions: HOB 30 degrees or less                Problem: Patient Education: Go to Patient Education Activity  Goal: Patient/Family Education  Outcome: Progressing Towards Goal     Problem: Falls - Risk of  Goal: *Absence of Falls  Description: Document Leigh Ann Fall Risk and appropriate interventions in the flowsheet.   Outcome: Progressing Towards Goal  Note: Fall Risk Interventions:  Mobility Interventions: Bed/chair exit alarm, Patient to call before getting OOB    Mentation Interventions: Bed/chair exit alarm    Medication Interventions: Bed/chair exit alarm, Patient to call before getting OOB    Elimination Interventions: Bed/chair exit alarm, Call light in reach, Patient to call for help with toileting needs    History of Falls Interventions: Bed/chair exit alarm         Problem: Patient Education: Go to Patient Education Activity  Goal: Patient/Family Education  Outcome: Progressing Towards Goal     Problem: Patient Education: Go to Patient Education Activity  Goal: Patient/Family Education  Outcome: Progressing Towards Goal     Problem: Patient Education: Go to Patient Education Activity  Goal: Patient/Family Education  Outcome: Progressing Towards Goal     Problem: Patient Education: Go to Patient Education Activity  Goal: Patient/Family Education  Outcome: Progressing Towards Goal

## 2021-06-10 NOTE — PROGRESS NOTES
[facility] PHYSICAL REHABILITATION  [address] [place] [address], [address]     INPATIENT REHABILITATION  DAILY PROGRESS NOTE     Date: 6/10/2021    Name: Rosi Love Age / Sex: 62 y.o. / female   CSN: [id number] MRN: [id number]   6 [facility] Date: 5/27/2021 Length of Stay: 14 days     Primary Rehab Diagnosis: Impaired Mobility and ADLs secondary to Acute Lacunar Stroke (acute lacunar infarct in the posterior left lentiform nucleus extending into the corona radiata) with residual right hemiparesis      Subjective:     Patient seen and examined. Blood pressure controlled. Team conference was held at bedside this PM.     Patient's Complaint:   No significant medical complaints    Pain Control: no current joint or muscle symptoms, essentially pain-free      Objective:     Vital Signs:  Patient Vitals for the past 24 hrs:   BP Temp Pulse Resp SpO2   06/10/21 0824 (!) 144/92 96.9 °F (36.1 °C) 62 18 99 %   06/09/21 2110 (!) 144/85 98.5 °F (36.9 °C) 69 18 100 %   06/09/21 1602 (!) 141/102 98.1 °F (36.7 °C) 69 16 100 %        Physical Examination:  GENERAL SURVEY: Patient is awake, alert, oriented x 3, sitting comfortably on the chair, not in acute respiratory distress. HEENT: pink palpebral conjunctivae, anicteric sclerae, no nasoaural discharge, moist oral mucosa  NECK: supple, no jugular venous distention, no palpable lymph nodes  CHEST/LUNGS: symmetrical chest expansion, good air entry, clear breath sounds  HEART: adynamic precordium, good S1 S2, no S3, regular rhythm, no murmurs  ABDOMEN: flat, bowel sounds appreciated, soft, non-tender  EXTREMITIES: pink nailbeds, no edema, full and equal pulses, no calf tenderness   NEUROLOGICAL EXAM: The patient is awake, alert and oriented x3, able to answer questions fairly appropriately, able to follow 1 and 2 step commands. Able to tell time from the wall clock. Cranial nerves II-XII are grossly intact. No gross sensory deficit.   Motor strength is 4 to 4+/5 on the RUE and RLE, 5/5 on the LUE and LLE.       Current Medications:  Current Facility-Administered Medications   Medication Dose Route Frequency    lubiPROStone (AMITIZA) capsule 8 mcg  8 mcg Oral BID WITH MEALS    heparin (porcine) injection 5,000 Units  5,000 Units SubCUTAneous Q12H    hydrALAZINE (APRESOLINE) tablet 25 mg  25 mg Oral TID PRN    polyethylene glycol (MIRALAX) packet 17 g  17 g Oral DAILY    senna-docusate (PERICOLACE) 8.6-50 mg per tablet 2 Tablet  2 Tablet Oral PCD    metoprolol tartrate (LOPRESSOR) tablet 25 mg  25 mg Oral Q12H    cholecalciferol (VITAMIN D3) capsule 5,000 Units  5,000 Units Oral DAILY    nicotine (NICODERM CQ) 14 mg/24 hr patch 1 Patch  1 Patch TransDERmal DAILY    acetaminophen (TYLENOL) tablet 650 mg  650 mg Oral Q4H PRN    bisacodyL (DULCOLAX) tablet 10 mg  10 mg Oral Q48H PRN    aspirin chewable tablet 81 mg  81 mg Oral DAILY WITH DINNER    atorvastatin (LIPITOR) tablet 80 mg  80 mg Oral DAILY    clopidogreL (PLAVIX) tablet 75 mg  75 mg Oral DAILY WITH BREAKFAST    co-enzyme Q-10 (CO Q-10) capsule 100 mg  100 mg Oral DAILY    melatonin tablet 3 mg  3 mg Oral QHS       Allergies:  No Known Allergies      Functional Progress:    SPEECH AND LANGUAGE PATHOLOGY    ON ADMISSION MOST RECENT   Comprehension (Native Language)  Primary Mode of Comprehension: Auditory  Score: 5 Comprehension (Native Language)  Primary Mode of Comprehension: Auditory  Score: 6     Expression (Native Language)  Primary Mode of Expression: Verbal  Score: 6   Expression (Native Language)  Primary Mode of Expression: Verbal  Score: 6     Social Interaction/Pragmatics  Score: 5 Social Interaction/Pragmatics  Score: 5     Problem Solving  Score: 4   Problem Solving  Score: 4     Memory  Score: 4 Memory  Score: 4       Legend:   7 - Independent   6 - Modified Independent   5 - Standby Assistance / Supervision / Set-up   4 - Minimum Assistance / Contact Guard Assistance   3 - Moderate Assistance   2 - Maximum Assistance   1 - Total Assistance / Dependent       Lab/Data Review:  Recent Results (from the past 24 hour(s))   HGB & HCT    Collection Time: 06/10/21  5:03 AM   Result Value Ref Range    HGB 13.1 12.0 - 16.0 g/dL    HCT 40.9 35.0 - 45.0 %   PLATELET COUNT    Collection Time: 06/10/21  5:03 AM   Result Value Ref Range    PLATELET 499 361 - 008 K/uL   RENAL FUNCTION PANEL    Collection Time: 06/10/21  5:03 AM   Result Value Ref Range    Sodium 140 136 - 145 mmol/L    Potassium 4.7 3.5 - 5.5 mmol/L    Chloride 113 (H) 100 - 111 mmol/L    CO2 21 21 - 32 mmol/L    Anion gap 6 3.0 - 18 mmol/L    Glucose 86 74 - 99 mg/dL    BUN 33 (H) 7.0 - 18 MG/DL    Creatinine 1.83 (H) 0.6 - 1.3 MG/DL    BUN/Creatinine ratio 18 12 - 20      GFR est AA 34 (L) >60 ml/min/1.73m2    GFR est non-AA 28 (L) >60 ml/min/1.73m2    Calcium 9.8 8.5 - 10.1 MG/DL    Phosphorus 4.1 2.5 - 4.9 MG/DL    Albumin 3.0 (L) 3.4 - 5.0 g/dL       Assessment:     Primary Rehab Diagnosis  1. Impaired Mobility and ADLs  2.  Acute Lacunar Stroke (acute lacunar infarct in the posterior left lentiform nucleus extending into the corona radiata) with residual right hemiparesis    Comorbidities  Patient Active Problem List   Diagnosis Code    Rheumatoid arthritis ([facility] [address] M06.9    History of vitamin D deficiency Z86.39    Cocaine use F14.90    Stage 3b chronic kidney disease (HCC) N18.32    History of Helicobacter pylori infection Z86.19    Abscess of finger L02.519    Gastroesophageal reflux disease K21.9    Acute lacunar stroke (HCC) I63.81    Impaired mobility and ADLs Z74.09, Z78.9    Hemiparesis of right dominant side due to acute cerebrovascular disease (HCC) I67.89, G81.91    Tobacco use disorder F17.200    Current use of aspirin Z79.82    On clopidogrel therapy Z79.01    Hypertensive heart and kidney disease without heart failure and with stage 3b chronic kidney disease (HCC) I13.10, N18.32    Mixed hyperlipidemia E78.2    On statin therapy due to risk of future cardiovascular event Z79.899    Constipation K59.00    History of iron deficiency anemia Z86.2    High serum magnesium R79.89    Secondary hyperparathyroidism of renal origin ([facility] [address] N25.81    Vitamin D deficiency E55.9        Plan:     1. Justification for continued stay: Good progression towards established rehabilitation goals. 2. Medical Issues being followed closely:    [x]  Fall and safety precautions     []  Wound Care     [x]  Bowel and Bladder Function     [x]  Fluid Electrolyte and Nutrition Balance     []  Pain Control      3. Issues that 24 hour rehabilitation nursing is following:    [x]  Fall and safety precautions     []  Wound Care     [x]  Bowel and Bladder Function     [x]  Fluid Electrolyte and Nutrition Balance     []  Pain Control      [x]  Assistance with and education on in-room safety with transfers to and from the bed, wheelchair, toilet and shower. 4. Acute rehabilitation plan of care:    [x]  Continue current care and rehab. [x]  Physical Therapy           [x]  Occupational Therapy           [x]  Speech Therapy     []  Hold Rehab until further notice     5. Medications:    [x]  MAR Reviewed     [x]  Continue Present Medications     6. DVT Prophylaxis:      []  Enoxaparin     [x]  Unfractionated Heparin     []  Warfarin     []  NOAC     []  NORBERT Stockings     []  Sequential Compression Device     []  None     7. Code status    [x]  Full code     []  Partial code     []  Do not intubate     []  Do not resuscitate     8.  Orders:   > Acute Lacunar Stroke (acute lacunar infarct in the posterior left lentiform nucleus extending into the corona radiata) with residual right hemiparesis   > Dual antiplatelet therapy (DAPT) was started 5/22/2021; Neurology recommending to continue DAPT for 21 days (~6/12/2021), then discontinue Aspirin and continue on Clopidogrel 75 mg PO once daily    > On 5/27/2021, started Melatonin 3 mg PO q HS (for stroke recovery)   > Continue:    > Aspirin 81 PO once daily with dinner (STOP DATE: 6/12/2021)    > Atorvastatin 40 mg PO once daily    > Clopidogrel 75 mg PO once daily with breakfast      > Melatonin 3 mg PO q HS    > Constipation   > On 5/28/2021, discontinued (re: refused by patient last night and this AM):    > Miralax 17 grams in 8 oz water PO once daily    > PeriColace 2 tabs PO once daily with dinner   > On 6/2/2021, started:    > Pericolace 2 tabs PO once daily after dinner    > Polyethylene glycol 17 grams in 8 oz water PO once daily    > On 6/6/2021, started Lubiprostone 24 mcg PO BID with meals   > On 6/7/2021, decreased Lubiprostone from 24 mcg to 8 mcg PO BID with meals   > Continue:    > Lubiprostone 8 mcg PO BID with meals    > Pericolace 2 tabs PO once daily after dinner    > Polyethylene glycol 17 grams in 8 oz water PO once daily     > Hypertensive heart and kidney disease without heart failure with stage 3b-4 chronic kidney disease   > On 5/31/2021, started Metoprolol tartrate 25 mg PO q 12 hr (9AM, 9PM)   > On 6/2/2021:    > Discontinued Amlodipine 10 mg PO once daily (9AM)    > Started Losartan 12.5 mg PO once daily (9AM)   > On 6/5/2021:    > Discontinued Losartan 12.5 mg PO once daily (9AM)    > Started Hydralazine 25 mg PO TID PRN for SBP greater than 160 mmHg   > Nifedipine XL 30 mg PO x 1 dose today   > In AM, start Nifedipine XL 60 mg PO once daily (9AM)   > Continue:    > Metoprolol tartrate 25 mg PO q 12 hr (9AM, 9PM)    > Hydralazine 25 mg PO TID PRN for SBP greater than 160 mmHg    > Mixed hyperlipidemia   > On 5/28/2021, started Coenzyme Q10 100 mg PO once daily   > Continue:    > Atorvastatin 40 mg PO once daily    > Coenzyme Q10 100 mg PO once daily    > High serum magnesium   > Mg (5/28/2021, on admission to the ARU) = 3.2   > patient reported she was given 3 doses of Milk of magnesia at [facility] prior to discharge to the ARU   > Avoid magnesium-containing medications/supplements 05/31/21  0545 05/30/21  0706 05/28/21  0613   MG 2.6 2.9* 3.2*     > Stage 3b-4 chronic kidney disease   > BUN/Creatinine (5/28/2021, on admission to the ARU) = 36/2.47   > Retroperitoneal ultrasound (5/28/2021) showed:    > Increased parenchymal echogenicity in both kidneys consistent with medical renal disease. There is a prominent cortical cyst in the mid right kidney. No hydronephrosis or nephrolithiasis.       > Renal cortical thickness is somewhat less than 10 mm in both kidneys.   The left kidney is somewhat small with respect to the right kidney which is low normal in size.   > On 5/29/2021, started IVF: 0.9%  ml.hr x 1 liter once daily after dinner   > PTH (5/31/2021) = 276.1   > Vitamin D 25-Hydroxy (5/31/2021) = 10.1   > Urine microalbumin (5/31/2021) = 15.30   > Microalbumin/Creatinine ratio (5/31/2021) = 165   > Urinalysis (5/31/2021): SG 1.013, protein 30, no casts seen   > Nephrology consult (Dr. Joellen Hess) called on 5/31/2021 for evaluation and comanagement   > CK (5/31/2021) = 107   > Urine creatinine (5/31/2021) = 93.00   > Random urine protein (5/31/2021) = 31   > Random urine sodium (5/31/2021) = 52      06/02/21  0445 06/01/21  0430 05/31/21  0545 05/30/21  0706 05/28/21  0613   BUN 35* 37* 36* 37* 36*   CREA 2.24* 2.27* 2.15* 2.25* 2.47*      > On 6/2/2021:    > Discontinued IVF: 0.9%  ml/hr x 1 liter once daily after dinner    > Started Losartan 12.5 mg PO once daily (9AM)      06/05/21  0609 06/04/21  0559 06/03/21  0516   K 4.8 4.9 4.9       06/05/21  0609 06/04/21  0559 06/03/21  0516   BUN 40* 36* 34*   CREA 2.85* 2.45* 2.24*      > On 6/5/2021:    > Discontinued Losartan 12.5 mg PO once daily (9AM)    > Started IVF: 0.9% NS at 75 ml/hr continuous      06/07/21  0555 06/06/21  0616   K 5.0 4.7         06/07/21  0555 06/06/21  0616   BUN 36* 40*   CREA 2.16* 2.41*      > On 6/7/2021, changed IVF from 0.9% NS at 75 ml/hr continuous to 75 ml/hr x 1 liter once daily after dinner      06/09/21  0450 06/08/21  0503   K 4.8 4.9       06/09/21  0450 06/08/21  0503   BUN 30* 34*   CREA 1.95* 2.02*      > On 6/9/2021:    > Discontinue IVF: 0.9% NS at 75 ml/hr x 1 liter once daily after dinner    > Started IVF: 0.45%  ml/hr x 1 liter   > BUN/Creatinine (6/10/2021) = 33/1.83   > K (6/10/2021) = 4.7    > Resume IVF: 0.45%  ml/hr x 1 liter    > Tobacco use disorder   > On 5/28/2021, started Nicotine 14 mg/24 hr patch, 1 patch on skin once daily   > Continue Nicotine 14 mg/24 hr patch, 1 patch on skin once daily    > Vitamin D Deficiency   > Vitamin D 25-Hydroxy (5/31/2021) = 10.1   > On 5/31/2021, patient was given Cholecalciferol 50,000 units PO x 1 dose    > On 6/1/2021, started Cholecalciferol 5,000 units PO once daily   > Continue Cholecalciferol 5,000 units PO once daily    > Analgesia   > Continue Acetaminophen 650 mg PO q 4 hr PRN for pain     > Diet:   > Specifications: Cardiac   > Solids (consistency): Regular    > Liquids (consistency): Thin    > Fluid restriction: None      9. Personal Protective Equipment was used while interacting with patient including: goggles and KN95 face mask. Patient was using a surgical mask. 10. Patient's progress in rehabilitation and medical issues discussed with the patient. All questions answered to the best of my ability. Care plan discussed with patient and nurse. 11. Total clinical care time is 30 minutes, including review of chart including all labs, radiology, past medical history, and discussion with patient. Greater than 50% of my time was spent in coordination of care and counseling.       Signed:    Yolie Major MD    Ambreen 10, 2021

## 2021-06-10 NOTE — PROGRESS NOTES
Problem: Mobility Impaired (Adult and Pediatric)  Goal: *Therapy Goal (Edit Goal, Insert Text)  Description: Physical Therapy Short Term Goals  Initiated 5/28/2021, re-assessed 6/4/2021 and to be accomplished within 7 day(s) on 6/11/2021  1. Patient will move from supine to sit and sit to supine , scoot up and down, and roll side to side in bed with supervision/set-up. (MET 6/4/2021)  2. Patient will transfer from bed to chair and chair to bed with minimal assistance/contact guard assist using the least restrictive device. (MET 6/4/2021)  Updated goal: Patient will transfer from bed to chair and chair to bed with standby assist using the least restrictive device. 3.  Patient will perform sit to stand with minimal assistance/contact guard assist.(MET 6/4/2021)  Updated goal: Patient will perform sit to stand with standby assistance. 4.  Patient will ambulate with minimal assistance/contact guard assist for 50 feet with the least restrictive device. (MET 6/4/2021)  Updated goal: Patient will ambulate with SBA for 150 ft with the least restrictive AD with cues for neutral knee extension with loading/midstance phase of gait <25% of the time. 5.  Patient will ascend/descend 5 stairs with 1 handrail(s) with moderate assistance . (4 steps with BHR and min assist)  Updated goal: Patient will ascend/descend 5 stairs with 1 handrail with CGA. Physical Therapy Long Term Goals  Initiated 5/28/2021 and to be accomplished within 21 day(s) on 6/18/2021  1. Patient will move from supine to sit and sit to supine , scoot up and down, and roll side to side in bed with modified independence. 2.  Patient will transfer from bed to chair and chair to bed with modified independence using the least restrictive device. 3.  Patient will perform sit to stand with modified independence. 4.  Patient will ambulate with modified independence for 150 feet with the least restrictive device.    5.  Patient will ascend/descend 15 stairs with 1 handrail(s) with contact guard assist/standby assistance. Outcome: Progressing Towards Goal   PHYSICAL THERAPY TREATMENT    Patient: Carmen Mejia (30 y.o. female)  Date: 6/10/2021  Diagnosis: Acute lacunar stroke [facility]) [I63.81] Acute lacunar stroke [facility])  Precautions: Fall, Skin  Chart, physical therapy assessment, plan of care and goals were reviewed. Time In:1035  Time Out:1135    Patient seen for: Transfer training;Gait training;Balance activities (stair training)    Pain:  Pt pain was reported as no c/o pre-treatment. Pt pain was reported as no c/o post-treatment. Intervention: NA     Patient identified with name and : yes     SUBJECTIVE:      Pt reports being told she may leave Saturday depending on progress. Therapist not aware of this change and will clarify with SW.     OBJECTIVE DATA SUMMARY:    Objective:     TRANSFERS Daily Assessment     Transfer Type: Other  Other: stand step txfr with [employer] [facility] [employer] [place]  Transfer Assistance : 4 (Contact guard assistance)  Sit to Stand Assistance: Contact guard assistance   Pt performed stand step txfr bed [facility] and CGA with v/c for proper sequencing and attention to right foot placement. Pt performed sit to stand from w/c pushing up from distal B femurs with CGA for anterior wt shift. Pt continues to require v/c to reduce rocking/launching for momentum with sit to stand. GAIT Daily Assessment    Gait Description (WDL)      Gait Abnormalities Decreased step clearance; Foot drop; Hemiplegic; Step to gait    Assistive device Cane, quad;Gait belt    Ambulation assistance - level surface 4 (Contact guard assistance)    Distance 150 Feet (ft)    Ambulation assistance- uneven surface      Comments Pt ambulated 150ft with SBQC and CGA with min v/c for erect posture, right foot clearance and focus on decreased right knee hyperextension.  Pt became distracted and demo'd decreased attention to right foot clearance and control of right knee hyperextension. BALANCE Daily Assessment     Sitting - Static: Good (unsupported)  Sitting - Dynamic: Good (unsupported)  Standing - Static: Fair  Standing - Dynamic : Impaired        Neuro Re-Education:  Pt performed alternating BLE tap ups on 8\" step x15 each with SBQC on left and CGA to challenge balance with wt shifting, right LE wt bearing in SLS and controlling right knee recurvatum and right hip flexion strengthening. Pt performed right heel taps on 2\" step to challenge DF and heel strike with foot clearance.        ASSESSMENT:  Pt demo'd increased distractibility today and required increased v/c for right foot clearance and control of right knee hyperextension during gait, although pt demo'd increased tolerance to ambulate increased distance. Progression toward goals:  []      Improving appropriately and progressing toward goals  [x]      Improving slowly and progressing toward goals  []      Not making progress toward goals and plan of care will be adjusted      PLAN:  Patient continues to benefit from skilled intervention to address the above impairments. Continue treatment per established plan of care. Discharge Recommendations:  Home Health  Further Equipment Recommendations for Discharge:  quad cane      Estimated Discharge Date: 6/15/2021    Activity Tolerance:   Fair+  Please refer to the flowsheet for vital signs taken during this treatment.     After treatment:   [] Patient left in no apparent distress in bed  [] Patient left in no apparent distress sitting up in chair  [x] Patient left in no apparent distress in w/c mobilizing under own power  [] Patient left in no apparent distress dining area  [] Patient left in no apparent distress mobilizing under own power  [] Call bell left within reach  [] Nursing notified  [] Caregiver present  [] Bed alarm activated   [] Chair alarm activated      Rodri Galindo, EDER  6/10/2021

## 2021-06-10 NOTE — ROUTINE PROCESS
[facility]    Bedside and Verbal shift change report given to Arlette Moss RN (oncoming nurse) by Kira Villatoro LPN (offgoing nurse). Report included the following information SBAR, Kardex, MAR and Recent Results.     Situation:   Code Status: Full Code   Hospital Day: 14   Problem List:   Hospital Problems  Date Reviewed: 6/10/2021        Codes Class Noted POA    Secondary hyperparathyroidism of renal origin ([facility] [address] (Chronic) ICD-10-CM: N25.81  ICD-9-CM: 588.81  5/31/2021 Yes        Vitamin D deficiency (Chronic) ICD-10-CM: E55.9  ICD-9-CM: 268.9  5/31/2021 Yes    Overview Signed 5/31/2021  1:17 PM by Lupe Petersen MD     Vitamin D 25-Hydroxy (5/31/2021) = 10.5              High serum magnesium ICD-10-CM: R79.89  ICD-9-CM: 790.99  5/28/2021 Yes        Hypertensive heart and kidney disease without heart failure and with stage 3b chronic kidney disease (HCC) (Chronic) ICD-10-CM: I13.10, N18.32  ICD-9-CM: 404.90, 585.3  Unknown Yes        Mixed hyperlipidemia (Chronic) ICD-10-CM: E78.2  ICD-9-CM: 272.2  Unknown Yes        Constipation (Chronic) ICD-10-CM: K59.00  ICD-9-CM: 564.00  Unknown Yes        Current use of aspirin ICD-10-CM: Z79.82  ICD-9-CM: V58.66  5/23/2021 Yes        On clopidogrel therapy ICD-10-CM: Z79.01  ICD-9-CM: V58.61  5/23/2021 Yes        On statin therapy due to risk of future cardiovascular event ICD-10-CM: Z79.899  ICD-9-CM: V58.69  5/22/2021 Yes    Overview Signed 5/27/2021  2:27 PM by Lupe Petersen MD     On Atorvastatin             * (Principal) Acute lacunar stroke [facility]) ICD-10-CM: I63.81  ICD-9-CM: 434.91  5/21/2021 Yes    Overview Signed 5/27/2021  2:15 PM by Lupe Petersen MD     Acute Lacunar Stroke (acute lacunar infarct in the posterior left lentiform nucleus extending into the corona radiata) with residual right hemiparesis             Impaired mobility and ADLs ICD-10-CM: Z74.09, Z78.9  ICD-9-CM: V49.89  5/21/2021 Yes        Hemiparesis of right dominant side due to acute cerebrovascular disease [facility]) ICD-10-CM: I67.89, G81.91  ICD-9-CM: 436, 342.91  5/21/2021 Yes              Background:   Past Medical History:   Past Medical History:   Diagnosis Date    Abnormal mammogram 2014    left with biopsy    Acute lacunar stroke ([facility] [address] 5/21/2021    Acute Lacunar Stroke (acute lacunar infarct in the posterior left lentiform nucleus extending into the corona radiata) with residual right hemiparesis    Bacterial vaginosis 1/9/15    Dr Marbella Gee Constipation     Current use of aspirin 5/23/2021    Gastroesophageal reflux disease 6/30/2016    Hemiparesis of right dominant side due to acute cerebrovascular disease ([facility] [address] 5/21/2021    History of Helicobacter pylori infection 7/24/2015    History of iron deficiency anemia 06/2014    History of vitamin D deficiency 6/11/2015    Hot flashes 1/9/15    Dr Marbella Gee On clopidogrel therapy 5/23/2021    On statin therapy due to risk of future cardiovascular event 5/22/2021    On Atorvastatin    Rheumatoid arthritis ([facility] [address]     Secondary hyperparathyroidism of renal origin ([facility] [address] 5/31/2021    Stage 3b chronic kidney disease ([facility] [address] 7/24/2015    Tobacco use disorder     Vitamin D deficiency 5/31/2021    Vitamin D 25-Hydroxy (5/31/2021) = 10.5         Assessment:   Changes in Assessment throughout shift:       Patient has a central line: no Reasons if yes: na  Insertion date:na Last dressing date:na   Patient has Irving Cath: no Reasons if yes: na   Insertion date:na  Shift irving care completed: NO     Last Vitals:     Vitals:    06/09/21 2110 06/10/21 0824 06/10/21 1553 06/10/21 1607   BP: (!) 144/85 (!) 144/92 (!) 140/98 (!) 156/102   Pulse: 69 62 83 76   Resp: 18 18 19    Temp: 98.5 °F (36.9 °C) 96.9 °F (36.1 °C) 97.2 °F (36.2 °C)    SpO2: 100% 99% 100%    Weight:       Height:       LMP: 10/15/2014        PAIN    Pain Assessment    Pain Intensity 1: 8 (06/10/21 1607) Pain Intensity 1: 2 (12/29/14 3612)    Pain Location 1: Head Pain Location 1: Abdomen    Pain Intervention(s) 1: Medication (see MAR) Pain Intervention(s) 1: Medication (see MAR)  Patient Stated Pain Goal: 2 Patient Stated Pain Goal: 0  o Intervention effective: yes  o Other actions taken for pain: Medication (see MAR)     Skin Assessment  Skin color    Condition/Temperature    Integrity    Turgor    Weekly Pressure Ulcer Documentation  Pressure  Injury Documentation: No Pressure Injury Noted-Pressure Ulcer Prevention Initiated  Wound Prevention & Protection Methods  Orientation of wound Orientation of Wound Prevention: Posterior  Location of Prevention Location of Wound Prevention: Buttocks, Sacrum/Coccyx  Dressing Present Dressing Present : No  Dressing Status    Wound Offloading Wound Offloading (Prevention Methods): Bed, pressure redistribution/air     INTAKE/OUPUT  Date 06/09/21 0700 - 06/10/21 0659 06/10/21 0700 - 06/11/21 0659   Shift 0532-6816 1711-7616 24 Hour Total 7336-8644 3337-0460 24 Hour Total   INTAKE   P.O. 1120  1120        P. O. 1120  1120      Shift Total(mL/kg) 1120(13.3)  1120(13.3)      OUTPUT   Urine(mL/kg/hr)           Urine Occurrence(s) 1 x 3 x 4 x 1 x  1 x   Stool           Stool Occurrence(s) 0 x 0 x 0 x 0 x  0 x   Shift Total(mL/kg)         NET 1120  1120      Weight (kg) 84.4 84.4 84.4 84.4 84.4 84.4       Recommendations:  1. Patient needs and requests: met    2. Pending tests/procedures: labs as ordered     3. Functional Level/Equipment: Partial (one person) / Bed (comment)    Fall Precautions:   Fall risk precautions were reinforced with the patient; she was instructed to call for help prior to getting up. The following fall risk precautions were continued: bed/ chair alarms, door signage, yellow bracelet and socks as well as update of the Charmel Ren tool in the patient's room. Leigh Ann Score: 4    HEALS Safety Check    A safety check occurred in the patient's room between off going nurse and oncoming nurse listed above.     The safety check included the below items  Area Items   H  High Alert Medications - Verify all high alert medication drips (heparin, PCA, etc.)   E  Equipment - Suction is set up for ALL patients (with blessing)  - Red plugs utilized for all equipment (IV pumps, etc.)  - WOWs wiped down at end of shift.  - Room stocked with oxygen, suction, and other unit-specific supplies   A  Alarms - Bed alarm is set for fall risk patients  - Ensure chair alarm is in place and activated if patient is up in a chair   L  Lines - Check IV for any infiltration  - Rollins bag is empty if patient has a Rollins   - Tubing and IV bags are labeled   S  Safety   - Room is clean, patient is clean, and equipment is clean. - Hallways are clear from equipment besides carts. - Fall bracelet on for fall risk patients  - Ensure room is clear and free of clutter  - Suction is set up for ALL patients (with blessing)  - Hallways are clear from equipment besides carts.    - Isolation precautions followed, supplies available outside room, sign posted     Lizabeth Hodgkins, LPN

## 2021-06-10 NOTE — ROUTINE PROCESS
[facility]    Bedside and Verbal shift change report given to CATY Lo (oncoming nurse) by Edgar Shen RN (offgoing nurse). Report included the following information SBAR, Kardex, MAR and Recent Results.     Situation:   Code Status: Full Code   Hospital Day: 14   Problem List:   Hospital Problems  Date Reviewed: 6/9/2021        Codes Class Noted POA    Secondary hyperparathyroidism of renal origin ([facility] (Chronic) ICD-10-CM: N25.81  ICD-9-CM: 588.81  5/31/2021 Yes        Vitamin D deficiency (Chronic) ICD-10-CM: E55.9  ICD-9-CM: 268.9  5/31/2021 Yes    Overview Signed 5/31/2021  1:17 PM by Keanu Mayes MD     Vitamin D 25-Hydroxy (5/31/2021) = 10.5              High serum magnesium ICD-10-CM: R79.89  ICD-9-CM: 790.99  5/28/2021 Yes        Hypertensive heart and kidney disease without heart failure and with stage 3b chronic kidney disease ([facility] [address] (Chronic) ICD-10-CM: I13.10, N18.32  ICD-9-CM: 404.90, 585.3  Unknown Yes        Mixed hyperlipidemia (Chronic) ICD-10-CM: E78.2  ICD-9-CM: 272.2  Unknown Yes        Constipation (Chronic) ICD-10-CM: K59.00  ICD-9-CM: 564.00  Unknown Yes        Current use of aspirin ICD-10-CM: Z79.82  ICD-9-CM: V58.66  5/23/2021 Yes        On clopidogrel therapy ICD-10-CM: Z79.01  ICD-9-CM: V58.61  5/23/2021 Yes        On statin therapy due to risk of future cardiovascular event ICD-10-CM: Z79.899  ICD-9-CM: V58.69  5/22/2021 Yes    Overview Signed 5/27/2021  2:27 PM by Keanu Mayes MD     On Atorvastatin             * (Principal) Acute lacunar stroke [facility]) ICD-10-CM: I63.81  ICD-9-CM: 434.91  5/21/2021 Yes    Overview Signed 5/27/2021  2:15 PM by Keanu Mayes MD     Acute Lacunar Stroke (acute lacunar infarct in the posterior left lentiform nucleus extending into the corona radiata) with residual right hemiparesis             Impaired mobility and ADLs ICD-10-CM: Z74.09, Z78.9  ICD-9-CM: V49.89  5/21/2021 Yes        Hemiparesis of right dominant side due to acute cerebrovascular disease [facility]) ICD-10-CM: I67.89, G81.91  ICD-9-CM: 436, 342.91  5/21/2021 Yes              Background:   Past Medical History:   Past Medical History:   Diagnosis Date    Abnormal mammogram 2014    left with biopsy    Acute lacunar stroke ([facility] [address] 5/21/2021    Acute Lacunar Stroke (acute lacunar infarct in the posterior left lentiform nucleus extending into the corona radiata) with residual right hemiparesis    Bacterial vaginosis 1/9/15    Dr Raquel Gaines Constipation     Current use of aspirin 5/23/2021    Gastroesophageal reflux disease 6/30/2016    Hemiparesis of right dominant side due to acute cerebrovascular disease ([facility] [address] 5/21/2021    History of Helicobacter pylori infection 7/24/2015    History of iron deficiency anemia 06/2014    History of vitamin D deficiency 6/11/2015    Hot flashes 1/9/15    Dr Raquel Gaines On clopidogrel therapy 5/23/2021    On statin therapy due to risk of future cardiovascular event 5/22/2021    On Atorvastatin    Rheumatoid arthritis ([facility] [address]     Secondary hyperparathyroidism of renal origin ([facility] [address] 5/31/2021    Stage 3b chronic kidney disease ([facility] [address] 7/24/2015    Tobacco use disorder     Vitamin D deficiency 5/31/2021    Vitamin D 25-Hydroxy (5/31/2021) = 10.5         Assessment:   Changes in Assessment throughout shift: No change to previous assessmentnone     Patient has a central line: no Reasons if yes: na  Insertion date:na Last dressing date:na   Patient has Irving Cath: no Reasons if yes: na   Insertion date:na  Shift irving care completed: NO     Last Vitals:     Vitals:    06/08/21 2130 06/09/21 0739 06/09/21 1602 06/09/21 2110   BP: 139/89 135/87 (!) 141/102 (!) 144/85   Pulse: 79 73 69 69   Resp: 18 16 16 18   Temp: 97.1 °F (36.2 °C) 97.4 °F (36.3 °C) 98.1 °F (36.7 °C) 98.5 °F (36.9 °C)   SpO2: 100% 99% 100% 100%   Weight:       Height:       LMP: 10/15/2014        PAIN    Pain Assessment    Pain Intensity 1: 0 (06/10/21 3825) Pain Intensity 1: 2 (12/29/14 1105)    Pain Location 1: Arm Pain Location 1: Abdomen    Pain Intervention(s) 1: Medication (see MAR) Pain Intervention(s) 1: Medication (see MAR)  Patient Stated Pain Goal: 0 Patient Stated Pain Goal: 0  o Intervention effective: yes  o Other actions taken for pain: Medication (see MAR)     Skin Assessment  Skin color    Condition/Temperature    Integrity    Turgor    Weekly Pressure Ulcer Documentation  Pressure  Injury Documentation: No Pressure Injury Noted-Pressure Ulcer Prevention Initiated  Wound Prevention & Protection Methods  Orientation of wound Orientation of Wound Prevention: Posterior  Location of Prevention Location of Wound Prevention: Buttocks, Sacrum/Coccyx  Dressing Present Dressing Present : No  Dressing Status    Wound Offloading Wound Offloading (Prevention Methods): Bed, pressure redistribution/air     INTAKE/OUPUT  Date 06/09/21 0700 - 06/10/21 0659 06/10/21 0700 - 06/11/21 0659   Shift 8922-5416 0676-2605 24 Hour Total 3872-0865 4181-6824 24 Hour Total   INTAKE   P.O. 1120  1120        P. O. 1120  1120      Shift Total(mL/kg) 1120(13.3)  1120(13.3)      OUTPUT   Urine(mL/kg/hr)           Urine Occurrence(s) 1 x 3 x 4 x      Stool           Stool Occurrence(s) 0 x 0 x 0 x      Shift Total(mL/kg)         NET 1120  1120      Weight (kg) 84.4 84.4 84.4 84.4 84.4 84.4       Recommendations:  1. Patient needs and requests: assist with toileting    2. Pending tests/procedures: lab as ordered     3. Functional Level/Equipment: Partial (one person) /  wheelchair    Fall Precautions:   Fall risk precautions were reinforced with the patient; she was instructed to call for help prior to getting up. The following fall risk precautions were continued: bed/ chair alarms, door signage, yellow bracelet and socks as well as update of the [place] tool in the patient's room.    Leigh Ann Score:      HEALS Safety Check    A safety check occurred in the patient's room between off going nurse and oncoming nurse listed above. The safety check included the below items  Area Items   H  High Alert Medications - Verify all high alert medication drips (heparin, PCA, etc.)   E  Equipment - Suction is set up for ALL patients (with blessing)  - Red plugs utilized for all equipment (IV pumps, etc.)  - WOWs wiped down at end of shift.  - Room stocked with oxygen, suction, and other unit-specific supplies   A  Alarms - Bed alarm is set for fall risk patients  - Ensure chair alarm is in place and activated if patient is up in a chair   L  Lines - Check IV for any infiltration  - Rollins bag is empty if patient has a Rollins   - Tubing and IV bags are labeled   S  Safety   - Room is clean, patient is clean, and equipment is clean. - Hallways are clear from equipment besides carts. - Fall bracelet on for fall risk patients  - Ensure room is clear and free of clutter  - Suction is set up for ALL patients (with blessing)  - Hallways are clear from equipment besides carts.    - Isolation precautions followed, supplies available outside room, sign posted     Hannah Cardoso RN

## 2021-06-10 NOTE — INTERDISCIPLINARY ROUNDS
[facility] PHYSICAL REHABILITATION  [address] [place] [address], [address]    INPATIENT REHABILITATION  TEAM CONFERENCE SUMMARY     Date of Conference: 6/10/2021    Patient Information:        Name: Hansel Rosales Age / Sex: 62 y.o. / female   CSN: [id number] MRN: [id number]   [facility] Date: 5/27/2021 Length of Stay: 14 days     Primary Rehabilitation Diagnosis  1. Impaired Mobility and ADLs  2.  Acute Lacunar Stroke (acute lacunar infarct in the posterior left lentiform nucleus extending into the corona radiata) with residual right hemiparesis    Comorbidities  Patient Active Problem List   Diagnosis Code    Rheumatoid arthritis ([facility] M06.9    History of vitamin D deficiency Z86.39    Cocaine use F14.90    Stage 3b chronic kidney disease (HCC) N18.32    History of Helicobacter pylori infection Z86.19    Abscess of finger L02.519    Gastroesophageal reflux disease K21.9    Acute lacunar stroke (HCC) I63.81    Impaired mobility and ADLs Z74.09, Z78.9    Hemiparesis of right dominant side due to acute cerebrovascular disease (HCC) I67.89, G81.91    Tobacco use disorder F17.200    Current use of aspirin Z79.82    On clopidogrel therapy Z79.01    Hypertensive heart and kidney disease without heart failure and with stage 3b chronic kidney disease (HCC) I13.10, N18.32    Mixed hyperlipidemia E78.2    On statin therapy due to risk of future cardiovascular event Z79.899    Constipation K59.00    History of iron deficiency anemia Z86.2    High serum magnesium R79.89    Secondary hyperparathyroidism of renal origin ([facility] N25.81    Vitamin D deficiency E55.9          Therapy:     FIM SCORES Initial Assessment Weekly Progress Assessment 6/10/2021   Eating Functional Level: 5  Comments:  (issued AE:built up handle utensils,2 handled cup with lid)  6   Swallowing     Grooming 5 (seated w/c level at sink using nondominant left hand)  4.5 CGA in stance w/ QC   Bathing 5 (5 SBA UB, 4.5 CGA LB)   5 Supv w/ UB  4.5-5 CGA-Supv w/ LB        Upper Body Dressing Functional Level: 5 (SBA)  Items Applied/Steps Completed: Pullover (4 steps)  Comments:  (instruction for hemitechnique)  5 Supv using hemitechnique   Lower Body Dressing Functional Level:  (4.5-4 CGA/Min A seated on bedside commode & in stance w/ RW)  Items Applied/Steps Completed: Elastic waist pants (3 steps), Shoe, right (1 step), Shoe, left (1 step), Sock, left (1 step), Sock, right (1 step), Underpants (3 steps)  Comments:  (cross leg method and hemitechnique)  5-4.5 Supv-CGA    Toileting Functional Level:  (4.5 CGA-4 Min A)  Comments:  (4 Min A to hike underwear and pants over hips)  4.5 CGA using QC    Bladder 1 0   Bowel  0 0   Toilet Transfer [place] Toilet Transfer Score:  (4 Min A using RW to bedside commode)  4,5 CGA using QC    Tub/Shower Transfer [place] Tub or Shower Type: Tub/Shower combination  Tub/Shower Transfer Score:  (NT d/t time constraints)  4.5 CGA using w/c, QC, grab bars and tub transfer bench      Comprehension Primary Mode of Comprehension: Auditory  Score: 5 Auditory  65   Expression Primary Mode of Expression: Verbal  Score: 6 Verbal6  6   Social Interaction Score: 5 55   Problem Solving Score: 4 44   Memory Score: 4 44     FIM SCORES Initial Assessment Weekly Progress Assessment 6/10/2021   Bed/Chair/Wheelchair Transfers Transfer Type: Other  Other: stand step without AD as per PLOF  Transfer Assistance : 3 (Moderate assistance )  Sit to Stand Assistance: Moderate assistance  Car Transfers:  Moderate assistance (using RW)  Car Type: car transfer simulator     txfr type: stand step txfr with [facility]  txfr assistance: CGA   Bed Mobility Rolling Right 5 (Stand-by assistance)   Rolling Left 5 (Stand-by assistance)   Supine to Sit 5 (Stand-by assistance) (cue for not holding her breath)   Sit to Stand Moderate assistance   Sit to Supine  (SBA)    Rolling Right    S   Rolling Left    S     Supine to Sit    S   Sit to Stand Sit to Supine    S      Locomotion (W/C) Able to Propel (ft): 70 feet  Functional Level: 2  Curbs/Ramps Assist Required (FIM Score):  (mod A for steering, using rosalba-technique)  Wheelchair Setup Assist Required : 2 (Maximal assistance)  Wheelchair Management: Manages left brake, Manages right brake (extra time, cues) Function  5  Setup Assistance: Supervision         Locomotion (W/C distance)    72ft   Locomotion (Walk) 2 (Maximal assistance) (maximal trunk support, blocking right LE)        Locomotion (Walk dist.) 1 Feet (ft)     Steps/Stairs Steps/Stairs Ambulated (#): 1  Level of Assist : 2 (Maximal assistance) (lifting/lowering assistance required)  Rail Use: Both (therapist assisting at right UE to )  4 6\" steps with right HR and SBQC on left with min assist         Nursing:     Neuro:   AAA&O x 4           Respiratory:   [] WNL   [] O2 LPM:   Other:  Peripheral Vascular:   [] TEDS present   [] Edema present ____ Grade   Cardiac:   [x] WNL   [] Other  Genitourinary:   [] continent   [] incontinent   [] irving  Abdominal _______ LBM  GI: _cardiac ___ Diet _thin_____ Liquids _____ tube feeds  Musculoskeletal: ____ ROM Transfers _____ Assistive Device Used  ____ Level of Assistance  Skin Integumentary:   [] Intact   [] Not Intact   __________Preventative Measures  Details______________________________________________________________  Pain: [x] Controlled   [] Not Controlled   Pain Meds:   [] Scheduled   [] PRN        Registered Dietitian / Nutrition:   No data found. Pt reported good appetite and meal intake. Eating until satisfied. Tolerating diet. Denied having any nutrition concerns/ questions at this time.  Noted 51-75% meal intake per chart documentation      Supplements:          [] Yes   [x] No      Amount of supplement consumed:  Not applicable       Intake/Output Summary (Last 24 hours) at 6/10/2021 1430  Last data filed at 6/9/2021 180  Gross per 24 hour   Intake 400 ml   Output --   Net 400 ml Last bowel movement: 6/7      Interdisciplinary Team Goals:     1. Discipline  Physical Therapy    Goal  Pt will consistently perform stand step txfr with SBQC and SBA without scissoring feet. Barrier  impulsive, right side weakness, decreased attention to right LE    Intervention  txfr training, gait training, stair training, balance, therapeutic exercise. Goal written by:   THADDEUS Barrera     2. Discipline  Occupational Therapy    Goal  Improve RUE strength, dexterity and coordination for ADL & functional tasks    Barrier  R side hemiplegia, chronic arthritic changes d/t RW of b/l hands, impaired balance and strength    Intervention  ADL retraining, there ex, there act and NMR    Goal written by:  Roxi Gan MS OTR/L     3. Discipline  Speech Therapy    Goal  Patient will list 10 items within concrete categories with supervision. Barrier  mild cognitive impairment    Intervention  cognitive retraining    Goal written by:  Mouna Woodard, CCC-SLP     4. Discipline  Nursing    Goal  keep pt . Hydrated to improve kidney function    Barrier  disease process    Intervention  encourge to drink 2 lit fluid/24 hours per nephrologist    Goal written by:  James Villatoro     5. Discipline  Clinical Psychology    Goal  Increase insight about all stroke recovery and healthcare compliance    Barrier  Limited insight and continued health issues    Intervention  Patient/stroke education and support     Goal written by:  Jeanne Melgar, PhD     6. Discipline  Nutrition / Dietetics    Goal  - PO nutrition intake will meet >75% of patient estimated nutritional needs within the next 7 days. Barrier  none known     Intervention  continue po diet    Goal written by:  Nighat Merritt RD       Disposition / Discharge Planning:      Follow-up services:  [x] Physical Therapy             [x] Occupational Therapy       [x] Speech Therapy           [] Skilled Nursing      [] Medical Social Worker   [] Aide        [] Outpatient      [] vs   [x] Home Health  [] vs       [] to progress to outpatient       [] with 24-hour supervision       [x] with 24-hour assistance   [] Timbo ABRAMS recommendations:  tub transfer bench, grab bar on wall in tub-shower, 3 in 1 commode, SBQC, w/c    Estimated discharge date:  6/15/2021   Discharge Location:  [x] Home  [] versus    [] Timbo Santos    [] [address]   [] Other:           Interdisciplinary team rounds were held this PM with the following team members:       Name   Physical Therapist    Altagracia Isaac PT, DPT   Occupational Therapist    Jose Vargas MS OTR/L   Recreational Therapist    Karen Collado, [address] [place], [place] [address]yakelin An RN   Physician    Joyce Reich MD       Alba Lees MSW       Signed:  Joyce Reich MD    Ambreen 10, 2021

## 2021-06-11 ENCOUNTER — HOME HEALTH ADMISSION (OUTPATIENT)
Dept: HOME HEALTH SERVICES | Facility: HOME HEALTH | Age: 61
End: 2021-06-11
Payer: MEDICAID

## 2021-06-11 PROBLEM — N17.9 ACUTE RENAL FAILURE SUPERIMPOSED ON STAGE 3 CHRONIC KIDNEY DISEASE (HCC): Status: ACTIVE | Noted: 2021-06-01

## 2021-06-11 PROBLEM — N18.30 ACUTE RENAL FAILURE SUPERIMPOSED ON STAGE 3 CHRONIC KIDNEY DISEASE (HCC): Status: ACTIVE | Noted: 2021-06-01

## 2021-06-11 LAB
ALBUMIN SERPL-MCNC: 3.1 G/DL (ref 3.4–5)
ANION GAP SERPL CALC-SCNC: 7 MMOL/L (ref 3–18)
BUN SERPL-MCNC: 32 MG/DL (ref 7–18)
BUN/CREAT SERPL: 18 (ref 12–20)
CALCIUM SERPL-MCNC: 9.3 MG/DL (ref 8.5–10.1)
CHLORIDE SERPL-SCNC: 113 MMOL/L (ref 100–111)
CO2 SERPL-SCNC: 21 MMOL/L (ref 21–32)
CREAT SERPL-MCNC: 1.82 MG/DL (ref 0.6–1.3)
GLUCOSE SERPL-MCNC: 111 MG/DL (ref 74–99)
PHOSPHATE SERPL-MCNC: 4.2 MG/DL (ref 2.5–4.9)
POTASSIUM SERPL-SCNC: 4.6 MMOL/L (ref 3.5–5.5)
SODIUM SERPL-SCNC: 141 MMOL/L (ref 136–145)

## 2021-06-11 PROCEDURE — 74011250636 HC RX REV CODE- 250/636: Performed by: INTERNAL MEDICINE

## 2021-06-11 PROCEDURE — 65310000000 HC RM PRIVATE REHAB

## 2021-06-11 PROCEDURE — 97110 THERAPEUTIC EXERCISES: CPT

## 2021-06-11 PROCEDURE — 92507 TX SP LANG VOICE COMM INDIV: CPT

## 2021-06-11 PROCEDURE — 97116 GAIT TRAINING THERAPY: CPT

## 2021-06-11 PROCEDURE — 97535 SELF CARE MNGMENT TRAINING: CPT

## 2021-06-11 PROCEDURE — 99232 SBSQ HOSP IP/OBS MODERATE 35: CPT | Performed by: INTERNAL MEDICINE

## 2021-06-11 PROCEDURE — 80069 RENAL FUNCTION PANEL: CPT

## 2021-06-11 PROCEDURE — 97530 THERAPEUTIC ACTIVITIES: CPT

## 2021-06-11 PROCEDURE — 74011250637 HC RX REV CODE- 250/637: Performed by: INTERNAL MEDICINE

## 2021-06-11 PROCEDURE — 36415 COLL VENOUS BLD VENIPUNCTURE: CPT

## 2021-06-11 RX ORDER — CARVEDILOL 6.25 MG/1
6.25 TABLET ORAL 2 TIMES DAILY WITH MEALS
Status: DISCONTINUED | OUTPATIENT
Start: 2021-06-11 | End: 2021-06-13

## 2021-06-11 RX ADMIN — Medication 3 MG: at 22:41

## 2021-06-11 RX ADMIN — ASPIRIN 81 MG: 81 TABLET, CHEWABLE ORAL at 18:04

## 2021-06-11 RX ADMIN — LUBIPROSTONE 8 MCG: 8 CAPSULE, GELATIN COATED ORAL at 09:10

## 2021-06-11 RX ADMIN — ATORVASTATIN CALCIUM 80 MG: 40 TABLET, FILM COATED ORAL at 09:19

## 2021-06-11 RX ADMIN — METOPROLOL TARTRATE 25 MG: 25 TABLET, FILM COATED ORAL at 09:19

## 2021-06-11 RX ADMIN — HEPARIN SODIUM 5000 UNITS: 5000 INJECTION INTRAVENOUS; SUBCUTANEOUS at 18:04

## 2021-06-11 RX ADMIN — ACETAMINOPHEN 650 MG: 325 TABLET ORAL at 09:10

## 2021-06-11 RX ADMIN — ACETAMINOPHEN 650 MG: 325 TABLET ORAL at 02:04

## 2021-06-11 RX ADMIN — Medication 5000 UNITS: at 09:19

## 2021-06-11 RX ADMIN — LUBIPROSTONE 8 MCG: 8 CAPSULE, GELATIN COATED ORAL at 18:04

## 2021-06-11 RX ADMIN — Medication 100 MG: at 09:19

## 2021-06-11 RX ADMIN — HEPARIN SODIUM 5000 UNITS: 5000 INJECTION INTRAVENOUS; SUBCUTANEOUS at 05:44

## 2021-06-11 RX ADMIN — POLYETHYLENE GLYCOL 3350 17 G: 17 POWDER, FOR SOLUTION ORAL at 09:09

## 2021-06-11 RX ADMIN — CLOPIDOGREL BISULFATE 75 MG: 75 TABLET ORAL at 09:10

## 2021-06-11 RX ADMIN — DOCUSATE SODIUM 50MG AND SENNOSIDES 8.6MG 2 TABLET: 8.6; 5 TABLET, FILM COATED ORAL at 18:04

## 2021-06-11 RX ADMIN — CARVEDILOL 6.25 MG: 6.25 TABLET, FILM COATED ORAL at 18:04

## 2021-06-11 NOTE — PROGRESS NOTES
Problem: Self Care Deficits Care Plan (Adult)  Goal: *Acute Goals and Plan of Care (Insert Text)  Description: Occupational Therapy Goals   Long Term Goals  Initiated 2021 and to be accomplished within 4 week(s) 2021    1. Patient will perform grooming with modified independence using adaptive equipment as needed. 2. Pt will perform UB bathing with Mod I.  3. Pt will perform LB bathing with Mod I.  4. Pt will perform tub/shower transfer with Mod I using DME. 5. Pt will perform UB dressing with Mod I.  6. Patient will perform upper body dressing and lower body dressing with modified independence. 7. Patient will perform all aspects of toileting with modified independence. 8. Patient will perform toilet transfers with modified independence using RW. Short Term Goals   Initiated 2021 and to be accomplished within 7 day(s) 6/3/2021    1. Pt will perform grooming with Supv using adaptive equipment as needed. - met goal, d/c  goal  2. Pt will perform UB bathing with Supv. - met goal, upgrade to Mod I, progressing 6/10/21  3. Pt will perform LB bathing with Supv. - progressing, 6/3/2021, progressing 6/10/21  4. Pt will perform tub/shower transfer with SBA using DME. - progressing, 6/3/2021, progressing 6/10/21  5. Pt will perform UB dressing with Supv. - met goal, upgrade to Mod I, progressing 6/10/21  6. Pt will perform LB dressing with Supv. - progressing, 6/3/2021, progressing 6/10/21  7. Pt will perform toileting task with SBA. - progressing, 6/3/2021, progressing 6/10/21  8. Pt will perform toilet transfer with SBA using RW. - progressing, 6/3/2021, progressing 6/10/21  Outcome: Progressing Towards Goal   Occupational Therapy TREATMENT    Patient: Whit Jonas   62 y.o.     Patient identified with name and : yes    Date: 2021    First Tx Session  Time In: 1330  Time Out: 1430    Diagnosis: Acute lacunar stroke [facility]) [I63.81]   Precautions: Fall, Skin  Chart, occupational therapy assessment, plan of care, and goals were reviewed. Pain:  Pt reports 0/10 pain or discomfort prior to treatment. Pt reports 0/10 pain or discomfort post treatment. Intervention Provided:       SUBJECTIVE:   Patient stated I probably do need to go to the bathroom.     OBJECTIVE DATA SUMMARY:     THERAPEUTIC ACTIVITY Daily Assessment         THERAPEUTIC EXERCISE Daily Assessment    UB bike x 15 mins with  rest breaks to increase strength and functional activity tolerance for improved independence with ADL routine and functional mobility. GROOMING Daily Assessment    Grooming  Grooming Assistance : 4 (Contact guard assistance) (washing hands in stance at sink with QC)     TOILETING Daily Assessment    Toileting  Toileting Assistance (FIM Score):  (5-SBA)  Adaptive Equipment: Grab bars; Other (comment) (QC and 3 in 1 commode over toilet)       MOBILITY/TRANSFERS Daily Assessment    Functional Transfers  Amount of Assistance Required: 4 (Contact guard assistance) (using QC) for toilet transfer using QC, no LOB noted  And CGA from w/c -> edge of bed using QC, no LOB noted       ASSESSMENT:  Patient noted with improvement during toilet tasks using QC and grab bar for balance and fall prevention. There act in stance w/ QC x 11 mins with F- to F balance alternating BUE reaching for placement followed by rest break prior to standing for removal of graded resistance clamps from yardstick tree. Patient verbalized understanding to utilize call bell to request assist e.g. functional transfers in order to prevent falls, bed alarm intact. Progression toward goals:  [x]          Improving appropriately and progressing toward goals  []          Improving slowly and progressing toward goals  []          Not making progress toward goals and plan of care will be adjusted     PLAN:  Patient continues to benefit from skilled intervention to address the above impairments.   Continue treatment per established plan of care.  Discharge Recommendations:  Home Health w/ 24/7 supv and assist e.g. ADLs  Further Equipment Recommendations for Discharge:  quad cane,grab bars, bedside commode, tub transfer bench, and wheelchair      Activity Tolerance:  fair    Estimated LOS: ~ 1 week    Please refer to the flowsheet for vital signs taken during this treatment. After treatment:   []  Patient left in no apparent distress sitting up in chair   [x]  Patient left in no apparent distress in bed  [x]  Call bell left within reach  [x]  Nursing notified  []  Caregiver present  [x]  Bed alarm activated    COMMUNICATION/EDUCATION:   [x] Home safety education was provided and the patient/caregiver indicated understanding. [x] Patient/family have participated as able in goal setting and plan of care. [x] Patient/family agree to work toward stated goals and plan of care. [] Patient understands intent and goals of therapy, but is neutral about his/her participation. [] Patient is unable to participate in goal setting and plan of care.       Maeve Robles

## 2021-06-11 NOTE — PROGRESS NOTES
In Patient Progress note      Admit Date: 5/27/2021    Assessment and plan:     #1 Acute kidney injury on chronic kidney disease stage III/IV. Patient's baseline creatinine  close to 2 range. Etiology prerenal +. Patient also has history of using Sutton 2 inhibitors /multiple NSAID use including BC powder chronically. Her CKD is likely d/t HTN nephrosclerosis and NSAID use . Renal US with no hydro. #2 Hypertension   #3 History of CVA with right-sided hemiparesis  #4 dyslipidemia  #5 mild proteinuria  #6 rheumatoid arthritis  #7 NSAID use  #8 Subnephrotic proteinuria     Plan:     #1agree with d/c IVF , encourage fluid intake   #2 noted hydralazine d/c ed and nifedipine uptitrated , agree   #3 no more NSAIDs , counseled patient   #4 follow renal panels daily     Please call with questions     Tom Santos MD [facility]  Cell [phone]  Pager: [phone]    Subjective:     - headaches + ,lip swelling but improved  - respiratory - stable  - hemodynamics - stable, no pressrs  - UOP-good   - Nutrition -ok    Objective:     Visit Vitals  BP (!) 150/95 (BP 1 Location: Left upper arm, BP Patient Position: At rest)   Pulse 67   Temp 97 °F (36.1 °C)   Resp 17   Ht 5' 7\" (1.702 m)   Wt 84.4 kg (186 lb)   LMP 10/15/2014   SpO2 99%   Breastfeeding No   BMI 29.13 kg/m²       No intake or output data in the 24 hours ending 06/11/21 1031    Physical Exam:     Patient is in no apparent distress. HEENT: mmm  Neck: no cervical lymphadenopathy or thyromegaly. Lungs: good air entry, clear to auscultation bilaterally. Cardiovascular system: S1, S2, regular rate and rhythm. Abdomen: soft, non tender, non distended.     Data Review:    Recent Labs     06/10/21  0503   HCT 40.9     Recent Labs     06/11/21  0535 06/10/21  0503 06/09/21  0450   BUN 32* 33* 30*   CREA 1.82* 1.83* 1.95*   CA 9.3 9.8 9.7   ALB 3.1* 3.0* 2.9*   K 4.6 4.7 4.8    140 141   * 113* 114*   CO2 21 21 23   PHOS 4.2 4.1 3.7   * 86 100* Avila mE MD

## 2021-06-11 NOTE — PROGRESS NOTES
Problem: Pressure Injury - Risk of  Goal: *Prevention of pressure injury  Description: Document John Scale and appropriate interventions in the flowsheet. Outcome: Progressing Towards Goal  Note: Pressure Injury Interventions:  Sensory Interventions: Assess changes in LOC    Moisture Interventions: Maintain skin hydration (lotion/cream), Minimize layers    Activity Interventions: Increase time out of bed, Pressure redistribution bed/mattress(bed type)    Mobility Interventions: Pressure redistribution bed/mattress (bed type)    Nutrition Interventions: Document food/fluid/supplement intake    Friction and Shear Interventions: HOB 30 degrees or less                Problem: Patient Education: Go to Patient Education Activity  Goal: Patient/Family Education  Outcome: Progressing Towards Goal     Problem: Falls - Risk of  Goal: *Absence of Falls  Description: Document Leigh Ann Fall Risk and appropriate interventions in the flowsheet.   Outcome: Progressing Towards Goal  Note: Fall Risk Interventions:  Mobility Interventions: Bed/chair exit alarm, Patient to call before getting OOB    Mentation Interventions: Adequate sleep, hydration, pain control    Medication Interventions: Bed/chair exit alarm    Elimination Interventions: Bed/chair exit alarm    History of Falls Interventions: Bed/chair exit alarm         Problem: Patient Education: Go to Patient Education Activity  Goal: Patient/Family Education  Outcome: Progressing Towards Goal     Problem: Patient Education: Go to Patient Education Activity  Goal: Patient/Family Education  Outcome: Progressing Towards Goal     Problem: Patient Education: Go to Patient Education Activity  Goal: Patient/Family Education  Outcome: Progressing Towards Goal     Problem: Patient Education: Go to Patient Education Activity  Goal: Patient/Family Education  Outcome: Progressing Towards Goal

## 2021-06-11 NOTE — PROGRESS NOTES
SW reviewed chart. Pt is pending discharge next week on 6/15. SW met with pt at bedside. She had just completed hygiene with the assistance of her visitor. SW engaged pt in discussion of discharge. SW discussed DME that is to be ordered. She acknowledged what was needed. SW will send order request once orders are placed. SW discussed home health being recommended. FOC was sought. Pt agreed to [facility]. DHARMESH placed a referral and contacted [facility]. Pt was accepted. DHARMESH will continue to follow.        Nelly Pal MSW, LCSW, [facility]  Doctoral Candidate, Doctor of Social Work

## 2021-06-11 NOTE — PROGRESS NOTES
[facility] PHYSICAL REHABILITATION  [address] [place] [address], [address]     INPATIENT REHABILITATION  DAILY PROGRESS NOTE     Date: 6/11/2021    Name: José Manuel Baca Age / Sex: 62 y.o. / female   CSN: [id number] MRN: [id number]   [facility] Date: 5/27/2021 Length of Stay: 15 days     Primary Rehab Diagnosis: Impaired Mobility and ADLs secondary to Acute Lacunar Stroke (acute lacunar infarct in the posterior left lentiform nucleus extending into the corona radiata) with residual right hemiparesis      Subjective:     Patient seen and examined. Blood pressure fairly controlled. Patient's Complaint:   Lip swelling last night    Pain Control: no current joint or muscle symptoms, essentially pain-free      Objective:     Vital Signs:  Patient Vitals for the past 24 hrs:   BP Temp Pulse Resp SpO2   06/11/21 0735 (!) 150/95 97 °F (36.1 °C) 67 17 99 %   06/10/21 2128 (!) 150/93 97 °F (36.1 °C) 86 18 100 %   06/10/21 1754 (!) 144/89 -- -- -- --   06/10/21 1607 (!) 156/102 -- 76 -- --   06/10/21 1553 (!) 140/98 97.2 °F (36.2 °C) 83 19 100 %        Physical Examination:  GENERAL SURVEY: Patient is awake, alert, oriented x 3, sitting comfortably on the chair, not in acute respiratory distress. HEENT: pink palpebral conjunctivae, anicteric sclerae, no nasoaural discharge, moist oral mucosa  NECK: supple, no jugular venous distention, no palpable lymph nodes  CHEST/LUNGS: symmetrical chest expansion, good air entry, clear breath sounds  HEART: adynamic precordium, good S1 S2, no S3, regular rhythm, no murmurs  ABDOMEN: flat, bowel sounds appreciated, soft, non-tender  EXTREMITIES: pink nailbeds, no edema, full and equal pulses, no calf tenderness   NEUROLOGICAL EXAM: The patient is awake, alert and oriented x3, able to answer questions fairly appropriately, able to follow 1 and 2 step commands. Able to tell time from the wall clock. Cranial nerves II-XII are grossly intact. No gross sensory deficit.   Motor strength is 4 to 4+/5 on the RUE and RLE, 5/5 on the LUE and LLE. Current Medications:  Current Facility-Administered Medications   Medication Dose Route Frequency    NIFEdipine ER (PROCARDIA XL) tablet 60 mg  60 mg Oral DAILY    lubiPROStone (AMITIZA) capsule 8 mcg  8 mcg Oral BID WITH MEALS    heparin (porcine) injection 5,000 Units  5,000 Units SubCUTAneous Q12H    hydrALAZINE (APRESOLINE) tablet 25 mg  25 mg Oral TID PRN    polyethylene glycol (MIRALAX) packet 17 g  17 g Oral DAILY    senna-docusate (PERICOLACE) 8.6-50 mg per tablet 2 Tablet  2 Tablet Oral PCD    metoprolol tartrate (LOPRESSOR) tablet 25 mg  25 mg Oral Q12H    cholecalciferol (VITAMIN D3) capsule 5,000 Units  5,000 Units Oral DAILY    nicotine (NICODERM CQ) 14 mg/24 hr patch 1 Patch  1 Patch TransDERmal DAILY    acetaminophen (TYLENOL) tablet 650 mg  650 mg Oral Q4H PRN    bisacodyL (DULCOLAX) tablet 10 mg  10 mg Oral Q48H PRN    aspirin chewable tablet 81 mg  81 mg Oral DAILY WITH DINNER    atorvastatin (LIPITOR) tablet 80 mg  80 mg Oral DAILY    clopidogreL (PLAVIX) tablet 75 mg  75 mg Oral DAILY WITH BREAKFAST    co-enzyme Q-10 (CO Q-10) capsule 100 mg  100 mg Oral DAILY    melatonin tablet 3 mg  3 mg Oral QHS       Allergies:  No Known Allergies      Lab/Data Review:  Recent Results (from the past 24 hour(s))   RENAL FUNCTION PANEL    Collection Time: 06/11/21  5:35 AM   Result Value Ref Range    Sodium 141 136 - 145 mmol/L    Potassium 4.6 3.5 - 5.5 mmol/L    Chloride 113 (H) 100 - 111 mmol/L    CO2 21 21 - 32 mmol/L    Anion gap 7 3.0 - 18 mmol/L    Glucose 111 (H) 74 - 99 mg/dL    BUN 32 (H) 7.0 - 18 MG/DL    Creatinine 1.82 (H) 0.6 - 1.3 MG/DL    BUN/Creatinine ratio 18 12 - 20      GFR est AA 34 (L) >60 ml/min/1.73m2    GFR est non-AA 28 (L) >60 ml/min/1.73m2    Calcium 9.3 8.5 - 10.1 MG/DL    Phosphorus 4.2 2.5 - 4.9 MG/DL    Albumin 3.1 (L) 3.4 - 5.0 g/dL       Assessment:     Primary Rehab Diagnosis  1.  Impaired Mobility and ADLs  2. Acute Lacunar Stroke (acute lacunar infarct in the posterior left lentiform nucleus extending into the corona radiata) with residual right hemiparesis    Comorbidities  Patient Active Problem List   Diagnosis Code    Rheumatoid arthritis ([facility] M06.9    History of vitamin D deficiency Z86.39    Cocaine use F14.90    Stage 3b chronic kidney disease (HCC) N18.32    History of Helicobacter pylori infection Z86.19    Abscess of finger L02.519    Gastroesophageal reflux disease K21.9    Acute lacunar stroke (HCC) I63.81    Impaired mobility and ADLs Z74.09, Z78.9    Hemiparesis of right dominant side due to acute cerebrovascular disease (HCC) I67.89, G81.91    Tobacco use disorder F17.200    Current use of aspirin Z79.82    On clopidogrel therapy Z79.01    Hypertensive heart and kidney disease without heart failure and with stage 3b chronic kidney disease (HCC) I13.10, N18.32    Mixed hyperlipidemia E78.2    On statin therapy due to risk of future cardiovascular event Z79.899    Constipation K59.00    History of iron deficiency anemia Z86.2    High serum magnesium R79.89    Secondary hyperparathyroidism of renal origin ([facility] N25.81    Vitamin D deficiency E55.9    Acute renal failure superimposed on stage 3 chronic kidney disease (HCC) N17.9, N18.30        Plan:     1. Justification for continued stay: Good progression towards established rehabilitation goals. 2. Medical Issues being followed closely:    [x]  Fall and safety precautions     []  Wound Care     [x]  Bowel and Bladder Function     [x]  Fluid Electrolyte and Nutrition Balance     []  Pain Control      3.  Issues that 24 hour rehabilitation nursing is following:    [x]  Fall and safety precautions     []  Wound Care     [x]  Bowel and Bladder Function     [x]  Fluid Electrolyte and Nutrition Balance     []  Pain Control      [x]  Assistance with and education on in-room safety with transfers to and from the bed, wheelchair, toilet and shower. 4. Acute rehabilitation plan of care:    [x]  Continue current care and rehab. [x]  Physical Therapy           [x]  Occupational Therapy           [x]  Speech Therapy     []  Hold Rehab until further notice     5. Medications:    [x]  MAR Reviewed     [x]  Continue Present Medications     6. DVT Prophylaxis:      []  Enoxaparin     [x]  Unfractionated Heparin     []  Warfarin     []  NOAC     []  NORBERT Stockings     []  Sequential Compression Device     []  None     7. Code status    [x]  Full code     []  Partial code     []  Do not intubate     []  Do not resuscitate     8.  Orders:   > Acute Lacunar Stroke (acute lacunar infarct in the posterior left lentiform nucleus extending into the corona radiata) with residual right hemiparesis   > Dual antiplatelet therapy (DAPT) was started 5/22/2021; Neurology recommending to continue DAPT for 21 days (~6/12/2021), then discontinue Aspirin and continue on Clopidogrel 75 mg PO once daily    > On 5/27/2021, started Melatonin 3 mg PO q HS (for stroke recovery)   > Continue:    > Aspirin 81 PO once daily with dinner (STOP DATE: 6/12/2021)    > Atorvastatin 40 mg PO once daily    > Clopidogrel 75 mg PO once daily with breakfast      > Melatonin 3 mg PO q HS    > Constipation   > On 5/28/2021, discontinued (re: refused by patient last night and this AM):    > Miralax 17 grams in 8 oz water PO once daily    > PeriColace 2 tabs PO once daily with dinner   > On 6/2/2021, started:    > Pericolace 2 tabs PO once daily after dinner    > Polyethylene glycol 17 grams in 8 oz water PO once daily    > On 6/6/2021, started Lubiprostone 24 mcg PO BID with meals   > On 6/7/2021, decreased Lubiprostone from 24 mcg to 8 mcg PO BID with meals   > Continue:    > Lubiprostone 8 mcg PO BID with meals    > Pericolace 2 tabs PO once daily after dinner    > Polyethylene glycol 17 grams in 8 oz water PO once daily     > Hypertensive heart and kidney disease without heart failure with stage 3b-4 chronic kidney disease   > On 5/31/2021, started Metoprolol tartrate 25 mg PO q 12 hr (9AM, 9PM)   > On 6/2/2021:    > Discontinued Amlodipine 10 mg PO once daily (9AM)    > Started Losartan 12.5 mg PO once daily (9AM)   > On 6/5/2021:    > Discontinued Losartan 12.5 mg PO once daily (9AM)    > Started Hydralazine 25 mg PO TID PRN for SBP greater than 160 mmHg   > On 6/10/2021, patient was given Nifedipine XL 30 mg PO x 1 dose   > Planned to start Nifedipine XL 60 mg PO once daily (9AM) this AM but patient reported some lip swelling last night and concern was whether this was related to the Nifedipine XL that she received yesterday PM   > Discontinue:    > Nifedipine XL 60 mg PO once daily (9AM)    > Metoprolol tartrate 25 mg PO q 12 hr (9AM, 9PM)   > Start Carvedilol 6.25 mg PO BID with meals (8AM, 5PM)   > Continue Hydralazine 25 mg PO TID PRN for SBP greater than 160 mmHg    > Mixed hyperlipidemia   > On 5/28/2021, started Coenzyme Q10 100 mg PO once daily   > Continue:    > Atorvastatin 40 mg PO once daily    > Coenzyme Q10 100 mg PO once daily    > High serum magnesium   > Mg (5/28/2021, on admission to the ARU) = 3.2   > patient reported she was given 3 doses of Milk of magnesia at [facility] prior to discharge to the ARU   > Avoid magnesium-containing medications/supplements      05/31/21  0545 05/30/21  0706 05/28/21  0613   MG 2.6 2.9* 3.2*     > Acute kidney injury superimposed on Stage 3b-4 chronic kidney disease   > BUN/Creatinine (5/28/2021, on admission to the ARU) = 36/2.47   > Retroperitoneal ultrasound (5/28/2021) showed:    > Increased parenchymal echogenicity in both kidneys consistent with medical renal disease. There is a prominent cortical cyst in the mid right kidney. No hydronephrosis or nephrolithiasis.       > Renal cortical thickness is somewhat less than 10 mm in both kidneys.   The left kidney is somewhat small with respect to the right kidney which is low normal in size.   > On 5/29/2021, started IVF: 0.9%  ml.hr x 1 liter once daily after dinner   > PTH (5/31/2021) = 276.1   > Vitamin D 25-Hydroxy (5/31/2021) = 10.1   > Urine microalbumin (5/31/2021) = 15.30   > Microalbumin/Creatinine ratio (5/31/2021) = 165   > Urinalysis (5/31/2021): SG 1.013, protein 30, no casts seen   > Nephrology consult (Dr. Mario Garcia) called on 5/31/2021 for evaluation and comanagement   > CK (5/31/2021) = 107   > Urine creatinine (5/31/2021) = 93.00   > Random urine protein (5/31/2021) = 31   > Random urine sodium (5/31/2021) = 52      06/02/21  0445 06/01/21  0430 05/31/21  0545 05/30/21  0706 05/28/21  0613   BUN 35* 37* 36* 37* 36*   CREA 2.24* 2.27* 2.15* 2.25* 2.47*      > On 6/2/2021:    > Discontinued IVF: 0.9%  ml/hr x 1 liter once daily after dinner    > Started Losartan 12.5 mg PO once daily (9AM)      06/05/21  0609 06/04/21  0559 06/03/21  0516   K 4.8 4.9 4.9       06/05/21  0609 06/04/21  0559 06/03/21  0516   BUN 40* 36* 34*   CREA 2.85* 2.45* 2.24*      > On 6/5/2021:    > Discontinued Losartan 12.5 mg PO once daily (9AM)    > Started IVF: 0.9% NS at 75 ml/hr continuous      06/07/21  0555 06/06/21  0616   K 5.0 4.7         06/07/21  0555 06/06/21  0616   BUN 36* 40*   CREA 2.16* 2.41*      > On 6/7/2021, changed IVF from 0.9% NS at 75 ml/hr continuous to 75 ml/hr x 1 liter once daily after dinner      06/09/21  0450 06/08/21  0503   K 4.8 4.9       06/09/21  0450 06/08/21  0503   BUN 30* 34*   CREA 1.95* 2.02*      > On 6/9/2021:    > Discontinue IVF: 0.9% NS at 75 ml/hr x 1 liter once daily after dinner    > Started IVF: 0.45%  ml/hr x 1 liter   > BUN/Creatinine (6/10/2021) = 33/1.83   > K (6/10/2021) = 4.7    > On 6/10/2021, patient was given IVF: 0.45%  ml/hr x 1 liter   > BUN/Creatinine (6/11/2021) = 32/1.82   > K (6/11/2021) = 4.6    > Tobacco use disorder   > On 5/28/2021, started Nicotine 14 mg/24 hr patch, 1 patch on skin once daily   > Continue Nicotine 14 mg/24 hr patch, 1 patch on skin once daily    > Vitamin D Deficiency   > Vitamin D 25-Hydroxy (5/31/2021) = 10.1   > On 5/31/2021, patient was given Cholecalciferol 50,000 units PO x 1 dose    > On 6/1/2021, started Cholecalciferol 5,000 units PO once daily   > Continue Cholecalciferol 5,000 units PO once daily    > Analgesia   > Continue Acetaminophen 650 mg PO q 4 hr PRN for pain     > Diet:   > Specifications: Cardiac   > Solids (consistency): Regular    > Liquids (consistency): Thin    > Fluid restriction: None      9. Personal Protective Equipment was used while interacting with patient including: goggles and KN95 face mask. Patient was using a surgical mask. 10. Patient's progress in rehabilitation and medical issues discussed with the patient. All questions answered to the best of my ability. Care plan discussed with patient and nurse. 11. Total clinical care time is 30 minutes, including review of chart including all labs, radiology, past medical history, and discussion with patient. Greater than 50% of my time was spent in coordination of care and counseling.       Signed:    Justin Roche MD    June 11, 2021

## 2021-06-11 NOTE — PROGRESS NOTES
Problem: Mobility Impaired (Adult and Pediatric)  Goal: *Therapy Goal (Edit Goal, Insert Text)  Description: Physical Therapy Short Term Goals  Initiated 5/28/2021, re-assessed 6/4/2021 and to be accomplished within 7 day(s) on 6/11/2021  1. Patient will move from supine to sit and sit to supine , scoot up and down, and roll side to side in bed with supervision/set-up. (MET 6/4/2021)  2. Patient will transfer from bed to chair and chair to bed with minimal assistance/contact guard assist using the least restrictive device. (MET 6/4/2021)  Updated goal: Patient will transfer from bed to chair and chair to bed with standby assist using the least restrictive device. (CGA with [facility])  3.  Patient will perform sit to stand with minimal assistance/contact guard assist.(MET 6/4/2021)  Updated goal: Patient will perform sit to stand with standby assistance. (CGA)  4. Patient will ambulate with minimal assistance/contact guard assist for 50 feet with the least restrictive device. (MET 6/4/2021)  Updated goal: Patient will ambulate with SBA for 150 ft with the least restrictive AD with cues for neutral knee extension with loading/midstance phase of gait <25% of the time.(min/mod v/c)  5. Patient will ascend/descend 5 stairs with 1 handrail(s) with moderate assistance . (4 steps with BHR and min assist)  Updated goal: Patient will ascend/descend 5 stairs with 1 handrail with CGA. (min assist)    Physical Therapy Long Term Goals  Initiated 5/28/2021 and to be accomplished within 21 day(s) on 6/18/2021  1. Patient will move from supine to sit and sit to supine , scoot up and down, and roll side to side in bed with modified independence. 2.  Patient will transfer from bed to chair and chair to bed with modified independence using the least restrictive device. 3.  Patient will perform sit to stand with modified independence.   4.  Patient will ambulate with modified independence for 150 feet with the least restrictive device. 5.  Patient will ascend/descend 12 stairs with 1 handrail(s) with contact guard assist/standby assistance. Outcome: Progressing Towards Goal   PHYSICAL THERAPY WEEKLY PROGRESS NOTE    Patient: Jimena Pond (62 y.o. female)  Date: 2021  Diagnosis: Acute lacunar stroke [facility]) [I63.81] Acute lacunar stroke [facility])  Precautions: Fall, Skin  Chart, physical therapy assessment, plan of care and goals were reviewed. Time NS:7212  Time out:1040    Patient seen for: Transfer training;Gait training; Therapeutic exercise (bed mobility)      Pain:  Pt pain was reported as no c/o pre-treatment. Pt pain was reported as no c/o post-treatment. Patient identified with name and : yes     SUBJECTIVE:     Pt reports having had diarrhea 3 times this morning due to being constipated for 4 days and been given meds to help.      OBJECTIVE DATA SUMMARY:       GROSS ASSESSMENT Weekly Progress Assessment 2021   AROM Generally decreased, functional   Strength Generally decreased, functional   Coordination Generally decreased, functional   Tone Abnormal   Sensation Impaired   PROM Within functional limits       POSTURE Weekly Progress Assessment 2021   Posture (WDL) Exceptions to [facility]   Posture Assessment Forward head;Genu recurvatum right;Rounded shoulders;Trunk flexion       BALANCE Weekly Progress Assessment 2021    Sitting - Static: Good (unsupported)  Sitting - Dynamic: Good (unsupported)  Standing - Static: Fair  Standing - Dynamic : Impaired     BED/CHAIR/WHEELCHAIR TRANSFERS Initial Assessment Weekly Progress Assessment 2021   Rolling Right 5 (Stand-by assistance) 6 (Modified independent)   Rolling Left 5 (Stand-by assistance) 6 (Modified independent)   Supine to Sit 5 (Stand-by assistance) (cue for not holding her breath) 5 (Supervision)   Sit to Stand Moderate assistance Contact guard assistance   Sit to Supine  (SBA) 6 (Modified independent)   Transfer Type Other Other   Transfer Assistance Needed 3 (Moderate assistance ) 4 (Contact guard assistance)   Comments   Performing bed mobility using mat table.     Performing stand step transfers without AD as per PLOF, needing moderate assistance and blocking of right knee for sit to stand and stand step transfer, assist with weightshifting appropriately.      Using RW, needing moderate support with assist at right hand to  RW appropriately (may benefit from handle splint)   Pt performed sit to stand from w/c with B hands on distal femurs to decrease dependence on BUE with CGA for anterior wt shift to decrease rocking/launching fwd. Pt performed stand step txfr with [facility] and v/c for slowing and sequencing for safety. Car Transfer Moderate assistance (using RW) needing steadying support and assist with right hand . Not tested   Car Type car transfer simulator NA       WHEELCHAIR MOBILITY/MANAGEMENT Initial Assessment Weekly Progress Assessment 6/11/2021   Able to Propel (dist) 70 feet 72 feet   Assistance Required 2 supervision   Curbs/ramps assistance required  (mod A for steering, using rosalba-technique) 0 (Not tested)   Wheelchair set up assistance required 2 (Maximal assistance)  supervision   Wheelchair management Manages left brake, Manages right brake (extra time, cues) Manages left brake;Manages right brake   Comments Patient performing w/c mobility with mod A for steering. Performing with rosalba-technique. Pt propelled w/c to and from gym with BLE     WALKING INDEPENDENCE Initial Assessment Weekly Progress Assessment 6/11/2021   Assistive device  (no AD as per PLOF; trialed RW for 38 ft mod A) Cane, quad;Gait belt   Ambulation assistance - level surface 2 (Maximal assistance) (maximal trunk support, blocking right LE) 4 (Contact guard assistance)   Distance 1 Feet (ft) 150 Feet (ft)   Comments   Max A with gait without AD with patient reporting feeling \"dizzy\" with attempt.  Vitals monitored (see flowsheet), but patient did not have significant orthostatic changes observed from sitting to standing. Patient also performing gait with RW and needing assistance with right hand  (would benefit from trial of handle splint to facilitate ), able to gait for 38 ft but very narrow VLADIMIR observed with increased path deviations requiring therapist to manage RW. Pt ambulated 150ft with SBQC and CGA for safety and min/mod v/c for erect posture, right foot clearance and controlling right knee hyperextension. Ambulation assistance - unlevel surfaces  (NT due to safety concern)  NT       GAIT Weekly Progress Assessment 6/11/2021   Gait Description (WDL) Exceptions to WDL   Gait Abnormalities Decreased step clearance; Hemiplegic; Step to gait     STEPS/STAIRS Initial Assessment Weekly Progress Assessment 6/11/2021   Steps/Stairs ambulated 1 4 (6\" steps)   Assistance Required  max assist 4 (Minimal assistance)   Rail Use Both (therapist assisting at right UE to ) Right  (SBQC on left)   Comments   Patient performing one step with assistance for lifting and lowering right LE appropriately as well as to facilitate  right UE. Pt negotiated up and down 4 6\" steps with right HR and SBQC on left with min assist for balance, performing step to pattern. Curbs/Ramps  (NT due to safety concern)  NT       Neuro Re-Education:  Pt performed alternating BLE tap ups on 8\" step x15 each with SBQC on left and CGA to challenge balance with wt shifting, right LE wt bearing in SLS and controlling right knee recurvatum and right hip flexion strengthening. ASSESSMENT:  Pt has made minimal progress this week and has not met updated STGs. Pt continues to require CGA for safety with txfrs using SBQC and for anterior wt shift with sit to stand to limit launching fwd.    Progression toward goals:  []      Improving appropriately and progressing toward goals  [x]      Improving slowly and progressing toward goals  []      Not making progress toward goals and plan of care will be adjusted     PLAN:  Patient continues to benefit from skilled intervention to address the above impairments. Continue treatment per established plan of care. Discharge Recommendations:  Home Health  Further Equipment Recommendations for Discharge:  quad cane and wheelchair 18 inch     Estimated Discharge Date: 6/14/2021    Activity Tolerance:   Fair+  Please refer to the flowsheet for vital signs taken during this treatment.   After treatment:   [] Patient left in no apparent distress in bed  [x] Patient left in no apparent distress sitting up in chair  [] Patient left in no apparent distress in w/c mobilizing under own power  [] Patient left in no apparent distress dining area  [] Patient left in no apparent distress mobilizing under own power  [x] Call bell left within reach  [] Nursing notified  [] Caregiver present  [] Bed alarm activated   [x] Chair alarm activated      Courtney Cummings PTA  6/11/2021

## 2021-06-11 NOTE — PROGRESS NOTES
Problem: Neurolinguistics Impaired (Adult)  Goal: *Speech Goal: (INSERT TEXT)  Description: Long term goals  Patient will:  1. Be oriented x 3 and recall events of the day, supervision. 2. Recall 3 words after 5 minutes, supervision. 3. Name 10-12 items within categories of increasing complexity, supervision. 4. Perform varied word finding tasks with 90% accuracy. 5. Perform problem solving/reasoning tasks with 90% accuracy. 6. Perform basic mathematical calculations, 90% accuracy. Short term goals (by 6/11/21)  Patient will:  1. Be oriented x 3 and recall events of the day, supervision. 2. Recall 3 words after 5 minutes, supervision. 3. Name 10-12 items within concrete categories, min assist-supervision. 4. Perform varied word finding tasks with 75-85% accuracy. 5. Perform problem solving/reasoning tasks with 80-90% accuracy. 6. Perform basic mathematical calculations, 70-80% accuracy. Note:   Speech language pathology treatment    Patient: Jimena Pond (47 y.o. female)  Date: 6/11/2021  Diagnosis: Acute lacunar stroke ([facility]) [I63.81] Acute lacunar stroke ([facility] [address]  Time in: 1100  Time Out: 1130  SUBJECTIVE:   Patient stated I missed y'all. OBJECTIVE:   Mental Status:  Ms. Vivian Gross was alert and oriented. Treatment & Interventions: The patient was seen for a 30 minute speech session. The following treatment tasks were presented:  Neuro-Linguistics:   Orientation:     Supervision  Recent memory:    Supervision  Recall 3 words: Moderate assistance  Determining cause and effect:   70%  List things found at the beach:  6 items independently, an additional 10 items given minimum support  Scategories (words beginning with 'r'): 75%      Response & Tolerance to Activities:  Ms. Vivian Gross was pleasant and cooperative. She tolerated activities well.     Pain:  Pain Scale 1: Numeric (0 - 10)  Pain Orientation 1: Posterior  Pain Description 1: Aching  After treatment:   [x]       Patient left in no apparent distress sitting up in chair  []       Patient left in no apparent distress in bed  [x]       Call bell left within reach  []       Nursing notified  []       Caregiver present  []       Bed alarm activated    ASSESSMENT:   Progression toward goals:  []       Improving appropriately and progressing toward goals  [x]       Improving slowly and progressing toward goals  []       Not making progress toward goals and plan of care will be adjusted    PLAN:   Patient continues to benefit from skilled intervention to address the above impairments. Continue treatment per established plan of care.   Discharge Recommendations:  Outpatient    Estimated LOS: Through 6/15/21    Lilly Albert Speech-Language Pathology Student  Time Calculation: 30 mins

## 2021-06-11 NOTE — PROGRESS NOTES
Patient resting comfortably in bed but is complaining of swelling above the lip. No shortness of breath or respiratory problems noted. Some swelling noted above the lip as patient stated and Dr. Aimee Singh paged. 2055-No response from page and Evon Slider paged again. 2105-Patient called me to the room and said the swelling has gone down. Observed that the swelling has subsided.

## 2021-06-11 NOTE — HOME CARE
Received [facility] referral for pending discharge on Monday 6/14/21 for SN,PT,OT; spoke to patient and her daughter Juan Jose Wilson) ; Verified demographics, patient states she will be staying with her daughter after discharge,daughter states address: [address] [place] [address] [address], [address] ; explained [facility] services to patient and daughter ; per  ( Brynn),states that DME will be ordered : W/C,cushion ,BSC,quad cane and possible TTB;, notified ARU  Kiki Boo) that [facility] will popeye a PCP arranged prior to discharge ; [facility] will follow. CATY CORNEJO.

## 2021-06-11 NOTE — ROUTINE PROCESS
SHIFT CHANGE NOTE FOR [facility]    Bedside and Verbal shift change report given to CATY Lo (oncoming nurse) by Hannah Cardoso RN (offgoing nurse). Report included the following information SBAR, Kardex, MAR and Recent Results.     Situation:   Code Status: Full Code   Hospital Day: 15   Problem List:   Hospital Problems  Date Reviewed: 6/10/2021        Codes Class Noted POA    Secondary hyperparathyroidism of renal origin ([facility] [address] (Chronic) ICD-10-CM: N25.81  ICD-9-CM: 588.81  5/31/2021 Yes        Vitamin D deficiency (Chronic) ICD-10-CM: E55.9  ICD-9-CM: 268.9  5/31/2021 Yes    Overview Signed 5/31/2021  1:17 PM by Luis Weiss MD     Vitamin D 25-Hydroxy (5/31/2021) = 10.5              High serum magnesium ICD-10-CM: R79.89  ICD-9-CM: 790.99  5/28/2021 Yes        Hypertensive heart and kidney disease without heart failure and with stage 3b chronic kidney disease (HCC) (Chronic) ICD-10-CM: I13.10, N18.32  ICD-9-CM: 404.90, 585.3  Unknown Yes        Mixed hyperlipidemia (Chronic) ICD-10-CM: E78.2  ICD-9-CM: 272.2  Unknown Yes        Constipation (Chronic) ICD-10-CM: K59.00  ICD-9-CM: 564.00  Unknown Yes        Current use of aspirin ICD-10-CM: Z79.82  ICD-9-CM: V58.66  5/23/2021 Yes        On clopidogrel therapy ICD-10-CM: Z79.01  ICD-9-CM: V58.61  5/23/2021 Yes        On statin therapy due to risk of future cardiovascular event ICD-10-CM: Z79.899  ICD-9-CM: V58.69  5/22/2021 Yes    Overview Signed 5/27/2021  2:27 PM by Luis Weiss MD     On Atorvastatin             * (Principal) Acute lacunar stroke [facility]) ICD-10-CM: I63.81  ICD-9-CM: 434.91  5/21/2021 Yes    Overview Signed 5/27/2021  2:15 PM by Luis Weiss MD     Acute Lacunar Stroke (acute lacunar infarct in the posterior left lentiform nucleus extending into the corona radiata) with residual right hemiparesis             Impaired mobility and ADLs ICD-10-CM: Z74.09, Z78.9  ICD-9-CM: V49.89  5/21/2021 Yes        Hemiparesis of right dominant side due to acute cerebrovascular disease ([facility] ICD-10-CM: I67.89, G81.91  ICD-9-CM: 436, 342.91  5/21/2021 Yes              Background:   Past Medical History:   Past Medical History:   Diagnosis Date    Abnormal mammogram 2014    left with biopsy    Acute lacunar stroke ([facility] [address] 5/21/2021    Acute Lacunar Stroke (acute lacunar infarct in the posterior left lentiform nucleus extending into the corona radiata) with residual right hemiparesis    Bacterial vaginosis 1/9/15    Dr Can Laboy Constipation     Current use of aspirin 5/23/2021    Gastroesophageal reflux disease 6/30/2016    Hemiparesis of right dominant side due to acute cerebrovascular disease ([facility] 5/21/2021    History of Helicobacter pylori infection 7/24/2015    History of iron deficiency anemia 06/2014    History of vitamin D deficiency 6/11/2015    Hot flashes 1/9/15    Dr Can Laboy On clopidogrel therapy 5/23/2021    On statin therapy due to risk of future cardiovascular event 5/22/2021    On Atorvastatin    Rheumatoid arthritis ([facility]     Secondary hyperparathyroidism of renal origin ([facility] 5/31/2021    Stage 3b chronic kidney disease ([facility] 7/24/2015    Tobacco use disorder     Vitamin D deficiency 5/31/2021    Vitamin D 25-Hydroxy (5/31/2021) = 10.5         Assessment:   Changes in Assessment throughout shift: No change to previous assessment     Patient has a central line: no Reasons if yes: na  Insertion date:na Last dressing date:na   Patient has Irving Cath: no Reasons if yes: na   Insertion date:na  Shift irving care completed: NO     Last Vitals:     Vitals:    06/10/21 1553 06/10/21 1607 06/10/21 1754 06/10/21 2128   BP: (!) 140/98 (!) 156/102 (!) 144/89 (!) 150/93   Pulse: 83 76  86   Resp: 19   18   Temp: 97.2 °F (36.2 °C)   97 °F (36.1 °C)   SpO2: 100%   100%   Weight:       Height:       LMP: 10/15/2014        PAIN    Pain Assessment    Pain Intensity 1: 0 (06/11/21 0400) Pain Intensity 1: 2 (12/29/14 1105)    Pain Location 1: Head Pain Location 1: Abdomen    Pain Intervention(s) 1: Medication (see MAR) Pain Intervention(s) 1: Medication (see MAR)  Patient Stated Pain Goal: 0 Patient Stated Pain Goal: 0  o Intervention effective: yes  o Other actions taken for pain: Medication (see MAR)     Skin Assessment  Skin color    Condition/Temperature    Integrity    Turgor    Weekly Pressure Ulcer Documentation  Pressure  Injury Documentation: No Pressure Injury Noted-Pressure Ulcer Prevention Initiated  Wound Prevention & Protection Methods  Orientation of wound Orientation of Wound Prevention: Posterior  Location of Prevention Location of Wound Prevention: Buttocks, Sacrum/Coccyx  Dressing Present Dressing Present : No  Dressing Status    Wound Offloading Wound Offloading (Prevention Methods): Bed, pressure redistribution/air     INTAKE/OUPUT  Date 06/10/21 0700 - 06/11/21 0659 06/11/21 0700 - 06/12/21 0659   Shift 9833-2449 0257-3080 24 Hour Total 4500-9287 5031-1452 24 Hour Total   INTAKE   Shift Total(mL/kg)         OUTPUT   Urine(mL/kg/hr)           Urine Occurrence(s) 2 x 2 x 4 x      Stool           Stool Occurrence(s) 0 x 0 x 0 x      Shift Total(mL/kg)         NET         Weight (kg) 84.4 84.4 84.4 84.4 84.4 84.4       Recommendations:  1. Patient needs and requests: met    2. Pending tests/procedures: labs as ordered     3. Functional Level/Equipment: Partial (one person) /      Fall Precautions:   Fall risk precautions were reinforced with the patient; she was instructed to call for help prior to getting up. The following fall risk precautions were continued: bed/ chair alarms, door signage, yellow bracelet and socks as well as update of the Reece Quarto tool in the patient's room. Leigh Ann Score: 4    HEALS Safety Check    A safety check occurred in the patient's room between off going nurse and oncoming nurse listed above.     The safety check included the below items  Area Items   H  High Alert Medications - Verify all high alert medication drips (heparin, PCA, etc.)   E  Equipment - Suction is set up for ALL patients (with blessing)  - Red plugs utilized for all equipment (IV pumps, etc.)  - WOWs wiped down at end of shift.  - Room stocked with oxygen, suction, and other unit-specific supplies   A  Alarms - Bed alarm is set for fall risk patients  - Ensure chair alarm is in place and activated if patient is up in a chair   L  Lines - Check IV for any infiltration  - Rollins bag is empty if patient has a Rollins   - Tubing and IV bags are labeled   S  Safety   - Room is clean, patient is clean, and equipment is clean. - Hallways are clear from equipment besides carts. - Fall bracelet on for fall risk patients  - Ensure room is clear and free of clutter  - Suction is set up for ALL patients (with blessing)  - Hallways are clear from equipment besides carts.    - Isolation precautions followed, supplies available outside room, sign posted     Abdirashid Joseph RN

## 2021-06-11 NOTE — PROGRESS NOTES
[x] Psychology  [] Social Work [] Recreational Therapy    INTERVENTION  UNITS/TIME OF SERVICE   Assessment    Supportive Counseling  Ambreen 10, 2021   Orientation    Discharge Planning    Resource Linkage              Progress/Current Status    Patient seen for individual support  and follow up this date on ARU. She was found lying comfortably in bed and not in any distress, and immediately able to engage in dialogue. She actually presents as very effusive and animated and highly verbal, requiring some redirection. She reported that much to the chagrin of her boyfriend, that she will discharge to her daughter's residence in [facility] for further recovery post ARU, rather than return to home in [place]. She explained that her partner feels that \"my family is a bad influence. \"  Yet, she is verbalizing intention to try and improve her healthcare maintenance and thereby reduce risk for recurrence. She is requesting staff recommendation for a PCP referral.  Patient also dwelled on her desire for an earlier discharge, actually this weekend rather than earlier next week. She was quite emphatic about her desire/intention and was encouraged to speak further with various team members in this afternoon's IDR meeting, in order to better understand what several more days in therapy may provide her. On the other hand, she was also told that leaving several days earlier would not necessarily impede outcome and that she basically requires clarification. Patient very responsive and understanding of recommendations made to her.     Valencia Ortiz, PHD 6/11/2021 10:14 AM

## 2021-06-11 NOTE — PROGRESS NOTES
Steven spoke with pt regarding insurance approval for dc on 6/14. Pt agreeable and excited to dc to home sooner than planned. Pt confirms that she needs all recommended DME. Steven advised that orders will be sent to [facility] for processing. Pt called her daughter on speaker phone to confirm that she will be present for family education on Monday at 80. Daughter provided dc address as:  [address] [facility] [address], [address] [place] [address], [facility] [address]. Harris will follow.

## 2021-06-12 LAB
ALBUMIN SERPL-MCNC: 3.3 G/DL (ref 3.4–5)
ANION GAP SERPL CALC-SCNC: 6 MMOL/L (ref 3–18)
BUN SERPL-MCNC: 34 MG/DL (ref 7–18)
BUN/CREAT SERPL: 17 (ref 12–20)
CALCIUM SERPL-MCNC: 9.6 MG/DL (ref 8.5–10.1)
CHLORIDE SERPL-SCNC: 113 MMOL/L (ref 100–111)
CO2 SERPL-SCNC: 23 MMOL/L (ref 21–32)
CREAT SERPL-MCNC: 1.98 MG/DL (ref 0.6–1.3)
GLUCOSE SERPL-MCNC: 89 MG/DL (ref 74–99)
PHOSPHATE SERPL-MCNC: 4.2 MG/DL (ref 2.5–4.9)
POTASSIUM SERPL-SCNC: 4.9 MMOL/L (ref 3.5–5.5)
SODIUM SERPL-SCNC: 142 MMOL/L (ref 136–145)

## 2021-06-12 PROCEDURE — 97535 SELF CARE MNGMENT TRAINING: CPT

## 2021-06-12 PROCEDURE — 74011250636 HC RX REV CODE- 250/636: Performed by: INTERNAL MEDICINE

## 2021-06-12 PROCEDURE — 74011000250 HC RX REV CODE- 250: Performed by: INTERNAL MEDICINE

## 2021-06-12 PROCEDURE — 80069 RENAL FUNCTION PANEL: CPT

## 2021-06-12 PROCEDURE — 36415 COLL VENOUS BLD VENIPUNCTURE: CPT

## 2021-06-12 PROCEDURE — 2709999900 HC NON-CHARGEABLE SUPPLY

## 2021-06-12 PROCEDURE — 74011250637 HC RX REV CODE- 250/637: Performed by: INTERNAL MEDICINE

## 2021-06-12 PROCEDURE — 65310000000 HC RM PRIVATE REHAB

## 2021-06-12 RX ORDER — SODIUM CHLORIDE 450 MG/100ML
1000 INJECTION, SOLUTION INTRAVENOUS
Status: COMPLETED | OUTPATIENT
Start: 2021-06-12 | End: 2021-06-14

## 2021-06-12 RX ADMIN — Medication 5000 UNITS: at 08:24

## 2021-06-12 RX ADMIN — HEPARIN SODIUM 5000 UNITS: 5000 INJECTION INTRAVENOUS; SUBCUTANEOUS at 06:20

## 2021-06-12 RX ADMIN — CARVEDILOL 6.25 MG: 6.25 TABLET, FILM COATED ORAL at 17:31

## 2021-06-12 RX ADMIN — LUBIPROSTONE 8 MCG: 8 CAPSULE, GELATIN COATED ORAL at 17:30

## 2021-06-12 RX ADMIN — ATORVASTATIN CALCIUM 80 MG: 40 TABLET, FILM COATED ORAL at 08:24

## 2021-06-12 RX ADMIN — HEPARIN SODIUM 5000 UNITS: 5000 INJECTION INTRAVENOUS; SUBCUTANEOUS at 17:31

## 2021-06-12 RX ADMIN — CLOPIDOGREL BISULFATE 75 MG: 75 TABLET ORAL at 08:24

## 2021-06-12 RX ADMIN — SODIUM CHLORIDE 1000 ML: 450 INJECTION, SOLUTION INTRAVENOUS at 18:15

## 2021-06-12 RX ADMIN — Medication 100 MG: at 08:24

## 2021-06-12 RX ADMIN — CARVEDILOL 6.25 MG: 6.25 TABLET, FILM COATED ORAL at 08:24

## 2021-06-12 RX ADMIN — LUBIPROSTONE 8 MCG: 8 CAPSULE, GELATIN COATED ORAL at 08:24

## 2021-06-12 RX ADMIN — Medication 3 MG: at 20:35

## 2021-06-12 RX ADMIN — POLYETHYLENE GLYCOL 3350 17 G: 17 POWDER, FOR SOLUTION ORAL at 08:24

## 2021-06-12 RX ADMIN — ASPIRIN 81 MG: 81 TABLET, CHEWABLE ORAL at 17:30

## 2021-06-12 NOTE — ROUTINE PROCESS
[facility]    Bedside and Verbal shift change report given to [facility] and Herzevin (oncoming nurse) by Della Verdugo RN (offgoing nurse). Report included the following information SBAR, Kardex, MAR and Recent Results.     Situation:   Code Status: Full Code   Hospital Day: 16   Problem List:   Hospital Problems  Date Reviewed: 6/12/2021        Codes Class Noted POA    Acute renal failure superimposed on stage 3 chronic kidney disease (HCC) ICD-10-CM: N17.9, N18.30  ICD-9-CM: 584.9, 585.3  6/1/2021 Yes        Secondary hyperparathyroidism of renal origin ([facility] [address] (Chronic) ICD-10-CM: N25.81  ICD-9-CM: 588.81  5/31/2021 Yes        Vitamin D deficiency (Chronic) ICD-10-CM: E55.9  ICD-9-CM: 268.9  5/31/2021 Yes    Overview Signed 5/31/2021  1:17 PM by Marisela Lion MD     Vitamin D 25-Hydroxy (5/31/2021) = 10.5              High serum magnesium ICD-10-CM: R79.89  ICD-9-CM: 790.99  5/28/2021 Yes        Hypertensive heart and kidney disease without heart failure and with stage 3b chronic kidney disease (HCC) (Chronic) ICD-10-CM: I13.10, N18.32  ICD-9-CM: 404.90, 585.3  Unknown Yes        Mixed hyperlipidemia (Chronic) ICD-10-CM: E78.2  ICD-9-CM: 272.2  Unknown Yes        Constipation (Chronic) ICD-10-CM: K59.00  ICD-9-CM: 564.00  Unknown Yes        Current use of aspirin ICD-10-CM: Z79.82  ICD-9-CM: V58.66  5/23/2021 Yes        On clopidogrel therapy ICD-10-CM: Z79.01  ICD-9-CM: V58.61  5/23/2021 Yes        On statin therapy due to risk of future cardiovascular event ICD-10-CM: Z79.899  ICD-9-CM: V58.69  5/22/2021 Yes    Overview Signed 5/27/2021  2:27 PM by Marisela Lion MD     On Atorvastatin             * (Principal) Acute lacunar stroke [facility]) ICD-10-CM: I63.81  ICD-9-CM: 434.91  5/21/2021 Yes    Overview Signed 5/27/2021  2:15 PM by Marisela Lion MD     Acute Lacunar Stroke (acute lacunar infarct in the posterior left lentiform nucleus extending into the corona radiata) with residual right hemiparesis Impaired mobility and ADLs ICD-10-CM: Z74.09, Z78.9  ICD-9-CM: V49.89  5/21/2021 Yes        Hemiparesis of right dominant side due to acute cerebrovascular disease ([facility] ICD-10-CM: I67.89, G81.91  ICD-9-CM: 436, 342.91  5/21/2021 Yes              Background:   Past Medical History:   Past Medical History:   Diagnosis Date    Abnormal mammogram 2014    left with biopsy    Acute lacunar stroke ([facility] [address] 5/21/2021    Acute Lacunar Stroke (acute lacunar infarct in the posterior left lentiform nucleus extending into the corona radiata) with residual right hemiparesis    Bacterial vaginosis 1/9/15    Dr Kiana Carmona Constipation     Current use of aspirin 5/23/2021    Gastroesophageal reflux disease 6/30/2016    Hemiparesis of right dominant side due to acute cerebrovascular disease ([facility] 5/21/2021    History of Helicobacter pylori infection 7/24/2015    History of iron deficiency anemia 06/2014    History of vitamin D deficiency 6/11/2015    Hot flashes 1/9/15    Dr Kiana Carmona On clopidogrel therapy 5/23/2021    On statin therapy due to risk of future cardiovascular event 5/22/2021    On Atorvastatin    Rheumatoid arthritis ([facility]     Secondary hyperparathyroidism of renal origin ([facility] 5/31/2021    Stage 3b chronic kidney disease ([facility] 7/24/2015    Tobacco use disorder     Vitamin D deficiency 5/31/2021    Vitamin D 25-Hydroxy (5/31/2021) = 10.5         Assessment:   Changes in Assessment throughout shift: No change to previous assessment     Patient has a central line: no Reasons if yes: na  Insertion date:na Last dressing date:na   Patient has Irving Cath: no Reasons if yes: na   Insertion date:na  Shift irving care completed: NO     Last Vitals:     Vitals:    06/11/21 1602 06/11/21 2111 06/12/21 0736 06/12/21 1522   BP: 133/87 (!) 156/101 (!) 148/89 132/85   Pulse: 71 90 79 76   Resp: 18 18 15 18   Temp: (!) 96.6 °F (35.9 °C) 97.8 °F (36.6 °C) 97.3 °F (36.3 °C) 97.4 °F (36.3 °C)   SpO2: 100% 100% 98% 93%   Weight:       Height:       LMP: 10/15/2014        PAIN    Pain Assessment    Pain Intensity 1: 0 (06/12/21 1529) Pain Intensity 1: 2 (12/29/14 1105)    Pain Location 1: Head Pain Location 1: Abdomen    Pain Intervention(s) 1: Medication (see MAR) Pain Intervention(s) 1: Medication (see MAR)  Patient Stated Pain Goal: 0 Patient Stated Pain Goal: 0  o Intervention effective: yes  o Other actions taken for pain:       Skin Assessment  Skin color    Condition/Temperature    Integrity    Turgor    Weekly Pressure Ulcer Documentation  Pressure  Injury Documentation: No Pressure Injury Noted-Pressure Ulcer Prevention Initiated  Wound Prevention & Protection Methods  Orientation of wound Orientation of Wound Prevention: Posterior  Location of Prevention Location of Wound Prevention: Sacrum/Coccyx  Dressing Present Dressing Present : No  Dressing Status    Wound Offloading Wound Offloading (Prevention Methods): Bed, pressure redistribution/air     INTAKE/OUPUT  Date 06/11/21 0700 - 06/12/21 0659 06/12/21 0700 - 06/13/21 0659   Shift 9992-2247 5833-6711 24 Hour Total 5638-6320 9023-5372 24 Hour Total   INTAKE   P.O.    550  550     P. O.    550  550   Shift Total(mL/kg)    550(6.5)  550(6.5)   OUTPUT   Urine(mL/kg/hr)           Urine Occurrence(s) 4 x 1 x 5 x 1 x  1 x   Emesis/NG output           Emesis Occurrence(s)  0 x 0 x      Stool           Stool Occurrence(s) 3 x 0 x 3 x 1 x  1 x   Shift Total(mL/kg)         NET    550  550   Weight (kg) 84.4 84.4 84.4 84.4 84.4 84.4       Recommendations:  1. Patient needs and requests: met    2. Pending tests/procedures: labs as ordered     3. Functional Level/Equipment: Partial (one person) / Bed (comment)    Fall Precautions:   Fall risk precautions were reinforced with the patient; she was instructed to call for help prior to getting up.  The following fall risk precautions were continued: bed/ chair alarms, door signage, yellow bracelet and socks as well as update of the Gil Picking tool in the patient's room. Leigh Ann Score: 4    HEALS Safety Check    A safety check occurred in the patient's room between off going nurse and oncoming nurse listed above. The safety check included the below items  Area Items   H  High Alert Medications - Verify all high alert medication drips (heparin, PCA, etc.)   E  Equipment - Suction is set up for ALL patients (with blessing)  - Red plugs utilized for all equipment (IV pumps, etc.)  - WOWs wiped down at end of shift.  - Room stocked with oxygen, suction, and other unit-specific supplies   A  Alarms - Bed alarm is set for fall risk patients  - Ensure chair alarm is in place and activated if patient is up in a chair   L  Lines - Check IV for any infiltration  - Rollins bag is empty if patient has a Rollins   - Tubing and IV bags are labeled   S  Safety   - Room is clean, patient is clean, and equipment is clean. - Hallways are clear from equipment besides carts. - Fall bracelet on for fall risk patients  - Ensure room is clear and free of clutter  - Suction is set up for ALL patients (with blessing)  - Hallways are clear from equipment besides carts.    - Isolation precautions followed, supplies available outside room, sign posted     Micaela Dotson RN

## 2021-06-12 NOTE — PROGRESS NOTES
Problem: Self Care Deficits Care Plan (Adult)  Goal: *Acute Goals and Plan of Care (Insert Text)  Description: Occupational Therapy Goals   Long Term Goals  Initiated 2021 and to be accomplished within 4 week(s) 2021    1. Patient will perform grooming with modified independence using adaptive equipment as needed. 2. Pt will perform UB bathing with Mod I.  3. Pt will perform LB bathing with Mod I.  4. Pt will perform tub/shower transfer with Mod I using DME. 5. Pt will perform UB dressing with Mod I.  6. Patient will perform upper body dressing and lower body dressing with modified independence. 7. Patient will perform all aspects of toileting with modified independence. 8. Patient will perform toilet transfers with modified independence using RW. Short Term Goals   Initiated 2021 and to be accomplished within 7 day(s) 6/3/2021    1. Pt will perform grooming with Supv using adaptive equipment as needed. - met goal, d/c  goal  2. Pt will perform UB bathing with Supv. - met goal, upgrade to Mod I, progressing 6/10/21  3. Pt will perform LB bathing with Supv. - progressing, 6/3/2021, progressing 6/10/21  4. Pt will perform tub/shower transfer with SBA using DME. - progressing, 6/3/2021, progressing 6/10/21  5. Pt will perform UB dressing with Supv. - met goal, upgrade to Mod I, progressing 6/10/21  6. Pt will perform LB dressing with Supv. - progressing, 6/3/2021, progressing 6/10/21  7. Pt will perform toileting task with SBA. - progressing, 6/3/2021, progressing 6/10/21  8. Pt will perform toilet transfer with SBA using RW. - progressing, 6/3/2021, progressing 6/10/21       Outcome: Progressing Towards Goal   Occupational Therapy TREATMENT    Patient: Hansel Rosales   62 y.o.     Patient identified with name and : yes    Date: 2021    First Tx Session  Time In: 80  Time Out[de-identified] 1030      Diagnosis: Acute lacunar stroke [facility]) [I63.81]   Precautions: Fall, Skin  Chart, occupational therapy assessment, plan of care, and goals were reviewed. Pain:  Pt reports 0/10 pain or discomfort prior to treatment. Pt reports 0/10 pain or discomfort post treatment. Intervention Provided: NA      SUBJECTIVE:   Patient stated I'm leaving a day earlier.     OBJECTIVE DATA SUMMARY:     GROOMING Daily Assessment    Grooming  Grooming Assistance : 5 (Supervision) (close)  Comments: Pt performed oral hygiene in stance at sink using RW for safety. UPPER BODY BATHING Daily Assessment    Upper Body Bathing  Bathing Assistance, Upper: 6 (Modified independent)  Position Performed: Seated in chair  Adaptive Equipment: Long handled sponge; Tub bench  Comments: Pt used bar soap to wash all UB areas. Pt used LH sponge to wash back. LOWER BODY BATHING Daily Assessment    Lower Body Bathing  Bathing Assistance, Lower : 4 (Contact guard assistance)  Position Performed: Seated in chair;Standing  Adaptive Equipment: Grab bar;Tub bench  Comments: Pt placed foot on knee to wash B lower legs and feet. Pt stood with CGA to wash buttocks and periarea. UPPER BODY DRESSING Daily Assessment    Upper Body Dressing   Dressing Assistance : 5 (Supervision)  Comments: Pt donned pullover jogbra and shirt seated in chair. LOWER BODY DRESSING Daily Assessment    Lower Body Dressing   Dressing Assistance : 5 (Stand-by assistance)  Leg Crossed Method Used: Yes  Position Performed: Seated in chair;Standing  Adaptive Equipment Used: Walker  Comments: Pt threaded B feet into underpants/pants and stood using RW for safety to pull up over hips/buttocks. Pt placed foot on knee to charlie socks. Provided pt with elastic shoelaces to use to facilitate increased independence with donning shoes.      MOBILITY/TRANSFERS Daily Assessment    Functional Transfers  Tub or Shower Type: Shower  Amount of Assistance Required: 5 (Supervision/setup) (close)  Adaptive Equipment: Tub transfer bench;Walker (comment)       ASSESSMENT:  Pt making progress with independence in self-care tasks and with functional transfers  Progression toward goals:  [x]          Improving appropriately and progressing toward goals  []          Improving slowly and progressing toward goals  []          Not making progress toward goals and plan of care will be adjusted     PLAN:  Patient continues to benefit from skilled intervention to address the above impairments. Continue treatment per established plan of care. Discharge Recommendations:  Home Health w/ 24/7 supv and assist   Further Equipment Recommendations for Discharge:  bedside commode, transfer bench and N/A     Activity Tolerance:  Fair+      Estimated LOS:6/14/2021    Please refer to the flowsheet for vital signs taken during this treatment. After treatment:   []  Patient left in no apparent distress sitting up in chair   [x]  Patient left in no apparent distress in bed  [x]  Call bell left within reach  []  Nursing notified  [x]  Caregiver present  []  Bed alarm activated    COMMUNICATION/EDUCATION:   [] Home safety education was provided and the patient/caregiver indicated understanding. [] Patient/family have participated as able in goal setting and plan of care. [x] Patient/family agree to work toward stated goals and plan of care. [] Patient understands intent and goals of therapy, but is neutral about his/her participation. [] Patient is unable to participate in goal setting and plan of care.       OLIVIA Go/L

## 2021-06-12 NOTE — ROUTINE PROCESS
[facility]    Bedside and Verbal shift change report given to Kirstin Saravia (oncoming nurse) by Reg Levi, RN (offgoing nurse). Report included the following information SBAR, Kardex, MAR and Recent Results.     Situation:   Code Status: Full Code   Hospital Day: 16   Problem List:   Hospital Problems  Date Reviewed: 6/11/2021        Codes Class Noted POA    Acute renal failure superimposed on stage 3 chronic kidney disease (HCC) ICD-10-CM: N17.9, N18.30  ICD-9-CM: 584.9, 585.3  6/1/2021 Yes        Secondary hyperparathyroidism of renal origin ([facility] [address] (Chronic) ICD-10-CM: N25.81  ICD-9-CM: 588.81  5/31/2021 Yes        Vitamin D deficiency (Chronic) ICD-10-CM: E55.9  ICD-9-CM: 268.9  5/31/2021 Yes    Overview Signed 5/31/2021  1:17 PM by Dylan Ochoa MD     Vitamin D 25-Hydroxy (5/31/2021) = 10.5              High serum magnesium ICD-10-CM: R79.89  ICD-9-CM: 790.99  5/28/2021 Yes        Hypertensive heart and kidney disease without heart failure and with stage 3b chronic kidney disease (HCC) (Chronic) ICD-10-CM: I13.10, N18.32  ICD-9-CM: 404.90, 585.3  Unknown Yes        Mixed hyperlipidemia (Chronic) ICD-10-CM: E78.2  ICD-9-CM: 272.2  Unknown Yes        Constipation (Chronic) ICD-10-CM: K59.00  ICD-9-CM: 564.00  Unknown Yes        Current use of aspirin ICD-10-CM: Z79.82  ICD-9-CM: V58.66  5/23/2021 Yes        On clopidogrel therapy ICD-10-CM: Z79.01  ICD-9-CM: V58.61  5/23/2021 Yes        On statin therapy due to risk of future cardiovascular event ICD-10-CM: Z79.899  ICD-9-CM: V58.69  5/22/2021 Yes    Overview Signed 5/27/2021  2:27 PM by Dylan Ochoa MD     On Atorvastatin             * (Principal) Acute lacunar stroke [facility]) ICD-10-CM: I63.81  ICD-9-CM: 434.91  5/21/2021 Yes    Overview Signed 5/27/2021  2:15 PM by Dylan Ochoa MD     Acute Lacunar Stroke (acute lacunar infarct in the posterior left lentiform nucleus extending into the corona radiata) with residual right hemiparesis Impaired mobility and ADLs ICD-10-CM: Z74.09, Z78.9  ICD-9-CM: V49.89  5/21/2021 Yes        Hemiparesis of right dominant side due to acute cerebrovascular disease ([facility] ICD-10-CM: I67.89, G81.91  ICD-9-CM: 436, 342.91  5/21/2021 Yes              Background:   Past Medical History:   Past Medical History:   Diagnosis Date    Abnormal mammogram 2014    left with biopsy    Acute lacunar stroke ([facility] [address] 5/21/2021    Acute Lacunar Stroke (acute lacunar infarct in the posterior left lentiform nucleus extending into the corona radiata) with residual right hemiparesis    Bacterial vaginosis 1/9/15    Dr Can Laboy Constipation     Current use of aspirin 5/23/2021    Gastroesophageal reflux disease 6/30/2016    Hemiparesis of right dominant side due to acute cerebrovascular disease ([facility] [address] 5/21/2021    History of Helicobacter pylori infection 7/24/2015    History of iron deficiency anemia 06/2014    History of vitamin D deficiency 6/11/2015    Hot flashes 1/9/15    Dr Can Laboy On clopidogrel therapy 5/23/2021    On statin therapy due to risk of future cardiovascular event 5/22/2021    On Atorvastatin    Rheumatoid arthritis ([facility]     Secondary hyperparathyroidism of renal origin ([facility] 5/31/2021    Stage 3b chronic kidney disease ([facility] 7/24/2015    Tobacco use disorder     Vitamin D deficiency 5/31/2021    Vitamin D 25-Hydroxy (5/31/2021) = 10.5         Assessment:   Changes in Assessment throughout shift: No change to previous assessment     Patient has a central line: no Reasons if yes: na  Insertion date:na Last dressing date:na   Patient has Irving Cath: no Reasons if yes: na   Insertion date:na  Shift irving care completed: NO     Last Vitals:     Vitals:    06/10/21 2128 06/11/21 0735 06/11/21 1602 06/11/21 2111   BP: (!) 150/93 (!) 150/95 133/87 (!) 156/101   Pulse: 86 67 71 90   Resp: 18 17 18 18   Temp: 97 °F (36.1 °C) 97 °F (36.1 °C) (!) 96.6 °F (35.9 °C) 97.8 °F (36.6 °C)   SpO2: 100% 99% 100% 100%   Weight:       Height:       LMP: 10/15/2014        PAIN    Pain Assessment    Pain Intensity 1: 0 (06/12/21 0416) Pain Intensity 1: 2 (12/29/14 6065)    Pain Location 1: Head Pain Location 1: Abdomen    Pain Intervention(s) 1: Medication (see MAR) Pain Intervention(s) 1: Medication (see MAR)  Patient Stated Pain Goal: 0 Patient Stated Pain Goal: 0  o Intervention effective: yes  o Other actions taken for pain:       Skin Assessment  Skin color    Condition/Temperature    Integrity    Turgor    Weekly Pressure Ulcer Documentation  Pressure  Injury Documentation: No Pressure Injury Noted-Pressure Ulcer Prevention Initiated  Wound Prevention & Protection Methods  Orientation of wound Orientation of Wound Prevention: Posterior  Location of Prevention Location of Wound Prevention: Sacrum/Coccyx  Dressing Present Dressing Present : No  Dressing Status    Wound Offloading Wound Offloading (Prevention Methods): Bed, pressure reduction mattress, Pillows, Walker, Wheelchair     INTAKE/OUPUT  Date 06/11/21 0700 - 06/12/21 0659 06/12/21 0700 - 06/13/21 0659   Shift 5185-7049 5152-6046 24 Hour Total 3443-8633 6516-9427 24 Hour Total   INTAKE   Shift Total(mL/kg)         OUTPUT   Urine(mL/kg/hr)           Urine Occurrence(s) 4 x 1 x 5 x      Emesis/NG output           Emesis Occurrence(s)  0 x 0 x      Stool           Stool Occurrence(s) 3 x 0 x 3 x      Shift Total(mL/kg)         NET         Weight (kg) 84.4 84.4 84.4 84.4 84.4 84.4       Recommendations:  1. Patient needs and requests: met    2. Pending tests/procedures: labs as ordered     3. Functional Level/Equipment: Partial (one person) / Bed (comment); [address]; Wheelchair    Fall Precautions:   Fall risk precautions were reinforced with the patient; she was instructed to call for help prior to getting up.  The following fall risk precautions were continued: bed/ chair alarms, door signage, yellow bracelet and socks as well as update of the Sujit Fountainon in the patient's room. Leigh Ann Score: 4    HEALS Safety Check    A safety check occurred in the patient's room between off going nurse and oncoming nurse listed above. The safety check included the below items  Area Items   H  High Alert Medications - Verify all high alert medication drips (heparin, PCA, etc.)   E  Equipment - Suction is set up for ALL patients (with blessing)  - Red plugs utilized for all equipment (IV pumps, etc.)  - WOWs wiped down at end of shift.  - Room stocked with oxygen, suction, and other unit-specific supplies   A  Alarms - Bed alarm is set for fall risk patients  - Ensure chair alarm is in place and activated if patient is up in a chair   L  Lines - Check IV for any infiltration  - Rollins bag is empty if patient has a Rollins   - Tubing and IV bags are labeled   S  Safety   - Room is clean, patient is clean, and equipment is clean. - Hallways are clear from equipment besides carts. - Fall bracelet on for fall risk patients  - Ensure room is clear and free of clutter  - Suction is set up for ALL patients (with blessing)  - Hallways are clear from equipment besides carts.    - Isolation precautions followed, supplies available outside room, sign posted     Rosalba Beth, RN

## 2021-06-12 NOTE — PROGRESS NOTES
[facility] PHYSICAL REHABILITATION  [address] [place] [address], [address]     INPATIENT REHABILITATION  DAILY PROGRESS NOTE     Date: 6/12/2021    Name: Lola Bell Age / Sex: 62 y.o. / female   CSN: [id number] MRN: [id number]   [facility] Date: 5/27/2021 Length of Stay: 16 days     Primary Rehab Diagnosis: Impaired Mobility and ADLs secondary to Acute Lacunar Stroke (acute lacunar infarct in the posterior left lentiform nucleus extending into the corona radiata) with residual right hemiparesis      Subjective:     No new issues or problems reported. Blood pressure fairly controlled.        Objective:     Vital Signs:  Patient Vitals for the past 24 hrs:   BP Temp Pulse Resp SpO2   06/12/21 0736 (!) 148/89 97.3 °F (36.3 °C) 79 15 98 %   06/11/21 2111 (!) 156/101 97.8 °F (36.6 °C) 90 18 100 %   06/11/21 1602 133/87 (!) 96.6 °F (35.9 °C) 71 18 100 %        Current Medications:  Current Facility-Administered Medications   Medication Dose Route Frequency    carvediloL (COREG) tablet 6.25 mg  6.25 mg Oral BID WITH MEALS    lubiPROStone (AMITIZA) capsule 8 mcg  8 mcg Oral BID WITH MEALS    heparin (porcine) injection 5,000 Units  5,000 Units SubCUTAneous Q12H    hydrALAZINE (APRESOLINE) tablet 25 mg  25 mg Oral TID PRN    polyethylene glycol (MIRALAX) packet 17 g  17 g Oral DAILY    senna-docusate (PERICOLACE) 8.6-50 mg per tablet 2 Tablet  2 Tablet Oral PCD    cholecalciferol (VITAMIN D3) capsule 5,000 Units  5,000 Units Oral DAILY    nicotine (NICODERM CQ) 14 mg/24 hr patch 1 Patch  1 Patch TransDERmal DAILY    acetaminophen (TYLENOL) tablet 650 mg  650 mg Oral Q4H PRN    bisacodyL (DULCOLAX) tablet 10 mg  10 mg Oral Q48H PRN    aspirin chewable tablet 81 mg  81 mg Oral DAILY WITH DINNER    atorvastatin (LIPITOR) tablet 80 mg  80 mg Oral DAILY    clopidogreL (PLAVIX) tablet 75 mg  75 mg Oral DAILY WITH BREAKFAST    co-enzyme Q-10 (CO Q-10) capsule 100 mg  100 mg Oral DAILY    melatonin tablet 3 mg  3 mg Oral QHS       Allergies:  No Known Allergies      Lab/Data Review:  Recent Results (from the past 24 hour(s))   RENAL FUNCTION PANEL    Collection Time: 06/12/21  7:10 AM   Result Value Ref Range    Sodium 142 136 - 145 mmol/L    Potassium 4.9 3.5 - 5.5 mmol/L    Chloride 113 (H) 100 - 111 mmol/L    CO2 23 21 - 32 mmol/L    Anion gap 6 3.0 - 18 mmol/L    Glucose 89 74 - 99 mg/dL    BUN 34 (H) 7.0 - 18 MG/DL    Creatinine 1.98 (H) 0.6 - 1.3 MG/DL    BUN/Creatinine ratio 17 12 - 20      GFR est AA 31 (L) >60 ml/min/1.73m2    GFR est non-AA 26 (L) >60 ml/min/1.73m2    Calcium 9.6 8.5 - 10.1 MG/DL    Phosphorus 4.2 2.5 - 4.9 MG/DL    Albumin 3.3 (L) 3.4 - 5.0 g/dL       Assessment:     Primary Rehab Diagnosis  1. Impaired Mobility and ADLs  2.  Acute Lacunar Stroke (acute lacunar infarct in the posterior left lentiform nucleus extending into the corona radiata) with residual right hemiparesis    Comorbidities  Patient Active Problem List   Diagnosis Code    Rheumatoid arthritis ([facility] M06.9    History of vitamin D deficiency Z86.39    Cocaine use F14.90    Stage 3b chronic kidney disease (HCC) N18.32    History of Helicobacter pylori infection Z86.19    Abscess of finger L02.519    Gastroesophageal reflux disease K21.9    Acute lacunar stroke (HCC) I63.81    Impaired mobility and ADLs Z74.09, Z78.9    Hemiparesis of right dominant side due to acute cerebrovascular disease (HCC) I67.89, G81.91    Tobacco use disorder F17.200    Current use of aspirin Z79.82    On clopidogrel therapy Z79.01    Hypertensive heart and kidney disease without heart failure and with stage 3b chronic kidney disease (HCC) I13.10, N18.32    Mixed hyperlipidemia E78.2    On statin therapy due to risk of future cardiovascular event Z79.899    Constipation K59.00    History of iron deficiency anemia Z86.2    High serum magnesium R79.89    Secondary hyperparathyroidism of renal origin ([facility] N25.81    Vitamin D deficiency E55.9    Acute renal failure superimposed on stage 3 chronic kidney disease (HCC) N17.9, N18.30        Plan:     1. Justification for continued stay: Good progression towards established rehabilitation goals. 2. Medical Issues being followed closely:    [x]  Fall and safety precautions     []  Wound Care     [x]  Bowel and Bladder Function     [x]  Fluid Electrolyte and Nutrition Balance     []  Pain Control      3. Issues that 24 hour rehabilitation nursing is following:    [x]  Fall and safety precautions     []  Wound Care     [x]  Bowel and Bladder Function     [x]  Fluid Electrolyte and Nutrition Balance     []  Pain Control      [x]  Assistance with and education on in-room safety with transfers to and from the bed, wheelchair, toilet and shower. 4. Acute rehabilitation plan of care:    [x]  Continue current care and rehab. [x]  Physical Therapy           [x]  Occupational Therapy           [x]  Speech Therapy     []  Hold Rehab until further notice     5. Medications:    [x]  MAR Reviewed     [x]  Continue Present Medications     6. DVT Prophylaxis:      []  Enoxaparin     [x]  Unfractionated Heparin     []  Warfarin     []  NOAC     []  NORBERT Stockings     []  Sequential Compression Device     []  None     7. Code status    [x]  Full code     []  Partial code     []  Do not intubate     []  Do not resuscitate     8.  Orders:   > Acute Lacunar Stroke (acute lacunar infarct in the posterior left lentiform nucleus extending into the corona radiata) with residual right hemiparesis   > Dual antiplatelet therapy (DAPT) was started 5/22/2021; Neurology recommending to continue DAPT for 21 days (~6/12/2021), then discontinue Aspirin and continue on Clopidogrel 75 mg PO once daily    > On 5/27/2021, started Melatonin 3 mg PO q HS (for stroke recovery)   > Continue:    > Aspirin 81 PO once daily with dinner (STOP DATE: 6/12/2021)    > Atorvastatin 40 mg PO once daily    > Clopidogrel 75 mg PO once daily with breakfast      > Melatonin 3 mg PO q HS    > Constipation   > On 5/28/2021, discontinued (re: refused by patient last night and this AM):    > Miralax 17 grams in 8 oz water PO once daily    > PeriColace 2 tabs PO once daily with dinner   > On 6/2/2021, started:    > Pericolace 2 tabs PO once daily after dinner    > Polyethylene glycol 17 grams in 8 oz water PO once daily    > On 6/6/2021, started Lubiprostone 24 mcg PO BID with meals   > On 6/7/2021, decreased Lubiprostone from 24 mcg to 8 mcg PO BID with meals   > Continue:    > Lubiprostone 8 mcg PO BID with meals    > Pericolace 2 tabs PO once daily after dinner    > Polyethylene glycol 17 grams in 8 oz water PO once daily     > Hypertensive heart and kidney disease without heart failure with stage 3b-4 chronic kidney disease   > On 5/31/2021, started Metoprolol tartrate 25 mg PO q 12 hr (9AM, 9PM)   > On 6/2/2021:    > Discontinued Amlodipine 10 mg PO once daily (9AM)    > Started Losartan 12.5 mg PO once daily (9AM)   > On 6/5/2021:    > Discontinued Losartan 12.5 mg PO once daily (9AM)    > Started Hydralazine 25 mg PO TID PRN for SBP greater than 160 mmHg   > On 6/10/2021, patient was given Nifedipine XL 30 mg PO x 1 dose   > Planned to start Nifedipine XL 60 mg PO once daily (9AM) this AM but patient reported some lip swelling last night and concern was whether this was related to the Nifedipine XL that she received yesterday PM   > On 6/11/2021:    > Discontinued:     > Nifedipine XL 60 mg PO once daily (9AM)     > Metoprolol tartrate 25 mg PO q 12 hr (9AM, 9PM)    > Started Carvedilol 6.25 mg PO BID with meals (8AM, 5PM)   > Continue:    > Carvedilol 6.25 mg PO BID with meals (8AM, 5PM)    > Hydralazine 25 mg PO TID PRN for SBP greater than 160 mmHg    > Mixed hyperlipidemia   > On 5/28/2021, started Coenzyme Q10 100 mg PO once daily   > Continue:    > Atorvastatin 40 mg PO once daily    > Coenzyme Q10 100 mg PO once daily    > High serum magnesium   > Mg (5/28/2021, on admission to the ARU) = 3.2   > patient reported she was given 3 doses of Milk of magnesia at [facility] prior to discharge to the ARU   > Avoid magnesium-containing medications/supplements      05/31/21  0545 05/30/21  0706 05/28/21  0613   MG 2.6 2.9* 3.2*     > Acute kidney injury superimposed on Stage 3b-4 chronic kidney disease   > BUN/Creatinine (5/28/2021, on admission to the ARU) = 36/2.47   > Retroperitoneal ultrasound (5/28/2021) showed:    > Increased parenchymal echogenicity in both kidneys consistent with medical renal disease. There is a prominent cortical cyst in the mid right kidney. No hydronephrosis or nephrolithiasis.       > Renal cortical thickness is somewhat less than 10 mm in both kidneys.   The left kidney is somewhat small with respect to the right kidney which is low normal in size.   > On 5/29/2021, started IVF: 0.9%  ml.hr x 1 liter once daily after dinner   > PTH (5/31/2021) = 276.1   > Vitamin D 25-Hydroxy (5/31/2021) = 10.1   > Urine microalbumin (5/31/2021) = 15.30   > Microalbumin/Creatinine ratio (5/31/2021) = 165   > Urinalysis (5/31/2021): SG 1.013, protein 30, no casts seen   > Nephrology consult (Dr. Joellen Hess) called on 5/31/2021 for evaluation and comanagement   > CK (5/31/2021) = 107   > Urine creatinine (5/31/2021) = 93.00   > Random urine protein (5/31/2021) = 31   > Random urine sodium (5/31/2021) = 52      06/02/21  0445 06/01/21  0430 05/31/21  0545 05/30/21  0706 05/28/21  0613   BUN 35* 37* 36* 37* 36*   CREA 2.24* 2.27* 2.15* 2.25* 2.47*      > On 6/2/2021:    > Discontinued IVF: 0.9%  ml/hr x 1 liter once daily after dinner    > Started Losartan 12.5 mg PO once daily (9AM)      06/05/21  0609 06/04/21  0559 06/03/21  0516   K 4.8 4.9 4.9       06/05/21  0609 06/04/21  0559 06/03/21 0516   BUN 40* 36* 34*   CREA 2.85* 2.45* 2.24*      > On 6/5/2021:    > Discontinued Losartan 12.5 mg PO once daily (9AM)    > Started IVF: 0.9% NS at 75 ml/hr continuous      06/07/21  0555 06/06/21  0616   K 5.0 4.7         06/07/21  0555 06/06/21  0616   BUN 36* 40*   CREA 2.16* 2.41*      > On 6/7/2021, changed IVF from 0.9% NS at 75 ml/hr continuous to 75 ml/hr x 1 liter once daily after dinner      06/09/21  0450 06/08/21  0503   K 4.8 4.9       06/09/21  0450 06/08/21  0503   BUN 30* 34*   CREA 1.95* 2.02*      > On 6/9/2021:    > Discontinue IVF: 0.9% NS at 75 ml/hr x 1 liter once daily after dinner    > Started IVF: 0.45%  ml/hr x 1 liter   > BUN/Creatinine (6/10/2021) = 33/1.83   > K (6/10/2021) = 4.7    > On 6/10/2021, patient was given IVF: 0.45%  ml/hr x 1 liter      06/12/21  0710 06/11/21  0535 06/10/21  0503   K 4.9 4.6 4.7   * 113* 113*       06/12/21  0710 06/11/21  0535 06/10/21  0503   BUN 34* 32* 33*   CREA 1.98* 1.82* 1.83*      > Resume IVF: 0.45%  ml/hr x 1 liter once daily after dinner x 2 doses    > Tobacco use disorder   > On 5/28/2021, started Nicotine 14 mg/24 hr patch, 1 patch on skin once daily   > Continue Nicotine 14 mg/24 hr patch, 1 patch on skin once daily    > Vitamin D Deficiency   > Vitamin D 25-Hydroxy (5/31/2021) = 10.1   > On 5/31/2021, patient was given Cholecalciferol 50,000 units PO x 1 dose    > On 6/1/2021, started Cholecalciferol 5,000 units PO once daily   > Continue Cholecalciferol 5,000 units PO once daily    > Analgesia   > Continue Acetaminophen 650 mg PO q 4 hr PRN for pain     > Diet:   > Specifications: Cardiac   > Solids (consistency): Regular    > Liquids (consistency):  Thin    > Fluid restriction: None      Signed:    Anna Lam MD    June 12, 2021

## 2021-06-13 LAB
ALBUMIN SERPL-MCNC: 3.2 G/DL (ref 3.4–5)
ANION GAP SERPL CALC-SCNC: 5 MMOL/L (ref 3–18)
BUN SERPL-MCNC: 30 MG/DL (ref 7–18)
BUN/CREAT SERPL: 16 (ref 12–20)
CALCIUM SERPL-MCNC: 9.5 MG/DL (ref 8.5–10.1)
CHLORIDE SERPL-SCNC: 115 MMOL/L (ref 100–111)
CO2 SERPL-SCNC: 22 MMOL/L (ref 21–32)
CREAT SERPL-MCNC: 1.87 MG/DL (ref 0.6–1.3)
GLUCOSE SERPL-MCNC: 94 MG/DL (ref 74–99)
PHOSPHATE SERPL-MCNC: 3.9 MG/DL (ref 2.5–4.9)
POTASSIUM SERPL-SCNC: 4.6 MMOL/L (ref 3.5–5.5)
SODIUM SERPL-SCNC: 142 MMOL/L (ref 136–145)

## 2021-06-13 PROCEDURE — 36415 COLL VENOUS BLD VENIPUNCTURE: CPT

## 2021-06-13 PROCEDURE — 80069 RENAL FUNCTION PANEL: CPT

## 2021-06-13 PROCEDURE — 74011250636 HC RX REV CODE- 250/636: Performed by: INTERNAL MEDICINE

## 2021-06-13 PROCEDURE — 2709999900 HC NON-CHARGEABLE SUPPLY

## 2021-06-13 PROCEDURE — 74011000250 HC RX REV CODE- 250: Performed by: INTERNAL MEDICINE

## 2021-06-13 PROCEDURE — 65310000000 HC RM PRIVATE REHAB

## 2021-06-13 PROCEDURE — 74011250637 HC RX REV CODE- 250/637: Performed by: INTERNAL MEDICINE

## 2021-06-13 RX ORDER — CARVEDILOL 12.5 MG/1
12.5 TABLET ORAL 2 TIMES DAILY WITH MEALS
Status: DISCONTINUED | OUTPATIENT
Start: 2021-06-13 | End: 2021-06-14 | Stop reason: HOSPADM

## 2021-06-13 RX ADMIN — ATORVASTATIN CALCIUM 80 MG: 40 TABLET, FILM COATED ORAL at 08:16

## 2021-06-13 RX ADMIN — CARVEDILOL 12.5 MG: 12.5 TABLET, FILM COATED ORAL at 17:02

## 2021-06-13 RX ADMIN — Medication 5000 UNITS: at 08:16

## 2021-06-13 RX ADMIN — Medication 3 MG: at 20:20

## 2021-06-13 RX ADMIN — CARVEDILOL 6.25 MG: 6.25 TABLET, FILM COATED ORAL at 08:16

## 2021-06-13 RX ADMIN — Medication 100 MG: at 08:16

## 2021-06-13 RX ADMIN — HEPARIN SODIUM 5000 UNITS: 5000 INJECTION INTRAVENOUS; SUBCUTANEOUS at 17:02

## 2021-06-13 RX ADMIN — HEPARIN SODIUM 5000 UNITS: 5000 INJECTION INTRAVENOUS; SUBCUTANEOUS at 06:10

## 2021-06-13 RX ADMIN — CLOPIDOGREL BISULFATE 75 MG: 75 TABLET ORAL at 08:15

## 2021-06-13 RX ADMIN — SODIUM CHLORIDE 1000 ML: 450 INJECTION, SOLUTION INTRAVENOUS at 17:47

## 2021-06-13 RX ADMIN — LUBIPROSTONE 8 MCG: 8 CAPSULE, GELATIN COATED ORAL at 08:16

## 2021-06-13 RX ADMIN — POLYETHYLENE GLYCOL 3350 17 G: 17 POWDER, FOR SOLUTION ORAL at 08:16

## 2021-06-13 RX ADMIN — ACETAMINOPHEN 650 MG: 325 TABLET ORAL at 01:04

## 2021-06-13 RX ADMIN — LUBIPROSTONE 8 MCG: 8 CAPSULE, GELATIN COATED ORAL at 17:02

## 2021-06-13 RX ADMIN — DOCUSATE SODIUM 50MG AND SENNOSIDES 8.6MG 2 TABLET: 8.6; 5 TABLET, FILM COATED ORAL at 20:20

## 2021-06-13 RX ADMIN — ACETAMINOPHEN 650 MG: 325 TABLET ORAL at 23:49

## 2021-06-13 NOTE — ROUTINE PROCESS
[facility]    Bedside and Verbal shift change report given to Drew Gil (oncoming nurse) by Glynn Spencer RN (offgoing nurse). Report included the following information SBAR, Kardex, MAR and Recent Results.     Situation:   Code Status: Full Code   Hospital Day: 17   Problem List:   Hospital Problems  Date Reviewed: 6/12/2021        Codes Class Noted POA    Acute renal failure superimposed on stage 3 chronic kidney disease (HCC) ICD-10-CM: N17.9, N18.30  ICD-9-CM: 584.9, 585.3  6/1/2021 Yes        Secondary hyperparathyroidism of renal origin ([facility] (Chronic) ICD-10-CM: N25.81  ICD-9-CM: 588.81  5/31/2021 Yes        Vitamin D deficiency (Chronic) ICD-10-CM: E55.9  ICD-9-CM: 268.9  5/31/2021 Yes    Overview Signed 5/31/2021  1:17 PM by Andrew Marr MD     Vitamin D 25-Hydroxy (5/31/2021) = 10.5              High serum magnesium ICD-10-CM: R79.89  ICD-9-CM: 790.99  5/28/2021 Yes        Hypertensive heart and kidney disease without heart failure and with stage 3b chronic kidney disease (HCC) (Chronic) ICD-10-CM: I13.10, N18.32  ICD-9-CM: 404.90, 585.3  Unknown Yes        Mixed hyperlipidemia (Chronic) ICD-10-CM: E78.2  ICD-9-CM: 272.2  Unknown Yes        Constipation (Chronic) ICD-10-CM: K59.00  ICD-9-CM: 564.00  Unknown Yes        Current use of aspirin ICD-10-CM: Z79.82  ICD-9-CM: V58.66  5/23/2021 Yes        On clopidogrel therapy ICD-10-CM: Z79.01  ICD-9-CM: V58.61  5/23/2021 Yes        On statin therapy due to risk of future cardiovascular event ICD-10-CM: Z79.899  ICD-9-CM: V58.69  5/22/2021 Yes    Overview Signed 5/27/2021  2:27 PM by Andrew Marr MD     On Atorvastatin             * (Principal) Acute lacunar stroke [facility]) ICD-10-CM: I63.81  ICD-9-CM: 434.91  5/21/2021 Yes    Overview Signed 5/27/2021  2:15 PM by Andrew Marr MD     Acute Lacunar Stroke (acute lacunar infarct in the posterior left lentiform nucleus extending into the corona radiata) with residual right hemiparesis Impaired mobility and ADLs ICD-10-CM: Z74.09, Z78.9  ICD-9-CM: V49.89  5/21/2021 Yes        Hemiparesis of right dominant side due to acute cerebrovascular disease ([facility] ICD-10-CM: I67.89, G81.91  ICD-9-CM: 436, 342.91  5/21/2021 Yes              Background:   Past Medical History:   Past Medical History:   Diagnosis Date    Abnormal mammogram 2014    left with biopsy    [facility] ([facility] 5/21/2021    Acute Lacunar Stroke (acute lacunar infarct in the posterior left lentiform nucleus extending into the corona radiata) with residual right hemiparesis    Bacterial vaginosis 1/9/15    Dr Eliana Snell Constipation     Current use of aspirin 5/23/2021    Gastroesophageal reflux disease 6/30/2016    Hemiparesis of right dominant side due to acute cerebrovascular disease ([facility] 5/21/2021    History of Helicobacter pylori infection 7/24/2015    History of iron deficiency anemia 06/2014    History of vitamin D deficiency 6/11/2015    Hot flashes 1/9/15    Dr Eliana Snell On clopidogrel therapy 5/23/2021    On statin therapy due to risk of future cardiovascular event 5/22/2021    On Atorvastatin    Rheumatoid arthritis ([facility]     Secondary hyperparathyroidism of renal origin ([facility] 5/31/2021    Stage 3b chronic kidney disease ([facility] 7/24/2015    Tobacco use disorder     Vitamin D deficiency 5/31/2021    Vitamin D 25-Hydroxy (5/31/2021) = 10.5         Assessment:   Changes in Assessment throughout shift: No change to previous assessment     Patient has a central line: no Reasons if yes: na  Insertion date:na Last dressing date:na   Patient has Irving Cath: no Reasons if yes: na   Insertion date:na  Shift irving care completed: NO     Last Vitals:     Vitals:    06/11/21 2111 06/12/21 0736 06/12/21 1522 06/12/21 2056   BP: (!) 156/101 (!) 148/89 132/85 (!) 143/87   Pulse: 90 79 76 81   Resp: 18 15 18 18   Temp: 97.8 °F (36.6 °C) 97.3 °F (36.3 °C) 97.4 °F (36.3 °C) 97.5 °F (36.4 °C)   SpO2: 100% 98% 93% 100%   Weight:       Height:       LMP: 10/15/2014        PAIN    Pain Assessment    Pain Intensity 1: 0 (06/13/21 0400) Pain Intensity 1: 2 (12/29/14 1105)    Pain Location 1: Head Pain Location 1: Abdomen    Pain Intervention(s) 1: Medication (see MAR) Pain Intervention(s) 1: Medication (see MAR)  Patient Stated Pain Goal: 0 Patient Stated Pain Goal: 0  o Intervention effective: yes  o Other actions taken for pain: Medication (see MAR)     Skin Assessment  Skin color    Condition/Temperature    Integrity    Turgor    Weekly Pressure Ulcer Documentation  Pressure  Injury Documentation: No Pressure Injury Noted-Pressure Ulcer Prevention Initiated  Wound Prevention & Protection Methods  Orientation of wound Orientation of Wound Prevention: Posterior  Location of Prevention Location of Wound Prevention: Buttocks, Sacrum/Coccyx  Dressing Present Dressing Present : No  Dressing Status    Wound Offloading Wound Offloading (Prevention Methods): Bed, pressure redistribution/air     INTAKE/OUPUT  Date 06/12/21 0700 - 06/13/21 0659 06/13/21 0700 - 06/14/21 0659   Shift 4443-1667 4047-3474 24 Hour Total 6859-7449 0708-0105 24 Hour Total   INTAKE   P.O. 550  550        P. O. 550  550      Shift Total(mL/kg) 550(6.5)  550(6.5)      OUTPUT   Urine(mL/kg/hr)           Urine Occurrence(s) 1 x 2 x 3 x      Stool           Stool Occurrence(s) 1 x 0 x 1 x      Shift Total(mL/kg)           550      Weight (kg) 84.4 84.4 84.4 84.4 84.4 84.4       Recommendations:  1. Patient needs and requests: toileting and pain meds    2. Pending tests/procedures: labs as ordered     3. Functional Level/Equipment: assist x 1/ wheelchair    Fall Precautions:   Fall risk precautions were reinforced with the patient; she was instructed to call for help prior to getting up.  The following fall risk precautions were continued: bed/ chair alarms, door signage, yellow bracelet and socks as well as update of the Rosa Hoit tool in the patient's room.   Rosa Arlen Score: 4    HEALS Safety Check    A safety check occurred in the patient's room between off going nurse and oncoming nurse listed above. The safety check included the below items  Area Items   H  High Alert Medications - Verify all high alert medication drips (heparin, PCA, etc.)   E  Equipment - Suction is set up for ALL patients (with blessing)  - Red plugs utilized for all equipment (IV pumps, etc.)  - WOWs wiped down at end of shift.  - Room stocked with oxygen, suction, and other unit-specific supplies   A  Alarms - Bed alarm is set for fall risk patients  - Ensure chair alarm is in place and activated if patient is up in a chair   L  Lines - Check IV for any infiltration  - Rollins bag is empty if patient has a Rollins   - Tubing and IV bags are labeled   S  Safety   - Room is clean, patient is clean, and equipment is clean. - Hallways are clear from equipment besides carts. - Fall bracelet on for fall risk patients  - Ensure room is clear and free of clutter  - Suction is set up for ALL patients (with blessing)  - Hallways are clear from equipment besides carts.    - Isolation precautions followed, supplies available outside room, sign posted     Rosy Rolon, RN

## 2021-06-13 NOTE — ROUTINE PROCESS
[facility]    Bedside and Verbal shift change report [facility] by Himanshu Pantoja RN (offgoing nurse). Report included the following information SBAR, Kardex, MAR and Recent Results.     Situation:   Code Status: Full Code   Hospital Day: 17   Problem List:   Hospital Problems  Date Reviewed: 6/13/2021        Codes Class Noted POA    Acute renal failure superimposed on stage 3 chronic kidney disease (HCC) ICD-10-CM: N17.9, N18.30  ICD-9-CM: 584.9, 585.3  6/1/2021 Yes        Secondary hyperparathyroidism of renal origin ([facility] (Chronic) ICD-10-CM: N25.81  ICD-9-CM: 588.81  5/31/2021 Yes        Vitamin D deficiency (Chronic) ICD-10-CM: E55.9  ICD-9-CM: 268.9  5/31/2021 Yes    Overview Signed 5/31/2021  1:17 PM by Tremayne Palencia MD     Vitamin D 25-Hydroxy (5/31/2021) = 10.5              High serum magnesium ICD-10-CM: R79.89  ICD-9-CM: 790.99  5/28/2021 Yes        Hypertensive heart and kidney disease without heart failure and with stage 3b chronic kidney disease (HCC) (Chronic) ICD-10-CM: I13.10, N18.32  ICD-9-CM: 404.90, 585.3  Unknown Yes        Mixed hyperlipidemia (Chronic) ICD-10-CM: E78.2  ICD-9-CM: 272.2  Unknown Yes        Constipation (Chronic) ICD-10-CM: K59.00  ICD-9-CM: 564.00  Unknown Yes        Current use of aspirin ICD-10-CM: Z79.82  ICD-9-CM: V58.66  5/23/2021 Yes        On clopidogrel therapy ICD-10-CM: Z79.01  ICD-9-CM: V58.61  5/23/2021 Yes        On statin therapy due to risk of future cardiovascular event ICD-10-CM: Z79.899  ICD-9-CM: V58.69  5/22/2021 Yes    Overview Signed 5/27/2021  2:27 PM by Tremayne Palencia MD     On Atorvastatin             * (Principal) Acute lacunar stroke [facility]) ICD-10-CM: I63.81  ICD-9-CM: 434.91  5/21/2021 Yes    Overview Signed 5/27/2021  2:15 PM by Tremayne Palencia MD     Acute Lacunar Stroke (acute lacunar infarct in the posterior left lentiform nucleus extending into the corona radiata) with residual right hemiparesis Impaired mobility and ADLs ICD-10-CM: Z74.09, Z78.9  ICD-9-CM: V49.89  5/21/2021 Yes        Hemiparesis of right dominant side due to acute cerebrovascular disease ([facility] ICD-10-CM: I67.89, G81.91  ICD-9-CM: 436, 342.91  5/21/2021 Yes              Background:   Past Medical History:   Past Medical History:   Diagnosis Date    Abnormal mammogram 2014    left with biopsy    [facility] ([facility] 5/21/2021    Acute Lacunar Stroke (acute lacunar infarct in the posterior left lentiform nucleus extending into the corona radiata) with residual right hemiparesis    Bacterial vaginosis 1/9/15    Dr Neva Arteaga Constipation     Current use of aspirin 5/23/2021    Gastroesophageal reflux disease 6/30/2016    Hemiparesis of right dominant side due to acute cerebrovascular disease ([facility] 5/21/2021    History of Helicobacter pylori infection 7/24/2015    History of iron deficiency anemia 06/2014    History of vitamin D deficiency 6/11/2015    Hot flashes 1/9/15    Dr Neva Arteaga On clopidogrel therapy 5/23/2021    On statin therapy due to risk of future cardiovascular event 5/22/2021    On Atorvastatin    Rheumatoid arthritis ([facility]     Secondary hyperparathyroidism of renal origin ([facility] 5/31/2021    Stage 3b chronic kidney disease ([facility] 7/24/2015    Tobacco use disorder     Vitamin D deficiency 5/31/2021    Vitamin D 25-Hydroxy (5/31/2021) = 10.5         Assessment:   Changes in Assessment throughout shift: No change to previous assessment     Patient has a central line: no Reasons if yes: na  Insertion date:na Last dressing date:na   Patient has Irving Cath: no Reasons if yes: na   Insertion date:na  Shift irving care completed: NO     Last Vitals:     Vitals:    06/12/21 1522 06/12/21 2056 06/13/21 0739 06/13/21 1620   BP: 132/85 (!) 143/87 (!) 133/95 (!) 146/90   Pulse: 76 81 72 87   Resp: 18 18 18 18   Temp: 97.4 °F (36.3 °C) 97.5 °F (36.4 °C) 97 °F (36.1 °C) 96.9 °F (36.1 °C)   SpO2: 93% 100% 100% 98%   Weight:       Height:       LMP: 10/15/2014        PAIN    Pain Assessment    Pain Intensity 1: 0 (06/13/21 1640) Pain Intensity 1: 2 (12/29/14 1105)    Pain Location 1: Head Pain Location 1: Abdomen    Pain Intervention(s) 1: Medication (see MAR) Pain Intervention(s) 1: Medication (see MAR)  Patient Stated Pain Goal: 0 Patient Stated Pain Goal: 0  o Intervention effective: yes  o Other actions taken for pain:       Skin Assessment  Skin color    Condition/Temperature    Integrity    Turgor    Weekly Pressure Ulcer Documentation  Pressure  Injury Documentation: No Pressure Injury Noted-Pressure Ulcer Prevention Initiated  Wound Prevention & Protection Methods  Orientation of wound Orientation of Wound Prevention: Posterior  Location of Prevention Location of Wound Prevention: Buttocks  Dressing Present Dressing Present : No  Dressing Status    Wound Offloading Wound Offloading (Prevention Methods): Bed, pressure redistribution/air     INTAKE/OUPUT  Date 06/12/21 0700 - 06/13/21 0659 06/13/21 0700 - 06/14/21 0659   Shift 3915-4808 1591-5369 24 Hour Total 7971-5508 0539-1887 24 Hour Total   INTAKE   P.O. 550  550 750  750     P. O. 550  550 750  750   Shift Total(mL/kg) 550(6.5)  550(6.5) 750(8.9)  750(8.9)   OUTPUT   Urine(mL/kg/hr)           Urine Occurrence(s) 1 x 2 x 3 x 1 x  1 x   Stool           Stool Occurrence(s) 1 x 0 x 1 x 0 x  0 x   Shift Total(mL/kg)           550 750  750   Weight (kg) 84.4 84.4 84.4 84.4 84.4 84.4       Recommendations:  1. Patient needs and requests: met    2. Pending tests/procedures: labs as ordered     3. Functional Level/Equipment: Partial (one person) / Bed (comment)    Fall Precautions:   Fall risk precautions were reinforced with the patient; she was instructed to call for help prior to getting up.  The following fall risk precautions were continued: bed/ chair alarms, door signage, yellow bracelet and socks as well as update of the Xavier Crespo tool in the patient's room.   German Imus Score: 4    HEALS Safety Check    A safety check occurred in the patient's room between off going nurse and oncoming nurse listed above. The safety check included the below items  Area Items   H  High Alert Medications - Verify all high alert medication drips (heparin, PCA, etc.)   E  Equipment - Suction is set up for ALL patients (with blessing)  - Red plugs utilized for all equipment (IV pumps, etc.)  - WOWs wiped down at end of shift.  - Room stocked with oxygen, suction, and other unit-specific supplies   A  Alarms - Bed alarm is set for fall risk patients  - Ensure chair alarm is in place and activated if patient is up in a chair   L  Lines - Check IV for any infiltration  - Rollins bag is empty if patient has a Rollins   - Tubing and IV bags are labeled   S  Safety   - Room is clean, patient is clean, and equipment is clean. - Hallways are clear from equipment besides carts. - Fall bracelet on for fall risk patients  - Ensure room is clear and free of clutter  - Suction is set up for ALL patients (with blessing)  - Hallways are clear from equipment besides carts.    - Isolation precautions followed, supplies available outside room, sign posted     Henrik Carballo, CLAY

## 2021-06-13 NOTE — PROGRESS NOTES
[facility]  [address] [place] [address], [address]     INPATIENT REHABILITATION  DAILY PROGRESS NOTE     Date: 6/13/2021    Name: José Manuel Baca Age / Sex: 62 y.o. / female   CSN: [id number] MRN: [id number]   [facility] Date: 5/27/2021 Length of Stay: 17 days     Primary Rehab Diagnosis: Impaired Mobility and ADLs secondary to Acute Lacunar Stroke (acute lacunar infarct in the posterior left lentiform nucleus extending into the corona radiata) with residual right hemiparesis      Subjective:     No new issues or problems reported. Blood pressure fairly controlled.        Objective:     Vital Signs:  Patient Vitals for the past 24 hrs:   BP Temp Pulse Resp SpO2   06/13/21 0739 (!) 133/95 97 °F (36.1 °C) 72 18 100 %   06/12/21 2056 (!) 143/87 97.5 °F (36.4 °C) 81 18 100 %   06/12/21 1522 132/85 97.4 °F (36.3 °C) 76 18 93 %        Current Medications:  Current Facility-Administered Medications   Medication Dose Route Frequency    0.45% sodium chloride infusion 1,000 mL  1,000 mL IntraVENous PCD    carvediloL (COREG) tablet 6.25 mg  6.25 mg Oral BID WITH MEALS    lubiPROStone (AMITIZA) capsule 8 mcg  8 mcg Oral BID WITH MEALS    heparin (porcine) injection 5,000 Units  5,000 Units SubCUTAneous Q12H    hydrALAZINE (APRESOLINE) tablet 25 mg  25 mg Oral TID PRN    polyethylene glycol (MIRALAX) packet 17 g  17 g Oral DAILY    senna-docusate (PERICOLACE) 8.6-50 mg per tablet 2 Tablet  2 Tablet Oral PCD    cholecalciferol (VITAMIN D3) capsule 5,000 Units  5,000 Units Oral DAILY    nicotine (NICODERM CQ) 14 mg/24 hr patch 1 Patch  1 Patch TransDERmal DAILY    acetaminophen (TYLENOL) tablet 650 mg  650 mg Oral Q4H PRN    bisacodyL (DULCOLAX) tablet 10 mg  10 mg Oral Q48H PRN    atorvastatin (LIPITOR) tablet 80 mg  80 mg Oral DAILY    clopidogreL (PLAVIX) tablet 75 mg  75 mg Oral DAILY WITH BREAKFAST    co-enzyme Q-10 (CO Q-10) capsule 100 mg  100 mg Oral DAILY    melatonin tablet 3 mg  3 mg Oral QHS       Allergies:  No Known Allergies      Lab/Data Review:  Recent Results (from the past 24 hour(s))   RENAL FUNCTION PANEL    Collection Time: 06/13/21  7:20 AM   Result Value Ref Range    Sodium 142 136 - 145 mmol/L    Potassium 4.6 3.5 - 5.5 mmol/L    Chloride 115 (H) 100 - 111 mmol/L    CO2 22 21 - 32 mmol/L    Anion gap 5 3.0 - 18 mmol/L    Glucose 94 74 - 99 mg/dL    BUN 30 (H) 7.0 - 18 MG/DL    Creatinine 1.87 (H) 0.6 - 1.3 MG/DL    BUN/Creatinine ratio 16 12 - 20      GFR est AA 33 (L) >60 ml/min/1.73m2    GFR est non-AA 28 (L) >60 ml/min/1.73m2    Calcium 9.5 8.5 - 10.1 MG/DL    Phosphorus 3.9 2.5 - 4.9 MG/DL    Albumin 3.2 (L) 3.4 - 5.0 g/dL       Assessment:     Primary Rehab Diagnosis  1. Impaired Mobility and ADLs  2.  Acute Lacunar Stroke (acute lacunar infarct in the posterior left lentiform nucleus extending into the corona radiata) with residual right hemiparesis    Comorbidities  Patient Active Problem List   Diagnosis Code    Rheumatoid arthritis ([facility] M06.9    History of vitamin D deficiency Z86.39    Cocaine use F14.90    Stage 3b chronic kidney disease (HCC) N18.32    History of Helicobacter pylori infection Z86.19    Abscess of finger L02.519    Gastroesophageal reflux disease K21.9    Acute lacunar stroke (HCC) I63.81    Impaired mobility and ADLs Z74.09, Z78.9    Hemiparesis of right dominant side due to acute cerebrovascular disease (HCC) I67.89, G81.91    Tobacco use disorder F17.200    Current use of aspirin Z79.82    On clopidogrel therapy Z79.01    Hypertensive heart and kidney disease without heart failure and with stage 3b chronic kidney disease (HCC) I13.10, N18.32    Mixed hyperlipidemia E78.2    On statin therapy due to risk of future cardiovascular event Z79.899    Constipation K59.00    History of iron deficiency anemia Z86.2    High serum magnesium R79.89    Secondary hyperparathyroidism of renal origin ([facility] N25.81    Vitamin D deficiency E55.9    Acute renal failure superimposed on stage 3 chronic kidney disease (HCC) N17.9, N18.30        Plan:     1. Justification for continued stay: Good progression towards established rehabilitation goals. 2. Medical Issues being followed closely:    [x]  Fall and safety precautions     []  Wound Care     [x]  Bowel and Bladder Function     [x]  Fluid Electrolyte and Nutrition Balance     []  Pain Control      3. Issues that 24 hour rehabilitation nursing is following:    [x]  Fall and safety precautions     []  Wound Care     [x]  Bowel and Bladder Function     [x]  Fluid Electrolyte and Nutrition Balance     []  Pain Control      [x]  Assistance with and education on in-room safety with transfers to and from the bed, wheelchair, toilet and shower. 4. Acute rehabilitation plan of care:    [x]  Continue current care and rehab. [x]  Physical Therapy           [x]  Occupational Therapy           [x]  Speech Therapy     []  Hold Rehab until further notice     5. Medications:    [x]  MAR Reviewed     [x]  Continue Present Medications     6. DVT Prophylaxis:      []  Enoxaparin     [x]  Unfractionated Heparin     []  Warfarin     []  NOAC     []  NORBERT Stockings     []  Sequential Compression Device     []  None     7. Code status    [x]  Full code     []  Partial code     []  Do not intubate     []  Do not resuscitate     8.  Orders:   > Acute Lacunar Stroke (acute lacunar infarct in the posterior left lentiform nucleus extending into the corona radiata) with residual right hemiparesis   > Dual antiplatelet therapy (DAPT) was started 5/22/2021; Neurology recommending to continue DAPT for 21 days (~6/12/2021), then discontinue Aspirin and continue on Clopidogrel 75 mg PO once daily    > On 5/27/2021, started Melatonin 3 mg PO q HS (for stroke recovery)   > Continue:    > Atorvastatin 40 mg PO once daily    > Clopidogrel 75 mg PO once daily with breakfast      > Melatonin 3 mg PO q HS    > Constipation   > On 5/28/2021, discontinued (re: refused by patient last night and this AM):    > Miralax 17 grams in 8 oz water PO once daily    > PeriColace 2 tabs PO once daily with dinner   > On 6/2/2021, started:    > Pericolace 2 tabs PO once daily after dinner    > Polyethylene glycol 17 grams in 8 oz water PO once daily    > On 6/6/2021, started Lubiprostone 24 mcg PO BID with meals   > On 6/7/2021, decreased Lubiprostone from 24 mcg to 8 mcg PO BID with meals   > Continue:    > Lubiprostone 8 mcg PO BID with meals    > Pericolace 2 tabs PO once daily after dinner    > Polyethylene glycol 17 grams in 8 oz water PO once daily     > Hypertensive heart and kidney disease without heart failure with stage 3b-4 chronic kidney disease   > On 5/31/2021, started Metoprolol tartrate 25 mg PO q 12 hr (9AM, 9PM)   > On 6/2/2021:    > Discontinued Amlodipine 10 mg PO once daily (9AM)    > Started Losartan 12.5 mg PO once daily (9AM)   > On 6/5/2021:    > Discontinued Losartan 12.5 mg PO once daily (9AM)    > Started Hydralazine 25 mg PO TID PRN for SBP greater than 160 mmHg   > On 6/10/2021, patient was given Nifedipine XL 30 mg PO x 1 dose   > Planned to start Nifedipine XL 60 mg PO once daily (9AM) this AM but patient reported some lip swelling last night and concern was whether this was related to the Nifedipine XL that she received yesterday PM   > On 6/11/2021:    > Discontinued:     > Nifedipine XL 60 mg PO once daily (9AM)     > Metoprolol tartrate 25 mg PO q 12 hr (9AM, 9PM)    > Started Carvedilol 6.25 mg PO BID with meals (8AM, 5PM)   > Continue:    > Increase Carvedilol from 6.25 mg to 12.5 mg PO BID with meals (8AM, 5PM)    > Hydralazine 25 mg PO TID PRN for SBP greater than 160 mmHg    > Mixed hyperlipidemia   > On 5/28/2021, started Coenzyme Q10 100 mg PO once daily   > Continue:    > Atorvastatin 40 mg PO once daily    > Coenzyme Q10 100 mg PO once daily    > High serum magnesium   > Mg (5/28/2021, on admission to the ARU) = 3.2   > patient reported she was given 3 doses of Milk of magnesia at [facility] prior to discharge to the ARU   > Avoid magnesium-containing medications/supplements      05/31/21  0545 05/30/21  0706 05/28/21  0613   MG 2.6 2.9* 3.2*     > Acute kidney injury superimposed on Stage 3b-4 chronic kidney disease   > BUN/Creatinine (5/28/2021, on admission to the ARU) = 36/2.47   > Retroperitoneal ultrasound (5/28/2021) showed:    > Increased parenchymal echogenicity in both kidneys consistent with medical renal disease. There is a prominent cortical cyst in the mid right kidney. No hydronephrosis or nephrolithiasis.       > Renal cortical thickness is somewhat less than 10 mm in both kidneys.   The left kidney is somewhat small with respect to the right kidney which is low normal in size.   > On 5/29/2021, started IVF: 0.9%  ml.hr x 1 liter once daily after dinner   > PTH (5/31/2021) = 276.1   > Vitamin D 25-Hydroxy (5/31/2021) = 10.1   > Urine microalbumin (5/31/2021) = 15.30   > Microalbumin/Creatinine ratio (5/31/2021) = 165   > Urinalysis (5/31/2021): SG 1.013, protein 30, no casts seen   > Nephrology consult (Dr. Ruth Ann Cisneros) called on 5/31/2021 for evaluation and comanagement   > CK (5/31/2021) = 107   > Urine creatinine (5/31/2021) = 93.00   > Random urine protein (5/31/2021) = 31   > Random urine sodium (5/31/2021) = 52      06/02/21  0445 06/01/21  0430 05/31/21  0545 05/30/21  0706 05/28/21  0613   BUN 35* 37* 36* 37* 36*   CREA 2.24* 2.27* 2.15* 2.25* 2.47*      > On 6/2/2021:    > Discontinued IVF: 0.9%  ml/hr x 1 liter once daily after dinner    > Started Losartan 12.5 mg PO once daily (9AM)      06/05/21  0609 06/04/21  0559 06/03/21  0516   K 4.8 4.9 4.9       06/05/21  0609 06/04/21  0559 06/03/21  0516   BUN 40* 36* 34*   CREA 2.85* 2.45* 2.24*      > On 6/5/2021:    > Discontinued Losartan 12.5 mg PO once daily (9AM)    > Started IVF: 0.9% NS at 75 ml/hr continuous 06/07/21  0555 06/06/21  0616   K 5.0 4.7         06/07/21  0555 06/06/21  0616   BUN 36* 40*   CREA 2.16* 2.41*      > On 6/7/2021, changed IVF from 0.9% NS at 75 ml/hr continuous to 75 ml/hr x 1 liter once daily after dinner      06/09/21  0450 06/08/21  0503   K 4.8 4.9       06/09/21  0450 06/08/21  0503   BUN 30* 34*   CREA 1.95* 2.02*      > On 6/9/2021:    > Discontinue IVF: 0.9% NS at 75 ml/hr x 1 liter once daily after dinner    > Started IVF: 0.45%  ml/hr x 1 liter   > BUN/Creatinine (6/10/2021) = 33/1.83   > K (6/10/2021) = 4.7    > On 6/10/2021, patient was given IVF: 0.45%  ml/hr x 1 liter      06/12/21  0710 06/11/21  0535 06/10/21  0503   K 4.9 4.6 4.7   * 113* 113*       06/12/21  0710 06/11/21  0535 06/10/21  0503   BUN 34* 32* 33*   CREA 1.98* 1.82* 1.83*      > On 6/12/2021, resumed IVF: 0.45%  ml/hr x 1 liter once daily after dinner x 2 doses   > Continue IVF: 0.45%  ml/hr x 1 liter once daily after dinner x 2 doses    > Tobacco use disorder   > On 5/28/2021, started Nicotine 14 mg/24 hr patch, 1 patch on skin once daily   > Continue Nicotine 14 mg/24 hr patch, 1 patch on skin once daily    > Vitamin D Deficiency   > Vitamin D 25-Hydroxy (5/31/2021) = 10.1   > On 5/31/2021, patient was given Cholecalciferol 50,000 units PO x 1 dose    > On 6/1/2021, started Cholecalciferol 5,000 units PO once daily   > Continue Cholecalciferol 5,000 units PO once daily    > Analgesia   > Continue Acetaminophen 650 mg PO q 4 hr PRN for pain     > Diet:   > Specifications: Cardiac   > Solids (consistency): Regular    > Liquids (consistency):  Thin    > Fluid restriction: None      Signed:    Guru Augustin MD    June 13, 2021

## 2021-06-14 VITALS
OXYGEN SATURATION: 96 % | SYSTOLIC BLOOD PRESSURE: 133 MMHG | DIASTOLIC BLOOD PRESSURE: 86 MMHG | WEIGHT: 186 LBS | HEART RATE: 71 BPM | RESPIRATION RATE: 16 BRPM | BODY MASS INDEX: 29.19 KG/M2 | TEMPERATURE: 97 F | HEIGHT: 67 IN

## 2021-06-14 LAB
ALBUMIN SERPL-MCNC: 3 G/DL (ref 3.4–5)
ANION GAP SERPL CALC-SCNC: 5 MMOL/L (ref 3–18)
BUN SERPL-MCNC: 33 MG/DL (ref 7–18)
BUN/CREAT SERPL: 17 (ref 12–20)
CALCIUM SERPL-MCNC: 10.2 MG/DL (ref 8.5–10.1)
CHLORIDE SERPL-SCNC: 114 MMOL/L (ref 100–111)
CO2 SERPL-SCNC: 21 MMOL/L (ref 21–32)
CREAT SERPL-MCNC: 1.94 MG/DL (ref 0.6–1.3)
GLUCOSE SERPL-MCNC: 96 MG/DL (ref 74–99)
HCT VFR BLD AUTO: 39.3 % (ref 35–45)
HGB BLD-MCNC: 12.9 G/DL (ref 12–16)
PHOSPHATE SERPL-MCNC: 3.8 MG/DL (ref 2.5–4.9)
PLATELET # BLD AUTO: 278 K/UL (ref 135–420)
POTASSIUM SERPL-SCNC: 4.4 MMOL/L (ref 3.5–5.5)
SODIUM SERPL-SCNC: 140 MMOL/L (ref 136–145)

## 2021-06-14 PROCEDURE — 2709999900 HC NON-CHARGEABLE SUPPLY

## 2021-06-14 PROCEDURE — 80069 RENAL FUNCTION PANEL: CPT

## 2021-06-14 PROCEDURE — 74011250637 HC RX REV CODE- 250/637: Performed by: INTERNAL MEDICINE

## 2021-06-14 PROCEDURE — 85018 HEMOGLOBIN: CPT

## 2021-06-14 PROCEDURE — 92507 TX SP LANG VOICE COMM INDIV: CPT

## 2021-06-14 PROCEDURE — 97116 GAIT TRAINING THERAPY: CPT

## 2021-06-14 PROCEDURE — 36415 COLL VENOUS BLD VENIPUNCTURE: CPT

## 2021-06-14 PROCEDURE — 97530 THERAPEUTIC ACTIVITIES: CPT

## 2021-06-14 PROCEDURE — 74011250636 HC RX REV CODE- 250/636: Performed by: INTERNAL MEDICINE

## 2021-06-14 PROCEDURE — 85049 AUTOMATED PLATELET COUNT: CPT

## 2021-06-14 PROCEDURE — 99239 HOSP IP/OBS DSCHRG MGMT >30: CPT | Performed by: INTERNAL MEDICINE

## 2021-06-14 RX ORDER — ACETAMINOPHEN 325 MG/1
650 TABLET ORAL
Qty: 30 TABLET | Refills: 0 | Status: SHIPPED | OUTPATIENT
Start: 2021-06-14 | End: 2022-09-02

## 2021-06-14 RX ORDER — CHOLECALCIFEROL (VITAMIN D3) 125 MCG
100 CAPSULE ORAL DAILY
Qty: 30 CAPSULE | Refills: 0 | Status: SHIPPED | OUTPATIENT
Start: 2021-06-15 | End: 2022-09-02

## 2021-06-14 RX ORDER — POLYETHYLENE GLYCOL 3350 17 G/17G
17 POWDER, FOR SOLUTION ORAL DAILY
Qty: 30 PACKET | Refills: 0 | Status: SHIPPED | OUTPATIENT
Start: 2021-06-15 | End: 2021-07-20 | Stop reason: ALTCHOICE

## 2021-06-14 RX ORDER — CLOPIDOGREL BISULFATE 75 MG/1
75 TABLET ORAL
Qty: 30 TABLET | Refills: 0 | Status: SHIPPED | OUTPATIENT
Start: 2021-06-15 | End: 2021-07-20 | Stop reason: SDUPTHER

## 2021-06-14 RX ORDER — LANOLIN ALCOHOL/MO/W.PET/CERES
3 CREAM (GRAM) TOPICAL
Qty: 30 TABLET | Refills: 0 | Status: SHIPPED | OUTPATIENT
Start: 2021-06-14 | End: 2021-06-22 | Stop reason: SDUPTHER

## 2021-06-14 RX ORDER — CHOLECALCIFEROL (VITAMIN D3) 125 MCG
5000 CAPSULE ORAL DAILY
Qty: 30 CAPSULE | Refills: 0 | Status: SHIPPED | OUTPATIENT
Start: 2021-06-15 | End: 2021-07-20 | Stop reason: ALTCHOICE

## 2021-06-14 RX ORDER — CARVEDILOL 12.5 MG/1
12.5 TABLET ORAL 2 TIMES DAILY WITH MEALS
Qty: 60 TABLET | Refills: 0 | Status: SHIPPED | OUTPATIENT
Start: 2021-06-14 | End: 2021-07-20

## 2021-06-14 RX ORDER — ATORVASTATIN CALCIUM 80 MG/1
80 TABLET, FILM COATED ORAL DAILY
Qty: 30 TABLET | Refills: 0 | Status: SHIPPED | OUTPATIENT
Start: 2021-06-15 | End: 2022-06-07 | Stop reason: SDUPTHER

## 2021-06-14 RX ADMIN — Medication 5000 UNITS: at 08:48

## 2021-06-14 RX ADMIN — HEPARIN SODIUM 5000 UNITS: 5000 INJECTION INTRAVENOUS; SUBCUTANEOUS at 05:43

## 2021-06-14 RX ADMIN — CARVEDILOL 12.5 MG: 12.5 TABLET, FILM COATED ORAL at 08:48

## 2021-06-14 RX ADMIN — ACETAMINOPHEN 650 MG: 325 TABLET ORAL at 03:50

## 2021-06-14 RX ADMIN — Medication 100 MG: at 08:48

## 2021-06-14 RX ADMIN — CLOPIDOGREL BISULFATE 75 MG: 75 TABLET ORAL at 08:48

## 2021-06-14 RX ADMIN — LUBIPROSTONE 8 MCG: 8 CAPSULE, GELATIN COATED ORAL at 08:47

## 2021-06-14 RX ADMIN — ATORVASTATIN CALCIUM 80 MG: 40 TABLET, FILM COATED ORAL at 08:48

## 2021-06-14 RX ADMIN — POLYETHYLENE GLYCOL 3350 17 G: 17 POWDER, FOR SOLUTION ORAL at 08:52

## 2021-06-14 RX ADMIN — ACETAMINOPHEN 650 MG: 325 TABLET ORAL at 08:56

## 2021-06-14 NOTE — ROUTINE PROCESS
[facility]    Bedside and Verbal shift change report given to CATY Lo (oncoming nurse) by Abhishek Pal RN (offgoing nurse). Report included the following information SBAR, Kardex, MAR and Recent Results.     Situation:   Code Status: Full Code   Hospital Day: 18   Problem List:   Hospital Problems  Date Reviewed: 6/13/2021        Codes Class Noted POA    Acute renal failure superimposed on stage 3 chronic kidney disease (HCC) ICD-10-CM: N17.9, N18.30  ICD-9-CM: 584.9, 585.3  6/1/2021 Yes        Secondary hyperparathyroidism of renal origin ([facility] (Chronic) ICD-10-CM: N25.81  ICD-9-CM: 588.81  5/31/2021 Yes        Vitamin D deficiency (Chronic) ICD-10-CM: E55.9  ICD-9-CM: 268.9  5/31/2021 Yes    Overview Signed 5/31/2021  1:17 PM by Mayco Cisneros MD     Vitamin D 25-Hydroxy (5/31/2021) = 10.5              High serum magnesium ICD-10-CM: R79.89  ICD-9-CM: 790.99  5/28/2021 Yes        Hypertensive heart and kidney disease without heart failure and with stage 3b chronic kidney disease (HCC) (Chronic) ICD-10-CM: I13.10, N18.32  ICD-9-CM: 404.90, 585.3  Unknown Yes        Mixed hyperlipidemia (Chronic) ICD-10-CM: E78.2  ICD-9-CM: 272.2  Unknown Yes        Constipation (Chronic) ICD-10-CM: K59.00  ICD-9-CM: 564.00  Unknown Yes        Current use of aspirin ICD-10-CM: Z79.82  ICD-9-CM: V58.66  5/23/2021 Yes        On clopidogrel therapy ICD-10-CM: Z79.01  ICD-9-CM: V58.61  5/23/2021 Yes        On statin therapy due to risk of future cardiovascular event ICD-10-CM: Z79.899  ICD-9-CM: V58.69  5/22/2021 Yes    Overview Signed 5/27/2021  2:27 PM by Mayco Cisneros MD     On Atorvastatin             * (Principal) Acute lacunar stroke [facility]) ICD-10-CM: I63.81  ICD-9-CM: 434.91  5/21/2021 Yes    Overview Signed 5/27/2021  2:15 PM by Mayco Cisneros MD     Acute Lacunar Stroke (acute lacunar infarct in the posterior left lentiform nucleus extending into the corona radiata) with residual right hemiparesis             Impaired mobility and ADLs ICD-10-CM: Z74.09, Z78.9  ICD-9-CM: V49.89  5/21/2021 Yes        Hemiparesis of right dominant side due to acute cerebrovascular disease ([facility] ICD-10-CM: I67.89, G81.91  ICD-9-CM: 436, 342.91  5/21/2021 Yes              Background:   Past Medical History:   Past Medical History:   Diagnosis Date    Abnormal mammogram 2014    left with biopsy    [facility] ([facility] 5/21/2021    Acute Lacunar Stroke (acute lacunar infarct in the posterior left lentiform nucleus extending into the corona radiata) with residual right hemiparesis    Bacterial vaginosis 1/9/15    Dr Hollie Brody Constipation     Current use of aspirin 5/23/2021    Gastroesophageal reflux disease 6/30/2016    Hemiparesis of right dominant side due to acute cerebrovascular disease ([facility] 5/21/2021    History of Helicobacter pylori infection 7/24/2015    History of iron deficiency anemia 06/2014    History of vitamin D deficiency 6/11/2015    Hot flashes 1/9/15    Dr Hollie Brody On clopidogrel therapy 5/23/2021    On statin therapy due to risk of future cardiovascular event 5/22/2021    On Atorvastatin    Rheumatoid arthritis ([facility]     Secondary hyperparathyroidism of renal origin ([facility] 5/31/2021    Stage 3b chronic kidney disease ([facility] 7/24/2015    Tobacco use disorder     Vitamin D deficiency 5/31/2021    Vitamin D 25-Hydroxy (5/31/2021) = 10.5         Assessment:   Changes in Assessment throughout shift: No change to previous assessment     Patient has a central line: no Reasons if yes: na  Insertion date:na Last dressing date:na   Patient has Irving Cath: no Reasons if yes: na   Insertion date:na  Shift irving care completed: NO     Last Vitals:     Vitals:    06/13/21 0739 06/13/21 1620 06/13/21 2100 06/14/21 0717   BP: (!) 133/95 (!) 146/90 (!) 141/91 133/86   Pulse: 72 87 77 71   Resp: 18 18 18 16   Temp: 97 °F (36.1 °C) 96.9 °F (36.1 °C) 97.6 °F (36.4 °C) 97 °F (36.1 °C) SpO2: 100% 98% 100% 96%   Weight:       Height:       LMP: 10/15/2014        PAIN    Pain Assessment    Pain Intensity 1: 0 (06/14/21 0433) Pain Intensity 1: 2 (12/29/14 1105)    Pain Location 1: Head Pain Location 1: Abdomen    Pain Intervention(s) 1: Medication (see MAR) Pain Intervention(s) 1: Medication (see MAR)  Patient Stated Pain Goal: 0 Patient Stated Pain Goal: 0  o Intervention effective: yes  o Other actions taken for pain: Medication (see MAR)     Skin Assessment  Skin color    Condition/Temperature    Integrity    Turgor    Weekly Pressure Ulcer Documentation  Pressure  Injury Documentation: No Pressure Injury Noted-Pressure Ulcer Prevention Initiated  Wound Prevention & Protection Methods  Orientation of wound Orientation of Wound Prevention: Posterior  Location of Prevention Location of Wound Prevention: Buttocks, Sacrum/Coccyx  Dressing Present Dressing Present : No  Dressing Status    Wound Offloading Wound Offloading (Prevention Methods): Bed, pressure redistribution/air     INTAKE/OUPUT  Date 06/13/21 0700 - 06/14/21 0659 06/14/21 0700 - 06/15/21 0659   Shift 3905-4378 8784-4888 24 Hour Total 8514-4438 4409-9839 24 Hour Total   INTAKE   P.O. 750  750        P. O. 750  750      Shift Total(mL/kg) 750(8.9)  750(8.9)      OUTPUT   Urine(mL/kg/hr)           Urine Occurrence(s) 2 x 3 x 5 x      Stool           Stool Occurrence(s) 1 x 0 x 1 x      Shift Total(mL/kg)           750      Weight (kg) 84.4 84.4 84.4 84.4 84.4 84.4       Recommendations:  1. Patient needs and requests: toileting    2. Pending tests/procedures: labs as ordered     3. Functional Level/Equipment: assist x 1/ wheelchair    Fall Precautions:   Fall risk precautions were reinforced with the patient; she was instructed to call for help prior to getting up.  The following fall risk precautions were continued: bed/ chair alarms, door signage, yellow bracelet and socks as well as update of the [place] tool in the patient's room.   Edith Fletcheron Score: 4    HEALS Safety Check    A safety check occurred in the patient's room between off going nurse and oncoming nurse listed above. The safety check included the below items  Area Items   H  High Alert Medications - Verify all high alert medication drips (heparin, PCA, etc.)   E  Equipment - Suction is set up for ALL patients (with blessing)  - Red plugs utilized for all equipment (IV pumps, etc.)  - WOWs wiped down at end of shift.  - Room stocked with oxygen, suction, and other unit-specific supplies   A  Alarms - Bed alarm is set for fall risk patients  - Ensure chair alarm is in place and activated if patient is up in a chair   L  Lines - Check IV for any infiltration  - Rollins bag is empty if patient has a Rollins   - Tubing and IV bags are labeled   S  Safety   - Room is clean, patient is clean, and equipment is clean. - Hallways are clear from equipment besides carts. - Fall bracelet on for fall risk patients  - Ensure room is clear and free of clutter  - Suction is set up for ALL patients (with blessing)  - Hallways are clear from equipment besides carts.    - Isolation precautions followed, supplies available outside room, sign posted     Matt Ling, RN

## 2021-06-14 NOTE — FACE TO FACE
[facility] PHYSICAL [address], [address]    [address] [address]    Name: Cori Moore Age / Sex: 62 y.o. / female   CSN: [id number] MRN: [id number]   [facility] Date: 5/27/2021 Discharge Date: 6/14/2021      Primary Care Provider: None      Primary Rehabilitation Diagnosis  1. Impaired Mobility and ADLs  2. Acute Lacunar Stroke (acute lacunar infarct in the posterior left lentiform nucleus extending into the corona radiata) with residual right hemiparesis    Comorbidities  Patient Active Problem List   Diagnosis Code    Rheumatoid arthritis ([facility] M06.9    History of vitamin D deficiency Z86.39    Cocaine use F14.90    Stage 3b chronic kidney disease (HCC) N18.32    History of Helicobacter pylori infection Z86.19    Abscess of finger L02.519    Gastroesophageal reflux disease K21.9    Acute lacunar stroke (HCC) I63.81    Impaired mobility and ADLs Z74.09, Z78.9    Hemiparesis of right dominant side due to acute cerebrovascular disease (HCC) I67.89, G81.91    Tobacco use disorder F17.200    Current use of aspirin Z79.82    On clopidogrel therapy Z79.01    Hypertensive heart and kidney disease without heart failure and with stage 3b chronic kidney disease (HCC) I13.10, N18.32    Mixed hyperlipidemia E78.2    On statin therapy due to risk of future cardiovascular event Z79.899    Constipation K59.00    History of iron deficiency anemia Z86.2    High serum magnesium R79.89    Secondary hyperparathyroidism of renal origin ([facility] N25.81    Vitamin D deficiency E55.9    Acute renal failure superimposed on stage 3 chronic kidney disease (HCC) N17.9, N18.30        History of the Present Illness:  The patient is a 67-year-old, right-handed, Black female with multiple medical comorbidities who was admitted to [facility] on 5/21/2021 due to recurrent falls due to right-sided weakness.      On 5/21/2021, the patient was brought to the [facility] Emergency Department for further evaluation of recurrent falls due to right-sided weakness. The patient was seen and examined by Emergency Medicine (Dr. Gimenez).     Excerpt (HPI and Neurological) from the ED Provider Note by Dr. Gimenez:  \"HPI Irma Bernheim is a 62 y.o. female with PMHx HTN presents with RT sided weakness starting at 10am. She has had right arm and leg weakness that is made it difficult for her to walk, She has also felt like her face has been twisted. She feels like her symptoms have worsened since they started. She was driven to the hospital by family but had difficulty getting out of the car due to her right leg weakness. She denied any fever, chills, chest pain, shortness of breath, abdominal pain, nausea, vomiting.     Neurological:    Mental status: She is alert and oriented to person, place and time. Comments: RT leg and RT leg weakness that drifts to hit the bed  No appreciable facial droop or dysarthria  No aphasia\"     CT scan of the head (5/21/2021) showed no acute intracranial abnormality; moderate sequela of chronic small vessel ischemic disease     Acute Stroke Alert TeleNeurology (Dr. Meliton Adamson) evaluated the patient and was not deemed to be a candidate for intravenous Alteplase because last known normal >4.5 hr.     CT angiogram of the head and neck (5/21/2021) showed:  1. Moderate stenosis in the inferior M2 division of the left MCA but with patent flow beyond the stenosis. 2. Otherwise patent intracranial circulation. No large vessel occlusion. No other significant intracranial stenosis. 3. Patent bilateral cervical carotid and vertebral arteries. No ICA origin stenosis. 4. 2 mm enhancing probable outpouching at the anterior lateral left cavernous ICA. Does not appear calcified on noncontrast CT images.  Could represent a tiny extradural cavernous ICA aneurysm. 5. Soft tissue findings remarkable for a small gas collection right lateral to the trachea and esophagus at the thoracic inlet consistent with a small tracheal diverticulum. Subcentimeter heterogenous right thyroid nodule.     Interventional Neurology consult (Dr. Aime Curiel) was called for evaluation and comanagement. Patient was not deemed to be a candidate for mechanical thrombectomy due to the non-occlusive nature of the thrombus and being outside the typical 6 hour, NIHSS 6 CTA guidelines for mechanical thrombectomy.     Patient was started on Aspirin 325 mg PO once daily and Atorvastatin 40 mg PO once daily. Atenolol was held due to bradycardia. Amlodipine 10 mg PO once daily was continued.     WBC count (5/21/2021) = 6.1  Hgb/Hct (5/21/2021) = 16.5/50.0  BUN/Creatinine (5/21/2021) = 23/2.2     The patient was admitted under the service of the [address] [place] [address] (Dr. Cj Morelos).     Excerpt (Subjective and Neuro) from the H&P by Dr. Cj Morelos:  Charisma Bucio is a 62 y.o. female with a past medical history significant for hypertension who presented to the emergency department with complaints of increasing falls throughout the day as well as right-sided weakness. Patient reports she was in her usual state of health until 10:00 this a.m. She attempted to go upstairs and noted that it felt as though her feet had fallen from under her. She has had several episodes of falls today. She denies any head trauma. She has had not any associated visual changes, no shortness of breath or sputum production. She denies any fever or chills. No prior history of CVA. Sandor Kick She had noted some slight slurring of he speech as well. Denies any dysphagia. Family at bedside state that F are the patient's second fall today, she was transported to the emergency department. Code stroke was initiated and initial CT imaging was negative.  CTA was also performed that showed some blood vessel narrowing, however interventional neuroradiology did not feel patient was a candidate for any therapy. Neurology recommended continued hospitalization for work up. Patient states that she does not take aspirin therapy at home. She has been referred to the hospitalist service for further evaluation and management.     Neuro: alert oriented, affect appropriate, new focal weakness is present Right upper and lower extremity 4/5 strength and Speech is dysarthric\"     Unfractionated heparin and SCDs were used for DVT prophylaxis.     Aspirin dose was decreased from 325 mg to 81 mg PO once daily. Clopidogrel 75 mg PO once daily was added.     MRI of the brain showed:  1. Acute lacunar infarct in the posterior left lentiform nucleus extending into the corona radiata. 2. Underlying moderate chronic microvascular ischemic changes in the white matter. 3. Incidental 2 cm left anterior frontal convexity arachnoid cyst.     MRA of the head and neck showed:  1. Compared with the CTA head and neck from 4 hours earlier no significant interval change. 2. Up to moderate narrowing in the left MCA inferior M2 division with patent flow beyond the stenosis unchanged from the CTA. 3. A 2 mm outpouching visible at the anterior lateral cavernous left ICA suspicious for extradural cavernous ICA aneurysm on CTA is not seen on this exam.   -This outpouching is a uncertain significance.  Could be a crossing vein rather than aneurysm given the nonvisualization on this exam.  -These extradural aneurysms are typically asymptomatic with no risk of subarachnoid hemorrhage.     2D echocardiogram showed EF 65%; moderate concentric LV hypertrophy; normal LV diastolic function; no hemodynamically significant valvular disease; bubble study was performed and was negative for shunt.     Physical Medicine and Rehabilitation consult (Dr. Roxann Mccann) was called on 5/24/2021 for evaluation of rehabilitation needs.     The patient had remained hemodynamically stable but due to the abovementioned neurologic deficit, the patient was noted to have impaired mobility and ADLs. Patient was felt to be a good candidate for acute inpatient rehabilitation. Upon evaluation by Physical Therapy and Occupational Therapy, the patient was recommended for acute inpatient rehabilitation. The patient was discharged and was subsequently admitted to the [facility] for Physical Rehabilitation for intensive rehabilitation to help recover strength, function and mobility.       Past Medical History:  Past Medical History:   Diagnosis Date    Abnormal mammogram 2014    left with biopsy    Acute lacunar stroke ([address] 5/21/2021    Acute Lacunar Stroke (acute lacunar infarct in the posterior left lentiform nucleus extending into the corona radiata) with residual right hemiparesis    Bacterial vaginosis 1/9/15    Dr Anca Cyr Constipation     Current use of aspirin 5/23/2021    Gastroesophageal reflux disease 6/30/2016    Hemiparesis of right dominant side due to acute cerebrovascular disease ([address] 5/21/2021    History of Helicobacter pylori infection 7/24/2015    History of iron deficiency anemia 06/2014    History of vitamin D deficiency 6/11/2015    Hot flashes 1/9/15    Dr nAca Cyr On clopidogrel therapy 5/23/2021    On statin therapy due to risk of future cardiovascular event 5/22/2021    On Atorvastatin    Rheumatoid arthritis ([address]     Secondary hyperparathyroidism of renal origin ([address] 5/31/2021    Stage 3b chronic kidney disease ([address] 7/24/2015    Tobacco use disorder     Vitamin D deficiency 5/31/2021    Vitamin D 25-Hydroxy (5/31/2021) = 10.5        Past Surgical History:  Past Surgical History:   Procedure Laterality Date    HX LAPAROSCOPIC SUPRACERVICAL HYSTERECTOMY  11/2014    Dr. Inge Tellez, GYN    HX SALPINGO-OOPHORECTOMY  11/2014       Medications on Discharge:    Current Discharge Medication List      START taking these medications    Details acetaminophen (TYLENOL) 325 mg tablet Take 2 Tablets by mouth every four (4) hours as needed (for fever or pain). Indications: fever, pain  Qty: 30 Tablet, Refills: 0  Start date: 6/14/2021    Associated Diagnoses: Acute lacunar stroke (HCC)      atorvastatin (LIPITOR) 80 mg tablet Take 1 Tablet by mouth daily. Indications: excessive fat in the blood, stroke prevention  Qty: 30 Tablet, Refills: 0  Start date: 6/15/2021    Associated Diagnoses: Acute [facility] stroke ([facility] Mixed hyperlipidemia; On statin therapy due to risk of future cardiovascular event      carvediloL (COREG) 12.5 mg tablet Take 1 Tablet by mouth two (2) times daily (with meals). Indications: high blood pressure  Qty: 60 Tablet, Refills: 0  Start date: 6/14/2021    Associated Diagnoses: Hypertensive heart and kidney disease without heart failure and with stage 3b chronic kidney disease (HCC)      cholecalciferol (VITAMIN D3) (5000 Units /125 mcg) capsule Take 1 Capsule by mouth daily. Indications: low vitamin D levels  Qty: 30 Capsule, Refills: 0  Start date: 6/15/2021    Associated Diagnoses: History of vitamin D deficiency; Vitamin D deficiency      clopidogreL (PLAVIX) 75 mg tab Take 1 Tablet by mouth daily (with breakfast). Indications: prevention for a blood clot going to the brain  Qty: 30 Tablet, Refills: 0  Start date: 6/15/2021    Associated Diagnoses: Acute lacunar stroke ([facility] On clopidogrel therapy      co-enzyme Q-10 (CO Q-10) 100 mg capsule Take 1 Capsule by mouth daily. Qty: 30 Capsule, Refills: 0  Start date: 6/15/2021    Associated Diagnoses: On statin therapy due to risk of future cardiovascular event      melatonin 3 mg tablet Take 1 Tablet by mouth nightly. Indications: for stroke recovery  Qty: 30 Tablet, Refills: 0  Start date: 6/14/2021    Associated Diagnoses: Acute lacunar stroke (HCC)      polyethylene glycol (MIRALAX) 17 gram packet Take 1 Packet by mouth daily.  Indications: constipation  Qty: 30 Packet, Refills: 0  Start date: 6/15/2021    Associated Diagnoses: Constipation, unspecified constipation type         STOP taking these medications       amLODIPine (Norvasc) 10 mg tablet Comments:   Reason for Stopping:         atenoloL (TENORMIN) 25 mg tablet Comments:   Reason for Stopping:         meloxicam (MOBIC) 7.5 mg tablet Comments:   Reason for Stopping:               Condition on Discharge: Stable. Ambulation Gait  Amount of Assistance: 4 (Contact guard assistance)  Distance (ft): 150 Feet (ft)  Assistive Device: Cane, quad, Gait belt     Wheelchair Mobility Wheelchair Mobility/Management  Able to Propel (ft): 72 feet  Functional Level:  (Supervision)  Curbs/Ramps Assist Required (FIM Score): 0 (Not tested)  Wheelchair Setup Assist Required : 5 (Stand-by assistance)  Wheelchair Management: Manages left brake, Manages right brake         Disposition: Patient clinically improved and was discharged to home with home health physical therapy, occupational therapy and skilled nursing. The patient is temporarily homebound secondary to functional deficits (residual right hemiparesis) due to an Acute Lacunar Stroke (acute lacunar infarct in the posterior left lentiform nucleus extending into the corona radiata). The patient can ambulate using a quad cane (see above). The patient would benefit from continued skilled physical therapy in order to improve independent functional mobility within the home with use of least restrictive device. The patient would also benefit from continued skilled occupational therapy in order to improve self care and functional mobility within the home with use of least restrictive device. Short-term skilled nursing is needed for medication reconciliation and disease education. Due to the abovementioned data, I certify that the patient needs intermittent Skilled Nursing, Physical Therapy and Occupational Therapy.      I will NOT be following this patient in the Community and Dr. Tiffany Mckinley will be responsible for signing the [address] of Care. In compliance with the Affordable Care Act, I certify that this patient was managed by me during this hospitalization and that I had a Face-to-Face Encounter that meets the physician Face-to-Face Encounter requirements.       Signed:    Sarah Stewart MD    June 14, 2021

## 2021-06-14 NOTE — PROGRESS NOTES
DHARMESH communicated with the treatment team. Pt was established a new provider appt for 6/22/21 at 10:30 am with NP Britney Castillo with [employer] at their [place] location. Sw contacted pt daughter and provider her with an update, and details of the appt. DHARMESH updated Lesvia with [facility]. DHARMESH will continue to follow.        Uriah Beck MSW, LCSW, CCM  Doctoral Candidate, Doctor of Social Work

## 2021-06-14 NOTE — HOME CARE
Discharge noted for today. Patient will need a PCP to process home health referral. Samuel Jamison notified. [phone] This writer called and eft message to obtain update on PCP status. Awaiting call back from [place] [id number] with Buzz Bates LPN, made aware patient needs PCP for Home health to process referral.     [phone] Spoke with Samuel Jamison, PCP scheduled with Antonia Ann NP on 6/22/21 at 1030 am. Orders noted and arranged through central intake.        Josafat White RN  Home Health Intake/Liaison

## 2021-06-14 NOTE — DISCHARGE INSTRUCTIONS
DISCHARGE SUMMARY from [facility] [facility]    After general anesthesia or intravenous sedation, for 24 hours or while taking prescription Narcotics:  · Limit your activities  · Do not drive and operate hazardous machinery  · Do not make important personal or business decisions  · Do  not drink alcoholic beverages  · If you have not urinated within 8 hours after discharge, please contact your surgeon on call. Report the following to your surgeon:  · Excessive pain, swelling, redness or odor of or around the surgical area  · Temperature over 100.5  · Nausea and vomiting lasting longer than 4 hours or if unable to take medications  · Any signs of decreased circulation or nerve impairment to extremity: change in color, persistent  numbness, tingling, coldness or increase pain  · Any questions    What to do at Home:      *  Please give a list of your current medications to your Primary Care Provider. *  Please update this list whenever your medications are discontinued, doses are      changed, or new medications (including over-the-counter products) are added. *  Please carry medication information at all times in case of emergency situations. These are general instructions for a healthy lifestyle:    No smoking/ No tobacco products/ Avoid exposure to second hand smoke  Surgeon General's Warning:  Quitting smoking now greatly reduces serious risk to your health. Obesity, smoking, and sedentary lifestyle greatly increases your risk for illness    A healthy diet, regular physical exercise & weight monitoring are important for maintaining a healthy lifestyle    You may be retaining fluid if you have a history of heart failure or if you experience any of the following symptoms:  Weight gain of 3 pounds or more overnight or 5 pounds in a week, increased swelling in our hands or feet or shortness of breath while lying flat in bed.   Please call your doctor as soon as you notice any of these symptoms; do not wait until your next office visit. The discharge information has been reviewed with the patient. The patient verbalized understanding. Discharge medications reviewed with the patient and appropriate educational materials and side effects teaching were provided. Patient armband removed and shredded  MyChart Activation    Thank you for requesting access to [employer]. Please follow the instructions below to securely access and download your online medical record. [employer] allows you to send messages to your doctor, view your test results, renew your prescriptions, schedule appointments, and more. How Do I Sign Up? 1. In your internet browser, go to [email or web address]  2. Click on the First Time User? Click Here link in the Sign In box. You will be redirect to the New Member Sign Up page. 3. Enter your [employer] Access Code exactly as it appears below. You will not need to use this code after youve completed the sign-up process. If you do not sign up before the expiration date, you must request a new code. [employer] Access Code: [email or web address]  Expires: 2021  2:10 PM (This is the date your [facility] access code will )    4. Enter the last four digits of your Social Security Number (xxxx) and Date of Birth (mm/dd/yyyy) as indicated and click Submit. You will be taken to the next sign-up page. 5. Create a [facility] ID. This will be your [employer] login ID and cannot be changed, so think of one that is secure and easy to remember. 6. Create a [facility] password. You can change your password at any time. 7. Enter your Password Reset Question and Answer. This can be used at a later time if you forget your password. 8. Enter your e-mail address. You will receive e-mail notification when new information is available in [address] 9. Click Sign Up. You can now view and download portions of your medical record.   10. Click the Download Summary menu link to download a portable copy of your medical information. Additional Information    If you have questions, please visit the Frequently Asked Questions section of the [facility] [facility] website at [email or web address] Remember, [facility] is NOT to be used for urgent needs. For medical emergencies, dial 911.      ___________________________________________________________________________________________________________________________________    ------------------------------------------------------------------------------------------------------------    DISCHARGE INSTRUCTIONS    1. Make sure that when you request refills at the pharmacy that the refill requests are sent to your PCP (NOT to the prescriber at the [facility]) to avoid any delays in getting your medication refills. The physician at the [facility] for 2021 [facility] will not able to order medications or refills after discharge -- Please understand that though we would like to help, it is simply not safe for our physician to order you a medication that we cannot monitor. 2. If any of the prescribed medications require a PRIOR AUTHORIZATION, contact your Primary Care Physician or specialist to EITHER complete prior authorizations and paperwork on your behalf OR prescribe an alternative medication. -- Please understand that though we would like to help, it is simply not safe for our physician to order you a medication that we cannot monitor. 3. If any of your prescriptions medications PRIOR TO ADMISSION TO [facility] needs a refill (and either the dosage, frequency or instructions was not changed), kindly contact your Primary Care Physician for refills.     -------------------------------------------------------------------------------------------------------------------

## 2021-06-14 NOTE — PROGRESS NOTES
Problem: Neurolinguistics Impaired (Adult)  Goal: *Speech Goal: (INSERT TEXT)  Description: Long term goals  Patient will:  1. Be oriented x 3 and recall events of the day, supervision. 2. Recall 3 words after 5 minutes, supervision. 3. Name 10-12 items within categories of increasing complexity, supervision. 4. Perform varied word finding tasks with 90% accuracy. 5. Perform problem solving/reasoning tasks with 90% accuracy. 6. Perform basic mathematical calculations, 90% accuracy. Short term goals (by 6/17/21)  Patient will:  1. Be oriented x 3 and recall events of the day, supervision. 2. Recall 3 words after 5 minutes, supervision. 3. Name 10-12 items within concrete categories, supervision. 4. Perform varied word finding tasks with 90% accuracy. 5. Perform problem solving/reasoning tasks with 90% accuracy. 6. Perform basic mathematical calculations, 80% accuracy. Outcome: Resolved/Not Met  Note:   Speech language pathology treatment    Patient: Maricel Allison (73 y.o. female)  Date: 6/14/2021  Diagnosis: Acute lacunar stroke ([address] [I63.81] Acute lacunar stroke ([address]       Time in: 0930  Time Out:  1000  SUBJECTIVE:   Patient stated It's been nice working with you. OBJECTIVE:   Mental Status:  Ms. Leyla Rueda was awake, alert and conversant this morning. She is already expressing things that she wants to do as soon as she gets home. Treatment & Interventions:   Patient was seen for a thirty minute session this morning with her daughter and significant other present.   The following treatment tasks were presented:  Neuro-Linguistics:   Orientation:  Supervision  Recent memory: Independent  Recall 3 words: Supervision  ID deciding factors: 82% accuracy  Name 1 more:  70% accuracy within categories  Functional math: 69% accuracy  \"Round Things\": Patient listed 6     4 more with cues from the therapist    Response & Tolerance to Activities:  Ms. Leyla Rueda has been engaged and motivated throughout her stay on the ARU. She has made very good progress. Pain:  Pain Scale 1: Numeric (0 - 10)  Pain Orientation 1: Anterior  Pain Description 1: Aching  After treatment:   [x]       Patient left in no apparent distress sitting up in chair  []       Patient left in no apparent distress in bed  [x]       Call bell left within reach  []       Nursing notified  [x]       Caregiver present  []       Bed alarm activated    ASSESSMENT:   Progression toward goals:  []       Improving appropriately and progressing toward goals  [x]       Improving slowly and progressing toward goals  []       Not making progress toward goals and plan of care will be adjusted    PLAN:   Patient continues to benefit from skilled intervention to address the above impairments. Continue treatment per established plan of care. Discharge Recommendations:  Home Health    Estimated LOS: Patient is to be discharged to home later today.     MAGGIE Garcia  Time Calculation: 30 mins

## 2021-06-14 NOTE — PROGRESS NOTES
Problem: Mobility Impaired (Adult and Pediatric)  Goal: *Therapy Goal (Edit Goal, Insert Text)  Description: Physical Therapy Short Term Goals  Initiated 5/28/2021, re-assessed 6/14/2021 for d/c on 6/14/2021  1. Patient will move from supine to sit and sit to supine , scoot up and down, and roll side to side in bed with supervision/set-up. (MET 6/4/2021)  2. Patient will transfer from bed to chair and chair to bed with minimal assistance/contact guard assist using the least restrictive device. (MET 6/4/2021)  Updated goal: Patient will transfer from bed to chair and chair to bed with standby assist using the least restrictive device. (CGA with [facility])  3.  Patient will perform sit to stand with minimal assistance/contact guard assist.(MET 6/4/2021)  Updated goal: Patient will perform sit to stand with standby assistance. (CGA)  4. Patient will ambulate with minimal assistance/contact guard assist for 50 feet with the least restrictive device. (MET 6/4/2021)  Updated goal: Patient will ambulate with SBA for 150 ft with the least restrictive AD with cues for neutral knee extension with loading/midstance phase of gait <25% of the time.(min/mod v/c)  5. Patient will ascend/descend 5 stairs with 1 handrail(s) with moderate assistance . (4 steps with BHR and min assist)  Updated goal: Patient will ascend/descend 5 stairs with 1 handrail with CGA. (min assist)    Physical Therapy Long Term Goals  Initiated 5/28/2021 and to be accomplished within 21 day(s) on 6/18/2021  1. Patient will move from supine to sit and sit to supine , scoot up and down, and roll side to side in bed with modified independence. 2.  Patient will transfer from bed to chair and chair to bed with modified independence using the least restrictive device. 3.  Patient will perform sit to stand with modified independence. 4.  Patient will ambulate with modified independence for 150 feet with the least restrictive device.    5.  Patient will ascend/descend 12 stairs with 1 handrail(s) with contact guard assist/standby assistance. Outcome: Progressing Towards Goal   PHYSICAL THERAPY DISCHARGE NOTE    Patient: Anuja Jean (89 y.o. female)  Date: 2021  Diagnosis: Acute lacunar stroke [facility]) [I63.81] Acute lacunar stroke [facility])  Precautions: Fall, Skin  Chart, physical therapy assessment, plan of care and goals were reviewed. Time in:1035  Time out:1105    Patient seen for: Transfer training;Gait training;Family training      Patient identified with name and : yes     SUBJECTIVE:     Patient stated:\"I can't wait to start driving again. \" pt educated on need to be cleared for driving by neurologist.     OBJECTIVE DATA SUMMARY:     GROSS ASSESSMENT Discharge Assessment 2021   AROM Generally decreased, functional   Strength Generally decreased, functional   Coordination Generally decreased, functional   Tone Abnormal   Sensation Impaired   PROM Within functional limits       POSTURE Discharge Assessment 2021   Posture (WDL) Exceptions to [facility]   Posture Assessment Forward head;Genu recurvatum right;Rounded shoulders;Trunk flexion           BALANCE Discharge Assessment 2021    Sitting - Static: Good (unsupported)  Sitting - Dynamic: Good (unsupported)  Standing - Static: Fair  Standing - Dynamic : Impaired       BED/CHAIR/WHEELCHAIR TRANSFERS Initial Assessment Discharge Assessment   Rolling Right 5 (Stand-by assistance) 6 (Modified independent)   Rolling Left 5 (Stand-by assistance) 6 (Modified independent)   Supine to Sit 5 (Stand-by assistance) (cue for not holding her breath) 5 (Supervision)   Sit to Stand Moderate assistance Stand-by assistance   Sit to Supine  (SBA) 6 (Modified independent)   Transfer Assistance Level 3 (Moderate assistance ) 5 (Stand-by assistance)   Transfer Type Other Other   Comments  Performing bed mobility using mat table.      Performing stand step transfers without AD as per PLOF, needing moderate assistance and blocking of right knee for sit to stand and stand step transfer, assist with weightshifting appropriately. Using RW, needing moderate support with assist at right hand to  RW appropriately (may benefit from handle splint)  Pt performed sit to stand from w/c with B hands on distal femurs to decrease dependence on BUE with CGA for anterior wt shift to decrease rocking/launching fwd. Pt performed stand step txfr with [facility] and CGA and v/c for slowing and sequencing for safety. Car Transfer Moderate assistance (using RW)needing steadying support and assist with right hand  Minimum assistance for sit to stand from low surface   Car Type car transfer simulator car txfr simulator       WHEELCHAIR MOBILITY/MANAGEMENT Initial Assessment Discharge Assessment   Able to [employer] 70 feet 72 feet   Assistance Level 2Patient performing w/c mobility with mod A for steering. Performing with rosalba-technique. Supervision, using BLE and left UE   Curbs/ramps assistance required  (mod A for steering, using rosalba-technique) 0 (Not tested)   Wheelchair set up assistance required 2 (Maximal assistance) 5 (Stand-by assistance)   Wheelchair management Manages left brake, Manages right brake (extra time, cues) Manages left brake;Manages right brake       WALKING INDEPENDENCE Initial Assessment Discharge Assessment   Assistive device  (no AD as per PLOF; trialed RW for 38 ft mod A) Cane, quad;Gait belt   Ambulation assistance - level surface 2 (Maximal assistance) (maximal trunk support, blocking right LE) 4 (Contact guard assistance)   Distance 1 Feet (ft) 150 Feet (ft)   Comments  Max A with gait without AD with patient reporting feeling \"dizzy\" with attempt. Vitals monitored (see flowsheet), but patient did not have significant orthostatic changes observed from sitting to standing.  Patient also performing gait with RW and needing assistance with right hand  (would benefit from trial of handle splint to facilitate ), able to gait for 38 ft but very narrow VLADIMIR observed with increased path deviations requiring therapist to manage RW. Pt ambulated 150ft with SBQC and CGA for safety and min/mod v/c for erect posture, right foot clearance and controlling right knee hyperextension. Pt ambulated 60ft with daughter present for family education. Ambulation assistance - unlevel surface  (NT due to safety concern)  NT       GAIT Discharge Assessment 6/14/2021   Gait Description (WDL) Exceptions to WDL   Gait Abnormalities Decreased step clearance; Hemiplegic; Step to gait       STEPS/STAIRS Initial Assessment Discharge Assessment   Steps/Stairs ambulated 2 (Maximal assistance) (lifting/lowering assistance required) 4 (6\" steps with )   Rail Use Both (therapist assisting at right UE to ) Right  (SBQC on left )   Assistance Level    Min assist    Comments    Pt negotiated up and down 4 6\" steps with right HR and SBQC on left with min assist for balance, performing step to pattern. Curbs/Ramps  (NT due to safety concern)  NT       ASSESSMENT:  Pt met initial STGs but has not met updated STGs. Pt continues to require CGA for safety with txfrs using SBQC and for anterior wt shift with sit to stand to limit launching fwd. LTGs: not met     PLAN:  Pt would benefit from continued skilled physical therapy in order to improve independent functional mobility at home health level. Interventions may include range of motion (AROM, PROM B LE/trunk), motor function (B LE/trunk strengthening/coordination), activity tolerance (vitals, oxygen saturation levels), bed mobility training, balance activities, gait training (progressive ambulation program), and functional transfer training. Discharge Recommendations:  Home Health  Further Equipment Recommendations for Discharge:  quad cane and w/c        Activity Tolerance:   Fair+  Please refer to the flowsheet for vital signs taken during this treatment.   After treatment:   [x] Patient left in no apparent distress in bed  [] Patient left in no apparent distress sitting up in chair  [] Patient left in no apparent distress in w/c mobilizing under own power  [] Patient left in no apparent distress dining area  [] Patient left in no apparent distress mobilizing under own power  [] Call bell left within reach  [] Nursing notified  [] Caregiver present  [x] Bed alarm activated   [] Chair alarm activated      Jhony Reyna, PTA  6/14/2021

## 2021-06-14 NOTE — PROGRESS NOTES
Problem: Self Care Deficits Care Plan (Adult)  Goal: *Acute Goals and Plan of Care (Insert Text)  Description: Occupational Therapy Goals   Long Term Goals  Initiated 5/28/2021 and to be accomplished within 4 week(s) 6/25/2021    1. Patient will perform grooming with modified independence using adaptive equipment as needed. - met goal while seated w/c level, goal not met while in stance w/ QC  2. Pt will perform UB bathing with Mod I. - goal met  3. Pt will perform LB bathing with Mod I. - goal not met  4. Pt will perform tub/shower transfer with Mod I using DME. - goal not met  5. Pt will perform UB dressing with Mod I. - goal not met  6. Patient will perform upper body dressing and lower body dressing with modified independence. - goal not met  7. Patient will perform all aspects of toileting with modified independence. - goal not met  8. Patient will perform toilet transfers with modified independence using RW. - goal not met      Short Term Goals   Initiated 5/28/2021 and to be accomplished within 7 day(s) 6/3/2021    1. Pt will perform grooming with Supv using adaptive equipment as needed. - met goal, d/c  goal  2. Pt will perform UB bathing with Supv. - met goal, upgrade to Mod I, progressing 6/10/21  3. Pt will perform LB bathing with Supv. - progressing, 6/3/2021, progressing 6/10/21  4. Pt will perform tub/shower transfer with SBA using DME. - progressing, 6/3/2021, progressing 6/10/21  5. Pt will perform UB dressing with Supv. - met goal, upgrade to Mod I, progressing 6/10/21  6. Pt will perform LB dressing with Supv. - progressing, 6/3/2021, progressing 6/10/21  7. Pt will perform toileting task with SBA. - progressing, 6/3/2021, progressing 6/10/21  8.  Pt will perform toilet transfer with SBA using RW. - progressing, 6/3/2021, progressing 6/10/21    Outcome: Resolved/Met  OCCUPATIONAL THERAPY DISCHARGE    Patient: Emerita Lamar (16 y.o. female)  Date: 6/14/2021    Primary Diagnosis: Acute lacunar stroke (HCC) [I63.81] Acute lacunar stroke [facility])    Precautions:  Fall, Skin    Barriers to Learning/Limitations: None  Compensate with: visual, verbal, tactile, kinesthetic cues/model     Patient identified with name and : No tx provided this date    SUBJECTIVE:   Patient stated: n/a No tx provided this date    OBJECTIVE DATA SUMMARY:     Past Medical History:   Diagnosis Date    Abnormal mammogram     left with biopsy    Acute lacunar stroke ([facility] [address] 2021    Acute Lacunar Stroke (acute lacunar infarct in the posterior left lentiform nucleus extending into the corona radiata) with residual right hemiparesis    Bacterial vaginosis 1/9/15    Dr Danial Campos Constipation     Current use of aspirin 2021    Gastroesophageal reflux disease 2016    Hemiparesis of right dominant side due to acute cerebrovascular disease ([facility] [place] [address] 2021    History of Helicobacter pylori infection 2015    History of iron deficiency anemia 2014    History of vitamin D deficiency 2015    Hot flashes 1/9/15    Dr Danial Campos On clopidogrel therapy 2021    On statin therapy due to risk of future cardiovascular event 2021    On Atorvastatin    Rheumatoid arthritis ([facility] [place] [address]     Secondary hyperparathyroidism of renal origin ([facility] [place] [address] 2021    Stage 3b chronic kidney disease ([facility] [place] [address] 2015    Tobacco use disorder     Vitamin D deficiency 2021    Vitamin D 25-Hydroxy (2021) = 10.5      Past Surgical History:   Procedure Laterality Date    HX LAPAROSCOPIC SUPRACERVICAL HYSTERECTOMY  2014    Dr. Sarwat Nelson, GYN    HX SALPINGO-OOPHORECTOMY  2014     Prior Level of Function/Home Situation:   Home Situation  Home Environment: Private residence  # Steps to Enter: 5  Rails to Enter: Yes  Hand Rails : Bilateral (close enough to hold onto simultaneously)  One/Two Story Residence: Two story  # of Interior Steps: 9  Interior Rails: Right (right side going up stairs)  Living Alone: No  Support Systems: Spouse/Significant Other/Partner, Family member(s)  Patient Expects to be Discharged to[de-identified] Private residence  Current DME Used/Available at Home: None  Tub or Shower Type: Shower  [x]     Right hand dominant   []     Left hand dominant      Pain: n/a no tx provided this date    Problem List:    Decreased strength B UE  [x]     Decreased strength trunk/core  [x]     Decreased AROM   [x]     Decreased PROM  [x]     Decreased balance sitting  [x]     Decreased balance standing  [x]     Decreased endurance  [x]     Pain  []       Functional Limitations:   Decreased independence with ADL  [x]     Decreased independence with functional transfers  [x]     Decreased independence with ambulation  [x]     Decreased independence with IADL  [x]       Outcome Measures:      MMT Initial Assessment   Right Upper Extremity  Left Upper Extremity    UE AROM WFL, b/l hands/digits with chronic arthritic changes d/t RA  WFL, b/l hands/digits with chronic arthritic changes d/t RA    Shoulder flexion     Shoulder extension     Shoulder ABDuction     Shoulder ADDUction     Elbow Flexion     Elbow Extension     Wrist Extension/Flexion                   MMT Discharge Assessment   Right Upper Extremity  Left Upper Extremity    UE AROM WFL 4/5  , b/l hands/digits with chronic arthritic changes d/t RA  WFL 5/5   b/l hands/digits with chronic arthritic changes d/t RA    Shoulder flexion     Shoulder extension     Shoulder ABDuction     Shoulder ADDUction     Elbow Flexion     Elbow Extension     Wrist Extension/Flexion              0/5 No palpable muscle contraction  1/5 Palpable muscle contraction, no joint movement  2-/5 Less than full range of motion in gravity eliminated position  2/5 Able to complete full range of motion in gravity eliminated position  2+/5 Able to initiate movement against gravity  3-/5 More than half but not full range of motion against gravity  3/5 Able to complete full range of motion against gravity  3+/5 Completes full range of motion against gravity with minimal resistance  4-/5 Completes full range of motion against gravity with minimal resistance  4/5 Completes full range of motion against gravity with moderate resistance  5/5 Completes full range of motion against gravity with maximum resistance    Coordination:  impaired b/l hands/digits with chronic arthritic changes d/t RA, R > L d/t recent CVA with R side weakness & hemiplegia  Sensation: intact    FIM SCORES Initial Assessment Discharge Assessment   Eating 5 6 using adaptive built up handle utensils, 2 handle cup with lid, assist with set up as needed    Grooming 5 (seated w/c level at sink using nondominant left hand) 6 mod I seated w/c level at sink  5 SBA-close Supv in stance w/ QC oral hygiene    Bathing 5 (5 SBA UB, 4.5 CGA LB)  6 UB seated on tub transfer bench using long handle sponge to wash back  4.5 CGA seated on tub transfer bench using cross leg method and in stance using grab bar    Upper Body Dressing 5 (SBA) 6 Mod I seated using hemitechnique    Lower Body Dressing  (4.5-4 CGA/Min A seated on bedside commode & in stance w/ RW) 4.5 CGA-5 SBA seated using cross leg method and in stance using QC & grab bar   Toileting  (4.5 CGA-4 Min A) 5 SBA clothing mgmt using grab bar and QC     Tub/Shower Transfer  (NT d/t time constraints) 4.5 CGA using QC, tub transfer bench and grab bars    Toilet Transfer  (4 Min A using RW to bedside commode) 4.5 CGA - 5 SBA using QC, grab bar and using 3 in 1 commode over toilet     Comprehension 5 Score: 6   Expression 6 Score: 6   Social Interaction 5 Score: 5   Problem Solving 4 Score: 5   Memory 4 Score: 5   Please see C Interdisciplinary Eval: Coordination/Balance Section for details regarding FIM score description.     Activity Tolerance:   fair    ASSESSMENT:  Patient has participated well with OT tx intervention and is now discharged home with supportive daughter, who will provide assist e.g. ADLs, IADLs, meal prep, home making tasks and functional transfers/mobility, OT HH services recommended. Progression toward goals:  [x]      Improving appropriately and progressing toward goals  []      Improving slowly and progressing toward goals  []      Not making progress toward goals and plan of care will be adjusted     PLAN:  Pt would benefit from continued skilled occupational therapy in order to improve ADL function and IADL with use of least restrictive device. Interventions may include range of motion (AROM, PROM B UE/trunk), motor function (B UE/trunk strengthening/coordination), activity tolerance (vitals, oxygen saturation levels), ADL, balance activities, IADL, and functional transfer training. Discharge Recommendations: Home Health w/ 24/7 supv and assist e.g. ADLs, IADLs, meal prep, home making tasks and functional transfers/mobility  Further Equipment Recommendations for Discharge: quad cane, grab bars, 3 in 1 commode, tub transfer bench, and wheelchair        Please refer to the flow sheet for vital signs taken during this treatment. After treatment: N/A, no tx provided this date  []  Patient left in no apparent distress sitting up in chair  []  Patient left in no apparent distress in bed  []  Call bell left within reach  []  Nursing notified  []  Caregiver present  []  Bed alarm activated    COMMUNICATION/EDUCATION:   Communication/Collaboration:   [x]      Home safety education was provided and the patient/caregiver indicated understanding. [x]      Patient/family have participated as able and agree with findings and recommendations. []      Patient is unable to participate in plan of care at this time.     Judith Nina  6/14/2021

## 2021-06-14 NOTE — DISCHARGE SUMMARY
[facility] PHYSICAL REHABILITATION  [address] [place] [address], [address]     INPATIENT REHABILITATION  DISCHARGE SUMMARY    Name: José Manuel Baca MRN: [id number]   Age / Sex: 62 y.o. / female CSN: [id number]   YOB: 1960 Length of Stay: 18 days   Admit Date: 5/27/2021 Discharge Date: 6/14/2021       PRIMARY CARE PHYSICIAN: Humberto / Dr. Briones Most:    Primary Rehabilitation Diagnosis  1. Impaired Mobility and ADLs  2. Acute Lacunar Stroke (acute lacunar infarct in the posterior left lentiform nucleus extending into the corona radiata) with residual right hemiparesis    Comorbidities  Patient Active Problem List   Diagnosis Code    Rheumatoid arthritis ([facility] M06.9    History of vitamin D deficiency Z86.39    Cocaine use F14.90    Stage 3b chronic kidney disease (HCC) N18.32    History of Helicobacter pylori infection Z86.19    Abscess of finger L02.519    Gastroesophageal reflux disease K21.9    Acute lacunar stroke (HCC) I63.81    Impaired mobility and ADLs Z74.09, Z78.9    Hemiparesis of right dominant side due to acute cerebrovascular disease (HCC) I67.89, G81.91    Tobacco use disorder F17.200    Current use of aspirin Z79.82    On clopidogrel therapy Z79.01    Hypertensive heart and kidney disease without heart failure and with stage 3b chronic kidney disease (HCC) I13.10, N18.32    Mixed hyperlipidemia E78.2    On statin therapy due to risk of future cardiovascular event Z79.899    Constipation K59.00    History of iron deficiency anemia Z86.2    High serum magnesium R79.89    Secondary hyperparathyroidism of renal origin ([facility] N25.81    Vitamin D deficiency E55.9    Acute renal failure superimposed on stage 3 chronic kidney disease (HCC) N17.9, N18.30       CONSULTS CALLED: Nephrology (Dr. Rusty Walsh)      PROCEDURES DONE: Retroperitoneal ultrasound (5/28/2021) showed:  1.  Increased parenchymal echogenicity in both kidneys consistent with medical renal disease. Althea Moore is a prominent cortical cyst in the mid right kidney.  No hydronephrosis or nephrolithiasis.    2. Renal cortical thickness is somewhat less than 10 mm in both kidneys.  The left kidney is somewhat small with respect to the right kidney which is low normal in size. BRIEF HISTORY: The patient is a 62year-old, right-handed, Black female with multiple medical comorbidities who was admitted to [facility] on 5/21/2021 due to recurrent falls due to right-sided weakness.      On 5/21/2021, the patient was brought to the [facility] Emergency Department for further evaluation of recurrent falls due to right-sided weakness. The patient was seen and examined by Emergency Medicine (Bowen Gutierrez).     Excerpt (HPI and Neurological) from the ED Provider Note by Bowen Gutierrez:  \"HPI Luanne Diaz is a 62 y.o. female with PMHx HTN presents with RT sided weakness starting at 10am. She has had right arm and leg weakness that is made it difficult for her to walk, She has also felt like her face has been twisted. She feels like her symptoms have worsened since they started. She was driven to the hospital by family but had difficulty getting out of the car due to her right leg weakness.  She denied any fever, chills, chest pain, shortness of breath, abdominal pain, nausea, vomiting.     Neurological:    Mental status: She is alert and oriented to person, place and time.    Comments: RT leg and RT leg weakness that drifts to hit the bed  No appreciable facial droop or dysarthria  No aphasia\"     CT scan of the head (5/21/2021) showed no acute intracranial abnormality; moderate sequela of chronic small vessel ischemic disease     Acute Stroke Alert TeleNeurology (Dr. Felicita Horta) evaluated the patient and was not deemed to be a candidate for intravenous Alteplase because last known normal >4.5 hr.     CT angiogram of the head and neck (5/21/2021) showed:  1. Moderate stenosis in the inferior M2 division of the left MCA but with patent flow beyond the stenosis. 2. Otherwise patent intracranial circulation. No large vessel occlusion. No other significant intracranial stenosis. 3. Patent bilateral cervical carotid and vertebral arteries. No ICA origin stenosis. 4. 2 mm enhancing probable outpouching at the anterior lateral left cavernous ICA. Does not appear calcified on noncontrast CT images. Could represent a tiny extradural cavernous ICA aneurysm. 5. Soft tissue findings remarkable for a small gas collection right lateral to the trachea and esophagus at the thoracic inlet consistent with a small tracheal diverticulum. Subcentimeter heterogenous right thyroid nodule.     Interventional Neurology consult (Dr. Broderick Valero) was called for evaluation and comanagement. Patient was not deemed to be a candidate for mechanical thrombectomy due to the non-occlusive nature of the thrombus and being outside the typical 6 hour, NIHSS 6 CTA guidelines for mechanical thrombectomy.     Patient was started on Aspirin 325 mg PO once daily and Atorvastatin 40 mg PO once daily. Atenolol was held due to bradycardia. Amlodipine 10 mg PO once daily was continued.     WBC count (5/21/2021) = 6.1  Hgb/Hct (5/21/2021) = 16.5/50.0  BUN/Creatinine (5/21/2021) = 23/2.2     The patient was admitted under the service of the [facility] Hospitalist Division (Dr. Skip Jovel).     Excerpt [facility]) from the H&P by Geoff Gonzalez:  Kandice Pitt is a 62 y.o. female with a past medical history significant for hypertension who presented to the emergency department with complaints of increasing falls throughout the day as well as right-sided weakness. Patient reports she was in her usual state of health until 10:00 this a.m.  She attempted to go upstairs and noted that it felt as though her feet had fallen from under her. She has had several episodes of falls today. She denies any head trauma. She has had not any associated visual changes, no shortness of breath or sputum production. She denies any fever or chills. No prior history of CVA. Velasquez Bello She had noted some slight slurring of he speech as well. Denies any dysphagia. Family at bedside state that F are the patient's second fall today, she was transported to the emergency department. Code stroke was initiated and initial CT imaging was negative. CTA was also performed that showed some blood vessel narrowing, however interventional neuroradiology did not feel patient was a candidate for any therapy. Neurology recommended continued hospitalization for work up. Patient states that she does not take aspirin therapy at home. She has been referred to the hospitalist service for further evaluation and management.     Neuro: alert oriented, affect appropriate, new focal weakness is present Right upper and lower extremity 4/5 strength and Speech is dysarthric\"     Unfractionated heparin and SCDs were used for DVT prophylaxis.     Aspirin dose was decreased from 325 mg to 81 mg PO once daily. Clopidogrel 75 mg PO once daily was added.     MRI of the brain showed:  1. Acute lacunar infarct in the posterior left lentiform nucleus extending into the corona radiata. 2. Underlying moderate chronic microvascular ischemic changes in the white matter. 3. Incidental 2 cm left anterior frontal convexity arachnoid cyst.     MRA of the head and neck showed:  1. Compared with the CTA head and neck from 4 hours earlier no significant interval change. 2. Up to moderate narrowing in the left MCA inferior M2 division with patent flow beyond the stenosis unchanged from the CTA. 3. A 2 mm outpouching visible at the anterior lateral cavernous left ICA suspicious for extradural cavernous ICA aneurysm on CTA is not seen on this exam.   -This outpouching is a uncertain significance.  Could be a crossing vein rather than aneurysm given the nonvisualization on this exam.  -These extradural aneurysms are typically asymptomatic with no risk of subarachnoid hemorrhage.     2D echocardiogram showed EF 65%; moderate concentric LV hypertrophy; normal LV diastolic function; no hemodynamically significant valvular disease; bubble study was performed and was negative for shunt.     Physical Medicine and Rehabilitation consult (Dr. Kalvin Kocher) was called on 5/24/2021 for evaluation of rehabilitation needs.     The patient had remained hemodynamically stable but due to the abovementioned neurologic deficit, the patient was noted to have impaired mobility and ADLs. Patient was felt to be a good candidate for acute inpatient rehabilitation. Upon evaluation by Physical Therapy and Occupational Therapy, the patient was recommended for acute inpatient rehabilitation. The patient was discharged and was subsequently admitted to the [facility] for Physical Rehabilitation for intensive rehabilitation to help recover strength, function and mobility. COURSE IN THE HOSPITAL: Upon admission to the [facility] for Physical Rehabilitation, the patient underwent physical therapy, occupational therapy and speech therapy. The patient was able to actively participate in the rehabilitation activities and progressed well. On discharge, the patient was able to perform the following activities:    1.  Occupational Therapy    ON ADMISSION ON DISCHARGE   Eating  Functional Level: 5   Eating  Functional Level: 5     Grooming  Functional Level: 5 (seated w/c level at sink using nondominant left hand)   Grooming  Functional Level: 5 (seated w/c level at sink using nondominant left hand)     Bathing  Functional Level: 5 (5 SBA UB, 4.5 CGA LB)   Bathing  Functional Level: 5 (5 SBA UB, 4.5 CGA LB)     Upper Body Dressing  Functional Level: 5 (SBA)   Upper Body Dressing  Functional Level: 5 (SBA)     Lower Body Dressing  Functional Level:  (4.5-4 CGA/Min A seated on bedside commode & in stance w/ RW)   Lower Body Dressing  Functional Level:  (4.5-4 CGA/Min A seated on bedside commode & in stance w/ RW)     Toileting  Functional Level:  (4.5 CGA-4 Min A)   Toileting  Functional Level: 4     Toilet Transfers  Toilet Transfer Score:  (4 Min A using RW to bedside commode)   Toilet Transfers  Toilet Transfer Score: 4     Tub /Shower Transfers  Tub/Shower Transfer Score:  (NT d/t time constraints)   Tub/Shower Transfers  Tub/Shower Transfer Score:  (NT d/t time constraints)       2.  Physical Therapy    ON ADMISSION ON DISCHARGE   Wheelchair Mobility/Management  Able to Propel (ft): 70 feet  Functional Level: 2  Curbs/Ramps Assist Required (FIM Score):  (mod A for steering, using rosalba-technique)  Wheelchair Setup Assist Required : 2 (Maximal assistance)  Wheelchair Management: Manages left brake, Manages right brake (extra time, cues) Wheelchair Mobility/Management  Able to Propel (ft): 72 feet  Functional Level:  (Supervision)  Curbs/Ramps Assist Required (FIM Score): 0 (Not tested)  Wheelchair Setup Assist Required : 5 (Stand-by assistance)  Wheelchair Management: Manages left brake, Manages right brake     Gait  Amount of Assistance: 2 (Maximal assistance) (maximal trunk support, blocking right LE)  Distance (ft): 1 Feet (ft)  Assistive Device:  (no AD as per PLOF; trialed RW for 38 ft mod A) Gait  Amount of Assistance: 4 (Contact guard assistance)  Distance (ft): 150 Feet (ft)  Assistive Device: Cane, quad, Gait belt     Balance-Sitting/Standing  Sitting - Static: Good (unsupported)  Sitting - Dynamic: Good (unsupported)  Standing - Static: Fair, Occasional, Poor  Standing - Dynamic : Impaired Balance-Sitting/Standing  Sitting - Static: Good (unsupported)  Sitting - Dynamic: Good (unsupported)  Standing - Static: Fair  Standing - Dynamic : Impaired     Bed/Mat Mobility  Rolling Right : 5 (Stand-by assistance)  Rolling Left : 5 (Stand-by assistance)  Supine to Sit : 5 (Stand-by assistance) (cue for not holding her breath)  Sit to Supine :  (SBA) Bed/Mat Mobility  Rolling Right : 6 (Modified independent)  Rolling Left : 6 (Modified independent)  Supine to Sit : 5 (Supervision)  Sit to Supine : 6 (Modified independent)     Transfers  Transfer Type: Other  Other: stand step without AD as per PLOF  Transfer Assistance : 3 (Moderate assistance )  Sit to Stand Assistance: Moderate assistance  Car Transfers: Moderate assistance (using RW)  Car Type: car transfer simulator Transfers  Transfer Type: Other  Other: stand step txfr with [facility]  Transfer Assistance : 4 (Contact guard assistance)  Sit to Stand Assistance: Contact guard assistance  Car Transfers: Not tested  Car Type: NA     Steps or Stairs  Steps/Stairs Ambulated (#): 1  Level of Assist : 2 (Maximal assistance) (lifting/lowering assistance required)  Rail Use: Both (therapist assisting at right UE to ) Steps or Stairs  Steps/Stairs Ambulated (#): 4 (6\" steps)  Level of Assist : 4 (Minimal assistance)  Rail Use: Right  (SBQC on left)       3.  Speech and Language Pathology    ON ADMISSION ON DISCHARGE   Comprehension (Native Language)  Primary Mode of Comprehension: Auditory  Score: 5 Comprehension (Native Language)  Primary Mode of Comprehension: Auditory  Score: 6     Expression (Native Language)  Primary Mode of Expression: Verbal  Score: 6   Expression (Native Language)  Primary Mode of Expression: Verbal  Score: 6     Social Interaction/Pragmatics  Score: 5 Social Interaction/Pragmatics  Score: 5     Problem Solving  Score: 4   Problem Solving  Score: 5     Memory  Score: 4 Memory  Score: 5       Legend:   7 - Independent   6 - Modified Independent   5 - Standby Assistance / Supervision / Set-up   4 - Minimum Assistance / Contact Guard Assistance   3 - Moderate Assistance   2 - Maximum Assistance   1 - Total Assistance / Dependent       ACUTE MEDICAL ISSUES ADDRESSED IN INPATIENT REHABILITATION FACILITY:     > Acute Lacunar Stroke (acute lacunar infarct in the posterior left lentiform nucleus extending into the corona radiata) with residual right hemiparesis   > Dual antiplatelet therapy (DAPT) was started 5/22/2021; Neurology recommending to continue DAPT for 21 days (~6/12/2021), then discontinue Aspirin and continue on Clopidogrel 75 mg PO once daily    > On 5/27/2021, started Melatonin 3 mg PO q HS (for stroke recovery)   > Continue:    > Atorvastatin 40 mg PO once daily    > Clopidogrel 75 mg PO once daily with breakfast      > Melatonin 3 mg PO q HS    > Constipation   > On 5/28/2021, discontinued (re: refused by patient last night and this AM):    > Miralax 17 grams in 8 oz water PO once daily    > PeriColace 2 tabs PO once daily with dinner   > On 6/2/2021, started:    > Pericolace 2 tabs PO once daily after dinner    > Polyethylene glycol 17 grams in 8 oz water PO once daily    > On 6/6/2021, started Lubiprostone 24 mcg PO BID with meals   > On 6/7/2021, decreased Lubiprostone from 24 mcg to 8 mcg PO BID with meals              > During the patient's stay at the ARU, the patient was given:     > Lubiprostone 8 mcg PO BID with meals    > Pericolace 2 tabs PO once daily after dinner   > Continue Polyethylene glycol 17 grams in 8 oz water PO once daily     > Hypertensive heart and kidney disease without heart failure with stage 3b-4 chronic kidney disease   > On 5/31/2021, started Metoprolol tartrate 25 mg PO q 12 hr (9AM, 9PM)   > On 6/2/2021:    > Discontinued Amlodipine 10 mg PO once daily (9AM)    > Started Losartan 12.5 mg PO once daily (9AM)   > On 6/5/2021:    > Discontinued Losartan 12.5 mg PO once daily (9AM)    > Started Hydralazine 25 mg PO TID PRN for SBP greater than 160 mmHg   > On 6/10/2021, patient was given Nifedipine XL 30 mg PO x 1 dose   > Planned to start Nifedipine XL 60 mg PO once daily (9AM) this AM but patient reported some lip swelling last night and concern was whether this was related to the Nifedipine XL that she received yesterday PM   > On 6/11/2021:    > Discontinued:     > Nifedipine XL 60 mg PO once daily (9AM)     > Metoprolol tartrate 25 mg PO q 12 hr (9AM, 9PM)    > Started Carvedilol 6.25 mg PO BID with meals (8AM, 5PM)   > On 6/13/2021, increased Carvedilol from 6.25 mg to 12.5 mg PO BID with meals (8AM, 5PM)   > Continue:    > Carvedilol 12.5 mg PO BID with meals (8AM, 5PM)    > Hydralazine 25 mg PO TID PRN for SBP greater than 160 mmHg    > Mixed hyperlipidemia   > On 5/28/2021, started Coenzyme Q10 100 mg PO once daily   > Continue:    > Atorvastatin 40 mg PO once daily    > Coenzyme Q10 100 mg PO once daily    > High serum magnesium   > Mg (5/28/2021, on admission to the ARU) = 3.2   > patient reported she was given 3 doses of Milk of magnesia at [facility] prior to discharge to the ARU   > Avoid magnesium-containing medications/supplements      05/31/21  0545 05/30/21  0706 05/28/21  0613   MG 2.6 2.9* 3.2*     > Acute kidney injury superimposed on Stage 3b-4 chronic kidney disease   > BUN/Creatinine (5/28/2021, on admission to the ARU) = 36/2.47   > Retroperitoneal ultrasound (5/28/2021) showed:    > Increased parenchymal echogenicity in both kidneys consistent with medical renal disease. There is a prominent cortical cyst in the mid right kidney. No hydronephrosis or nephrolithiasis.       > Renal cortical thickness is somewhat less than 10 mm in both kidneys.   The left kidney is somewhat small with respect to the right kidney which is low normal in size.   > On 5/29/2021, started IVF: 0.9%  ml.hr x 1 liter once daily after dinner   > PTH (5/31/2021) = 276.1   > Vitamin D 25-Hydroxy (5/31/2021) = 10.1   > Urine microalbumin (5/31/2021) = 15.30   > Microalbumin/Creatinine ratio (5/31/2021) = 165   > Urinalysis (5/31/2021): SG 1.013, protein 30, no casts seen   > Nephrology consult (Dr. Evelyn Ordoñez) called on 5/31/2021 for evaluation and comanagement   > CK (5/31/2021) = 107   > Urine creatinine (5/31/2021) = 93.00   > Random urine protein (5/31/2021) = 31   > Random urine sodium (5/31/2021) = 52      06/02/21  0445 06/01/21  0430 05/31/21  0545 05/30/21  0706 05/28/21  0613   BUN 35* 37* 36* 37* 36*   CREA 2.24* 2.27* 2.15* 2.25* 2.47*      > On 6/2/2021:    > Discontinued IVF: 0.9%  ml/hr x 1 liter once daily after dinner    > Started Losartan 12.5 mg PO once daily (9AM)      06/05/21  0609 06/04/21  0559 06/03/21  0516   K 4.8 4.9 4.9       06/05/21  0609 06/04/21  0559 06/03/21  0516   BUN 40* 36* 34*   CREA 2.85* 2.45* 2.24*      > On 6/5/2021:    > Discontinued Losartan 12.5 mg PO once daily (9AM)    > Started IVF: 0.9% NS at 75 ml/hr continuous      06/07/21  0555 06/06/21  0616   K 5.0 4.7         06/07/21  0555 06/06/21  0616   BUN 36* 40*   CREA 2.16* 2.41*      > On 6/7/2021, changed IVF from 0.9% NS at 75 ml/hr continuous to 75 ml/hr x 1 liter once daily after dinner      06/09/21  0450 06/08/21  0503   K 4.8 4.9       06/09/21  0450 06/08/21  0503   BUN 30* 34*   CREA 1.95* 2.02*      > On 6/9/2021:    > Discontinue IVF: 0.9% NS at 75 ml/hr x 1 liter once daily after dinner    > Started IVF: 0.45%  ml/hr x 1 liter   > BUN/Creatinine (6/10/2021) = 33/1.83   > K (6/10/2021) = 4.7    > On 6/10/2021, patient was given IVF: 0.45%  ml/hr x 1 liter      06/12/21  0710 06/11/21  0535 06/10/21  0503   K 4.9 4.6 4.7   * 113* 113*       06/12/21  0710 06/11/21  0535 06/10/21  0503   BUN 34* 32* 33*   CREA 1.98* 1.82* 1.83*      > On 6/12/2021, resumed IVF: 0.45%  ml/hr x 1 liter once daily after dinner x 2 doses   > BUN/Creatinine (6/14/2021) = 33/1.94    > K (6/14/2021) = 4.4    > Tobacco use disorder   > On 5/28/2021, started Nicotine 14 mg/24 hr patch, 1 patch on skin once daily              > During the patient's stay at the ARU, the patient was given Nicotine 14 mg/24 hr patch, 1 patch on skin once daily    > Vitamin D Deficiency   > Vitamin D 25-Hydroxy (5/31/2021) = 10.1   > On 5/31/2021, patient was given Cholecalciferol 50,000 units PO x 1 dose    > On 6/1/2021, started Cholecalciferol 5,000 units PO once daily   > Continue Cholecalciferol 5,000 units PO once daily    > Analgesia   > Continue Acetaminophen 650 mg PO q 4 hr PRN for pain       MEDICATIONS ON DISCHARGE:    Current Discharge Medication List      START taking these medications    Details   acetaminophen (TYLENOL) 325 mg tablet Take 2 Tablets by mouth every four (4) hours as needed (for fever or pain). Indications: fever, pain  Qty: 30 Tablet, Refills: 0  Start date: 6/14/2021    Associated Diagnoses: Acute lacunar stroke (HCC)      atorvastatin (LIPITOR) 80 mg tablet Take 1 Tablet by mouth daily. Indications: excessive fat in the blood, stroke prevention  Qty: 30 Tablet, Refills: 0  Start date: 6/15/2021    Associated Diagnoses: Acute lacunar stroke ([address] Mixed hyperlipidemia; On statin therapy due to risk of future cardiovascular event      carvediloL (COREG) 12.5 mg tablet Take 1 Tablet by mouth two (2) times daily (with meals). Indications: high blood pressure  Qty: 60 Tablet, Refills: 0  Start date: 6/14/2021    Associated Diagnoses: Hypertensive heart and kidney disease without heart failure and with stage 3b chronic kidney disease (HCC)      cholecalciferol (VITAMIN D3) (5000 Units /125 mcg) capsule Take 1 Capsule by mouth daily. Indications: low vitamin D levels  Qty: 30 Capsule, Refills: 0  Start date: 6/15/2021    Associated Diagnoses: History of vitamin D deficiency; Vitamin D deficiency      clopidogreL (PLAVIX) 75 mg tab Take 1 Tablet by mouth daily (with breakfast). Indications: prevention for a blood clot going to the brain  Qty: 30 Tablet, Refills: 0  Start date: 6/15/2021    Associated Diagnoses: Acute lacunar stroke ([address] On clopidogrel therapy      co-enzyme Q-10 (CO Q-10) 100 mg capsule Take 1 Capsule by mouth daily.   Qty: 30 Capsule, Refills: 0  Start date: 6/15/2021    Associated Diagnoses: On statin therapy due to risk of future cardiovascular event      melatonin 3 mg tablet Take 1 Tablet by mouth nightly. Indications: for stroke recovery  Qty: 30 Tablet, Refills: 0  Start date: 6/14/2021    Associated Diagnoses: Acute lacunar stroke (HCC)      polyethylene glycol (MIRALAX) 17 gram packet Take 1 Packet by mouth daily. Indications: constipation  Qty: 30 Packet, Refills: 0  Start date: 6/15/2021    Associated Diagnoses: Constipation, unspecified constipation type         STOP taking these medications       amLODIPine (Norvasc) 10 mg tablet Comments:   Reason for Stopping:         atenoloL (TENORMIN) 25 mg tablet Comments:   Reason for Stopping:         meloxicam (MOBIC) 7.5 mg tablet Comments:   Reason for Stopping:               DISCHARGE VITAL SIGNS:  Visit Vitals  /86 (BP 1 Location: Left upper arm, BP Patient Position: Supine)   Pulse 71   Temp 97 °F (36.1 °C)   Resp 16   Ht 5' 7\" (1.702 m)   Wt 84.4 kg (186 lb)   SpO2 96%   Breastfeeding No   BMI 29.13 kg/m²       DISCHARGE PHYSICAL EXAMINATION:  GENERAL SURVEY: Patient is awake, alert, oriented x 3, sitting comfortably on the chair, not in acute respiratory distress.   HEENT: pink palpebral conjunctivae, anicteric sclerae, no nasoaural discharge, moist oral mucosa  NECK: supple, no jugular venous distention, no palpable lymph nodes  CHEST/LUNGS: symmetrical chest expansion, good air entry, clear breath sounds  HEART: adynamic precordium, good S1 S2, no S3, regular rhythm, no murmurs  ABDOMEN: flat, bowel sounds appreciated, soft, non-tender  EXTREMITIES: pink nailbeds, no edema, full and equal pulses, no calf tenderness   NEUROLOGICAL EXAM: The patient is awake, alert and oriented x3, able to answer questions fairly appropriately, able to follow 1 and 2 step commands.  Able to tell time from the wall clock.  Cranial nerves II-XII are grossly intact.  No gross sensory deficit.  Motor strength is 4 to 4+/5 on the RUE and RLE, 5/5 on the LUE and LLE. CONDITION ON DISCHARGE: Stable. DISPOSITION: Patient clinically improved and was discharged to home with home health physical therapy, occupational therapy and skilled nursing. The patient is temporarily homebound secondary to functional deficits (residual right hemiparesis) due to an Acute Lacunar Stroke (acute lacunar infarct in the posterior left lentiform nucleus extending into the corona radiata). The patient can ambulate using a quad cane (see above). The patient would benefit from continued skilled physical therapy in order to improve independent functional mobility within the home with use of least restrictive device. The patient would also benefit from continued skilled occupational therapy in order to improve self care and functional mobility within the home with use of least restrictive device. Short-term skilled nursing is needed for medication reconciliation and disease education. FOLLOW-UP RECOMMENDATIONS:   Follow-up Information     Follow up With Specialties Details Why [address]  Physical Therapy, and Occupational Therapy 1035 [facility] [address] [place] [address]    Heather Mojica MD Rheumatology In 1 week Call  office for follow up appointment within 1-2 weeks after discharge [address] Sanjiv Glez MD  [address] [place] [address]  [phone]      Sudarshan Etienne MD Neurology Schedule an appointment as soon as possible for a visit in 2 weeks Scheduled appointment with Dr. Abisai Cortez NP on June 30th @ 11:20 8 [address]      Zhanna Boland MD Nephrology  Call to make an appointment with Dr. Maira Munoz in one weekafter Discharge [address]  [address] [address] [place] [address]:  1.  Diet.    > Specifications: Cardiac   > Solids (consistency): Regular    > Liquids (consistency): Thin    > Fluid restriction: None  2. Activity. As tolerated. 3. Safety / fall precautions. TIME SPENT ON DISCHARGE ACTIVITIES: 32 minutes.       Signed:  Shoaib Oh MD    6/14/2021

## 2021-06-15 ENCOUNTER — HOME CARE VISIT (OUTPATIENT)
Dept: SCHEDULING | Facility: HOME HEALTH | Age: 61
End: 2021-06-15
Payer: MEDICAID

## 2021-06-15 VITALS
RESPIRATION RATE: 18 BRPM | HEART RATE: 84 BPM | TEMPERATURE: 97.3 F | HEIGHT: 67 IN | BODY MASS INDEX: 28.41 KG/M2 | DIASTOLIC BLOOD PRESSURE: 100 MMHG | WEIGHT: 181 LBS | OXYGEN SATURATION: 98 % | SYSTOLIC BLOOD PRESSURE: 144 MMHG

## 2021-06-15 PROCEDURE — 400013 HH SOC

## 2021-06-15 PROCEDURE — G0151 HHCP-SERV OF PT,EA 15 MIN: HCPCS

## 2021-06-16 ENCOUNTER — HOME CARE VISIT (OUTPATIENT)
Dept: HOME HEALTH SERVICES | Facility: HOME HEALTH | Age: 61
End: 2021-06-16
Payer: MEDICAID

## 2021-06-16 ENCOUNTER — HOME CARE VISIT (OUTPATIENT)
Dept: SCHEDULING | Facility: HOME HEALTH | Age: 61
End: 2021-06-16
Payer: MEDICAID

## 2021-06-16 PROCEDURE — G0299 HHS/HOSPICE OF RN EA 15 MIN: HCPCS

## 2021-06-17 ENCOUNTER — HOME CARE VISIT (OUTPATIENT)
Dept: SCHEDULING | Facility: HOME HEALTH | Age: 61
End: 2021-06-17
Payer: MEDICAID

## 2021-06-17 VITALS
OXYGEN SATURATION: 99 % | RESPIRATION RATE: 18 BRPM | HEART RATE: 74 BPM | DIASTOLIC BLOOD PRESSURE: 80 MMHG | SYSTOLIC BLOOD PRESSURE: 138 MMHG | TEMPERATURE: 97.6 F

## 2021-06-17 PROCEDURE — G0151 HHCP-SERV OF PT,EA 15 MIN: HCPCS

## 2021-06-18 VITALS
SYSTOLIC BLOOD PRESSURE: 146 MMHG | DIASTOLIC BLOOD PRESSURE: 78 MMHG | RESPIRATION RATE: 18 BRPM | OXYGEN SATURATION: 98 % | HEART RATE: 68 BPM | TEMPERATURE: 98 F

## 2021-06-21 ENCOUNTER — HOME CARE VISIT (OUTPATIENT)
Dept: SCHEDULING | Facility: HOME HEALTH | Age: 61
End: 2021-06-21
Payer: MEDICAID

## 2021-06-21 VITALS
TEMPERATURE: 98.2 F | SYSTOLIC BLOOD PRESSURE: 165 MMHG | OXYGEN SATURATION: 100 % | HEART RATE: 75 BPM | DIASTOLIC BLOOD PRESSURE: 90 MMHG

## 2021-06-21 PROCEDURE — G0300 HHS/HOSPICE OF LPN EA 15 MIN: HCPCS

## 2021-06-21 PROCEDURE — G0157 HHC PT ASSISTANT EA 15: HCPCS

## 2021-06-22 ENCOUNTER — OFFICE VISIT (OUTPATIENT)
Dept: FAMILY MEDICINE CLINIC | Age: 61
End: 2021-06-22
Payer: MEDICAID

## 2021-06-22 ENCOUNTER — HOSPITAL ENCOUNTER (OUTPATIENT)
Dept: LAB | Age: 61
Discharge: HOME | End: 2021-06-22
Payer: MEDICAID

## 2021-06-22 ENCOUNTER — HOME CARE VISIT (OUTPATIENT)
Dept: SCHEDULING | Facility: HOME HEALTH | Age: 61
End: 2021-06-22
Payer: MEDICAID

## 2021-06-22 VITALS
DIASTOLIC BLOOD PRESSURE: 94 MMHG | RESPIRATION RATE: 24 BRPM | HEIGHT: 67 IN | WEIGHT: 187.2 LBS | HEART RATE: 63 BPM | OXYGEN SATURATION: 98 % | SYSTOLIC BLOOD PRESSURE: 136 MMHG | BODY MASS INDEX: 29.38 KG/M2

## 2021-06-22 DIAGNOSIS — E55.9 VITAMIN D DEFICIENCY: ICD-10-CM

## 2021-06-22 DIAGNOSIS — I63.81 ACUTE LACUNAR STROKE (HCC): ICD-10-CM

## 2021-06-22 DIAGNOSIS — E78.2 MIXED HYPERLIPIDEMIA: ICD-10-CM

## 2021-06-22 DIAGNOSIS — I13.10 HYPERTENSIVE HEART AND KIDNEY DISEASE WITHOUT HEART FAILURE AND WITH STAGE 3B CHRONIC KIDNEY DISEASE (HCC): ICD-10-CM

## 2021-06-22 DIAGNOSIS — Z13.228 SCREENING FOR ENDOCRINE, METABOLIC AND IMMUNITY DISORDER: ICD-10-CM

## 2021-06-22 DIAGNOSIS — Z86.2 HISTORY OF IRON DEFICIENCY ANEMIA: ICD-10-CM

## 2021-06-22 DIAGNOSIS — Z79.899 HIGH RISK MEDICATION USE: ICD-10-CM

## 2021-06-22 DIAGNOSIS — R73.09 ELEVATED GLUCOSE: ICD-10-CM

## 2021-06-22 DIAGNOSIS — Z13.6 SCREENING FOR CARDIOVASCULAR CONDITION: ICD-10-CM

## 2021-06-22 DIAGNOSIS — N18.32 HYPERTENSIVE HEART AND KIDNEY DISEASE WITHOUT HEART FAILURE AND WITH STAGE 3B CHRONIC KIDNEY DISEASE (HCC): ICD-10-CM

## 2021-06-22 DIAGNOSIS — E11.9 TYPE 2 DIABETES MELLITUS WITHOUT COMPLICATION, WITHOUT LONG-TERM CURRENT USE OF INSULIN (HCC): ICD-10-CM

## 2021-06-22 DIAGNOSIS — G81.91 HEMIPARESIS OF RIGHT DOMINANT SIDE DUE TO ACUTE CEREBROVASCULAR DISEASE (HCC): ICD-10-CM

## 2021-06-22 DIAGNOSIS — G62.9 NEUROPATHY: ICD-10-CM

## 2021-06-22 DIAGNOSIS — I67.89 HEMIPARESIS OF RIGHT DOMINANT SIDE DUE TO ACUTE CEREBROVASCULAR DISEASE (HCC): ICD-10-CM

## 2021-06-22 DIAGNOSIS — I10 ESSENTIAL HYPERTENSION: ICD-10-CM

## 2021-06-22 DIAGNOSIS — Z13.29 SCREENING FOR ENDOCRINE, METABOLIC AND IMMUNITY DISORDER: ICD-10-CM

## 2021-06-22 DIAGNOSIS — Z13.0 SCREENING FOR ENDOCRINE, METABOLIC AND IMMUNITY DISORDER: ICD-10-CM

## 2021-06-22 PROCEDURE — 80307 DRUG TEST PRSMV CHEM ANLYZR: CPT

## 2021-06-22 PROCEDURE — 99203 OFFICE O/P NEW LOW 30 MIN: CPT | Performed by: NURSE PRACTITIONER

## 2021-06-22 PROCEDURE — G0152 HHCP-SERV OF OT,EA 15 MIN: HCPCS

## 2021-06-22 RX ORDER — LOSARTAN POTASSIUM 50 MG/1
50 TABLET ORAL DAILY
Qty: 30 TABLET | Refills: 1 | Status: SHIPPED | OUTPATIENT
Start: 2021-06-22 | End: 2021-07-20 | Stop reason: SDUPTHER

## 2021-06-22 RX ORDER — GABAPENTIN 300 MG/1
300 CAPSULE ORAL 3 TIMES DAILY
Qty: 90 CAPSULE | Refills: 0 | Status: SHIPPED | OUTPATIENT
Start: 2021-06-22 | End: 2021-07-20 | Stop reason: ALTCHOICE

## 2021-06-22 RX ORDER — ACETAMINOPHEN, DIPHENHYDRAMINE HCL, PHENYLEPHRINE HCL 325; 25; 5 MG/1; MG/1; MG/1
3 TABLET ORAL
Qty: 30 TABLET | Refills: 1 | Status: SHIPPED | OUTPATIENT
Start: 2021-06-22 | End: 2022-06-07

## 2021-06-22 NOTE — PROGRESS NOTES
Health maintenance issues addressed patient has had covid vaccine-chart updated. Denies any other vaccines at this time. Patient was advised that she is due for mammogram, pap smear and colon cancer screening  Ludy Santos presents today for   Chief Complaint   Patient presents with   [facility] Follow Up     obici       Is someone accompanying this pt? yes    Is the patient using any DME equipment during OV? Yes  walker    Depression Screening:  3 most recent PHQ Screens 6/22/2021   Little interest or pleasure in doing things Not at all   Feeling down, depressed, irritable, or hopeless Not at all   Total Score PHQ 2 0   Trouble falling or staying asleep, or sleeping too much Not at all   Feeling tired or having little energy Several days   Poor appetite, weight loss, or overeating Several days   Feeling bad about yourself - or that you are a failure or have let yourself or your family down Not at all   Trouble concentrating on things such as school, work, reading, or watching TV Not at all   Moving or speaking so slowly that other people could have noticed; or the opposite being so fidgety that others notice Not at all   Thoughts of being better off dead, or hurting yourself in some way Not at all   PHQ 9 Score 2   How difficult have these problems made it for you to do your work, take care of your home and get along with others Not difficult at all       Learning Assessment:  Learning Assessment 6/18/2014   PRIMARY LEARNER Patient   HIGHEST LEVEL OF EDUCATION - PRIMARY LEARNER  GRADUATED HIGH SCHOOL OR GED   BARRIERS PRIMARY LEARNER NONE   PRIMARY LANGUAGE ENGLISH   LEARNER PREFERENCE PRIMARY READING   ANSWERED BY Patient   RELATIONSHIP SELF       Abuse Screening:  No flowsheet data found. Fall Risk  No flowsheet data found. Health Maintenance reviewed and discussed and ordered per Provider.     Health Maintenance Due   Topic Date Due    Pneumococcal 0-64 years (1 of 2 - PPSV23) Never done    COVID-19 Vaccine (1) Never done    DTaP/Tdap/Td series (1 - Tdap) Never done    Shingrix Vaccine Age 50> (1 of 2) Never done    Colorectal Cancer Screening Combo  Never done    Breast Cancer Screen Mammogram  07/18/2016    Lipid Screen  06/25/2017    PAP AKA CERVICAL CYTOLOGY  11/23/2018   . Coordination of Care:  1. Have you been to the ER, urgent care clinic since your last visit? Hospitalized since your last visit? Yes      2. Have you seen or consulted any other health care providers outside of the [address] [place] [address] since your last visit? Include any pap smears or colon screening.  no      Last  Checked no  Last UDS Checked no  Last Pain contract signed: no

## 2021-06-22 NOTE — PATIENT INSTRUCTIONS

## 2021-06-22 NOTE — PROGRESS NOTES
OFFICE NOTE    Blanca Roach is a 62 y.o. female presenting today for the following:  Chief Complaint   Patient presents with   [facility] Follow Up     obici      HPI     Patient is being seen today to establish care as a new patient. She is a 63 yo  [place] female with multiple medical comorbidities. On 5/21/21 she was taken [facility] and admitted for a week and then transferred to  [address] rehabilitation. She was seen in the ER due to recurrent falls and having right sided weakness. She had right arm and leg weakness that was making it difficult for her to walk and she felt like her face was twisted. She was driven to hospital by family. She was discharged from [facility] rehab on 6/14/2021. Hemiparesis or right side due to CVA/Acute lacunar stroke/Imparired mobility and ADLs  Patient had a stroke on 5/21/21. She is currently on Plavix, and she is currently in PT and OT in her home. According to notes she had a acute lacunar infarct in the posterior left lentiform nucleus extending into the corona radiata with residual right hemiparesis. Patient was discharged from inpatient rehab on 6/14/2021. Patient is ambulating today with a walker. She was advised to follow up with Dr. Gypsy Cordero in Rheumatology and Dr. Heam Andrews in neurology, and Dr. Sven Mccormack in nephrology. Neuropathic/Neuropathy  Patient states she is having some pain and numbness and tingling in her right hand/arm that have been there since CVA. She states it has caused her not to sleep at night. She states its even worst after her PT. Patient is scheduled to see Dr. Hema Andrews on June 30 th. Will start patient on gabapentin (control substance agreement form completed). Hypertension/Hyperlipidemia  Patient is currently on carvedilol 12.5 mg BID. She is on atorvastatin 80 mg daily. Patient blood pressure is elevated today.   She previously was on 2 medications amlodipine and hydralazine but no longer on. Patient is unsure of who discontinued it. She states her home blood pressure has been elevated at home as well. Will start her on Losartan 50 mg daily. Will follow back up with patient in 4 weeks. Advised to monitor blood pressures at home and bring on next visit. She denies any side effects from medication and take as prescribed. Hypertensive heart and kidney disease w/o heart failure/Chronic CKD stabe 3B/High serum magnesium  She is currently being followed by nephrology (Dr. Purvis Lefort) scheduled to see in 2 weeks    Vitamin D deficiency  Will order a vitamin D lab for patient to have completed. Hyperlipidemia  She is currently on Lipitor 80 mg doing well on this medication will continue. Denies any side effects from this medication and take as ordered. Smoking cessatation  She is currently ont he 14 mg/24 patch. (she should be doing this for 2 weeks and then 7mg/day for 2 weeks). She states she is no longer on the patch and states she down to only smoking 2 cigarettes a day. Insomnia  She takes Melatonin 3 mg every night. She states it have not helped her at all she continues to be up at night but likely due to the numbness and tingling of left hand and arm. Will Increased today to 10 mg nightly and will reassess    Recent labs completed on 5/31/21 reviewed cbc and cmp      Ordered blood pressure kit  Completed DMV form today    Review of Systems   Constitutional: Negative for fatigue and fever. HENT: Negative. Eyes: Negative. Respiratory: Negative for cough, chest tightness, shortness of breath and wheezing. Cardiovascular: Negative for chest pain and palpitations. Neurological: Positive for weakness and numbness. Negative for dizziness, light-headedness and headaches. Right sided weakness numbness/tingling   Psychiatric/Behavioral: Negative.           History  Past Medical History:   Diagnosis Date    Abnormal mammogram 2014    left with biopsy    Acute lacunar stroke ([facility] 5/21/2021    Acute Lacunar Stroke (acute lacunar infarct in the posterior left lentiform nucleus extending into the corona radiata) with residual right hemiparesis    Bacterial vaginosis 1/9/15    Dr Thor Jaimes Constipation     Current use of aspirin 5/23/2021    Gastroesophageal reflux disease 6/30/2016    Hemiparesis of right dominant side due to acute cerebrovascular disease ([facility] 5/21/2021    History of Helicobacter pylori infection 7/24/2015    History of iron deficiency anemia 06/2014    History of vitamin D deficiency 6/11/2015    Hot flashes 1/9/15    Dr Thor Jaimes On clopidogrel therapy 5/23/2021    On statin therapy due to risk of future cardiovascular event 5/22/2021    On Atorvastatin    [facility]     Secondary hyperparathyroidism of renal origin ([facility] 5/31/2021    Stage 3b chronic kidney disease ([facility] 7/24/2015    Tobacco use disorder     Vitamin D deficiency 5/31/2021    Vitamin D 25-Hydroxy (5/31/2021) = 10.5        Past Surgical History:   Procedure Laterality Date    HX LAPAROSCOPIC SUPRACERVICAL HYSTERECTOMY  11/2014    Dr. Felipa Gautam, GYN    HX SALPINGO-OOPHORECTOMY  11/2014       Social History     Socioeconomic History    Marital status: SINGLE     Spouse name: Not on file    Number of children: Not on file    Years of education: Not on file    Highest education level: Not on file   Occupational History    Not on file   Tobacco Use    Smoking status: Current Every Day Smoker     Packs/day: 0.50     Years: 20.00     Pack years: 10.00     Types: Cigarettes    Smokeless tobacco: Never Used   Substance and Sexual Activity    Alcohol use: No    Drug use: Not Currently     Types: Cocaine    Sexual activity: Yes     Partners: Male   Other Topics Concern     Service No    Blood Transfusions Yes    Caffeine Concern No    Occupational Exposure No    Hobby Hazards No    Sleep Concern No    Stress Concern No  Weight Concern No    Special Diet No    Back Care No    Exercise No    Bike Helmet No    Seat Belt Yes    Self-Exams No   Social History Narrative    Not on file     Social Determinants of Health     Financial Resource Strain:     Difficulty of Paying Living Expenses:    Food Insecurity:     Worried About Running Out of Food in the Last Year:     Ran Out of Food in the Last Year:    Transportation Needs:     Lack of Transportation (Medical):  Lack of Transportation (Non-Medical):    Physical Activity:     Days of Exercise per Week:     Minutes of Exercise per Session:    Stress:     Feeling of Stress :    Social Connections:     Frequency of Communication with Friends and Family:     Frequency of Social Gatherings with Friends and Family:     Attends Confucianist Services:     Active Member of Clubs or Organizations:     Attends Club or Organization Meetings:     Marital Status:    Intimate Partner Violence:     Fear of Current or Ex-Partner:     Emotionally Abused:     Physically Abused:     Sexually Abused:        No Known Allergies    Current Outpatient Medications   Medication Sig Dispense Refill    melatonin 10 mg tab Take 3 mg by mouth nightly. Indications: for stroke recovery 30 Tablet 1    losartan (COZAAR) 50 mg tablet Take 1 Tablet by mouth daily. 30 Tablet 1    gabapentin (NEURONTIN) 300 mg capsule Take 1 Capsule by mouth three (3) times daily. Max Daily Amount: 900 mg. 90 Capsule 0    acetaminophen (TYLENOL) 325 mg tablet Take 2 Tablets by mouth every four (4) hours as needed (for fever or pain). Indications: fever, pain 30 Tablet 0    atorvastatin (LIPITOR) 80 mg tablet Take 1 Tablet by mouth daily. Indications: excessive fat in the blood, stroke prevention 30 Tablet 0    carvediloL (COREG) 12.5 mg tablet Take 1 Tablet by mouth two (2) times daily (with meals).  Indications: high blood pressure 60 Tablet 0    cholecalciferol (VITAMIN D3) (5000 Units /125 mcg) capsule Take 1 Capsule by mouth daily. Indications: low vitamin D levels 30 Capsule 0    clopidogreL (PLAVIX) 75 mg tab Take 1 Tablet by mouth daily (with breakfast). Indications: prevention for a blood clot going to the brain 30 Tablet 0    co-enzyme Q-10 (CO Q-10) 100 mg capsule Take 1 Capsule by mouth daily. 30 Capsule 0    polyethylene glycol (MIRALAX) 17 gram packet Take 1 Packet by mouth daily. Indications: constipation 30 Packet 0         Patient Care Team:  Patient Care Team:  Hakeem Page NP as PCP - General (Nurse Practitioner)  Hakeem Page NP as PCP - [facility]  Donte Bernal MD (Rheumatology)  Perfecto Garcia MD (Obstetrics & Gynecology)  Jennifer Perrin MD (Gastroenterology)      LABS:  No results found for any visits on 06/22/21. RADIOLOGY:  No recent results      Physical Exam  Vitals and nursing note reviewed. Constitutional:       Appearance: Normal appearance. She is well-developed. Eyes:      Pupils: Pupils are equal, round, and reactive to light. Cardiovascular:      Rate and Rhythm: Normal rate and regular rhythm. Heart sounds: Normal heart sounds. Pulmonary:      Effort: Pulmonary effort is normal. No respiratory distress. Breath sounds: Normal breath sounds. No wheezing or rales. Abdominal:      General: Bowel sounds are normal. There is no distension. Palpations: Abdomen is soft. Tenderness: There is no abdominal tenderness. There is no rebound. Musculoskeletal:         General: Deformity (right hand) present. Cervical back: Normal range of motion and neck supple. Comments: Right sided weakness noted with assessment. Lymphadenopathy:      Cervical: No cervical adenopathy. Skin:     General: Skin is warm and dry. Neurological:      Mental Status: She is alert and oriented to person, place, and time. Deep Tendon Reflexes: Reflexes are normal and symmetric.    Psychiatric:         Mood and Affect: Mood normal.           Vitals:    06/22/21 1018 06/22/21 1032 06/22/21 1121   BP: (!) 146/90 (!) 136/103 (!) 136/94   Pulse: 63     Resp: 24     SpO2: 98%     Weight: 187 lb 3.2 oz (84.9 kg)     Height: 5' 7\" (1.702 m)     PainSc:   0 - No pain     LMP: 10/15/2014         Assessment and Plan    1. Elevated glucose    - HEMOGLOBIN A1C WITH EAG; Future    2. Vitamin D deficiency    - VITAMIN D, 25 HYDROXY; Future    3. Type 2 diabetes mellitus without complication, without long-term current use of insulin (HCC)    - HEMOGLOBIN A1C WITH EAG; Future    4. Mixed hyperlipidemia    - LIPID PANEL; Future    5. Hypertensive heart and kidney disease without heart failure and with stage 3b chronic kidney disease ([facility]      6. Acute lacunar stroke (HCC)    - melatonin 10 mg tab; Take 3 mg by mouth nightly. Indications: for stroke recovery  Dispense: 30 Tablet; Refill: 1    7. Hemiparesis of right dominant side due to acute cerebrovascular disease ([facility]      8. History of iron deficiency anemia      9. High risk medication use    - COMPLIANCE DRUG SCREEN/PRESCRIPTION MONITORING; Future    10. Essential hypertension    - losartan (COZAAR) 50 mg tablet; Take 1 Tablet by mouth daily. Dispense: 30 Tablet; Refill: 1    11. Neuropathy    - gabapentin (NEURONTIN) 300 mg capsule; Take 1 Capsule by mouth three (3) times daily. Max Daily Amount: 900 mg. Dispense: 90 Capsule; Refill: 0    12. Screening for cardiovascular condition    - LIPID PANEL; Future    13. Screening for endocrine, metabolic and immunity disorder        MDM    Procedures      *Plan of care reviewed with patient. Patient in agreement with plan and expresses understanding. All questions answered and patient encouraged to call or RTO if further questions or concerns. Advised patient if symptoms worsen to go to nearest ER or call 911. AVS and recommendations given to patient upon discharge.

## 2021-06-22 NOTE — LETTER
Name:.Snow Kaur :1960 MR #:[id number] [address] [place] [address] Page 1 of 5 CONTROLLED SUBSTANCE AGREEMENT I may be prescribed medications that are controlled substances as part  of my treatment plan for management of my medical condition(s). The goal of my treatment plan is to maintain and/or improve my health and wellbeing. Because controlled substances have an increased risk of abuse or harm, continual re-evaluation is needed determine if the goals of my treatment plan are being met for my safety and the safety of others. Chiquis Kaur  am entering into this Controlled Substance Agreement with my provider, __________________________________ at [address] [place] [address] . I understand that successful treatment requires mutual trust and honesty between me and my provider. I understand that there are state and federal laws and regulations which apply to the medications that my provider may prescribe that must be followed. I understand there are risks and benefits ts of taking the medicines that my provider may prescribe. I understand and agree that following this Agreement is necessary in continuing my provider-patient relationship and success of my treatment plan. As a part of my treatment plan, I agree to the following: COMMUNICATION: 
 
1. I will communicate fully with my provider about my medical condition(s), including the effect on my daily life and how well my medications are helping. I will tell my provider all of the medications that I take for any reason, including medications I receive from another health care provider, and will notify my provider about all issues, problems or concerns, including any side effects, which may be related to my medications. I understand that this information allows my provider to adjust my treatment plan to help manage my medical condition.  I understand that this information will become part of my permanent medical record. 2. I will notify my provider if I have a history of alcohol/drug misuse/addiction or if I have had treatment for alcohol/drug addiction in the past, or if I have a new problem with or concern about alcohol/drug use/addiction, because this increases the likelihood of high risk behaviors and may lead to serious medical conditions. 3. Females Only: I will notify my provider if I am or become pregnant, or if I intend to become pregnant, or if I intend to breastfeed. I understand that communication of these issues with my provider is important, due to possible effects my medication could have on an unborn fetus or breastfeeding child. Initials_____ Name:.Snow Kaur :1960 MR #:[id number] [address] [place] [address] Page 2 of 5 MISUSE OF MEDICATIONS / DRUGS: 
 
1. I agree to take all controlled substances as prescribed, and will not misuse or abuse any controlled substances prescribed by my provider. For my safety, I will not increase the amount of medicine I take without first talking with and getting permission from my provider. 2. If I have a medical emergency, another health care provider may prescribe me medication. If I seek emergency treatment, I will notify my provider within seventy-two (72) hours. 3. I understand that my provider may discuss my use and/or possible misuse/abuse of controlled substances and alcohol, as appropriate, with any health care provider involved in my care, pharmacist or legal authority. ILLEGAL DRUGS: 
 
1. I will not use illegal drugs of any kind, including but not limited to marijuana, heroin, cocaine, or any prescription drug which is not prescribed to me. DRUG DIVERSION / PRESCRIPTION FRAUD: 
 
1. I will not share, sell, trade, give away, or otherwise misuse my prescriptions or medications. 2. I will not alter any prescriptions provided to me by my provider. SINGLE PROVIDER: 
 
1. I agree that all controlled substances that I take will be prescribed only by my provider (or his/her covering provider) under this Agreement. This agreement does not prevent me from seeking emergency medical treatment or receiving pain management related to a surgery. PROTECTING MEDICATIONS: 
 
1. I am responsible for keeping my prescriptions and medications in a safe and secure place including safeguarding them from loss or theft. I understand that lost, stolen or damaged/destroyed prescriptions or medications will not be replaced. Initials____ Name:.Snow Kaur :1960 MR #:[id number] [address] [place] [address] Page 3 of 5 PRESCRIPTION RENEWALS/REFILLS: 
 
1. I will follow my controlled substance medication schedule as prescribed by my provider. 2. I understand and agree that I will make any requests for renewals or refills of my prescriptions only at the time of an office visit or during my providers regular office hours subject to the prescription refill requirements of the individual practice. 3. I understand that my provider may not call in prescriptions for controlled substances to my pharmacy. 4. I understand that my provider may adjust or discontinue these medications as deemed appropriate for my medical treatment plan. This Agreement does not guarantee the prescription of controlled medications. 5. I agree that if my medications are adjusted or discontinued, I will properly dispose of any remaining medications. I understand that I will be required to dispose of any remaining controlled medications prior to being provided with any prescriptions for other controlled medications. 6. I understand that the renewal of my prescription depends on my medical condition, my consistent participation, and my adherence with my treatment plan and this Agreement.  
 
7. I understand that if I do not keep an appointment with my provider, I may not receive a renewal or refill for my controlled substance medication. PRESCRIPTION MONITORING / DRUG TESTIN. I understand that my provider may require me to provide urine, saliva or blood for testing at any time. I understand that this testing will be used to monitor for safety and adherence with my treatment plan and this Agreement. 2. I understand that my provider may ask me to provide an observed urine specimen, which means that a nurse or other health care provider may watch me provide urine, and I agree to cooperate if I am asked to provide an observed specimen. 3. I understand that if I do not provide urine, saliva or blood samples within two (2) hours of my providers request, or other timeframe decided by my provider, my treatment plan could be changed, or my prescriptions and medications may be changed or ended. 4. I understand that urine, saliva and blood test results will be a part of my permanent medical record. Initials_____ Name:.Snow Kaur :1960 MR #:[id number] [address] [place] [address] Page 4 of 5 
 
5. I understand that my provider is required to obtain a copy of my State Prescription Monitoring Program () Report at any time in order to safely prescribe medications. 6. I will bring all of my prescribed controlled substance medications in their original bottles to all of my scheduled appointments. 7. I understand that my provider may ask me to come to the practice with all of my prescribed medications for a random pill count at any time. I agree to cooperate if I am asked to come in for a random pill count. I understand that if I do not arrive in the timeframe decided by my provider, my treatment plan could be changed, or my prescriptions and medications may be changed or ended.  
 
COOPERATION WITH INVESTIGATIONS: 
 
1. I authorize my provider and my pharmacy to cooperate fully with any local, state, or federal law enforcement agency in the investigation of any possible misuse, sale, or other diversion of my controlled substance prescriptions or medications. RISKS: 
 
1. I understand that my level of consciousness may be affected from the use of controlled substances, and I understand that there are risks, benefits, effects and potential alternatives (including no treatment) to the medications that my provider has prescribed. 2. I understand that I may become drowsy, tired, dizzy, constipated, and sick to my stomach, or have changes in my mood or in my sleep while taking my medications. I have talked with my provider about these possible side effects, risks, benefits, and alternative treatments, and my provider has answered all of my questions. 3. I understand that I should not suddenly stop taking my medications without first speaking with my provider. I understand that if I suddenly stop taking my medications, I may experience nausea, vomiting, sweating,anxiety, sleeplessness, itching or other uncomfortable feelings. 4. I will not take my medications with alcohol of any kind, including beer, wine or liquor. I understand that drinking alcohol with my medications increases the chances of side effects, including breathing problems or even death. 5. I understand that if I have a history of alcoholism or other drug addiction I may be at increased risk of addiction to my medications. Signs of addiction might include craving, compulsive use, and continued use despite harm. Since addiction is a disease, I understand my provider may decide to change my medications and refer me to appropriate treatment services. I understand that this information would become part of my permanent medical record. Initials_____ Name:.Snow GENE Kaur :1960 MR #:[id number] [address] [place] [address] Page 5 of 5  
 
 
6.  Females only: Children born to women who regularly take controlled substances are likely to have physical problems and suffer withdrawal symptoms at birth. If I am of child-bearing age, I understand that I should use safe and effective birth control while taking any controlled substances to avoid the impact of medications on an unborn fetus or  child. I agree to notify my provider immediately if I should become pregnant so that my treatment plan can be adjusted. 7. Males only: I understand that chronic use of controlled substances has been associated with low testosterone levels in males which may affect my mood, stamina, sexual desire, and general health. I understand that my provider may order the appropriate laboratory test to determine my testosterone level,and I agree to this testing. ADHERENCE: 
 
1. I understand that if I do not adhere to this Agreement in any way, my provider may change my prescriptions, stop prescribing controlled substances or end our provider-patient relationship. 2. If my provider decides to stop prescribing medication, or decides to end our provider-patient relationship,my provider may require that I taper my medications slowly. If necessary, my provider may also provide a prescription for other medications to treat my withdrawal symptoms. UNDERSTANDING THIS AGREEMENT: 
 
I understand that my provider may adjust or stop my prescriptions for controlled substances based on my medical condition and my treatment plan. I understand that this Agreement does not guarantee that I will be prescribed medications or controlled substances. I understand that controlled substances may be just one part 
of my treatment plan. My initial on each page and my signature below shows that I have read each page of this Agreement, I have had an opportunity to ask questions, and all of my questions have been answered to my satisfaction by my provider.  
 
By signing below, I agree to comply with this Agreement, and I understand that if I do not follow the Agreements listed above, my provider may stop 
 
_________________________________________  Date/Time 6/22/2021 11:15 AM   
             (Patient Signature) 
 
________________________________________    Date/Time 6/22/2021 11:15 AM 
 (Parent or Guardian Signature if <18 yrs) 
 
_________________________________________ Date/Time 6/22/2021 11:15 AM 
 (Provider Signature)

## 2021-06-23 ENCOUNTER — HOME CARE VISIT (OUTPATIENT)
Dept: SCHEDULING | Facility: HOME HEALTH | Age: 61
End: 2021-06-23
Payer: MEDICAID

## 2021-06-23 VITALS
HEART RATE: 72 BPM | TEMPERATURE: 98.2 F | OXYGEN SATURATION: 97 % | SYSTOLIC BLOOD PRESSURE: 136 MMHG | DIASTOLIC BLOOD PRESSURE: 94 MMHG

## 2021-06-23 VITALS
DIASTOLIC BLOOD PRESSURE: 100 MMHG | TEMPERATURE: 97.5 F | SYSTOLIC BLOOD PRESSURE: 148 MMHG | HEART RATE: 67 BPM | OXYGEN SATURATION: 99 %

## 2021-06-23 PROCEDURE — G0157 HHC PT ASSISTANT EA 15: HCPCS

## 2021-06-24 VITALS
OXYGEN SATURATION: 100 % | HEART RATE: 65 BPM | DIASTOLIC BLOOD PRESSURE: 90 MMHG | TEMPERATURE: 97.6 F | SYSTOLIC BLOOD PRESSURE: 135 MMHG

## 2021-06-25 ENCOUNTER — HOME CARE VISIT (OUTPATIENT)
Dept: SCHEDULING | Facility: HOME HEALTH | Age: 61
End: 2021-06-25
Payer: MEDICAID

## 2021-06-25 LAB — DRUGS UR: NORMAL

## 2021-06-25 PROCEDURE — G0300 HHS/HOSPICE OF LPN EA 15 MIN: HCPCS

## 2021-06-28 VITALS
TEMPERATURE: 97.6 F | DIASTOLIC BLOOD PRESSURE: 100 MMHG | SYSTOLIC BLOOD PRESSURE: 138 MMHG | HEART RATE: 57 BPM | OXYGEN SATURATION: 99 %

## 2021-06-29 ENCOUNTER — TELEPHONE (OUTPATIENT)
Dept: FAMILY MEDICINE CLINIC | Age: 61
End: 2021-06-29

## 2021-06-29 ENCOUNTER — HOME CARE VISIT (OUTPATIENT)
Dept: SCHEDULING | Facility: HOME HEALTH | Age: 61
End: 2021-06-29
Payer: MEDICAID

## 2021-06-29 VITALS
OXYGEN SATURATION: 98 % | SYSTOLIC BLOOD PRESSURE: 159 MMHG | DIASTOLIC BLOOD PRESSURE: 105 MMHG | TEMPERATURE: 98.1 F | HEART RATE: 65 BPM

## 2021-06-29 PROCEDURE — G0157 HHC PT ASSISTANT EA 15: HCPCS

## 2021-06-29 NOTE — TELEPHONE ENCOUNTER
Tried to reach patient at this time. No answer noted. Spoke with daughter which is not at the home with patient at this time. Informed patient daughter I received a call from home health reporting elevated BP readings. Informed daughter when she get with her mom and she is experiencing any abnormal symptoms then report to there ER. Symptoms such as chest pain,nausea, vomiting, numbness and tingling, dizziness or and distress. Daughter verbalized understanding. Informed daughter of upcoming appointment as well

## 2021-06-29 NOTE — TELEPHONE ENCOUNTER
[facility] called to report blood pressure readings:    160/119  159/105    They discussed diet and lowering sodium.

## 2021-06-30 ENCOUNTER — OFFICE VISIT (OUTPATIENT)
Dept: NEUROLOGY | Age: 61
End: 2021-06-30
Payer: MEDICAID

## 2021-06-30 ENCOUNTER — HOME CARE VISIT (OUTPATIENT)
Dept: SCHEDULING | Facility: HOME HEALTH | Age: 61
End: 2021-06-30
Payer: MEDICAID

## 2021-06-30 VITALS
HEART RATE: 78 BPM | RESPIRATION RATE: 18 BRPM | SYSTOLIC BLOOD PRESSURE: 150 MMHG | DIASTOLIC BLOOD PRESSURE: 100 MMHG | HEIGHT: 67 IN | OXYGEN SATURATION: 99 % | WEIGHT: 186 LBS | BODY MASS INDEX: 29.19 KG/M2

## 2021-06-30 DIAGNOSIS — Z79.02 ANTIPLATELET OR ANTITHROMBOTIC LONG-TERM USE: ICD-10-CM

## 2021-06-30 DIAGNOSIS — I63.81 LACUNAR INFARCTION (HCC): ICD-10-CM

## 2021-06-30 DIAGNOSIS — Z79.899 ON STATIN THERAPY: ICD-10-CM

## 2021-06-30 DIAGNOSIS — I67.89 HEMIPARESIS OF RIGHT DOMINANT SIDE DUE TO ACUTE CEREBROVASCULAR DISEASE (HCC): Primary | ICD-10-CM

## 2021-06-30 DIAGNOSIS — G81.91 HEMIPARESIS OF RIGHT DOMINANT SIDE DUE TO ACUTE CEREBROVASCULAR DISEASE (HCC): Primary | ICD-10-CM

## 2021-06-30 DIAGNOSIS — E78.5 HYPERLIPIDEMIA LDL GOAL <70: ICD-10-CM

## 2021-06-30 PROCEDURE — 99215 OFFICE O/P EST HI 40 MIN: CPT | Performed by: NURSE PRACTITIONER

## 2021-06-30 RX ORDER — ASPIRIN 81 MG/1
81 TABLET ORAL DAILY
COMMUNITY
End: 2022-06-07 | Stop reason: SDUPTHER

## 2021-06-30 NOTE — PROGRESS NOTES
[facility]  [address], [address], [place], [address]  [address], [address]  Office:  [phone]  Fax: [phone]  Chief Complaint   Patient presents with    Stroke     follow up     This is a 32-year-old female who presents for hospital follow-up stroke. She was treated at [facility] and then [facility] [place] [address]. Tells me she had trouble ambulating and right sided weakness. Had multiple falls. Per documentation patient states she was in her normal state until 10 AM.  She had attempted to go upstairs and felt as if her feet had fallen from under her. Said she went to lie down until approximately 3 PM.  She had several episodes of falls. Denied hitting her head or loss of consciousness. Denied any visual changes. Denies history of stroke or heart attack. Endorsed some slight slurred speech without dysphagia. Her fiancé urged her to go to the hospital due to persistent symptoms. Her initial head CT was negative. She was evaluated by teleneurology and not deemed a candidate for alteplase due to time window of symptoms onset. She had a CTA head and neck that showed some blood vessel narrowing, however consultation with interventional neuroradiology did not feel the patient was a candidate for any therapy. Per documentation she was evaluated by interventional neurologist, Dr. Shantell Flannery, for evaluation and comanagement. Patient was not deemed to be a candidate for mechanical thrombectomy due to the non-occlusive nature of the thrombus and being outside the typical 6 hours. She was started on Plavix and aspirin therapy. She is on statin therapy. She endorses staying one week in the hospital and then being transferred to rehab. She is now discharged home. She is doing at home physical therapy and Occupational Therapy. Denies any new onset strokelike symptoms. Still having some right sided weakness.   Feels it is improving since being in the hospital.  Endorses tobacco use approximately 2 to 3 cigarettes a day. She has followed up with her primary care provider since discharge. She is staying with her daughter currently who lives in a one-story home where she does not have to navigate stairs. Denies falls. Denies any other concerns at this time. Past Medical History:   Diagnosis Date    Abnormal mammogram 2014    left with biopsy    Acute lacunar stroke ([facility] [address] 5/21/2021    Acute Lacunar Stroke (acute lacunar infarct in the posterior left lentiform nucleus extending into the corona radiata) with residual right hemiparesis    Bacterial vaginosis 1/9/15    Dr Shantal Tejada Constipation     Current use of aspirin 5/23/2021    Gastroesophageal reflux disease 6/30/2016    Hemiparesis of right dominant side due to acute cerebrovascular disease ([facility] [address] 5/21/2021    History of Helicobacter pylori infection 7/24/2015    History of iron deficiency anemia 06/2014    History of vitamin D deficiency 6/11/2015    Hot flashes 1/9/15    Dr Shantal Teajda On clopidogrel therapy 5/23/2021    On statin therapy due to risk of future cardiovascular event 5/22/2021    On Atorvastatin    Rheumatoid arthritis ([facility]     Secondary hyperparathyroidism of renal origin ([facility] [address] 5/31/2021    Stage 3b chronic kidney disease ([facility] [address] 7/24/2015    Tobacco use disorder     Vitamin D deficiency 5/31/2021    Vitamin D 25-Hydroxy (5/31/2021) = 10.5        Past Surgical History:   Procedure Laterality Date    HX LAPAROSCOPIC SUPRACERVICAL HYSTERECTOMY  11/2014    Dr. Ronal Richey, GYN    HX SALPINGO-OOPHORECTOMY  11/2014       Current Outpatient Medications   Medication Sig Dispense Refill    aspirin delayed-release 81 mg tablet Take 81 mg by mouth daily.  melatonin 10 mg tab Take 3 mg by mouth nightly. Indications: for stroke recovery 30 Tablet 1    losartan (COZAAR) 50 mg tablet Take 1 Tablet by mouth daily.  30 Tablet 1    gabapentin (NEURONTIN) 300 mg capsule Take 1 Capsule by mouth three (3) times daily. Max Daily Amount: 900 mg. 90 Capsule 0    acetaminophen (TYLENOL) 325 mg tablet Take 2 Tablets by mouth every four (4) hours as needed (for fever or pain). Indications: fever, pain 30 Tablet 0    atorvastatin (LIPITOR) 80 mg tablet Take 1 Tablet by mouth daily. Indications: excessive fat in the blood, stroke prevention 30 Tablet 0    carvediloL (COREG) 12.5 mg tablet Take 1 Tablet by mouth two (2) times daily (with meals). Indications: high blood pressure 60 Tablet 0    cholecalciferol (VITAMIN D3) (5000 Units /125 mcg) capsule Take 1 Capsule by mouth daily. Indications: low vitamin D levels 30 Capsule 0    clopidogreL (PLAVIX) 75 mg tab Take 1 Tablet by mouth daily (with breakfast). Indications: prevention for a blood clot going to the brain 30 Tablet 0    co-enzyme Q-10 (CO Q-10) 100 mg capsule Take 1 Capsule by mouth daily. 30 Capsule 0    polyethylene glycol (MIRALAX) 17 gram packet Take 1 Packet by mouth daily.  Indications: constipation 30 Packet 0        No Known Allergies    Social History     Tobacco Use    Smoking status: Current Every Day Smoker     Packs/day: 0.50     Years: 20.00     Pack years: 10.00     Types: Cigarettes    Smokeless tobacco: Never Used   Substance Use Topics    Alcohol use: No    Drug use: Not Currently     Types: Cocaine       Family History   Problem Relation Age of Onset    Arthritis-osteo Maternal Grandmother     Cancer Sister     Hypertension Sister    [facility] Arthritis-osteo Mother     Hypertension Mother     Diabetes Mother     Hypertension Brother     Stroke Neg Hx        Review of Systems  GENERAL: Denies fever or fatigue  CARDIAC: No CP or SOB  PULMONARY: No cough of SOB  MUSCULOSKELETAL: No new joint pain  NEURO: SEE HPI    Examination  Visit Vitals  BP (!) 150/100   Pulse 78   Resp 18   Ht 5' 7\" (1.702 m)   Wt 84.4 kg (186 lb)   LMP 10/15/2014   SpO2 99%   BMI 29.13 kg/m²       This is a very pleasant 42-year-old female. She is alert and in no apparent distress. Full fund of knowledge. Speech is clear. No facial asymmetry. Pupils equal round reactive to light. Extraocular movements intact. Tongue midline. Equal shoulder shrug. Very mild weakness noted to right upper and lower extremity. Reflexes more reactive R>L. She does ambulate with assistance of walker. Impression/Plan  Jarrett Rizvi is a 62 y.o. female whose history and physical are consistent with lacunar infarct with risk factors including tobacco use and hypertension. Patient describes new onset right-sided weakness with subsequent development of repeated falls starting at approximately 10 AM.  She said that she did navigate up stairs at home and lie down until approximately 3 PM.  At some point later that evening they did present to [facility] for evaluation. Reviewed hospital documentation through notes section. Unable to access records in Care Everywhere tab for unknown reason. CT scan of the head (5/21/2021) showed no acute intracranial abnormality; moderate sequela of chronic small vessel ischemic disease.     MRI of the brain showed:  1. Acute lacunar infarct in the posterior left lentiform nucleus extending into the corona radiata. 2. Underlying moderate chronic microvascular ischemic changes in the white matter. 3. Incidental 2 cm left anterior frontal convexity arachnoid cyst.     MRA of the head and neck showed:  1. Compared with the CTA head and neck from 4 hours earlier no significant interval change. 2. Up to moderate narrowing in the left MCA inferior M2 division with patent flow beyond the stenosis unchanged from the CTA. 3. A 2 mm outpouching visible at the anterior lateral cavernous left ICA suspicious for extradural cavernous ICA aneurysm on CTA is not seen on this exam.   -This outpouching is a uncertain significance.  Could be a crossing vein rather than aneurysm given the nonvisualization on this exam.  -These extradural aneurysms are typically asymptomatic with no risk of subarachnoid hemorrhage.     2D echocardiogram showed EF 65%; moderate concentric LV hypertrophy; normal LV diastolic function; no hemodynamically significant valvular disease; bubble study was performed and was negative for shunt.     CT angiogram of the head and neck (5/21/2021) showed:  1. Moderate stenosis in the inferior M2 division of the left MCA but with patent flow beyond the stenosis. 2. Otherwise patent intracranial circulation. No large vessel occlusion. No other significant intracranial stenosis. 3. Patent bilateral cervical carotid and vertebral arteries. No ICA origin stenosis. 4. 2 mm enhancing probable outpouching at the anterior lateral left cavernous ICA. Does not appear calcified on noncontrast CT images. Could represent a tiny extradural cavernous ICA aneurysm. 5. Soft tissue findings remarkable for a small gas collection right lateral to the trachea and esophagus at the thoracic inlet consistent with a small tracheal diverticulum. Subcentimeter heterogenous right thyroid nodule. Per documentation she was evaluated by interventional neurologist, Dr. Naeem Mancilla, for evaluation and comanagement. Patient was not deemed to be a candidate for mechanical thrombectomy due to the non-occlusive nature of the thrombus and being outside the typical 6 hours. She was started on Plavix and aspirin 81 mg therapy. Today it is unclear if she is on aspirin therapy. Per review of records she is to maintain on clopidogrel 75 mg and aspirin 81 mg therapy. Patient and her daughter verbalized understanding. She is on statin therapy. Encourage smoking cessation. Continue to follow-up with her primary care provider regarding management of blood pressure. Blood pressures up to 150/100 today. She has upcoming blood work including LDL and A1c ordered by her primary care provider.   I cannot see recent LDL from hospitalization, but recommendation is goal LDL less than 70. Continue with at home physical and occupational therapy. Denies any trouble with speech or swallowing. Follow-up in 3 months or sooner as needed. All questions addressed and patient and patient's daughter are agreeable with plan of care. Diagnoses and all orders for this visit:    1. Hemiparesis of right dominant side due to acute cerebrovascular disease ([address]    2. Antiplatelet or antithrombotic long-term use    3. On statin therapy    4. Lacunar infarction ([address]    5. Hyperlipidemia LDL goal <70      Total time: 45 min   Counseling / coordination time: 30 min   > 50% counseling / coordination?: Yes re: medications, recommendations, diagnostic testing results, safety, education, answering questions, risk factors, and plan of care. Signed By: Juan Edgar NP        PLEASE NOTE:   Portions of this document may have been produced using voice recognition software. Unrecognized errors in transcription may be present.

## 2021-06-30 NOTE — PROGRESS NOTES
Lucero Duarte presents today for   Chief Complaint   Patient presents with    Stroke     follow up       Is someone accompanying this pt? Yes, Daughter    Is the patient using any DME equipment during [address]? no    Depression Screening:  3 most recent PHQ Screens 6/22/2021   Little interest or pleasure in doing things Not at all   Feeling down, depressed, irritable, or hopeless Not at all   Total Score PHQ 2 0   Trouble falling or staying asleep, or sleeping too much Not at all   Feeling tired or having little energy Several days   Poor appetite, weight loss, or overeating Several days   Feeling bad about yourself - or that you are a failure or have let yourself or your family down Not at all   Trouble concentrating on things such as school, work, reading, or watching TV Not at all   Moving or speaking so slowly that other people could have noticed; or the opposite being so fidgety that others notice Not at all   Thoughts of being better off dead, or hurting yourself in some way Not at all   PHQ 9 Score 2   How difficult have these problems made it for you to do your work, take care of your home and get along with others Not difficult at all       Learning Assessment:  Learning Assessment 6/18/2014   PRIMARY LEARNER Patient   HIGHEST LEVEL OF EDUCATION - PRIMARY LEARNER  GRADUATED HIGH SCHOOL OR GED   BARRIERS PRIMARY LEARNER NONE   PRIMARY LANGUAGE ENGLISH   LEARNER PREFERENCE PRIMARY READING   ANSWERED BY Patient   RELATIONSHIP SELF       Abuse Screening:  No flowsheet data found. Fall Risk  No flowsheet data found. Coordination of Care:  1. Have you been to the ER, urgent care clinic since your last visit? Hospitalized since your last visit? no    2. Have you seen or consulted any other health care providers outside of the [address] [place] [address] since your last visit? Include any pap smears or colon screening.  no

## 2021-07-01 ENCOUNTER — HOME CARE VISIT (OUTPATIENT)
Dept: SCHEDULING | Facility: HOME HEALTH | Age: 61
End: 2021-07-01
Payer: MEDICAID

## 2021-07-01 VITALS
RESPIRATION RATE: 18 BRPM | DIASTOLIC BLOOD PRESSURE: 90 MMHG | TEMPERATURE: 97.9 F | SYSTOLIC BLOOD PRESSURE: 131 MMHG | OXYGEN SATURATION: 99 % | HEART RATE: 76 BPM

## 2021-07-01 PROCEDURE — G0158 HHC OT ASSISTANT EA 15: HCPCS

## 2021-07-01 PROCEDURE — G0157 HHC PT ASSISTANT EA 15: HCPCS

## 2021-07-02 ENCOUNTER — HOME CARE VISIT (OUTPATIENT)
Dept: SCHEDULING | Facility: HOME HEALTH | Age: 61
End: 2021-07-02
Payer: MEDICAID

## 2021-07-02 VITALS — TEMPERATURE: 97.3 F | OXYGEN SATURATION: 100 % | HEART RATE: 75 BPM

## 2021-07-02 PROCEDURE — G0300 HHS/HOSPICE OF LPN EA 15 MIN: HCPCS

## 2021-07-06 ENCOUNTER — HOME CARE VISIT (OUTPATIENT)
Dept: SCHEDULING | Facility: HOME HEALTH | Age: 61
End: 2021-07-06
Payer: MEDICAID

## 2021-07-06 ENCOUNTER — TELEPHONE (OUTPATIENT)
Dept: FAMILY MEDICINE CLINIC | Age: 61
End: 2021-07-06

## 2021-07-06 ENCOUNTER — HOME CARE VISIT (OUTPATIENT)
Dept: HOME HEALTH SERVICES | Facility: HOME HEALTH | Age: 61
End: 2021-07-06
Payer: MEDICAID

## 2021-07-06 VITALS
SYSTOLIC BLOOD PRESSURE: 150 MMHG | HEART RATE: 57 BPM | TEMPERATURE: 97.7 F | OXYGEN SATURATION: 99 % | DIASTOLIC BLOOD PRESSURE: 90 MMHG

## 2021-07-06 VITALS
SYSTOLIC BLOOD PRESSURE: 169 MMHG | TEMPERATURE: 97.7 F | HEART RATE: 67 BPM | DIASTOLIC BLOOD PRESSURE: 110 MMHG | OXYGEN SATURATION: 97 %

## 2021-07-06 PROCEDURE — G0158 HHC OT ASSISTANT EA 15: HCPCS

## 2021-07-06 NOTE — TELEPHONE ENCOUNTER
Sindhu, [facility] [facility] [address] called to report elevated BP:    158/107  159/110    [phone]

## 2021-07-07 ENCOUNTER — HOSPITAL ENCOUNTER (OUTPATIENT)
Dept: LAB | Age: 61
Discharge: HOME | End: 2021-07-07
Payer: MEDICAID

## 2021-07-07 ENCOUNTER — HOME CARE VISIT (OUTPATIENT)
Dept: SCHEDULING | Facility: HOME HEALTH | Age: 61
End: 2021-07-07
Payer: MEDICAID

## 2021-07-07 ENCOUNTER — LAB ONLY (OUTPATIENT)
Dept: FAMILY MEDICINE CLINIC | Age: 61
End: 2021-07-07
Payer: MEDICAID

## 2021-07-07 DIAGNOSIS — Z13.6 SCREENING FOR CARDIOVASCULAR CONDITION: ICD-10-CM

## 2021-07-07 DIAGNOSIS — E11.9 TYPE 2 DIABETES MELLITUS WITHOUT COMPLICATION, WITHOUT LONG-TERM CURRENT USE OF INSULIN (HCC): ICD-10-CM

## 2021-07-07 DIAGNOSIS — Z01.89 ENCOUNTER FOR LABORATORY TEST: Primary | ICD-10-CM

## 2021-07-07 DIAGNOSIS — E55.9 VITAMIN D DEFICIENCY: ICD-10-CM

## 2021-07-07 DIAGNOSIS — E78.2 MIXED HYPERLIPIDEMIA: ICD-10-CM

## 2021-07-07 DIAGNOSIS — R73.09 ELEVATED GLUCOSE: ICD-10-CM

## 2021-07-07 LAB
25(OH)D3 SERPL-MCNC: 25.7 NG/ML (ref 30–100)
ALBUMIN SERPL-MCNC: 3.4 G/DL (ref 3.4–5)
ANION GAP SERPL CALC-SCNC: 7 MMOL/L (ref 3–18)
BUN SERPL-MCNC: 29 MG/DL (ref 7–18)
BUN/CREAT SERPL: 14 (ref 12–20)
CALCIUM SERPL-MCNC: 9.2 MG/DL (ref 8.5–10.1)
CHLORIDE SERPL-SCNC: 113 MMOL/L (ref 100–111)
CHOLEST SERPL-MCNC: 161 MG/DL
CO2 SERPL-SCNC: 24 MMOL/L (ref 21–32)
CREAT SERPL-MCNC: 2.01 MG/DL (ref 0.6–1.3)
EST. AVERAGE GLUCOSE BLD GHB EST-MCNC: 117 MG/DL
GLUCOSE SERPL-MCNC: 98 MG/DL (ref 74–99)
HBA1C MFR BLD: 5.7 % (ref 4.2–5.6)
HDLC SERPL-MCNC: 40 MG/DL (ref 40–60)
HDLC SERPL: 4 {RATIO} (ref 0–5)
LDLC SERPL CALC-MCNC: 81.6 MG/DL (ref 0–100)
LIPID PROFILE,FLP: ABNORMAL
PHOSPHATE SERPL-MCNC: 3.8 MG/DL (ref 2.5–4.9)
POTASSIUM SERPL-SCNC: 4.1 MMOL/L (ref 3.5–5.5)
SODIUM SERPL-SCNC: 144 MMOL/L (ref 136–145)
TRIGL SERPL-MCNC: 197 MG/DL (ref ?–150)
VLDLC SERPL CALC-MCNC: 39.4 MG/DL

## 2021-07-07 PROCEDURE — 82306 VITAMIN D 25 HYDROXY: CPT

## 2021-07-07 PROCEDURE — 36415 COLL VENOUS BLD VENIPUNCTURE: CPT

## 2021-07-07 PROCEDURE — 83036 HEMOGLOBIN GLYCOSYLATED A1C: CPT

## 2021-07-07 PROCEDURE — 80069 RENAL FUNCTION PANEL: CPT

## 2021-07-07 PROCEDURE — G0151 HHCP-SERV OF PT,EA 15 MIN: HCPCS

## 2021-07-07 PROCEDURE — 80061 LIPID PANEL: CPT

## 2021-07-07 PROCEDURE — 36415 COLL VENOUS BLD VENIPUNCTURE: CPT | Performed by: NURSE PRACTITIONER

## 2021-07-07 NOTE — PROGRESS NOTES
Patient in for labs today. Labs ordered by PCP. Venipuncture to the left arm. Patient tolerated well and there are no concerns.

## 2021-07-07 NOTE — TELEPHONE ENCOUNTER
Attempted to call patient o answer unable to leave message. Did speak with home health and confirmed the high readings. Advised home health to confirm patient is taking two BP medications and call with readings tomorrow when she is seen.   Will attempt to call patient again and advise if she is having any other symptoms she should go to ER for evaluation and treatment

## 2021-07-08 ENCOUNTER — HOME CARE VISIT (OUTPATIENT)
Dept: SCHEDULING | Facility: HOME HEALTH | Age: 61
End: 2021-07-08
Payer: MEDICAID

## 2021-07-08 VITALS
OXYGEN SATURATION: 99 % | DIASTOLIC BLOOD PRESSURE: 97 MMHG | RESPIRATION RATE: 18 BRPM | TEMPERATURE: 98.4 F | HEART RATE: 78 BPM | SYSTOLIC BLOOD PRESSURE: 146 MMHG

## 2021-07-08 PROCEDURE — G0300 HHS/HOSPICE OF LPN EA 15 MIN: HCPCS

## 2021-07-08 PROCEDURE — G0158 HHC OT ASSISTANT EA 15: HCPCS

## 2021-07-09 ENCOUNTER — TELEPHONE (OUTPATIENT)
Dept: FAMILY MEDICINE CLINIC | Age: 61
End: 2021-07-09

## 2021-07-09 NOTE — TELEPHONE ENCOUNTER
Venu Villarreal [facility], called regarding blood pressure 170/112. She is concerned because pt has had a stroke.      Asking for approval for 4 more visits for PT- will take verbal.     [phone]

## 2021-07-09 NOTE — TELEPHONE ENCOUNTER
Called patient's daughter and left message for patient to take her to ER due to increased blood pressure. Verbal order was given for 4 more PT home health visits.   Per provider  ok

## 2021-07-12 ENCOUNTER — HOME CARE VISIT (OUTPATIENT)
Dept: HOME HEALTH SERVICES | Facility: HOME HEALTH | Age: 61
End: 2021-07-12
Payer: MEDICAID

## 2021-07-12 ENCOUNTER — HOME CARE VISIT (OUTPATIENT)
Dept: SCHEDULING | Facility: HOME HEALTH | Age: 61
End: 2021-07-12
Payer: MEDICAID

## 2021-07-12 VITALS
SYSTOLIC BLOOD PRESSURE: 148 MMHG | TEMPERATURE: 98 F | OXYGEN SATURATION: 98 % | DIASTOLIC BLOOD PRESSURE: 76 MMHG | HEART RATE: 88 BPM

## 2021-07-12 VITALS
SYSTOLIC BLOOD PRESSURE: 170 MMHG | TEMPERATURE: 97.6 F | DIASTOLIC BLOOD PRESSURE: 112 MMHG | HEART RATE: 74 BPM | OXYGEN SATURATION: 97 %

## 2021-07-12 VITALS
RESPIRATION RATE: 20 BRPM | OXYGEN SATURATION: 97 % | HEART RATE: 64 BPM | SYSTOLIC BLOOD PRESSURE: 166 MMHG | TEMPERATURE: 98.2 F | DIASTOLIC BLOOD PRESSURE: 96 MMHG

## 2021-07-12 DIAGNOSIS — E55.9 VITAMIN D DEFICIENCY: ICD-10-CM

## 2021-07-12 DIAGNOSIS — R74.8 ELEVATED CREATINE KINASE LEVEL: Primary | ICD-10-CM

## 2021-07-12 PROCEDURE — G0158 HHC OT ASSISTANT EA 15: HCPCS

## 2021-07-13 ENCOUNTER — HOME CARE VISIT (OUTPATIENT)
Dept: HOME HEALTH SERVICES | Facility: HOME HEALTH | Age: 61
End: 2021-07-13
Payer: MEDICAID

## 2021-07-13 ENCOUNTER — HOME CARE VISIT (OUTPATIENT)
Dept: SCHEDULING | Facility: HOME HEALTH | Age: 61
End: 2021-07-13
Payer: MEDICAID

## 2021-07-13 ENCOUNTER — HOSPITAL ENCOUNTER (EMERGENCY)
Age: 61
Discharge: HOME | End: 2021-07-13
Attending: EMERGENCY MEDICINE
Payer: MEDICAID

## 2021-07-13 ENCOUNTER — TELEPHONE (OUTPATIENT)
Dept: FAMILY MEDICINE CLINIC | Age: 61
End: 2021-07-13

## 2021-07-13 VITALS
TEMPERATURE: 98.8 F | HEART RATE: 73 BPM | OXYGEN SATURATION: 100 % | SYSTOLIC BLOOD PRESSURE: 183 MMHG | DIASTOLIC BLOOD PRESSURE: 115 MMHG

## 2021-07-13 VITALS
TEMPERATURE: 98.4 F | HEART RATE: 66 BPM | OXYGEN SATURATION: 99 % | DIASTOLIC BLOOD PRESSURE: 94 MMHG | RESPIRATION RATE: 18 BRPM | SYSTOLIC BLOOD PRESSURE: 161 MMHG | BODY MASS INDEX: 29.82 KG/M2 | WEIGHT: 190 LBS | HEIGHT: 67 IN

## 2021-07-13 DIAGNOSIS — I10 UNCONTROLLED HYPERTENSION: Primary | ICD-10-CM

## 2021-07-13 PROCEDURE — 99282 EMERGENCY DEPT VISIT SF MDM: CPT

## 2021-07-13 PROCEDURE — G0157 HHC PT ASSISTANT EA 15: HCPCS

## 2021-07-13 NOTE — ED PROVIDER NOTES
EMERGENCY DEPARTMENT HISTORY AND PHYSICAL EXAM    Date: 7/13/2021  Patient Name: Carolin Mckeon    History of Presenting Illness     Chief Complaint   Patient presents with    Hypertension         History Provided By: Patient    Chief Complaint: High blood pressure  Duration: This morning  Timing: Improved  Location: N/A  Quality: Asymptomatic  Severity: Severe  Modifying Factors: Worse after not having her morning blood pressure medications  Associated Symptoms: none       Additional History (Context): Carolin Mckeon is a 62 y.o. female with a history of hypertension, kidney disease and CVA who presents today for issues listed above. Patient reports that physical therapy came in this morning right as she had just woken up and when they took her blood pressure her systolic was in the 436T. States at that time she had not had her morning blood pressure medication. States she has no symptoms and feels well however did come into the emergency department as advised. Patient reports she is compliant with all of her medications. PCP: Shahnaz Chau NP    Current Outpatient Medications   Medication Sig Dispense Refill    aspirin delayed-release 81 mg tablet Take 81 mg by mouth daily.  melatonin 10 mg tab Take 3 mg by mouth nightly. Indications: for stroke recovery 30 Tablet 1    losartan (COZAAR) 50 mg tablet Take 1 Tablet by mouth daily. 30 Tablet 1    gabapentin (NEURONTIN) 300 mg capsule Take 1 Capsule by mouth three (3) times daily. Max Daily Amount: 900 mg. 90 Capsule 0    acetaminophen (TYLENOL) 325 mg tablet Take 2 Tablets by mouth every four (4) hours as needed (for fever or pain). Indications: fever, pain 30 Tablet 0    atorvastatin (LIPITOR) 80 mg tablet Take 1 Tablet by mouth daily. Indications: excessive fat in the blood, stroke prevention 30 Tablet 0    carvediloL (COREG) 12.5 mg tablet Take 1 Tablet by mouth two (2) times daily (with meals).  Indications: high blood pressure 60 Tablet 0    cholecalciferol (VITAMIN D3) (5000 Units /125 mcg) capsule Take 1 Capsule by mouth daily. Indications: low vitamin D levels 30 Capsule 0    clopidogreL (PLAVIX) 75 mg tab Take 1 Tablet by mouth daily (with breakfast). Indications: prevention for a blood clot going to the brain 30 Tablet 0    co-enzyme Q-10 (CO Q-10) 100 mg capsule Take 1 Capsule by mouth daily. 30 Capsule 0    polyethylene glycol (MIRALAX) 17 gram packet Take 1 Packet by mouth daily.  Indications: constipation 30 Packet 0       Past History     Past Medical History:  Past Medical History:   Diagnosis Date    Abnormal mammogram 2014    left with biopsy    Acute lacunar stroke ([facility] 5/21/2021    Acute Lacunar Stroke (acute lacunar infarct in the posterior left lentiform nucleus extending into the corona radiata) with residual right hemiparesis    Bacterial vaginosis 1/9/15    Dr Ryan Stevens Constipation     Current use of aspirin 5/23/2021    Gastroesophageal reflux disease 6/30/2016    Hemiparesis of right dominant side due to acute cerebrovascular disease ([facility] 5/21/2021    History of Helicobacter pylori infection 7/24/2015    History of iron deficiency anemia 06/2014    History of vitamin D deficiency 6/11/2015    Hot flashes 1/9/15    Dr Ryan Stevens On clopidogrel therapy 5/23/2021    On statin therapy due to risk of future cardiovascular event 5/22/2021    On Atorvastatin    Rheumatoid arthritis ([facility]     Secondary hyperparathyroidism of renal origin ([facility] 5/31/2021    Stage 3b chronic kidney disease ([facility] 7/24/2015    Tobacco use disorder     Vitamin D deficiency 5/31/2021    Vitamin D 25-Hydroxy (5/31/2021) = 10.5        Past Surgical History:  Past Surgical History:   Procedure Laterality Date    HX LAPAROSCOPIC SUPRACERVICAL HYSTERECTOMY  11/2014    Dr. Yolande Bridges, GYN    HX SALPINGO-OOPHORECTOMY  11/2014       Family History:  Family History   Problem Relation Age of Onset    Arthritis-osteo Maternal Grandmother     Cancer Sister     Hypertension Sister     Arthritis-osteo Mother     Hypertension Mother     Diabetes Mother     Hypertension Brother     Stroke Neg Hx        Social History:  Social History     Tobacco Use    Smoking status: Current Every Day Smoker     Packs/day: 0.50     Years: 20.00     Pack years: 10.00     Types: Cigarettes    Smokeless tobacco: Never Used   Substance Use Topics    Alcohol use: No    Drug use: Not Currently     Types: Cocaine       Allergies:  No Known Allergies      Review of Systems   Review of Systems   Constitutional: Negative for chills and fever. HENT: Negative for congestion, rhinorrhea and sore throat. Respiratory: Negative for cough and shortness of breath. Cardiovascular: Negative for chest pain. Gastrointestinal: Negative for abdominal pain, blood in stool, constipation, diarrhea, nausea and vomiting. Genitourinary: Negative for dysuria, frequency and hematuria. Musculoskeletal: Negative for back pain and myalgias. Skin: Negative for rash and wound. Neurological: Negative for dizziness and headaches. All other systems reviewed and are negative. All Other Systems Negative  Physical Exam     Vitals:    07/13/21 1542   BP: (!) 161/94   Pulse: 66   Resp: 18   Temp: 98.4 °F (36.9 °C)   SpO2: 99%   Weight: 86.2 kg (190 lb)   Height: 5' 7\" (1.702 m)     Physical Exam  Vitals and nursing note reviewed. Constitutional:       General: She is not in acute distress. Appearance: She is well-developed. She is not diaphoretic. Comments: Well-appearing, smiling   HENT:      Head: Normocephalic and atraumatic. Eyes:      Conjunctiva/sclera: Conjunctivae normal.   Cardiovascular:      Rate and Rhythm: Normal rate and regular rhythm. Heart sounds: Normal heart sounds. Pulmonary:      Effort: Pulmonary effort is normal. No respiratory distress. Breath sounds: Normal breath sounds. Chest:      Chest wall: No tenderness. Abdominal:      General: Bowel sounds are normal. There is no distension. Palpations: Abdomen is soft. Tenderness: There is no abdominal tenderness. There is no guarding or rebound. Musculoskeletal:         General: No deformity. Cervical back: Normal range of motion and neck supple. Skin:     General: Skin is warm and dry. Neurological:      Mental Status: She is alert and oriented to person, place, and time. Deep Tendon Reflexes: Reflexes are normal and symmetric. Diagnostic Study Results     Labs -   No results found for this or any previous visit (from the past 12 hour(s)). Radiologic Studies -   No orders to display     CT Results  (Last 48 hours)    None        CXR Results  (Last 48 hours)    None            Medical Decision Making   I am the first provider for this patient. I reviewed the vital signs, available nursing notes, past medical history, past surgical history, family history and social history. Vital Signs-Reviewed the patient's vital signs. Records Reviewed: Nursing Notes and Old Medical Records     Procedures: None   Procedures    Provider Notes (Medical Decision Making):       Differential: Essential hypertension, uncontrolled hypertension, hypertensive urgency, hypertensive emergency      Plan: Given patient is asymptomatic and blood pressure upon arrival today is 161/94, do not feel emergent work-up or intervention is needed at this time. Have discussed the importance of following up with primary care to discuss better blood pressure management. Have given strict return precautions. Have advised patient to continue closely monitoring blood pressures at home. Will discharge home. MED RECONCILIATION:  No current facility-administered medications for this encounter. Current Outpatient Medications   Medication Sig    aspirin delayed-release 81 mg tablet Take 81 mg by mouth daily.  melatonin 10 mg tab Take 3 mg by mouth nightly. Indications: for stroke recovery    losartan (COZAAR) 50 mg tablet Take 1 Tablet by mouth daily.  gabapentin (NEURONTIN) 300 mg capsule Take 1 Capsule by mouth three (3) times daily. Max Daily Amount: 900 mg.  acetaminophen (TYLENOL) 325 mg tablet Take 2 Tablets by mouth every four (4) hours as needed (for fever or pain). Indications: fever, pain    atorvastatin (LIPITOR) 80 mg tablet Take 1 Tablet by mouth daily. Indications: excessive fat in the blood, stroke prevention    carvediloL (COREG) 12.5 mg tablet Take 1 Tablet by mouth two (2) times daily (with meals). Indications: high blood pressure    cholecalciferol (VITAMIN D3) (5000 Units /125 mcg) capsule Take 1 Capsule by mouth daily. Indications: low vitamin D levels    clopidogreL (PLAVIX) 75 mg tab Take 1 Tablet by mouth daily (with breakfast). Indications: prevention for a blood clot going to the brain    co-enzyme Q-10 (CO Q-10) 100 mg capsule Take 1 Capsule by mouth daily.  polyethylene glycol (MIRALAX) 17 gram packet Take 1 Packet by mouth daily. Indications: constipation       Disposition:  Home     DISCHARGE NOTE:   Pt has been reexamined. Patient has no new complaints, changes, or physical findings. Care plan outlined and precautions discussed. Results of workup were reviewed with the patient. All medications were reviewed with the patient. All of pt's questions and concerns were addressed. Patient was instructed and agrees to follow up with PCP as well as to return to the ED upon further deterioration. Patient is ready to go home.     Follow-up Information     Follow up With Specialties Details Why Contact [facility] EMERGENCY DEPT Emergency Medicine  As needed [address] [facility]  [phone]    Alisha Mckeon NP Nurse Practitioner Call in 1 day  [address]  [phone]            Discharge Medication List as of 7/13/2021  4:06 PM              Diagnosis Clinical Impression:   1. Uncontrolled hypertension          \"Please note that this dictation was completed with [employer], the computer voice recognition software. Quite often unanticipated grammatical, syntax, homophones, and other interpretive errors are inadvertently transcribed by the computer software. Please disregard these errors. Please excuse any errors that have escaped final proofreading. \"

## 2021-07-13 NOTE — TELEPHONE ENCOUNTER
Left message for home health-    Spoke with daughter of patient and she advised she has been trying to get her mother (the patient) to go to ER but is refusing. Spoke with patient directly-speech sounded somewhat slurred unknown if it is from previous stroke-recommended to patient that since her BP readings were  high and they have been for over a week. Patient states that she has been taking her medication as directed  In addition to already having one stroke we needed to make sure she didn't have another one. we really needed her to go and be evaluated at the ER-her provider has not openings and is not scheduled to see her until 7-. Patient verbalized understanding and will go to [facility].

## 2021-07-13 NOTE — ED TRIAGE NOTES
Patient states that she has been having elevation in her blood pressure readings x 3 days. She denies symptoms with elevated blood pressure. She attests that she is taking BP medications as prescribed.

## 2021-07-13 NOTE — PROGRESS NOTES
Please patient with the following lab results    Inform patient that vitamin D level is slightly low at 25.7 and normal vitamin D level is between 30 and 100. Inform patient I am going to prescribe a weekly dose of vitamin D for her to take for the next 12 weeks. Please also inform patient that her triglyceride cholesterol was elevated at 197 and normal is below 150. I would advise patient to take cholesterol medication as prescribed and also incorporate healthier lifestyle choices with diet and exercise. Inform patient also that creatinine level is elevated at 2.01 a normal should be between 0.6 and 1.3. Creatinine levels determine how good the kidneys are functioning. I do not see that the patient is following with a nephrologist I will place an order for nephrology. Inform patient that if she does not hear from them within 2 weeks to call our office. Vitamin D was ordered and referral for nephrology for a workup of kidney function have been placed. These inform patient that A1c is 5.7 which considers her as a prediabetic.

## 2021-07-13 NOTE — TELEPHONE ENCOUNTER
Doris Viveros, from [facility], called to report elevated BP:    192/126   183/115    [phone] Madelon Carrier patient's daughter

## 2021-07-13 NOTE — ED TRIAGE NOTES
Patient states that she had not taken her blood pressure pill or eaten her breakfast when her blood pressure was taken earlier today. She states BP was taken by PT during home visit. She states tingling sensations to right hand are pre-existing from prior CVA. She states that she is feeling well. BP noted to be 161/94 taken to left upper arm.

## 2021-07-13 NOTE — ED NOTES
Patient received verbal discharge instructions from provider. Patient left prior to receiving written discharge instructions.

## 2021-07-14 ENCOUNTER — HOME CARE VISIT (OUTPATIENT)
Dept: SCHEDULING | Facility: HOME HEALTH | Age: 61
End: 2021-07-14
Payer: MEDICAID

## 2021-07-14 VITALS
DIASTOLIC BLOOD PRESSURE: 129 MMHG | SYSTOLIC BLOOD PRESSURE: 177 MMHG | RESPIRATION RATE: 18 BRPM | OXYGEN SATURATION: 99 % | TEMPERATURE: 97.5 F | HEART RATE: 63 BPM

## 2021-07-14 PROCEDURE — G0152 HHCP-SERV OF OT,EA 15 MIN: HCPCS

## 2021-07-14 PROCEDURE — G0157 HHC PT ASSISTANT EA 15: HCPCS

## 2021-07-15 VITALS — HEART RATE: 75 BPM | DIASTOLIC BLOOD PRESSURE: 100 MMHG | SYSTOLIC BLOOD PRESSURE: 160 MMHG | TEMPERATURE: 97.8 F

## 2021-07-19 ENCOUNTER — HOME CARE VISIT (OUTPATIENT)
Dept: SCHEDULING | Facility: HOME HEALTH | Age: 61
End: 2021-07-19
Payer: MEDICAID

## 2021-07-19 PROCEDURE — 400013 HH SOC

## 2021-07-19 PROCEDURE — G0157 HHC PT ASSISTANT EA 15: HCPCS

## 2021-07-20 ENCOUNTER — OFFICE VISIT (OUTPATIENT)
Dept: FAMILY MEDICINE CLINIC | Age: 61
End: 2021-07-20
Payer: MEDICAID

## 2021-07-20 VITALS
SYSTOLIC BLOOD PRESSURE: 160 MMHG | HEART RATE: 75 BPM | TEMPERATURE: 98.1 F | DIASTOLIC BLOOD PRESSURE: 97 MMHG | OXYGEN SATURATION: 100 %

## 2021-07-20 VITALS
WEIGHT: 188 LBS | RESPIRATION RATE: 20 BRPM | DIASTOLIC BLOOD PRESSURE: 94 MMHG | BODY MASS INDEX: 29.51 KG/M2 | SYSTOLIC BLOOD PRESSURE: 138 MMHG | HEART RATE: 83 BPM | HEIGHT: 67 IN | OXYGEN SATURATION: 99 % | TEMPERATURE: 96.8 F

## 2021-07-20 DIAGNOSIS — N18.32 HYPERTENSIVE HEART AND KIDNEY DISEASE WITHOUT HEART FAILURE AND WITH STAGE 3B CHRONIC KIDNEY DISEASE (HCC): Primary | ICD-10-CM

## 2021-07-20 DIAGNOSIS — I67.89 HEMIPARESIS OF RIGHT DOMINANT SIDE DUE TO ACUTE CEREBROVASCULAR DISEASE (HCC): ICD-10-CM

## 2021-07-20 DIAGNOSIS — I10 ESSENTIAL HYPERTENSION: ICD-10-CM

## 2021-07-20 DIAGNOSIS — G62.9 NEUROPATHY: ICD-10-CM

## 2021-07-20 DIAGNOSIS — I13.10 HYPERTENSIVE HEART AND KIDNEY DISEASE WITHOUT HEART FAILURE AND WITH STAGE 3B CHRONIC KIDNEY DISEASE (HCC): Primary | ICD-10-CM

## 2021-07-20 DIAGNOSIS — G81.91 HEMIPARESIS OF RIGHT DOMINANT SIDE DUE TO ACUTE CEREBROVASCULAR DISEASE (HCC): ICD-10-CM

## 2021-07-20 DIAGNOSIS — I63.81 ACUTE LACUNAR STROKE (HCC): ICD-10-CM

## 2021-07-20 DIAGNOSIS — Z79.01 ON CLOPIDOGREL THERAPY: ICD-10-CM

## 2021-07-20 DIAGNOSIS — G89.29 OTHER CHRONIC PAIN: ICD-10-CM

## 2021-07-20 PROCEDURE — 99214 OFFICE O/P EST MOD 30 MIN: CPT | Performed by: NURSE PRACTITIONER

## 2021-07-20 RX ORDER — CARVEDILOL 12.5 MG/1
12.5 TABLET ORAL 2 TIMES DAILY WITH MEALS
Qty: 60 TABLET | Refills: 0 | Status: CANCELLED | OUTPATIENT
Start: 2021-07-20

## 2021-07-20 RX ORDER — CLOPIDOGREL BISULFATE 75 MG/1
75 TABLET ORAL
Qty: 90 TABLET | Refills: 0 | Status: SHIPPED | OUTPATIENT
Start: 2021-07-20 | End: 2021-11-04 | Stop reason: SDUPTHER

## 2021-07-20 RX ORDER — AMLODIPINE BESYLATE 10 MG/1
TABLET ORAL
COMMUNITY
Start: 2021-07-15 | End: 2021-07-20 | Stop reason: SDUPTHER

## 2021-07-20 RX ORDER — DEXTROMETHORPHAN HYDROBROMIDE, GUAIFENESIN 5; 100 MG/5ML; MG/5ML
650 LIQUID ORAL EVERY 8 HOURS
Qty: 90 TABLET | Refills: 1 | Status: SHIPPED | OUTPATIENT
Start: 2021-07-20 | End: 2021-11-17 | Stop reason: SDUPTHER

## 2021-07-20 RX ORDER — LOSARTAN POTASSIUM 100 MG/1
100 TABLET ORAL DAILY
Qty: 90 TABLET | Refills: 0 | Status: SHIPPED | OUTPATIENT
Start: 2021-07-20 | End: 2021-10-25 | Stop reason: SDUPTHER

## 2021-07-20 RX ORDER — AMLODIPINE BESYLATE 10 MG/1
TABLET ORAL
Qty: 90 TABLET | Refills: 0 | Status: SHIPPED | OUTPATIENT
Start: 2021-07-20 | End: 2021-11-09 | Stop reason: SDUPTHER

## 2021-07-20 NOTE — PROGRESS NOTES
OFFICE NOTE    Du Moreno is a 62 y.o. female presenting today for the following:  Chief Complaint   Patient presents with    ED Follow-up    Medication Evaluation      HPI     Hypertension  Patient was recently seen in the emergency room on July 13 for elevated blood pressures. Patient arrived to the emergency room blood pressure 161/94. Patient was also asymptomatic at the time and according to notes stated she had not take her morning blood pressures. According to ER notes no emergent work-up or intervention was needed at the time and patient was informed to follow-up with PCP and was discharged home. Patient is currently on carvedilol 12.5 mg twice daily and losartan 50 mg daily. Patient states she have not taken the carvedilol or losartan because she ran out of the medication. Patient was started back on amlodipine 10 mg on her recent visit at the ER. She states her blood pressure have been running 130's - 140's over 90's since taking just the amlodipine. Today we have discontinued the Coreg. We have increased losartan to 100 mg daily and refilled, We have refilled amlodipine. Refilled plavix and tylenol. Patient will follow up in 4 weeks for hypertension. If still elevated will refer to cardiology. Advised to monitor blood pressure at home and bring in on next visit. Educated patient on the importance of taking medications as prescribed and if running out to call PCP. Patient denies chest pain, vision changes or headaches. Patient will follow back up in the office in 4 weeks. Today patient admits to not taken her plavix because she have been out of it. She stop using the gabapentin because she says her hands hurt worse with the gabapentin. She have been taking the tylenol and it seems to help better with her hand pain. Neuropathy/Arthritis  Patient is followed by Dr. Zuleika Prescott in rheumatology but have not went in awhile but states her arthritis is worsening.   Advised patient to follow up with Dr. [place] M[place]. She was referred to Dr. Vinicio Wasserman but have not been able to see them because they will not return calls to her. Today it is noted she have a upcoming appointment in October to see the neurology but the NP in the office. Patient is requesting to be seen by a outside neurologist instead at this time. A new referral placed today. Today I have refilled Tylenol arthritis. CKD   Patient is scheduled to see the nephrologist next week. Patient doing well. Review of Systems   Constitutional: Negative for fatigue and fever. HENT: Negative. Eyes: Negative. Respiratory: Negative for cough, chest tightness, shortness of breath and wheezing. Cardiovascular: Negative for chest pain and palpitations. Neurological: Negative for dizziness, light-headedness and headaches.          History  Past Medical History:   Diagnosis Date    Abnormal mammogram 2014    left with biopsy    Acute lacunar stroke ([address] 5/21/2021    Acute Lacunar Stroke (acute lacunar infarct in the posterior left lentiform nucleus extending into the corona radiata) with residual right hemiparesis    Bacterial vaginosis 1/9/15    Dr Dinesh Weathers Constipation     Current use of aspirin 5/23/2021    Gastroesophageal reflux disease 6/30/2016    Hemiparesis of right dominant side due to acute cerebrovascular disease ([address] 5/21/2021    History of Helicobacter pylori infection 7/24/2015    History of iron deficiency anemia 06/2014    History of vitamin D deficiency 6/11/2015    Hot flashes 1/9/15    Dr Dinesh Weathers On clopidogrel therapy 5/23/2021    On statin therapy due to risk of future cardiovascular event 5/22/2021    On Atorvastatin    Rheumatoid arthritis ([address]     Secondary hyperparathyroidism of renal origin ([address] 5/31/2021    Stage 3b chronic kidney disease ([address] 7/24/2015    Tobacco use disorder     Vitamin D deficiency 5/31/2021    Vitamin D 25-Hydroxy (5/31/2021) = 10.5        Past Surgical History:   Procedure Laterality Date    HX LAPAROSCOPIC SUPRACERVICAL HYSTERECTOMY  11/2014    Dr. Rocky Mcghee, GYN    HX SALPINGO-OOPHORECTOMY  11/2014       Social History     Socioeconomic History    Marital status: SINGLE     Spouse name: Not on file    Number of children: Not on file    Years of education: Not on file    Highest education level: Not on file   Occupational History    Not on file   Tobacco Use    Smoking status: Current Every Day Smoker     Packs/day: 0.50     Years: 20.00     Pack years: 10.00     Types: Cigarettes    Smokeless tobacco: Never Used   Substance and Sexual Activity    Alcohol use: No    Drug use: Not Currently     Types: Cocaine    Sexual activity: Yes     Partners: Male   Other Topics Concern     Service No    Blood Transfusions Yes    Caffeine Concern No    Occupational Exposure No    Hobby Hazards No    Sleep Concern No    Stress Concern No    Weight Concern No    Special Diet No    Back Care No    Exercise No    Bike Helmet No    Seat Belt Yes    Self-Exams No   Social History Narrative    Not on file     Social Determinants of Health     Financial Resource Strain:     Difficulty of Paying Living Expenses:    Food Insecurity:     Worried About Running Out of Food in the Last Year:     Ran Out of Food in the Last Year:    Transportation Needs:     Lack of Transportation (Medical):      Lack of Transportation (Non-Medical):    Physical Activity:     Days of Exercise per Week:     Minutes of Exercise per Session:    Stress:     Feeling of Stress :    Social Connections:     Frequency of Communication with Friends and Family:     Frequency of Social Gatherings with Friends and Family:     Attends [employer] Services:     Active Member of Clubs or Organizations:     Attends Club or Organization Meetings:     Marital Status:    Intimate Partner Violence:     Fear of Current or Ex-Partner:     Emotionally Abused:     Physically Abused:     Sexually Abused:        No Known Allergies    Current Outpatient Medications   Medication Sig Dispense Refill    clopidogreL (PLAVIX) 75 mg tab Take 1 Tablet by mouth daily (with breakfast). Indications: prevention for a blood clot going to the brain 90 Tablet 0    losartan (COZAAR) 100 mg tablet Take 1 Tablet by mouth daily. 90 Tablet 0    amLODIPine (NORVASC) 10 mg tablet TAKE 1 TABLET BY MOUTH ONCE DAILY 90 Tablet 0    acetaminophen (Tylenol Arthritis Pain) 650 mg TbER Take 1 Tablet by mouth every eight (8) hours. 90 Tablet 1    aspirin delayed-release 81 mg tablet Take 81 mg by mouth daily.  melatonin 10 mg tab Take 3 mg by mouth nightly. Indications: for stroke recovery 30 Tablet 1    acetaminophen (TYLENOL) 325 mg tablet Take 2 Tablets by mouth every four (4) hours as needed (for fever or pain). Indications: fever, pain 30 Tablet 0    atorvastatin (LIPITOR) 80 mg tablet Take 1 Tablet by mouth daily. Indications: excessive fat in the blood, stroke prevention 30 Tablet 0    co-enzyme Q-10 (CO Q-10) 100 mg capsule Take 1 Capsule by mouth daily. (Patient not taking: Reported on 7/20/2021) 30 Capsule 0         Patient Care Team:  Patient Care Team:  Adore Ramos NP as PCP - General (Nurse Practitioner)  Adore Ramos NP [facility]  Sean Flower MD (Rheumatology)  Bessy Christine MD (Obstetrics & Gynecology)  Neo Melo MD (Gastroenterology)      LABS:  No results found for any visits on 07/20/21. RADIOLOGY:  No recent results      Physical Exam  Vitals and nursing note reviewed. Constitutional:       Appearance: Normal appearance. She is well-developed. Eyes:      Pupils: Pupils are equal, round, and reactive to light. Cardiovascular:      Rate and Rhythm: Normal rate and regular rhythm. Heart sounds: Normal heart sounds. Pulmonary:      Effort: Pulmonary effort is normal. No respiratory distress.       Breath sounds: Normal breath sounds. No wheezing or rales. Abdominal:      General: Bowel sounds are normal. There is no distension. Palpations: Abdomen is soft. Tenderness: There is no abdominal tenderness. There is no rebound. Musculoskeletal:         General: Normal range of motion. Cervical back: Normal range of motion and neck supple. Lymphadenopathy:      Cervical: No cervical adenopathy. Skin:     General: Skin is warm and dry. Neurological:      Mental Status: She is alert and oriented to person, place, and time. Deep Tendon Reflexes: Reflexes are normal and symmetric. Psychiatric:         Mood and Affect: Mood normal.           Vitals:    07/20/21 1244 07/20/21 1259   BP: (!) 139/98 (!) 138/94   Pulse: 83    Resp: 20    Temp: 96.8 °F (36 °C)    TempSrc: Oral    SpO2: 99%    Weight: 188 lb (85.3 kg)    Height: 5' 7\" (1.702 m)    PainSc:   0 - No pain    LMP: 10/15/2014         Assessment and Plan    1. Hypertensive heart and kidney disease without heart failure and with stage 3b chronic kidney disease ([facility]      2. Acute lacunar stroke (HCC)    - clopidogreL (PLAVIX) 75 mg tab; Take 1 Tablet by mouth daily (with breakfast). Indications: prevention for a blood clot going to the brain  Dispense: 90 Tablet; Refill: 0    3. On clopidogrel therapy    - clopidogreL (PLAVIX) 75 mg tab; Take 1 Tablet by mouth daily (with breakfast). Indications: prevention for a blood clot going to the brain  Dispense: 90 Tablet; Refill: 0    4. Essential hypertension    - losartan (COZAAR) 100 mg tablet; Take 1 Tablet by mouth daily. Dispense: 90 Tablet; Refill: 0  - amLODIPine (NORVASC) 10 mg tablet; TAKE 1 TABLET BY MOUTH ONCE DAILY  Dispense: 90 Tablet; Refill: 0    5. Other chronic pain    - acetaminophen (Tylenol Arthritis Pain) 650 mg TbER; Take 1 Tablet by mouth every eight (8) hours. Dispense: 90 Tablet; Refill: 1    6.  Hemiparesis of right dominant side due to acute cerebrovascular disease ([facility]    - REFERRAL TO NEUROLOGY    7. Neuropathy    - REFERRAL TO NEUROLOGY      MDM    Procedures      *Plan of care reviewed with patient. Patient in agreement with plan and expresses understanding. All questions answered and patient encouraged to call or RTO if further questions or concerns. Advised patient if symptoms worsen to go to nearest ER or call 911. AVS and recommendations given to patient upon discharge.

## 2021-07-20 NOTE — PROGRESS NOTES
Kostas Trinidad presents today for   Chief Complaint   Patient presents with    ED Follow-up    Medication Evaluation       Is someone accompanying this pt? no    Is the patient using any DME equipment during [address]? no    Depression Screening:  3 most recent PHQ Screens 7/20/2021   Little interest or pleasure in doing things Not at all   Feeling down, depressed, irritable, or hopeless Not at all   Total Score PHQ 2 0   Trouble falling or staying asleep, or sleeping too much Not at all   Feeling tired or having little energy Not at all   Poor appetite, weight loss, or overeating Not at all   Feeling bad about yourself - or that you are a failure or have let yourself or your family down Not at all   Trouble concentrating on things such as school, work, reading, or watching TV Not at all   Moving or speaking so slowly that other people could have noticed; or the opposite being so fidgety that others notice Not at all   Thoughts of being better off dead, or hurting yourself in some way Not at all   PHQ 9 Score 0   How difficult have these problems made it for you to do your work, take care of your home and get along with others Not difficult at all       Learning Assessment:  Learning Assessment 6/18/2014   PRIMARY LEARNER Patient   HIGHEST LEVEL OF EDUCATION - PRIMARY LEARNER  GRADUATED HIGH SCHOOL OR GED   BARRIERS PRIMARY LEARNER NONE   PRIMARY LANGUAGE ENGLISH   LEARNER PREFERENCE PRIMARY READING   ANSWERED BY Patient   RELATIONSHIP SELF       Abuse Screening:  No flowsheet data found. Fall Risk  No flowsheet data found. Health Maintenance reviewed and discussed and ordered per Provider.     Health Maintenance Due   Topic Date Due    Pneumococcal 0-64 years (1 of 2 - PPSV23) Never done    Foot Exam Q1  Never done    Eye Exam Retinal or Dilated  Never done    DTaP/Tdap/Td series (1 - Tdap) Never done    Colorectal Cancer Screening Combo  Never done    Shingrix Vaccine Age 50> (1 of 2) Never done   [employer] Breast Cancer Screen Mammogram  07/18/2016    PAP AKA CERVICAL CYTOLOGY  11/23/2018   . Coordination of Care:  1. Have you been to the ER, urgent care clinic since your last visit? Hospitalized since your last visit? Yes  [facility]    2. Have you seen or consulted any other health care providers outside of the [address] [place] [address] since your last visit? Include any pap smears or colon screening.  no      Last  Checked no  Last UDS Checked no  Last Pain contract signed: no

## 2021-07-21 ENCOUNTER — HOME CARE VISIT (OUTPATIENT)
Dept: HOME HEALTH SERVICES | Facility: HOME HEALTH | Age: 61
End: 2021-07-21
Payer: MEDICAID

## 2021-07-21 ENCOUNTER — HOME CARE VISIT (OUTPATIENT)
Dept: SCHEDULING | Facility: HOME HEALTH | Age: 61
End: 2021-07-21
Payer: MEDICAID

## 2021-07-21 PROCEDURE — G0151 HHCP-SERV OF PT,EA 15 MIN: HCPCS

## 2021-07-23 VITALS — TEMPERATURE: 98.3 F | SYSTOLIC BLOOD PRESSURE: 143 MMHG | HEART RATE: 79 BPM | DIASTOLIC BLOOD PRESSURE: 101 MMHG

## 2021-07-27 NOTE — CASE COMMUNICATION
PT D/C:  Great progress made enabling pt to function in the home w/ little to no (A); strength progressed in L hip flex/ext from 4-/5 at [facility] to 4+/5 at d/c; L knee flex/e/xt progressed from 4-/5 at [facility] to 4+/5 and DF remaining at 3-/5; FTSTS performed at 10 secs unsupported indicating LE strength enabiling pt to perform fast transfers w/o difficulty; transfers performed w/ (I) from all surfaces in the home except shower/car transfers w/ Sup; balance progressed per Tinetti at 16/28 from 12/28 indicating less fall risk though w/ conted high fall risk; TUG progressed from 47.8 secs supported at [facility] to 20 secs supported and 15.8 secs unsupported; pt strongly encouraged to amb w/ (A) device at all times for safety and to decrease high fall risk; stairs performed w/ Sup for safety amb up/down 1 step at a time; gait w/ WBQC at household distances w/ (I) though w/ R knee hyperextension and increased knee flex during swing to clear L foot during swing; pt conts to have elevated B/P at 143/101 but is much better than before; pt would benefit from further PT to increase endurance, gait, balance, and stair ability; therapist recommends OP PT services and will request orders from MD for conted strengthening; will therefore d/c home PT services.   Thanks for your referral.  Daren Slade, MPT

## 2021-08-17 ENCOUNTER — OFFICE VISIT (OUTPATIENT)
Dept: FAMILY MEDICINE CLINIC | Age: 61
End: 2021-08-17
Payer: MEDICAID

## 2021-08-17 VITALS
HEART RATE: 77 BPM | OXYGEN SATURATION: 99 % | WEIGHT: 185 LBS | BODY MASS INDEX: 29.03 KG/M2 | HEIGHT: 67 IN | RESPIRATION RATE: 20 BRPM | DIASTOLIC BLOOD PRESSURE: 102 MMHG | SYSTOLIC BLOOD PRESSURE: 152 MMHG | TEMPERATURE: 97.1 F

## 2021-08-17 DIAGNOSIS — E11.9 ENCOUNTER FOR DIABETIC FOOT EXAM (HCC): ICD-10-CM

## 2021-08-17 DIAGNOSIS — E55.9 VITAMIN D DEFICIENCY: ICD-10-CM

## 2021-08-17 DIAGNOSIS — M05.741 RHEUMATOID ARTHRITIS INVOLVING BOTH HANDS WITH POSITIVE RHEUMATOID FACTOR (HCC): ICD-10-CM

## 2021-08-17 DIAGNOSIS — I13.10 HYPERTENSIVE HEART AND KIDNEY DISEASE WITHOUT HEART FAILURE AND WITH STAGE 3B CHRONIC KIDNEY DISEASE (HCC): ICD-10-CM

## 2021-08-17 DIAGNOSIS — M05.742 RHEUMATOID ARTHRITIS INVOLVING BOTH HANDS WITH POSITIVE RHEUMATOID FACTOR (HCC): ICD-10-CM

## 2021-08-17 DIAGNOSIS — N18.32 HYPERTENSIVE HEART AND KIDNEY DISEASE WITHOUT HEART FAILURE AND WITH STAGE 3B CHRONIC KIDNEY DISEASE (HCC): ICD-10-CM

## 2021-08-17 DIAGNOSIS — E78.1 HIGH TRIGLYCERIDES: ICD-10-CM

## 2021-08-17 DIAGNOSIS — R73.03 PREDIABETES: ICD-10-CM

## 2021-08-17 PROCEDURE — 99214 OFFICE O/P EST MOD 30 MIN: CPT | Performed by: NURSE PRACTITIONER

## 2021-08-17 RX ORDER — ASPIRIN 325 MG
50000 TABLET, DELAYED RELEASE (ENTERIC COATED) ORAL
Qty: 12 CAPSULE | Refills: 0 | Status: SHIPPED | OUTPATIENT
Start: 2021-08-17 | End: 2021-11-03

## 2021-08-17 NOTE — PATIENT INSTRUCTIONS
Rheumatoid Arthritis (RA): Care Instructions  Your Care Instructions     Arthritis is a common health problem in which the joints are inflamed. There are many types of arthritis. In rheumatoid arthritis, the body's own immune system attacks the joints. This causes pain, stiffness, and swelling in the joints, especially in the hands and feet. It can become hard to open jars, write, and do other daily tasks. Sometimes rheumatoid arthritis can also cause bumps to form under the skin. Over time, rheumatoid arthritis can damage and deform joints. Early treatment with medicines may reduce your chances of having a lasting disability. Follow-up care is a key part of your treatment and safety. Be sure to make and go to all appointments, and call your doctor if you are having problems. It's also a good idea to know your test results and keep a list of the medicines you take. How can you care for yourself at home? · If your doctor recommends it, get more exercise. Walking is a good choice. If your knees or ankles hurt, try riding a stationary bike or swimming. · Move each joint gently through its full range of motion once or twice a day. · Rest joints when they are sore or overworked. Short rest breaks may help more than staying in bed. · Reach and stay at a healthy weight. Regular exercise and a healthy diet will help you do this. Extra weight can strain the joints, especially the knees and hips, and make the pain worse. Losing even a few pounds may help. · Get enough calcium and vitamin D to help prevent osteoporosis, which causes thin bones. Talk to your doctor about how much you should take. · Protect your joints from injury. Do not overuse them. Try to limit or avoid activities that cause joint pain or swelling. Use special kitchen tools and other self-help devices as well as walkers, splints, or canes if needed. · Use heat to ease pain. Take warm showers or baths. Use hot packs or a heating pad set on low. Sleep under a warm electric blanket. · Put ice or a cold pack on the area for 10 to 20 minutes at a time. Put a thin cloth between the ice and your skin. · Take pain medicines exactly as directed. ? If the doctor gave you a prescription medicine for pain, take it as prescribed. ? If you are not taking a prescription pain medicine, ask your doctor if you can take an over-the-counter medicine. · Take an active role in managing your condition. Set up a treatment plan with your doctor, and learn as much as you can about rheumatoid arthritis. This will help you control pain and stay active. When should you call for help? Call your doctor now or seek immediate medical care if:    · You have a fever or a rash along with joint pain.     · You have joint pain that is so severe that you cannot use the joint at all.     · You have sudden swelling, redness, or pain in one or more joints, and you do not know why.     · You have back or neck pain along with weakness in your arms or legs.     · You have a loss of bowel or bladder control. Watch closely for changes in your health, and be sure to contact your doctor if:    · You have joint pain that lasts for more than 6 weeks.     · You have side effects from your arthritis medicines, such as stomach pain, nausea, heartburn, or dark and tarlike stools. Where can you learn more? Go to http://www.gray.com/  Enter K205 in the search box to learn more about \"Rheumatoid Arthritis (RA): Care Instructions. \"  Current as of: August 5, 2020               Content Version: 12.8  © 2006-2021 [employer]. Care instructions adapted under license by [employer] (which disclaims liability or warranty for this information). If you have questions about a medical condition or this instruction, always ask your healthcare professional. [place] [address] any warranty or liability for your use of this information.

## 2021-08-17 NOTE — PROGRESS NOTES
OFFICE NOTE    Bhumika Fernández is a 62 y.o. female presenting today for the following:  Chief Complaint   Patient presents with    Follow Up Chronic Condition    Hand Pain     pain and hot and cold-rt hand      HPI     Hypertension/Cholesterol  Patient is currently on amlodipine 10 mg daily, and losartan 100 mg daily. On previous visit patient not been compliant with taking medications. We increased the losartan to 100 mg daily on last visit. Patient Triglyceride level is 197 a normal is under 150. Patient blood pressure is elevated to 152/102 and recheck was 160/110. She admits today that she have not taking her blood pressure medications in 2 days she needs to pick them up from pharmacy also she smoked a cigarette and drunk a pepsi prior to coming into the office today. Patient denies SOB, vision changes, headaches or chest pain. Educated patient on the importance of taking medications as prescribed. Offered to send to ER and patient declined stating she is going to get her medications today and take them. AMA was completed. ARF stage 3  Patient most recent creatinine level was 2.0. Patient is currently seeing Dr. Campo Check the nephrologist.      Prediabetes  Most recent A1c is 5.7. Educated patient on prediabetes. No medications are started at this point but we will continue to monitor. Patient denies polyphagia, polydipsia or polyuria. Vitamin D  Patient vitamin D level was 25.7. She was started on Vitamin D. Patient was unaware she Vitamin D at the pharmacy. She will pickup today. Rheumatoid Arthritis  She was being followed by Dr. Latanya Chinchilla rheumatoid arthritis. However she states that she has not been able to get in touch with the office. She states that she calls them the number go straight to voicemail. She is requesting a referral to a new rheumatologist today.     Diabetic Foot Exam: DP pulses 2+ symmetric, no open areas or skin breakdown noted, sensory intact to filament and positioning. Vibratory sensation not tested. Nails in good condition. Review of Systems   Constitutional: Negative for fatigue and fever. HENT: Negative. Eyes: Negative. Respiratory: Negative for cough, chest tightness, shortness of breath and wheezing. Cardiovascular: Negative for chest pain and palpitations. Neurological: Positive for weakness (left and right hand) and numbness (Left and right hand). Negative for dizziness, light-headedness and headaches.          History  Past Medical History:   Diagnosis Date    Abnormal mammogram 2014    left with biopsy    Acute lacunar stroke ([facility] 5/21/2021    Acute Lacunar Stroke (acute lacunar infarct in the posterior left lentiform nucleus extending into the corona radiata) with residual right hemiparesis    Bacterial vaginosis 1/9/15    Dr Jeana Arteaga Constipation     Current use of aspirin 5/23/2021    Gastroesophageal reflux disease 6/30/2016    Hemiparesis of right dominant side due to acute cerebrovascular disease ([facility] 5/21/2021    History of Helicobacter pylori infection 7/24/2015    History of iron deficiency anemia 06/2014    History of vitamin D deficiency 6/11/2015    Hot flashes 1/9/15    Dr Jeana Arteaga On clopidogrel therapy 5/23/2021    On statin therapy due to risk of future cardiovascular event 5/22/2021    On Atorvastatin    Rheumatoid arthritis ([facility]     Secondary hyperparathyroidism of renal origin ([facility] 5/31/2021    Stage 3b chronic kidney disease ([facility] 7/24/2015    Tobacco use disorder     Vitamin D deficiency 5/31/2021    Vitamin D 25-Hydroxy (5/31/2021) = 10.5        Past Surgical History:   Procedure Laterality Date    HX LAPAROSCOPIC SUPRACERVICAL HYSTERECTOMY  11/2014    Dr. Sandy Ewing, GYN    HX SALPINGO-OOPHORECTOMY  11/2014       Social History     Socioeconomic History    Marital status: SINGLE     Spouse name: Not on file    Number of children: Not on file    Years of education: Not on file    Highest education level: Not on file   Occupational History    Not on file   Tobacco Use    Smoking status: Current Every Day Smoker     Packs/day: 0.50     Years: 20.00     Pack years: 10.00     Types: Cigarettes    Smokeless tobacco: Never Used   Substance and Sexual Activity    Alcohol use: No    Drug use: Not Currently     Types: Cocaine    Sexual activity: Yes     Partners: Male   Other Topics Concern     Service No    Blood Transfusions Yes    Caffeine Concern No    Occupational Exposure No    Hobby Hazards No    Sleep Concern No    Stress Concern No    Weight Concern No    Special Diet No    Back Care No    Exercise No    Bike Helmet No    Seat Belt Yes    Self-Exams No   Social History Narrative    Not on file     Social Determinants of Health     Financial Resource Strain:     Difficulty of Paying Living Expenses:    Food Insecurity:     Worried About Running Out of Food in the Last Year:     Ran Out of Food in the Last Year:    Transportation Needs:     Lack of Transportation (Medical):  Lack of Transportation (Non-Medical):    Physical Activity:     Days of Exercise per Week:     Minutes of Exercise per Session:    Stress:     Feeling of Stress :    Social Connections:     Frequency of Communication with Friends and Family:     Frequency of Social Gatherings with Friends and Family:     Attends [employer] Services:     Active Member of Clubs or Organizations:     Attends Club or Organization Meetings:     Marital Status:    Intimate Partner Violence:     Fear of Current or Ex-Partner:     Emotionally Abused:     Physically Abused:     Sexually Abused:        No Known Allergies    Current Outpatient Medications   Medication Sig Dispense Refill    cholecalciferol (VITAMIN D3) (50,000 UNITS /1250 MCG) capsule Take 1 Capsule by mouth every seven (7) days for 12 doses. Then get an OTC version of 1,000-2,000 units to take daily.  12 Capsule 0    clopidogreL (PLAVIX) 75 mg tab Take 1 Tablet by mouth daily (with breakfast). Indications: prevention for a blood clot going to the brain 90 Tablet 0    losartan (COZAAR) 100 mg tablet Take 1 Tablet by mouth daily. 90 Tablet 0    amLODIPine (NORVASC) 10 mg tablet TAKE 1 TABLET BY MOUTH ONCE DAILY 90 Tablet 0    acetaminophen (Tylenol Arthritis Pain) 650 mg TbER Take 1 Tablet by mouth every eight (8) hours. 90 Tablet 1    aspirin delayed-release 81 mg tablet Take 81 mg by mouth daily.  melatonin 10 mg tab Take 3 mg by mouth nightly. Indications: for stroke recovery 30 Tablet 1    atorvastatin (LIPITOR) 80 mg tablet Take 1 Tablet by mouth daily. Indications: excessive fat in the blood, stroke prevention 30 Tablet 0    acetaminophen (TYLENOL) 325 mg tablet Take 2 Tablets by mouth every four (4) hours as needed (for fever or pain). Indications: fever, pain (Patient not taking: Reported on 8/17/2021) 30 Tablet 0    co-enzyme Q-10 (CO Q-10) 100 mg capsule Take 1 Capsule by mouth daily. (Patient not taking: Reported on 7/20/2021) 30 Capsule 0         Patient Care Team:  Patient Care Team:  Yulisa Anders NP as PCP - General (Nurse Practitioner)  Yulisa Anders NP as PCP - [address] [place], [place] [address]led Provider  Juanell Schilder, MD (Rheumatology)  Andriy Guy MD (Obstetrics & Gynecology)  Susannah Kee MD (Gastroenterology)      LABS:  No results found for any visits on 08/17/21. RADIOLOGY:  No recent results      Physical Exam      Vitals:    08/17/21 1211 08/17/21 1218   BP: (!) 148/104 (!) 152/102   Pulse: 77    Resp: 20    Temp: 97.1 °F (36.2 °C)    TempSrc: Oral    SpO2: 99%    Weight: 185 lb (83.9 kg)    Height: 5' 7\" (1.702 m)    PainSc:   0 - No pain    LMP: 10/15/2014         Assessment and Plan    1.  Rheumatoid arthritis involving both hands with positive rheumatoid factor ([facility]    - REFERRAL TO RHEUMATOLOGY    2. Encounter for diabetic foot exam ([facility]    -  DIABETES FOOT EXAM    3. Vitamin D deficiency    - cholecalciferol (VITAMIN D3) (50,000 UNITS /1250 MCG) capsule; Take 1 Capsule by mouth every seven (7) days for 12 doses. Then get an OTC version of 1,000-2,000 units to take daily. Dispense: 12 Capsule; Refill: 0    4. Hypertensive heart and kidney disease without heart failure and with stage 3b chronic kidney disease ([facility]      5. High triglycerides      6. Prediabetes        MDM    Procedures      *Plan of care reviewed with patient. Patient in agreement with plan and expresses understanding. All questions answered and patient encouraged to call or RTO if further questions or concerns. Advised patient if symptoms worsen to go to nearest ER or call 911. AVS and recommendations given to patient upon discharge.

## 2021-08-17 NOTE — PROGRESS NOTES
Madelyn Logan presents today for   Chief Complaint   Patient presents with    Follow Up Chronic Condition    Hand Pain     pain and hot and cold-rt hand       Is someone accompanying this pt? no    Is the patient using any DME equipment during OV? no    Depression Screening:  3 most recent PHQ Screens 8/17/2021   Little interest or pleasure in doing things Not at all   Feeling down, depressed, irritable, or hopeless Not at all   Total Score PHQ 2 0   Trouble falling or staying asleep, or sleeping too much Not at all   Feeling tired or having little energy Not at all   Poor appetite, weight loss, or overeating Not at all   Feeling bad about yourself - or that you are a failure or have let yourself or your family down Not at all   Trouble concentrating on things such as school, work, reading, or watching TV Not at all   Moving or speaking so slowly that other people could have noticed; or the opposite being so fidgety that others notice Not at all   Thoughts of being better off dead, or hurting yourself in some way Not at all   PHQ 9 Score 0   How difficult have these problems made it for you to do your work, take care of your home and get along with others Not difficult at all       Learning Assessment:  Learning Assessment 6/18/2014   PRIMARY LEARNER Patient   HIGHEST LEVEL OF EDUCATION - PRIMARY LEARNER  GRADUATED HIGH SCHOOL OR GED   BARRIERS PRIMARY LEARNER NONE   PRIMARY LANGUAGE ENGLISH   LEARNER PREFERENCE PRIMARY READING   ANSWERED BY Patient   RELATIONSHIP SELF       Abuse Screening:  No flowsheet data found. Fall Risk  No flowsheet data found. Health Maintenance reviewed and discussed and ordered per Provider.     Health Maintenance Due   Topic Date Due    Pneumococcal 0-64 years (1 of 2 - PPSV23) Never done    Foot Exam Q1  Never done    Eye Exam Retinal or Dilated  Never done    DTaP/Tdap/Td series (1 - Tdap) Never done    Colorectal Cancer Screening Combo  Never done    Shingrix Vaccine Age 50> (1 of 2) Never done    Breast Cancer Screen Mammogram  07/18/2016    PAP AKA CERVICAL CYTOLOGY  11/23/2018   . Coordination of Care:  1. Have you been to the ER, urgent care clinic since your last visit? Hospitalized since your last visit? no    2. Have you seen or consulted any other health care providers outside of the [address] [place] [address] since your last visit? Include any pap smears or colon screening.  no      Last  Checked no  Last UDS Checked no  Last Pain contract signed: no

## 2021-09-21 ENCOUNTER — OFFICE VISIT (OUTPATIENT)
Dept: NEUROLOGY | Age: 61
End: 2021-09-21
Payer: MEDICAID

## 2021-09-21 VITALS
WEIGHT: 182 LBS | HEIGHT: 67 IN | OXYGEN SATURATION: 97 % | DIASTOLIC BLOOD PRESSURE: 80 MMHG | BODY MASS INDEX: 28.56 KG/M2 | RESPIRATION RATE: 18 BRPM | SYSTOLIC BLOOD PRESSURE: 136 MMHG | HEART RATE: 85 BPM

## 2021-09-21 DIAGNOSIS — G81.91 HEMIPARESIS OF RIGHT DOMINANT SIDE DUE TO ACUTE CEREBROVASCULAR DISEASE (HCC): Primary | ICD-10-CM

## 2021-09-21 DIAGNOSIS — Z79.899 ON STATIN THERAPY: ICD-10-CM

## 2021-09-21 DIAGNOSIS — I67.89 HEMIPARESIS OF RIGHT DOMINANT SIDE DUE TO ACUTE CEREBROVASCULAR DISEASE (HCC): Primary | ICD-10-CM

## 2021-09-21 DIAGNOSIS — I63.81 LACUNAR INFARCTION (HCC): ICD-10-CM

## 2021-09-21 DIAGNOSIS — Z79.02 ANTIPLATELET OR ANTITHROMBOTIC LONG-TERM USE: ICD-10-CM

## 2021-09-21 PROCEDURE — 99214 OFFICE O/P EST MOD 30 MIN: CPT | Performed by: NURSE PRACTITIONER

## 2021-09-21 RX ORDER — DULOXETIN HYDROCHLORIDE 20 MG/1
20 CAPSULE, DELAYED RELEASE ORAL DAILY
Qty: 30 CAPSULE | Refills: 3 | Status: SHIPPED | OUTPATIENT
Start: 2021-09-21 | End: 2022-02-24 | Stop reason: SDUPTHER

## 2021-09-21 NOTE — PROGRESS NOTES
[facility]  [address], [address], [facility], [address]  [address] Robert Zal[place], [address]  Office:  [phone]  Fax: [phone]  Chief Complaint   Patient presents with    Neurologic Problem     follow up     This is a 27-year-old female who presents for follow-up stroke from May. Last seen in office in June. In the interim endorses completing at home physical therapy and Occupational Therapy. Denies any subsequent falls. Denies any new onset strokelike symptoms. She is accompanied in office today by her significant other. Has a history of rheumatoid arthritis and recently was referred to a new rheumatologist by her primary care provider. She has yet to hear from their office to establish new patient visit. Endorses some right arm pains since the stroke. At baseline endorses pain in the joints due to her rheumatoid arthritis. She was prescribed gabapentin and did not tolerate this. They would like to transfer their care to a neurologist in [place] simply due to location. Maintains on aspirin, Plavix, and statin therapy. No other concerns at this time.       Past Medical History:   Diagnosis Date    Abnormal mammogram 2014    left with biopsy    Acute lacunar stroke ([address] 5/21/2021    Acute Lacunar Stroke (acute lacunar infarct in the posterior left lentiform nucleus extending into the corona radiata) with residual right hemiparesis    Bacterial vaginosis 1/9/15    Dr Ivory Huynh Constipation     Current use of aspirin 5/23/2021    Gastroesophageal reflux disease 6/30/2016    Hemiparesis of right dominant side due to acute cerebrovascular disease ([address] 5/21/2021    History of Helicobacter pylori infection 7/24/2015    History of iron deficiency anemia 06/2014    History of vitamin D deficiency 6/11/2015    Hot flashes 1/9/15    Dr Ivory Huynh On clopidogrel therapy 5/23/2021    On statin therapy due to risk of future cardiovascular event 5/22/2021    On Atorvastatin    Rheumatoid arthritis ([facility]     Secondary hyperparathyroidism of renal origin ([facility] 5/31/2021    Stage 3b chronic kidney disease ([facility] 7/24/2015    Tobacco use disorder     Vitamin D deficiency 5/31/2021    Vitamin D 25-Hydroxy (5/31/2021) = 10.5        Past Surgical History:   Procedure Laterality Date    HX LAPAROSCOPIC SUPRACERVICAL HYSTERECTOMY  11/2014    Dr. Taryn Barrera, GYN    HX SALPINGO-OOPHORECTOMY  11/2014       Current Outpatient Medications   Medication Sig Dispense Refill    DULoxetine (CYMBALTA) 20 mg capsule Take 1 Capsule by mouth daily. 30 Capsule 3    cholecalciferol (VITAMIN D3) (50,000 UNITS /1250 MCG) capsule Take 1 Capsule by mouth every seven (7) days for 12 doses. Then get an OTC version of 1,000-2,000 units to take daily. 12 Capsule 0    clopidogreL (PLAVIX) 75 mg tab Take 1 Tablet by mouth daily (with breakfast). Indications: prevention for a blood clot going to the brain 90 Tablet 0    losartan (COZAAR) 100 mg tablet Take 1 Tablet by mouth daily. 90 Tablet 0    amLODIPine (NORVASC) 10 mg tablet TAKE 1 TABLET BY MOUTH ONCE DAILY 90 Tablet 0    acetaminophen (Tylenol Arthritis Pain) 650 mg TbER Take 1 Tablet by mouth every eight (8) hours. 90 Tablet 1    aspirin delayed-release 81 mg tablet Take 81 mg by mouth daily.  melatonin 10 mg tab Take 3 mg by mouth nightly. Indications: for stroke recovery 30 Tablet 1    acetaminophen (TYLENOL) 325 mg tablet Take 2 Tablets by mouth every four (4) hours as needed (for fever or pain). Indications: fever, pain 30 Tablet 0    atorvastatin (LIPITOR) 80 mg tablet Take 1 Tablet by mouth daily. Indications: excessive fat in the blood, stroke prevention 30 Tablet 0    co-enzyme Q-10 (CO Q-10) 100 mg capsule Take 1 Capsule by mouth daily.  (Patient not taking: Reported on 7/20/2021) 30 Capsule 0        No Known Allergies    Social History     Tobacco Use    Smoking status: Current Every Day Smoker     Packs/day: 0.50     Years: 20.00     Pack years: 10.00     Types: Cigarettes    Smokeless tobacco: Never Used   Substance Use Topics    Alcohol use: No    Drug use: Not Currently     Types: Cocaine       Family History   Problem Relation Age of Onset    Arthritis-osteo Maternal Grandmother     Cancer Sister     Hypertension Sister    [facility] Arthritis-osteo Mother     Hypertension Mother     Diabetes Mother     Hypertension Brother     Stroke Neg Hx        Review of Systems  GENERAL: Denies fever or fatigue  CARDIAC: No CP or SOB  PULMONARY: No cough of SOB  MUSCULOSKELETAL: No new joint pain  NEURO: SEE HPI    Examination  Visit Vitals  /80   Pulse 85   Resp 18   Ht 5' 7\" (1.702 m)   Wt 82.6 kg (182 lb)   LMP 10/15/2014   SpO2 97%   BMI 28.51 kg/m²       This is a very pleasant 21-year-old female. She is alert and in no apparent distress. Full fund of knowledge. Speech is clear. No facial asymmetry. Extraocular movements intact. Equal shoulder shrug. Resists fully in the upper extremities with mild decrease in right lower extremity on strength testing compared to left lower extremity. DTRs more reactive in right upper extremity compared to left upper extremity. Does have joint deformity in bilateral hands. She ambulates steady with use of cane for assistive device. Impression/Plan  Hieu Kelly is a 62 y.o. female whose history and physical are consistent with history of lacunar infarction with hemiparesis on the right side. In the interim patient completed at home physical and occupational therapy. We discussed graduating to outpatient physical therapy and Occupational Therapy. Referral placed. Denies any new onset strokelike symptoms. Endorses some pain in the right arm since suffering from the stroke in May. She was placed on gabapentin and says that she did not tolerate this as prescribed by her primary care provider. Can start conservative dose of Cymbalta daily.   They would like to transfer her care to a neurologist in [facility]. Referral placed. She will maintain routine follow-ups with her primary care provider. If she has been unable to establish with a new neurologist I will see her again in 12 weeks. All questions addressed and patient and patient's significant other are agreeable with plan of care. Diagnoses and all orders for this visit:    1. Hemiparesis of right dominant side due to acute cerebrovascular disease (HCC)  -     REFERRAL TO PHYSICAL THERAPY  -     REFERRAL TO OCCUPATIONAL THERAPY  -     REFERRAL TO NEUROLOGY    2. Lacunar infarction ([facility]  -     REFERRAL TO NEUROLOGY    3. On statin therapy    4. Antiplatelet or antithrombotic long-term use    Other orders  -     DULoxetine (CYMBALTA) 20 mg capsule; Take 1 Capsule by mouth daily. Signed By: Braulio Johnson NP      PLEASE NOTE:   Portions of this document may have been produced using voice recognition software. Unrecognized errors in transcription may be present.

## 2021-09-21 NOTE — PROGRESS NOTES
Adams Sweet presents today for   Chief Complaint   Patient presents with    Neurologic Problem     follow up       Is someone accompanying this pt? Yes, caregiver. Is the patient using any DME equipment during OV? no    Depression Screening:  3 most recent PHQ Screens 8/17/2021   Little interest or pleasure in doing things Not at all   Feeling down, depressed, irritable, or hopeless Not at all   Total Score PHQ 2 0   Trouble falling or staying asleep, or sleeping too much Not at all   Feeling tired or having little energy Not at all   Poor appetite, weight loss, or overeating Not at all   Feeling bad about yourself - or that you are a failure or have let yourself or your family down Not at all   Trouble concentrating on things such as school, work, reading, or watching TV Not at all   Moving or speaking so slowly that other people could have noticed; or the opposite being so fidgety that others notice Not at all   Thoughts of being better off dead, or hurting yourself in some way Not at all   PHQ 9 Score 0   How difficult have these problems made it for you to do your work, take care of your home and get along with others Not difficult at all       Learning Assessment:  Learning Assessment 6/18/2014   PRIMARY LEARNER Patient   HIGHEST LEVEL OF EDUCATION - PRIMARY LEARNER  GRADUATED HIGH SCHOOL OR GED   BARRIERS PRIMARY LEARNER NONE   PRIMARY LANGUAGE ENGLISH   LEARNER PREFERENCE PRIMARY READING   ANSWERED BY Patient   RELATIONSHIP SELF       Abuse Screening:  No flowsheet data found. Fall Risk  No flowsheet data found. Coordination of Care:  1. Have you been to the ER, urgent care clinic since your last visit? Hospitalized since your last visit? Yes,    2. Have you seen or consulted any other health care providers outside of the [address] [place] [address] since your last visit? Include any pap smears or colon screening.  Isabela

## 2021-10-25 DIAGNOSIS — I10 ESSENTIAL HYPERTENSION: ICD-10-CM

## 2021-10-25 RX ORDER — LOSARTAN POTASSIUM 100 MG/1
100 TABLET ORAL DAILY
Qty: 90 TABLET | Refills: 0 | Status: SHIPPED | OUTPATIENT
Start: 2021-10-25 | End: 2022-01-05 | Stop reason: DRUGHIGH

## 2021-10-25 NOTE — TELEPHONE ENCOUNTER
Please see patient refill request    Patient was last seen on 8-    Last prescribed on 7-  #90 X 0    Thank you

## 2021-11-04 DIAGNOSIS — I63.81 ACUTE LACUNAR STROKE (HCC): ICD-10-CM

## 2021-11-04 DIAGNOSIS — Z79.01 ON CLOPIDOGREL THERAPY: ICD-10-CM

## 2021-11-04 NOTE — TELEPHONE ENCOUNTER
Please see refill request    Patient was last seen on 8-    Last prescribed on 7-  #90 X 0    Upcoming scheduled appt on 11-    Thank you

## 2021-11-04 NOTE — TELEPHONE ENCOUNTER
Requested Prescriptions     Pending Prescriptions Disp Refills    clopidogreL (PLAVIX) 75 mg tab 90 Tablet 0     Sig: Take 1 Tablet by mouth daily (with breakfast).  Indications: prevention for a blood clot going to the brain

## 2021-11-05 RX ORDER — CLOPIDOGREL BISULFATE 75 MG/1
75 TABLET ORAL
Qty: 90 TABLET | Refills: 0 | Status: SHIPPED | OUTPATIENT
Start: 2021-11-05 | End: 2022-02-20

## 2021-11-09 DIAGNOSIS — I10 ESSENTIAL HYPERTENSION: ICD-10-CM

## 2021-11-09 NOTE — TELEPHONE ENCOUNTER
Requested Prescriptions     Pending Prescriptions Disp Refills    amLODIPine (NORVASC) 10 mg tablet 90 Tablet 0     Sig: TAKE 1 TABLET BY MOUTH ONCE DAILY

## 2021-11-11 RX ORDER — AMLODIPINE BESYLATE 10 MG/1
TABLET ORAL
Qty: 90 TABLET | Refills: 0 | Status: SHIPPED | OUTPATIENT
Start: 2021-11-11 | End: 2022-02-20

## 2021-11-16 NOTE — PROGRESS NOTES
OFFICE NOTE    Juan C Gutierrez is a 62 y.o. female presenting today for the following:  Chief Complaint   Patient presents with    Follow Up Chronic Condition    Referral Request     home health      HPI     Hypertension/Cholesterol  Patient is currently on amlodipine 10 mg daily, and losartan 100 mg daily. Patient denies any side effects with medication and take as prescribed. Patient denies chest pain, SOB, vision changes or headaches. Will add HCTZ 12.5 due to elevated blood pressures. Will reassess in 4 weeks. Prediabetes  Patient is currently not on any medications for diabetes. Last A1C was 5.7 in July 2021. Patient denies polyphagia, polydipsia or polyuria. Post stroke/Right arm pain  Patient was previously on gabapentin but no longer use. She is on cymbalta now that was prescribed by neurology due to right arm pain. Rheumatoid Arthritis/Chronic Pain  She was being followed by Dr. Nadira Grace rheumatoid arthritis. She has prescribed prednisone, and folic acid. Patient is currently seeing neurology and Rheumatology for her chronic daily pain with her hands bilaterally. She also have right arm and leg pain from previous stroke. Patient hands are deformed due to the arthritis. Will refer to pain management. Callus   Right foot callus under big toe for over a month that is painful. Patient states she has tried to take some of the skin off but have not work. Advised patient to soak foot then use a file to get the dead skin off. Also advised patient to use OTC medicated pads for callus. Patient is also prediabetic. I am going to refer to podiatry per patient request.        Review of Systems   Constitutional: Negative for fatigue and fever. HENT: Negative. Eyes: Negative. Respiratory: Negative for cough, chest tightness, shortness of breath and wheezing. Cardiovascular: Negative for chest pain and palpitations.    Skin:        Right foot callus   Neurological: Negative for dizziness, light-headedness and headaches.          History  Past Medical History:   Diagnosis Date    Abnormal mammogram 2014    left with biopsy    Acute lacunar stroke ([facility] [address] 5/21/2021    Acute Lacunar Stroke (acute lacunar infarct in the posterior left lentiform nucleus extending into the corona radiata) with residual right hemiparesis    Bacterial vaginosis 1/9/15    Dr Marcum July Constipation     Current use of aspirin 5/23/2021    Gastroesophageal reflux disease 6/30/2016    Hemiparesis of right dominant side due to acute cerebrovascular disease ([address] 5/21/2021    History of Helicobacter pylori infection 7/24/2015    History of iron deficiency anemia 06/2014    History of vitamin D deficiency 6/11/2015    Hot flashes 1/9/15    Dr Marcum July On clopidogrel therapy 5/23/2021    On statin therapy due to risk of future cardiovascular event 5/22/2021    On Atorvastatin    Rheumatoid arthritis ([facility] [address]     Secondary hyperparathyroidism of renal origin ([facility] [address] 5/31/2021    Stage 3b chronic kidney disease ([facility] [address] 7/24/2015    Tobacco use disorder     Vitamin D deficiency 5/31/2021    Vitamin D 25-Hydroxy (5/31/2021) = 10.5        Past Surgical History:   Procedure Laterality Date    HX LAPAROSCOPIC SUPRACERVICAL HYSTERECTOMY  11/2014    Dr. Elaina Maldonado, GYN    HX SALPINGO-OOPHORECTOMY  11/2014       Social History     Socioeconomic History    Marital status: SINGLE     Spouse name: Not on file    Number of children: Not on file    Years of education: Not on file    Highest education level: Not on file   Occupational History    Not on file   Tobacco Use    Smoking status: Current Every Day Smoker     Packs/day: 0.50     Years: 20.00     Pack years: 10.00     Types: Cigarettes    Smokeless tobacco: Never Used   Vaping Use    Vaping Use: Not on file   Substance and Sexual Activity    Alcohol use: No    Drug use: Not Currently     Types: Cocaine    Sexual activity: Yes     Partners: Male   Other Topics Concern     Service No    Blood Transfusions Yes    Caffeine Concern No    Occupational Exposure No    Hobby Hazards No    Sleep Concern No    Stress Concern No    Weight Concern No    Special Diet No    Back Care No    Exercise No    Bike Helmet No    Seat Belt Yes    Self-Exams No   Social History Narrative    Not on file     Social Determinants of Health     Financial Resource Strain:     Difficulty of Paying Living Expenses: Not on file   Food Insecurity:     Worried About Running Out of Food in the Last Year: Not on file    Erma of Food in the Last Year: Not on file   Transportation Needs:     Lack of Transportation (Medical): Not on file    Lack of Transportation (Non-Medical): Not on file   Physical Activity:     Days of Exercise per Week: Not on file    Minutes of Exercise per Session: Not on file   Stress:     Feeling of Stress : Not on file   Social Connections:     Frequency of Communication with Friends and Family: Not on file    Frequency of Social Gatherings with Friends and Family: Not on file    Attends [employer] Services: Not on file    Active Member of [address] [place] [address] or Organizations: Not on file    Attends Club or Organization Meetings: Not on file    Marital Status: Not on file   Intimate Partner Violence:     Fear of Current or Ex-Partner: Not on file    Emotionally Abused: Not on file    Physically Abused: Not on file    Sexually Abused: Not on file   Housing Stability:     Unable to Pay for Housing in the Last Year: Not on file    Number of [place] in the Last Year: Not on file    Unstable Housing in the Last Year: Not on file       No Known Allergies    Current Outpatient Medications   Medication Sig Dispense Refill    methotrexate (RHEUMATREX) 2.5 mg tablet       predniSONE (DELTASONE) 2.5 mg tablet       zolpidem (AMBIEN) 10 mg tablet       acetaminophen (Tylenol Arthritis Pain) 650 mg TbER Take 1 Tablet by mouth every eight (8) hours.  80 Tablet 1    tofacitinib 11 mg Tb24 Take 1 Tablet by mouth daily.  hydroCHLOROthiazide (HYDRODIURIL) 12.5 mg tablet Take 1 Tablet by mouth daily. 30 Tablet 1    amLODIPine (NORVASC) 10 mg tablet TAKE 1 TABLET BY MOUTH ONCE DAILY 90 Tablet 0    clopidogreL (PLAVIX) 75 mg tab Take 1 Tablet by mouth daily (with breakfast). Indications: prevention for a blood clot going to the brain 90 Tablet 0    losartan (COZAAR) 100 mg tablet Take 1 Tablet by mouth daily. 90 Tablet 0    DULoxetine (CYMBALTA) 20 mg capsule Take 1 Capsule by mouth daily. 30 Capsule 3    aspirin delayed-release 81 mg tablet Take 81 mg by mouth daily.  melatonin 10 mg tab Take 3 mg by mouth nightly. Indications: for stroke recovery 30 Tablet 1    atorvastatin (LIPITOR) 80 mg tablet Take 1 Tablet by mouth daily. Indications: excessive fat in the blood, stroke prevention 30 Tablet 0    folic acid (FOLVITE) 1 mg tablet       acetaminophen (TYLENOL) 325 mg tablet Take 2 Tablets by mouth every four (4) hours as needed (for fever or pain). Indications: fever, pain (Patient not taking: Reported on 11/17/2021) 30 Tablet 0    co-enzyme Q-10 (CO Q-10) 100 mg capsule Take 1 Capsule by mouth daily. (Patient not taking: Reported on 7/20/2021) 30 Capsule 0         Patient Care Team:  Patient Care Team:  Su Zapata NP as PCP - General (Nurse Practitioner)  Su Zapata NP as PCP - [facility]  Kendy Pedraza MD (Rheumatology)  Angela Hu MD (Obstetrics & Gynecology)  Fernando Hylton MD (Gastroenterology)      LABS:  No results found for any visits on 11/17/21.      RADIOLOGY:  No recent results      Physical Exam      Vitals:    11/17/21 1011 11/17/21 1026   BP: (!) 133/90 (!) 130/103   Pulse: 70    Resp: 24    Temp: 99.5 °F (37.5 °C)    TempSrc: Temporal    SpO2: 98%    Weight: 183 lb (83 kg)    Height: 5' 7\" (1.702 m)    PainSc:   8    PainLoc: Generalized    LMP: 10/15/2014         Assessment and Plan    1. Essential hypertension    - CBC W/O DIFF; Future  - hydroCHLOROthiazide (HYDRODIURIL) 12.5 mg tablet; Take 1 Tablet by mouth daily. Dispense: 30 Tablet; Refill: 1    2. Prediabetes    - HEMOGLOBIN A1C WITH EAG; Future    3. Other chronic pain    - acetaminophen (Tylenol Arthritis Pain) 650 mg TbER; Take 1 Tablet by mouth every eight (8) hours. Dispense: 90 Tablet; Refill: 1  - REFERRAL TO PAIN MANAGEMENT    4. Rheumatoid arthritis involving both hands with positive rheumatoid factor (HCC)      5. Hemiparesis of right dominant side due to acute cerebrovascular disease (HCC)    - REFERRAL TO PAIN MANAGEMENT    6. Foot callus    - REFERRAL TO PODIATRY    7. Encounter for immunization    - INFLUENZA VIRUS VAC QUAD,SPLIT,PRESV FREE SYRINGE IM  - PNEUMOCOCCAL POLYSACCHARIDE VACCINE, 23-VALENT, ADULT OR IMMUNOSUPPRESSED PT DOSE,  - REFERRAL TO PAIN MANAGEMENT  - MS IMMUNIZ ADMIN,1 SINGLE/COMB VAC/TOXOID  - MS IMMUNIZ,ADMIN,EACH ADDL    8. Screening for cardiovascular condition    - LIPID PANEL; Future    9. Screening for endocrine, metabolic and immunity disorder    - CBC W/O DIFF; Future  - METABOLIC PANEL, COMPREHENSIVE; Future  - LIPID PANEL; Future      MDM    Procedures      *Plan of care reviewed with patient. Patient in agreement with plan and expresses understanding. All questions answered and patient encouraged to call or RTO if further questions or concerns. Advised patient if symptoms worsen to go to nearest ER or call 911. AVS and recommendations given to patient upon discharge.

## 2021-11-17 ENCOUNTER — OFFICE VISIT (OUTPATIENT)
Dept: FAMILY MEDICINE CLINIC | Age: 61
End: 2021-11-17
Payer: MEDICAID

## 2021-11-17 VITALS
HEART RATE: 70 BPM | TEMPERATURE: 99.5 F | DIASTOLIC BLOOD PRESSURE: 103 MMHG | BODY MASS INDEX: 28.72 KG/M2 | HEIGHT: 67 IN | SYSTOLIC BLOOD PRESSURE: 130 MMHG | WEIGHT: 183 LBS | OXYGEN SATURATION: 98 % | RESPIRATION RATE: 24 BRPM

## 2021-11-17 DIAGNOSIS — G81.91 HEMIPARESIS OF RIGHT DOMINANT SIDE DUE TO ACUTE CEREBROVASCULAR DISEASE (HCC): ICD-10-CM

## 2021-11-17 DIAGNOSIS — I67.89 HEMIPARESIS OF RIGHT DOMINANT SIDE DUE TO ACUTE CEREBROVASCULAR DISEASE (HCC): ICD-10-CM

## 2021-11-17 DIAGNOSIS — Z13.6 SCREENING FOR CARDIOVASCULAR CONDITION: ICD-10-CM

## 2021-11-17 DIAGNOSIS — M05.741 RHEUMATOID ARTHRITIS INVOLVING BOTH HANDS WITH POSITIVE RHEUMATOID FACTOR (HCC): ICD-10-CM

## 2021-11-17 DIAGNOSIS — Z13.228 SCREENING FOR ENDOCRINE, METABOLIC AND IMMUNITY DISORDER: ICD-10-CM

## 2021-11-17 DIAGNOSIS — G89.29 OTHER CHRONIC PAIN: ICD-10-CM

## 2021-11-17 DIAGNOSIS — M05.742 RHEUMATOID ARTHRITIS INVOLVING BOTH HANDS WITH POSITIVE RHEUMATOID FACTOR (HCC): ICD-10-CM

## 2021-11-17 DIAGNOSIS — Z13.0 SCREENING FOR ENDOCRINE, METABOLIC AND IMMUNITY DISORDER: ICD-10-CM

## 2021-11-17 DIAGNOSIS — R73.03 PREDIABETES: ICD-10-CM

## 2021-11-17 DIAGNOSIS — Z13.29 SCREENING FOR ENDOCRINE, METABOLIC AND IMMUNITY DISORDER: ICD-10-CM

## 2021-11-17 DIAGNOSIS — L84 FOOT CALLUS: ICD-10-CM

## 2021-11-17 DIAGNOSIS — Z23 ENCOUNTER FOR IMMUNIZATION: ICD-10-CM

## 2021-11-17 DIAGNOSIS — I10 ESSENTIAL HYPERTENSION: ICD-10-CM

## 2021-11-17 PROCEDURE — 90732 PPSV23 VACC 2 YRS+ SUBQ/IM: CPT | Performed by: NURSE PRACTITIONER

## 2021-11-17 PROCEDURE — 99214 OFFICE O/P EST MOD 30 MIN: CPT | Performed by: NURSE PRACTITIONER

## 2021-11-17 PROCEDURE — 90686 IIV4 VACC NO PRSV 0.5 ML IM: CPT | Performed by: NURSE PRACTITIONER

## 2021-11-17 RX ORDER — METHOTREXATE 2.5 MG/1
TABLET ORAL
COMMUNITY
Start: 2021-11-01 | End: 2022-06-07

## 2021-11-17 RX ORDER — CLOPIDOGREL BISULFATE 75 MG/1
75 TABLET ORAL DAILY
COMMUNITY
Start: 2021-05-28 | End: 2021-11-17 | Stop reason: SDUPTHER

## 2021-11-17 RX ORDER — HYDROCHLOROTHIAZIDE 12.5 MG/1
12.5 TABLET ORAL DAILY
Qty: 30 TABLET | Refills: 1 | Status: SHIPPED | OUTPATIENT
Start: 2021-11-17 | End: 2022-01-05 | Stop reason: DRUGHIGH

## 2021-11-17 RX ORDER — PREDNISONE 2.5 MG/1
TABLET ORAL
COMMUNITY
Start: 2021-10-23 | End: 2022-06-07

## 2021-11-17 RX ORDER — ZOLPIDEM TARTRATE 10 MG/1
TABLET ORAL
COMMUNITY
Start: 2021-11-01 | End: 2022-06-07

## 2021-11-17 RX ORDER — FOLIC ACID 1 MG/1
TABLET ORAL
COMMUNITY
Start: 2021-10-23 | End: 2022-09-02

## 2021-11-17 RX ORDER — DEXTROMETHORPHAN HYDROBROMIDE, GUAIFENESIN 5; 100 MG/5ML; MG/5ML
650 LIQUID ORAL EVERY 8 HOURS
Qty: 90 TABLET | Refills: 1 | Status: SHIPPED | OUTPATIENT
Start: 2021-11-17

## 2021-11-17 NOTE — PROGRESS NOTES
After obtaining consent, and per orders of Dr. Carlos Jeff, NP, injection of [facility] and Pneumovax was given by Yolanda Stevenson. Patient instructed to remain in clinic for 20 minutes afterwards, and to report any adverse reaction to me immediately.

## 2021-11-17 NOTE — PROGRESS NOTES
1. \"Have you been to the ER, urgent care clinic since your last visit? Hospitalized since your last visit? \" No    2. \"Have you seen or consulted any other health care providers outside of the [address] [place] [address] since your last visit? \" No     3. For patients aged 39-70: Has the patient had a colonoscopy / FIT/ Cologuard? No     If the patient is female:    4. For patients aged 41-77: Has the patient had a mammogram within the past 2 years? No    5.  For patients aged 21-65: Has the patient had a pap smear? yes

## 2021-12-09 ENCOUNTER — HOSPITAL ENCOUNTER (OUTPATIENT)
Dept: LAB | Age: 61
Discharge: HOME | End: 2021-12-09

## 2021-12-09 LAB — XX-LABCORP SPECIMEN COL,LCBCF: NORMAL

## 2021-12-09 PROCEDURE — 99001 SPECIMEN HANDLING PT-LAB: CPT

## 2022-01-05 ENCOUNTER — OFFICE VISIT (OUTPATIENT)
Dept: FAMILY MEDICINE CLINIC | Age: 62
End: 2022-01-05
Payer: MEDICAID

## 2022-01-05 VITALS
HEIGHT: 67 IN | WEIGHT: 184 LBS | HEART RATE: 71 BPM | RESPIRATION RATE: 24 BRPM | DIASTOLIC BLOOD PRESSURE: 87 MMHG | SYSTOLIC BLOOD PRESSURE: 130 MMHG | OXYGEN SATURATION: 98 % | TEMPERATURE: 98.1 F | BODY MASS INDEX: 28.88 KG/M2

## 2022-01-05 DIAGNOSIS — Z12.31 ENCOUNTER FOR SCREENING MAMMOGRAM FOR MALIGNANT NEOPLASM OF BREAST: ICD-10-CM

## 2022-01-05 DIAGNOSIS — I10 ESSENTIAL HYPERTENSION: Primary | ICD-10-CM

## 2022-01-05 DIAGNOSIS — Z01.00 EYE EXAM, ROUTINE: ICD-10-CM

## 2022-01-05 DIAGNOSIS — Z12.11 SCREENING FOR COLON CANCER: ICD-10-CM

## 2022-01-05 PROCEDURE — 99213 OFFICE O/P EST LOW 20 MIN: CPT | Performed by: NURSE PRACTITIONER

## 2022-01-05 RX ORDER — LOSARTAN POTASSIUM AND HYDROCHLOROTHIAZIDE 12.5; 1 MG/1; MG/1
1 TABLET ORAL DAILY
Qty: 90 TABLET | Refills: 0
Start: 2022-01-05 | End: 2022-06-07

## 2022-01-05 NOTE — PROGRESS NOTES
OFFICE NOTE    Coby Young is a 62 y.o. female presenting today for the following:  Chief Complaint   Patient presents with    Follow Up Chronic Condition     blood pressure      HPI     Hypertension  Patient is currently on amlodipine 10 mg daily, losartan 100 mg daily and HCTZ 12.5 mg daily (added on last visit). Today patient blood pressure is WNL. She states her blood pressure readings at home are WNL. Patient denies chest pain, SOB, vision changes or headaches. She takes her medication as prescribed. Will continue on same regimen. No other concerns today. HM  Eye exam - referral placed today  Tdap - will think about per patient  Colon cancer screening - referred to GI  shingrinx - [facility] or [facility] to get  Mammogram - ordered today  Covid vaccine booster - will get done per patient        Review of Systems   Constitutional: Negative for fatigue and fever. HENT: Negative. Eyes: Negative. Respiratory: Negative for cough, chest tightness, shortness of breath and wheezing. Cardiovascular: Negative for chest pain and palpitations. Neurological: Negative for dizziness, light-headedness and headaches.          History  Past Medical History:   Diagnosis Date    Abnormal mammogram 2014    left with biopsy    Acute lacunar stroke ([facility] [address] 5/21/2021    Acute Lacunar Stroke (acute lacunar infarct in the posterior left lentiform nucleus extending into the corona radiata) with residual right hemiparesis    Bacterial vaginosis 1/9/15    Dr Joseluis Madison Constipation     Current use of aspirin 5/23/2021    Gastroesophageal reflux disease 6/30/2016    Hemiparesis of right dominant side due to acute cerebrovascular disease ([address] 5/21/2021    History of Helicobacter pylori infection 7/24/2015    History of iron deficiency anemia 06/2014    History of vitamin D deficiency 6/11/2015    Hot flashes 1/9/15    Dr Joseluis Madison On clopidogrel therapy 5/23/2021    On statin therapy due to risk of future cardiovascular event 5/22/2021    On Atorvastatin    Rheumatoid arthritis ([facility] [address]     Secondary hyperparathyroidism of renal origin ([facility] [address] 5/31/2021    Stage 3b chronic kidney disease ([facility] [address] 7/24/2015    Tobacco use disorder     Vitamin D deficiency 5/31/2021    Vitamin D 25-Hydroxy (5/31/2021) = 10.5        Past Surgical History:   Procedure Laterality Date    HX LAPAROSCOPIC SUPRACERVICAL HYSTERECTOMY  11/2014    Dr. Thu Keating, GYN    HX SALPINGO-OOPHORECTOMY  11/2014       Social History     Socioeconomic History    Marital status: SINGLE     Spouse name: Not on file    Number of children: Not on file    Years of education: Not on file    Highest education level: Not on file   Occupational History    Not on file   Tobacco Use    Smoking status: Current Every Day Smoker     Packs/day: 0.50     Years: 20.00     Pack years: 10.00     Types: Cigarettes    Smokeless tobacco: Never Used   Vaping Use    Vaping Use: Not on file   Substance and Sexual Activity    Alcohol use: Yes    Drug use: Not Currently     Types: Cocaine    Sexual activity: Yes     Partners: Male   Other Topics Concern     Service No    Blood Transfusions Yes    Caffeine Concern No    Occupational Exposure No    Hobby Hazards No    Sleep Concern No    Stress Concern No    Weight Concern No    Special Diet No    Back Care No    Exercise No    Bike Helmet No    Seat Belt Yes    Self-Exams No   Social History Narrative    Not on file     Social Determinants of Health     Financial Resource Strain:     Difficulty of Paying Living Expenses: Not on file   Food Insecurity:     Worried About Running Out of Food in the Last Year: Not on file    Erma of Food in the Last Year: Not on file   Transportation Needs:     Lack of Transportation (Medical): Not on file    Lack of Transportation (Non-Medical):  Not on file   Physical Activity:     Days of Exercise per Week: Not on file    Minutes of Exercise per Session: Not on file   Stress:     Feeling of Stress : Not on file   Social Connections:     Frequency of Communication with Friends and Family: Not on file    Frequency of Social Gatherings with Friends and Family: Not on file    Attends [employer] Services: Not on file    Active Member of [address] [place] [address] or Organizations: Not on file    Attends Club or Organization Meetings: Not on file    Marital Status: Not on file   Intimate Partner Violence:     Fear of Current or Ex-Partner: Not on file    Emotionally Abused: Not on file    Physically Abused: Not on file    Sexually Abused: Not on file   Housing Stability:     Unable to Pay for Housing in the Last Year: Not on file    Number of [place] in the Last Year: Not on file    Unstable Housing in the Last Year: Not on file       No Known Allergies    Current Outpatient Medications   Medication Sig Dispense Refill    losartan-hydroCHLOROthiazide (HYZAAR) 100-12.5 mg per tablet Take 1 Tablet by mouth daily. 90 Tablet 0    folic acid (FOLVITE) 1 mg tablet       methotrexate (RHEUMATREX) 2.5 mg tablet       predniSONE (DELTASONE) 2.5 mg tablet       zolpidem (AMBIEN) 10 mg tablet       acetaminophen (Tylenol Arthritis Pain) 650 mg TbER Take 1 Tablet by mouth every eight (8) hours. 90 Tablet 1    tofacitinib 11 mg Tb24 Take 1 Tablet by mouth daily.  amLODIPine (NORVASC) 10 mg tablet TAKE 1 TABLET BY MOUTH ONCE DAILY 90 Tablet 0    clopidogreL (PLAVIX) 75 mg tab Take 1 Tablet by mouth daily (with breakfast). Indications: prevention for a blood clot going to the brain 90 Tablet 0    DULoxetine (CYMBALTA) 20 mg capsule Take 1 Capsule by mouth daily. 30 Capsule 3    aspirin delayed-release 81 mg tablet Take 81 mg by mouth daily.  melatonin 10 mg tab Take 3 mg by mouth nightly. Indications: for stroke recovery 30 Tablet 1    atorvastatin (LIPITOR) 80 mg tablet Take 1 Tablet by mouth daily.  Indications: excessive fat in the blood, stroke prevention 30 Tablet 0    co-enzyme Q-10 (CO Q-10) 100 mg capsule Take 1 Capsule by mouth daily. 30 Capsule 0    acetaminophen (TYLENOL) 325 mg tablet Take 2 Tablets by mouth every four (4) hours as needed (for fever or pain). Indications: fever, pain (Patient not taking: Reported on 11/17/2021) 30 Tablet 0         Patient Care Team:  Patient Care Team:  Clarissa Padgett NP as PCP - General (Nurse Practitioner)  Clarissa Padgett NP as PCP - [facility]  Abdirashid Castillo MD (Rheumatology)  Caitie Andrew MD (Obstetrics & Gynecology)  Lequita Cooks, MD (Gastroenterology)      LABS:  No results found for any visits on 01/05/22. RADIOLOGY:  No recent results      Physical Exam  Vitals and nursing note reviewed. Constitutional:       Appearance: Normal appearance. She is well-developed. Cardiovascular:      Rate and Rhythm: Normal rate and regular rhythm. Heart sounds: Normal heart sounds. Pulmonary:      Effort: Pulmonary effort is normal. No respiratory distress. Breath sounds: Normal breath sounds. No wheezing or rales. Musculoskeletal:         General: Normal range of motion. Cervical back: Normal range of motion and neck supple. Lymphadenopathy:      Cervical: No cervical adenopathy. Skin:     General: Skin is warm and dry. Neurological:      Mental Status: She is alert and oriented to person, place, and time. Deep Tendon Reflexes: Reflexes are normal and symmetric. Psychiatric:         Mood and Affect: Mood normal.           Vitals:    01/05/22 1157   BP: 130/87   Pulse: 71   Resp: 24   Temp: 98.1 °F (36.7 °C)   TempSrc: Oral   SpO2: 98%   Weight: 184 lb (83.5 kg)   Height: 5' 7\" (1.702 m)   PainSc:   0 - No pain   LMP: 10/15/2014         Assessment and Plan    1. Essential hypertension    - losartan-hydroCHLOROthiazide (HYZAAR) 100-12.5 mg per tablet; Take 1 Tablet by mouth daily. Dispense: 90 Tablet; Refill: 0    2.  Eye exam, routine    - REFERRAL TO OPTOMETRY    3. Encounter for screening mammogram for malignant neoplasm of breast    - JAY MAMMO BI SCREENING INCL CAD; Future    4. Screening for colon cancer    - REFERRAL TO GASTROENTEROLOGY      MDM    Procedures      *Plan of care reviewed with patient. Patient in agreement with plan and expresses understanding. All questions answered and patient encouraged to call or RTO if further questions or concerns. Advised patient if symptoms worsen to go to nearest ER or call 911. AVS and recommendations given to patient upon discharge.

## 2022-01-05 NOTE — PROGRESS NOTES
1. \"Have you been to the ER, urgent care clinic since your last visit? Hospitalized since your last visit? \" No    2. \"Have you seen or consulted any other health care providers outside of the [address] [place] [address] since your last visit? \" No     3. For patients aged 39-70: Has the patient had a colonoscopy / FIT/ Cologuard? No     If the patient is female:    4. For patients aged 41-77: Has the patient had a mammogram within the past 2 years? No    5. For patients aged 21-65: Has the patient had a pap smear?  No

## 2022-01-05 NOTE — PATIENT INSTRUCTIONS
High Blood Pressure: Care Instructions  Overview     It's normal for blood pressure to go up and down throughout the day. But if it stays up, you have high blood pressure. Another name for high blood pressure is hypertension. Despite what a lot of people think, high blood pressure usually doesn't cause headaches or make you feel dizzy or lightheaded. It usually has no symptoms. But it does increase your risk of stroke, heart attack, and other problems. You and your doctor will talk about your risks of these problems based on your blood pressure. Your doctor will give you a goal for your blood pressure. Your goal will be based on your health and your age. Lifestyle changes, such as eating healthy and being active, are always important to help lower blood pressure. You might also take medicine to reach your blood pressure goal.  Follow-up care is a key part of your treatment and safety. Be sure to make and go to all appointments, and call your doctor if you are having problems. It's also a good idea to know your test results and keep a list of the medicines you take. How can you care for yourself at home? Medical treatment  · If you stop taking your medicine, your blood pressure will go back up. You may take one or more types of medicine to lower your blood pressure. Be safe with medicines. Take your medicine exactly as prescribed. Call your doctor if you think you are having a problem with your medicine. · Talk to your doctor before you start taking aspirin every day. Aspirin can help certain people lower their risk of a heart attack or stroke. But taking aspirin isn't right for everyone, because it can cause serious bleeding. · See your doctor regularly. You may need to see the doctor more often at first or until your blood pressure comes down. · If you are taking blood pressure medicine, talk to your doctor before you take decongestants or anti-inflammatory medicine, such as ibuprofen.  Some of these medicines can raise blood pressure. · Learn how to check your blood pressure at home. Lifestyle changes  · Stay at a healthy weight. This is especially important if you put on weight around the waist. Losing even 10 pounds can help you lower your blood pressure. · If your doctor recommends it, get more exercise. Walking is a good choice. Bit by bit, increase the amount you walk every day. Try for at least 30 minutes on most days of the week. You also may want to swim, bike, or do other activities. · Avoid or limit alcohol. Talk to your doctor about whether you can drink any alcohol. · Try to limit how much sodium you eat to less than 2,300 milligrams (mg) a day. Your doctor may ask you to try to eat less than 1,500 mg a day. · Eat plenty of fruits (such as bananas and oranges), vegetables, legumes, whole grains, and low-fat dairy products. · Lower the amount of saturated fat in your diet. Saturated fat is found in animal products such as milk, cheese, and meat. Limiting these foods may help you lose weight and also lower your risk for heart disease. · Do not smoke. Smoking increases your risk for heart attack and stroke. If you need help quitting, talk to your doctor about stop-smoking programs and medicines. These can increase your chances of quitting for good. When should you call for help? Call 911  anytime you think you may need emergency care. This may mean having symptoms that suggest that your blood pressure is causing a serious heart or blood vessel problem. Your blood pressure may be over 180/120. For example, call 911 if:    · You have symptoms of a heart attack. These may include:  ? Chest pain or pressure, or a strange feeling in the chest.  ? Sweating. ? Shortness of breath. ? Nausea or vomiting. ? Pain, pressure, or a strange feeling in the back, neck, jaw, or upper belly or in one or both shoulders or arms. ? Lightheadedness or sudden weakness.   ? A fast or irregular heartbeat.     · You have symptoms of a stroke. These may include:  ? Sudden numbness, tingling, weakness, or loss of movement in your face, arm, or leg, especially on only one side of your body. ? Sudden vision changes. ? Sudden trouble speaking. ? Sudden confusion or trouble understanding simple statements. ? Sudden problems with walking or balance. ? A sudden, severe headache that is different from past headaches.     · You have severe back or belly pain. Do not wait until your blood pressure comes down on its own. Get help right away. Call your doctor now or seek immediate care if:    · Your blood pressure is much higher than normal (such as 180/120 or higher), but you don't have symptoms.     · You think high blood pressure is causing symptoms, such as:  ? Severe headache.  ? Blurry vision. Watch closely for changes in your health, and be sure to contact your doctor if:    · Your blood pressure measures higher than your doctor recommends at least 2 times. That means the top number is higher or the bottom number is higher, or both.     · You think you may be having side effects from your blood pressure medicine. Where can you learn more? Go to http://www.gray.com/  Enter [id number] in the search box to learn more about \"High Blood Pressure: Care Instructions. \"  Current as of: April 29, 2021               Content Version: 13.0  © 2006-2021 Healthwise[employer]. Care instructions adapted under license by [employer] (which disclaims liability or warranty for this information). If you have questions about a medical condition or this instruction, always ask your healthcare professional. [place] [address] any warranty or liability for your use of this information.

## 2022-02-20 DIAGNOSIS — I10 ESSENTIAL HYPERTENSION: ICD-10-CM

## 2022-02-20 DIAGNOSIS — Z79.01 ON CLOPIDOGREL THERAPY: ICD-10-CM

## 2022-02-20 DIAGNOSIS — I63.81 ACUTE LACUNAR STROKE (HCC): ICD-10-CM

## 2022-02-20 RX ORDER — CLOPIDOGREL BISULFATE 75 MG/1
TABLET ORAL
Qty: 90 TABLET | Refills: 0 | Status: SHIPPED | OUTPATIENT
Start: 2022-02-20 | End: 2022-04-21 | Stop reason: SDUPTHER

## 2022-02-20 RX ORDER — AMLODIPINE BESYLATE 10 MG/1
TABLET ORAL
Qty: 30 TABLET | Refills: 1 | Status: SHIPPED | OUTPATIENT
Start: 2022-02-20 | End: 2022-06-07 | Stop reason: SDUPTHER

## 2022-02-25 RX ORDER — DULOXETIN HYDROCHLORIDE 20 MG/1
20 CAPSULE, DELAYED RELEASE ORAL DAILY
Qty: 30 CAPSULE | Refills: 0 | Status: SHIPPED | OUTPATIENT
Start: 2022-02-25 | End: 2022-06-07

## 2022-03-18 PROBLEM — I67.89 HEMIPARESIS OF RIGHT DOMINANT SIDE DUE TO ACUTE CEREBROVASCULAR DISEASE (HCC): Status: ACTIVE | Noted: 2021-05-21

## 2022-03-18 PROBLEM — Z74.09 IMPAIRED MOBILITY AND ADLS: Status: ACTIVE | Noted: 2021-05-21

## 2022-03-18 PROBLEM — Z78.9 IMPAIRED MOBILITY AND ADLS: Status: ACTIVE | Noted: 2021-05-21

## 2022-03-18 PROBLEM — I63.81 ACUTE LACUNAR STROKE (HCC): Status: ACTIVE | Noted: 2021-05-21

## 2022-03-18 PROBLEM — G81.91 HEMIPARESIS OF RIGHT DOMINANT SIDE DUE TO ACUTE CEREBROVASCULAR DISEASE (HCC): Status: ACTIVE | Noted: 2021-05-21

## 2022-03-19 PROBLEM — Z79.899 ON STATIN THERAPY DUE TO RISK OF FUTURE CARDIOVASCULAR EVENT: Status: ACTIVE | Noted: 2021-05-22

## 2022-03-19 PROBLEM — N18.30 ACUTE RENAL FAILURE SUPERIMPOSED ON STAGE 3 CHRONIC KIDNEY DISEASE (HCC): Status: ACTIVE | Noted: 2021-06-01

## 2022-03-19 PROBLEM — R79.89 HIGH SERUM MAGNESIUM: Status: ACTIVE | Noted: 2021-05-28

## 2022-03-19 PROBLEM — Z79.01 ON CLOPIDOGREL THERAPY: Status: ACTIVE | Noted: 2021-05-23

## 2022-03-19 PROBLEM — Z79.82 CURRENT USE OF ASPIRIN: Status: ACTIVE | Noted: 2021-05-23

## 2022-03-19 PROBLEM — N17.9 ACUTE RENAL FAILURE SUPERIMPOSED ON STAGE 3 CHRONIC KIDNEY DISEASE (HCC): Status: ACTIVE | Noted: 2021-06-01

## 2022-03-20 PROBLEM — N25.81 SECONDARY HYPERPARATHYROIDISM OF RENAL ORIGIN (HCC): Status: ACTIVE | Noted: 2021-05-31

## 2022-03-20 PROBLEM — E55.9 VITAMIN D DEFICIENCY: Status: ACTIVE | Noted: 2021-05-31

## 2022-04-09 LAB — HBA1C MFR BLD HPLC: 5.3 %

## 2022-04-15 ENCOUNTER — TELEPHONE (OUTPATIENT)
Dept: FAMILY MEDICINE CLINIC | Age: 62
End: 2022-04-15

## 2022-04-15 NOTE — TELEPHONE ENCOUNTER
Attempted to call no answer left message to call back----- Message from Mayur Dena sent at 4/12/2022 10:08 AM EDT -----  Subject: Message to Provider    QUESTIONS  Information for Provider? Pt is requesting to speak with a nurse in   regards to being in the hospital now and having a surgery at the end of   next week; please call pt   ---------------------------------------------------------------------------  --------------  CALL BACK INFO  What is the best way for the office to contact you? OK to leave message on   voicemail  Preferred Call Back Phone Number? [phone]  ---------------------------------------------------------------------------  --------------  SCRIPT ANSWERS  Relationship to Patient?  Self

## 2022-04-21 ENCOUNTER — TELEPHONE (OUTPATIENT)
Dept: FAMILY MEDICINE CLINIC | Age: 62
End: 2022-04-21

## 2022-04-21 DIAGNOSIS — Z79.01 ON CLOPIDOGREL THERAPY: ICD-10-CM

## 2022-04-21 DIAGNOSIS — K80.20 GALLSTONES: ICD-10-CM

## 2022-04-21 DIAGNOSIS — K85.90 RECURRENT PANCREATITIS: Primary | ICD-10-CM

## 2022-04-21 DIAGNOSIS — I63.81 ACUTE LACUNAR STROKE (HCC): ICD-10-CM

## 2022-04-21 RX ORDER — CLOPIDOGREL BISULFATE 75 MG/1
75 TABLET ORAL DAILY
Qty: 90 TABLET | Refills: 0 | Status: SHIPPED | OUTPATIENT
Start: 2022-04-21 | End: 2022-06-07 | Stop reason: SDUPTHER

## 2022-05-18 ENCOUNTER — TELEPHONE (OUTPATIENT)
Dept: MAMMOGRAPHY | Age: 62
End: 2022-05-18

## 2022-05-18 NOTE — TELEPHONE ENCOUNTER
I called  Easton Pedro, to assist her  in scheduling a Mammogram . I left a message for this patient to return my call  at [phone].     Aleyda Rose LPN   Panel Manager

## 2022-06-06 PROBLEM — I63.412 CEREBROVASCULAR ACCIDENT (CVA) DUE TO EMBOLISM OF LEFT MIDDLE CEREBRAL ARTERY (HCC): Status: ACTIVE | Noted: 2022-06-06

## 2022-06-06 PROBLEM — K85.90 ACUTE PANCREATITIS WITHOUT INFECTION OR NECROSIS: Status: ACTIVE | Noted: 2022-04-08

## 2022-06-07 ENCOUNTER — OFFICE VISIT (OUTPATIENT)
Dept: FAMILY MEDICINE CLINIC | Age: 62
End: 2022-06-07
Payer: MEDICAID

## 2022-06-07 VITALS
HEIGHT: 67 IN | OXYGEN SATURATION: 98 % | BODY MASS INDEX: 27 KG/M2 | HEART RATE: 80 BPM | RESPIRATION RATE: 24 BRPM | DIASTOLIC BLOOD PRESSURE: 112 MMHG | WEIGHT: 172 LBS | SYSTOLIC BLOOD PRESSURE: 155 MMHG | TEMPERATURE: 97.3 F

## 2022-06-07 DIAGNOSIS — E78.2 MIXED HYPERLIPIDEMIA: ICD-10-CM

## 2022-06-07 DIAGNOSIS — Z79.899 ON STATIN THERAPY DUE TO RISK OF FUTURE CARDIOVASCULAR EVENT: ICD-10-CM

## 2022-06-07 DIAGNOSIS — I63.81 ACUTE LACUNAR STROKE (HCC): ICD-10-CM

## 2022-06-07 DIAGNOSIS — N18.4 STAGE 4 CHRONIC KIDNEY DISEASE (HCC): ICD-10-CM

## 2022-06-07 DIAGNOSIS — I10 ESSENTIAL HYPERTENSION: ICD-10-CM

## 2022-06-07 DIAGNOSIS — Z79.01 ON CLOPIDOGREL THERAPY: ICD-10-CM

## 2022-06-07 PROCEDURE — 99214 OFFICE O/P EST MOD 30 MIN: CPT | Performed by: FAMILY MEDICINE

## 2022-06-07 RX ORDER — HYDRALAZINE HYDROCHLORIDE 25 MG/1
25 TABLET, FILM COATED ORAL 3 TIMES DAILY
Qty: 270 TABLET | Refills: 1 | Status: SHIPPED | OUTPATIENT
Start: 2022-06-07 | End: 2022-09-05

## 2022-06-07 RX ORDER — CLOPIDOGREL BISULFATE 75 MG/1
75 TABLET ORAL DAILY
Qty: 90 TABLET | Refills: 0 | Status: SHIPPED | OUTPATIENT
Start: 2022-06-07 | End: 2022-09-05

## 2022-06-07 RX ORDER — ASPIRIN 81 MG/1
81 TABLET ORAL DAILY
Qty: 90 TABLET | Refills: 1 | Status: SHIPPED | OUTPATIENT
Start: 2022-06-07 | End: 2022-09-05

## 2022-06-07 RX ORDER — AMLODIPINE BESYLATE 10 MG/1
10 TABLET ORAL DAILY
Qty: 30 TABLET | Refills: 1 | Status: SHIPPED | OUTPATIENT
Start: 2022-06-07

## 2022-06-07 RX ORDER — ATORVASTATIN CALCIUM 80 MG/1
80 TABLET, FILM COATED ORAL DAILY
Qty: 90 TABLET | Refills: 2 | Status: SHIPPED | OUTPATIENT
Start: 2022-06-07 | End: 2022-09-05

## 2022-06-07 NOTE — PROGRESS NOTES
HPI  This is a 62year old female with a PMH significant for CKD stage 4 (GFR 27), history of CVA in May of last year currently on statin and Plavix along with ASA, Rheumatoid arthritis, Acute recurrent pancreatitis attributed to multiple gallstones, secondary hyperparathyroidism, Vitamin D deficiency who is here to follow up on chronic conditions (does not have surgical date as of yet for possible gallstone removal as she has not seen general surgeon outpatient. Recurrent pancreatitis secondary to gallstones  Spoke with patient on the phone regarding multiple hospital admissions stating due to this. She has not had an episodes of abdominal pain, nausea/vomiting since April. Referred patient to general surgeon when I spoke with her on the phone. I gave her the number to the general surgeon and advised her to call them to establish care    HTN  Patient states that she was told in the hospital that her medication was damaging her kidneys and she should stop taking all of them which she did and BP in office is elevated. I will start patient on hydralazine 25 mg TID and amlodipine 10 mg daily. Stopped losartan-HCTZ. Counseled patient regarding low salt and low protein diet. History of CVA  Patient has history of CVA in May of last year and has since been on plavix, ASA, atorvastatin. I will restart patient's atorvastatin 80 mg daily, and plavix 75 mg daily at this time. CKD stage 4  GFR 27. Patient used to see kidney doctor but has not seen them in a long time. I will refer to new provider. Patient has history of secondary hyperparathyroidism. Refer to nephrology will be placed.  Patient still producing urine        Past Medical History  Past Medical History:   Diagnosis Date    Abnormal mammogram 2014    left with biopsy    Acute lacunar stroke ([facility] [address] 5/21/2021    Acute Lacunar Stroke (acute lacunar infarct in the posterior left lentiform nucleus extending into the corona radiata) with residual right hemiparesis    Bacterial vaginosis 1/9/15    Dr Richard Ignacio Constipation     Current use of aspirin 5/23/2021    Gastroesophageal reflux disease 6/30/2016    Hemiparesis of right dominant side due to acute cerebrovascular disease ([facility] [address] 5/21/2021    History of Helicobacter pylori infection 7/24/2015    History of iron deficiency anemia 06/2014    History of vitamin D deficiency 6/11/2015    Hot flashes 1/9/15    Dr Richard Ignacio On clopidogrel therapy 5/23/2021    On statin therapy due to risk of future cardiovascular event 5/22/2021    On Atorvastatin    Rheumatoid arthritis ([facility] [address]     Secondary hyperparathyroidism of renal origin ([facility] [address] 5/31/2021    Stage 3b chronic kidney disease ([facility] [address] 7/24/2015    Tobacco use disorder     Vitamin D deficiency 5/31/2021    Vitamin D 25-Hydroxy (5/31/2021) = 10.5        Surgical History  Past Surgical History:   Procedure Laterality Date    HX LAPAROSCOPIC SUPRACERVICAL HYSTERECTOMY  11/2014    Dr. Tootie Smith, GYN    HX SALPINGO-OOPHORECTOMY  11/2014        Medications  Current Outpatient Medications   Medication Sig Dispense Refill    amLODIPine (NORVASC) 10 mg tablet Take 1 Tablet by mouth daily. 30 Tablet 1    hydrALAZINE (APRESOLINE) 25 mg tablet Take 1 Tablet by mouth three (3) times daily for 90 days. 270 Tablet 1    clopidogreL (PLAVIX) 75 mg tab Take 1 Tablet by mouth daily for 90 days. 90 Tablet 0    aspirin delayed-release 81 mg tablet Take 1 Tablet by mouth daily for 90 days. 90 Tablet 1    atorvastatin (LIPITOR) 80 mg tablet Take 1 Tablet by mouth daily for 90 days. Indications: excessive fat in the blood, stroke prevention 90 Tablet 2    acetaminophen (Tylenol Arthritis Pain) 650 mg TbER Take 1 Tablet by mouth every eight (8) hours. 90 Tablet 1    folic acid (FOLVITE) 1 mg tablet  (Patient not taking: Reported on 6/7/2022)      acetaminophen (TYLENOL) 325 mg tablet Take 2 Tablets by mouth every four (4) hours as needed (for fever or pain).  Indications: fever, pain (Patient not taking: Reported on 11/17/2021) 30 Tablet 0    co-enzyme Q-10 (CO Q-10) 100 mg capsule Take 1 Capsule by mouth daily. (Patient not taking: Reported on 6/7/2022) 30 Capsule 0       Allergies  No Known Allergies    Family History  Family History   Problem Relation Age of Onset    OSTEOARTHRITIS Maternal Grandmother     Cancer Sister     Hypertension Sister     OSTEOARTHRITIS Mother     Hypertension Mother     Diabetes Mother     Hypertension Brother     Stroke Neg Hx        Social History  Social History     Socioeconomic History    Marital status: SINGLE     Spouse name: Not on file    Number of children: Not on file    Years of education: Not on file    Highest education level: Not on file   Occupational History    Not on file   Tobacco Use    Smoking status: Current Every Day Smoker     Packs/day: 0.50     Years: 20.00     Pack years: 10.00     Types: Cigarettes    Smokeless tobacco: Never Used   Vaping Use    Vaping Use: Not on file   Substance and Sexual Activity    Alcohol use: Yes    Drug use: Not Currently     Types: Cocaine    Sexual activity: Yes     Partners: Male   Other Topics Concern     Service No    Blood Transfusions Yes    Caffeine Concern No    Occupational Exposure No    Hobby Hazards No    Sleep Concern No    Stress Concern No    Weight Concern No    Special Diet No    Back Care No    Exercise No    Bike Helmet No    Seat Belt Yes    Self-Exams No   Social History Narrative    Not on file     Social Determinants of Health     Financial Resource Strain:     Difficulty of Paying Living Expenses: Not on file   Food Insecurity:     Worried About Running Out of Food in the Last Year: Not on file    Erma of Food in the Last Year: Not on file   Transportation Needs:     Lack of Transportation (Medical): Not on file    Lack of Transportation (Non-Medical):  Not on file   Physical Activity:     Days of Exercise per Week: Not on file    Minutes of Exercise per Session: Not on file   Stress:     Feeling of Stress : Not on file   Social Connections:     Frequency of Communication with Friends and Family: Not on file    Frequency of Social Gatherings with Friends and Family: Not on file    Attends Druze Services: Not on file    Active Member of Clubs or Organizations: Not on file    Attends Club or Organization Meetings: Not on file    Marital Status: Not on file   Intimate Partner Violence:     Fear of Current or Ex-Partner: Not on file    Emotionally Abused: Not on file    Physically Abused: Not on file    Sexually Abused: Not on file   Housing Stability:     Unable to Pay for Housing in the Last Year: Not on file    Number of [place] in the Last Year: Not on file    Unstable Housing in the Last Year: Not on file       Review of Systems  Review of Systems   Constitutional: Negative for chills and fever. Respiratory: Negative for cough and shortness of breath. Cardiovascular: Negative for chest pain and leg swelling. Gastrointestinal: Negative for abdominal pain, nausea and vomiting. Skin: Negative for rash. Neurological: Negative for dizziness and headaches. Vital Signs  Visit Vitals  BP (!) 155/112 (BP 1 Location: Left upper arm, BP Patient Position: Sitting, BP Cuff Size: Adult)   Pulse 80   Temp 97.3 °F (36.3 °C) (Temporal)   Resp 24   Ht 5' 7\" (1.702 m)   Wt 172 lb (78 kg)   LMP 10/15/2014   SpO2 98%   BMI 26.94 kg/m²         Physical Exam  Physical Exam  Vitals reviewed. Constitutional:       Appearance: Normal appearance. HENT:      Head: Normocephalic and atraumatic. Right Ear: External ear normal.      Left Ear: External ear normal.      Nose: Nose normal.      Mouth/Throat:      Mouth: Mucous membranes are moist.   Eyes:      Extraocular Movements: Extraocular movements intact.       Conjunctiva/sclera: Conjunctivae normal.   Cardiovascular:      Rate and Rhythm: Normal rate and regular rhythm. Pulses: Normal pulses. Heart sounds: Normal heart sounds. No murmur heard. No gallop. Pulmonary:      Effort: Pulmonary effort is normal. No respiratory distress. Breath sounds: Normal breath sounds. Abdominal:      General: Bowel sounds are normal. There is no distension. Palpations: Abdomen is soft. Tenderness: There is no abdominal tenderness. Musculoskeletal:         General: Normal range of motion. Cervical back: Normal range of motion. Right lower leg: No edema. Left lower leg: No edema. Skin:     General: Skin is warm. Neurological:      General: No focal deficit present. Mental Status: She is alert and oriented to person, place, and time. Cranial Nerves: No cranial nerve deficit. Motor: No weakness. Gait: Gait normal.   Psychiatric:         Mood and Affect: Mood normal.         Behavior: Behavior normal.                Results  Results for orders placed or performed during the hospital encounter of 12/09/21   [facility] SPECIMEN COL   Result Value Ref Range    [facility] SPEC[facility]. Specimens collected/sent to [facility]. Please direct inquiries to ([phone]). ASSESSMENT and PLAN  1. Essential hypertension  -medication regimen: restart amlodipine 10 mg daily and start hydralazine 25 mg TID. -counseled about diet rich in green leafy vegetables/protein, decrease intake of red meat/sodium/and cholesterol.   -counseled about 150 min of moderate intensity exercise a week. Alternating between cardio and strength training.   -counseled about medication adherence and weight loss  -counseled to keep log of blood pressure at home and bring to next appointment   -Return visit: est care with new PCP in 2 months-list of providers accepting new patients in the area given to patient today  - amLODIPine (NORVASC) 10 mg tablet; Take 1 Tablet by mouth daily. Dispense: 30 Tablet;  Refill: 1  - hydrALAZINE (APRESOLINE) 25 mg tablet; Take 1 Tablet by mouth three (3) times daily for 90 days. Dispense: 270 Tablet; Refill: 1    2. Stage 4 chronic kidney disease (HCC)  - REFERRAL TO NEPHROLOGY    3. Acute lacunar stroke (HCC)  - clopidogreL (PLAVIX) 75 mg tab; Take 1 Tablet by mouth daily for 90 days. Dispense: 90 Tablet; Refill: 0  - aspirin delayed-release 81 mg tablet; Take 1 Tablet by mouth daily for 90 days. Dispense: 90 Tablet; Refill: 1  - atorvastatin (LIPITOR) 80 mg tablet; Take 1 Tablet by mouth daily for 90 days. Indications: excessive fat in the blood, stroke prevention  Dispense: 90 Tablet; Refill: 2    4. On clopidogrel therapy  - clopidogreL (PLAVIX) 75 mg tab; Take 1 Tablet by mouth daily for 90 days. Dispense: 90 Tablet; Refill: 0    5. Mixed hyperlipidemia  - atorvastatin (LIPITOR) 80 mg tablet; Take 1 Tablet by mouth daily for 90 days. Indications: excessive fat in the blood, stroke prevention  Dispense: 90 Tablet; Refill: 2    6. On statin therapy due to risk of future cardiovascular event  - atorvastatin (LIPITOR) 80 mg tablet; Take 1 Tablet by mouth daily for 90 days. Indications: excessive fat in the blood, stroke prevention  Dispense: 90 Tablet; Refill: 2           Follow-up and Dispositions    · Return in about 8 weeks (around 8/2/2022), or if symptoms worsen or fail to improve, for est care with new PCP list given to patient today. I have discussed the diagnosis with the patient and the intended plan of care as seen in the above orders. The patient has received an after-visit summary and questions were answered concerning future plans. I have discussed medication, side effects, and warnings with the patient in detail. The patient verbalized understanding and is in agreement with the plan of care. The patient will contact the office with any additional concerns. I spent at least 30 minutes on this visit with this established patient.     Israel Bauman MD    PLEASE NOTE:   This document has been produced using voice recognition software.  Unrecognized errors in transcription may be present

## 2022-06-07 NOTE — PATIENT INSTRUCTIONS
DASH Diet: Care Instructions  Your Care Instructions     The DASH diet is an eating plan that can help lower your blood pressure. DASH stands for Dietary Approaches to Stop Hypertension. Hypertension is high blood pressure. The DASH diet focuses on eating foods that are high in calcium, potassium, and magnesium. These nutrients can lower blood pressure. The foods that are highest in these nutrients are fruits, vegetables, low-fat dairy products, nuts, seeds, and legumes. But taking calcium, potassium, and magnesium supplements instead of eating foods that are high in those nutrients does not have the same effect. The DASH diet also includes whole grains, fish, and poultry. The DASH diet is one of several lifestyle changes your doctor may recommend to lower your high blood pressure. Your doctor may also want you to decrease the amount of sodium in your diet. Lowering sodium while following the DASH diet can lower blood pressure even further than just the DASH diet alone. Follow-up care is a key part of your treatment and safety. Be sure to make and go to all appointments, and call your doctor if you are having problems. It's also a good idea to know your test results and keep a list of the medicines you take. How can you care for yourself at home? Following the DASH diet  · Eat 4 to 5 servings of fruit each day. A serving is 1 medium-sized piece of fruit, ½ cup chopped or canned fruit, 1/4 cup dried fruit, or 4 ounces (½ cup) of fruit juice. Choose fruit more often than fruit juice. · Eat 4 to 5 servings of vegetables each day. A serving is 1 cup of lettuce or raw leafy vegetables, ½ cup of chopped or cooked vegetables, or 4 ounces (½ cup) of vegetable juice. Choose vegetables more often than vegetable juice. · Get 2 to 3 servings of low-fat and fat-free dairy each day. A serving is 8 ounces of milk, 1 cup of yogurt, or 1 ½ ounces of cheese. · Eat 6 to 8 servings of grains each day.  A serving is 1 slice of bread, 1 ounce of dry cereal, or ½ cup of cooked rice, pasta, or cooked cereal. Try to choose whole-grain products as much as possible. · Limit lean meat, poultry, and fish to 2 servings each day. A serving is 3 ounces, about the size of a deck of cards. · Eat 4 to 5 servings of nuts, seeds, and legumes (cooked dried beans, lentils, and split peas) each week. A serving is 1/3 cup of nuts, 2 tablespoons of seeds, or ½ cup of cooked beans or peas. · Limit fats and oils to 2 to 3 servings each day. A serving is 1 teaspoon of vegetable oil or 2 tablespoons of salad dressing. · Limit sweets and added sugars to 5 servings or less a week. A serving is 1 tablespoon jelly or jam, ½ cup sorbet, or 1 cup of lemonade. · Eat less than 2,300 milligrams (mg) of sodium a day. If you limit your sodium to 1,500 mg a day, you can lower your blood pressure even more. · Be aware that all of these are the suggested number of servings for people who eat 1,800 to 2,000 calories a day. Your recommended number of servings may be different if you need more or fewer calories. Tips for success  · Start small. Do not try to make dramatic changes to your diet all at once. You might feel that you are missing out on your favorite foods and then be more likely to not follow the plan. Make small changes, and stick with them. Once those changes become habit, add a few more changes. · Try some of the following:  ? Make it a goal to eat a fruit or vegetable at every meal and at snacks. This will make it easy to get the recommended amount of fruits and vegetables each day. ? Try yogurt topped with fruit and nuts for a snack or healthy dessert. ? Add lettuce, tomato, cucumber, and onion to sandwiches. ? Combine a ready-made pizza crust with low-fat mozzarella cheese and lots of vegetable toppings. Try using tomatoes, squash, spinach, broccoli, carrots, cauliflower, and onions. ?  Have a variety of cut-up vegetables with a low-fat dip as an appetizer instead of chips and dip. ? Sprinkle sunflower seeds or chopped almonds over salads. Or try adding chopped walnuts or almonds to cooked vegetables. ? Try some vegetarian meals using beans and peas. Add garbanzo or kidney beans to salads. Make burritos and tacos with mashed elizalde beans or black beans. Where can you learn more? Go to http://www.valderrama.com/  Enter H967 in the search box to learn more about \"DASH Diet: Care Instructions. \"  Current as of: January 10, 2022               Content Version: 13.2  © 3706-7314 [employer]. Care instructions adapted under license by [employer] (which disclaims liability or warranty for this information). If you have questions about a medical condition or this instruction, always ask your healthcare professional. [address] any warranty or liability for your use of this information.

## 2022-06-07 NOTE — PROGRESS NOTES
1. \"Have you been to the ER, urgent care clinic since your last visit? Hospitalized since your last visit? \" [facility]     2. \"Have you seen or consulted any other health care providers outside of the [address] [place] [address] since your last visit? \" No     3. For patients aged 39-70: Has the patient had a colonoscopy / FIT/ Cologuard? No      If the patient is female:    4. For patients aged 41-77: Has the patient had a mammogram within the past 2 years? No      5. For patients aged 21-65: Has the patient had a pap smear?  Yes - no Care Gap present

## 2022-06-30 ENCOUNTER — TELEPHONE (OUTPATIENT)
Dept: FAMILY MEDICINE CLINIC | Age: 62
End: 2022-06-30

## 2022-06-30 DIAGNOSIS — I67.89 HEMIPARESIS OF RIGHT DOMINANT SIDE DUE TO ACUTE CEREBROVASCULAR DISEASE (HCC): ICD-10-CM

## 2022-06-30 DIAGNOSIS — I63.81 ACUTE LACUNAR STROKE (HCC): ICD-10-CM

## 2022-06-30 DIAGNOSIS — G81.91 HEMIPARESIS OF RIGHT DOMINANT SIDE DUE TO ACUTE CEREBROVASCULAR DISEASE (HCC): ICD-10-CM

## 2022-06-30 DIAGNOSIS — I10 ESSENTIAL HYPERTENSION: ICD-10-CM

## 2022-06-30 NOTE — LETTER
2022 3:02 PM    Ms. Cyndi Hammans  [facility] [address] [place] [address]    To Whomever This May Concern,    This is a letter to recommend that Ms. Carlitos Finley (: 1960) be exempt from jury duty due to her recent history of stroke which has left her with some residual weakness on her right side. She also has mild cognitive impairment from her stroke. Of note she is on medication for prevention of stroke and for high blood pressure and none of these medications should be missed. Please call [phone] for any questions. Thank you.           Sincerely,      Kerri Dawson MD

## 2022-06-30 NOTE — TELEPHONE ENCOUNTER
Spoke with patient and she states that she is unable to part take in jury duty due to her remote stroke last year which has left her with residual right sided weakness and mild cognitive deficits. She also has HTN and is on medication for this as well as blood thinners for her history of stroke.  Patient

## 2022-06-30 NOTE — TELEPHONE ENCOUNTER
Pt is requesting to be contacted by the clinical staff to discuss her options for jury duty, Ms Oz Stanley was told she needed a letter from her Dr stating her health condition that would be of conflict to dismiss her from jury duty. Ms Oz Stanley needs to talk to someone as soon as possible.  Please follow up when available

## 2022-06-30 NOTE — TELEPHONE ENCOUNTER
Attempted to call patient a third time. No answer.  Voice mail was left that I would try again later

## 2022-07-06 ENCOUNTER — DOCUMENTATION ONLY (OUTPATIENT)
Dept: FAMILY MEDICINE CLINIC | Age: 62
End: 2022-07-06

## 2022-08-17 DIAGNOSIS — I10 ESSENTIAL HYPERTENSION: ICD-10-CM

## 2022-08-17 NOTE — TELEPHONE ENCOUNTER
This pharmacy faxed over request for the following prescriptions to be filled:    Medication requested :   Requested Prescriptions     Pending Prescriptions Disp Refills    amLODIPine (NORVASC) 10 mg tablet 30 Tablet 1     Sig: Take 1 Tablet by mouth daily.        PCP: Dr. Rey Stevens or Print: [facility]  Mail order or Local pharmacy: [facility]    Scheduled appointment if not seen by current providers in office: Pt has appointment with new provider on 11/30/22

## 2022-08-17 NOTE — TELEPHONE ENCOUNTER
Please see refill request    Dr Villafuerte How patient    Patient was last seen on 6-7-2022    Last prescribed on 6-7-2022  #30 X 1    Thank you

## 2022-08-18 RX ORDER — AMLODIPINE BESYLATE 10 MG/1
10 TABLET ORAL DAILY
Qty: 30 TABLET | Refills: 1 | OUTPATIENT
Start: 2022-08-18

## 2022-08-29 ENCOUNTER — OFFICE VISIT (OUTPATIENT)
Dept: SURGERY | Age: 62
End: 2022-08-29

## 2022-08-29 ENCOUNTER — TELEPHONE (OUTPATIENT)
Dept: SURGERY | Age: 62
End: 2022-08-29

## 2022-08-29 VITALS
SYSTOLIC BLOOD PRESSURE: 144 MMHG | TEMPERATURE: 97 F | OXYGEN SATURATION: 99 % | HEIGHT: 67 IN | BODY MASS INDEX: 26.84 KG/M2 | DIASTOLIC BLOOD PRESSURE: 100 MMHG | WEIGHT: 171 LBS | HEART RATE: 83 BPM

## 2022-08-29 DIAGNOSIS — K85.10 GALLSTONE PANCREATITIS: Primary | ICD-10-CM

## 2022-08-29 PROCEDURE — 99204 OFFICE O/P NEW MOD 45 MIN: CPT | Performed by: SURGERY

## 2022-08-29 NOTE — PROGRESS NOTES
General Surgery Consult      Efrain Santillan  Admit date: (Not on file)    MRN: [id number]     : 1960     Age: 62 y.o. Attending Physician: Isabela Kilpatrick MD, FACS      Subjective: Alie is a 62 y.o. female who was referred to me for evaluation of gallstone pancreatitis. The patient had attack of pancreatitis and she had an ultrasound that showed cholelithiasis and she also had an MRCP that showed pancreatitis with no evidence of choledocholithiasis. She was told that she needs her gallbladder removed to prevent another attack and she is here today to discuss the surgery. The patient currently stated that she feels great however she is concerned of having another attack because it was awful according to her. She has some minimal abdominal discomfort mainly in the right upper quadrant according to her now but no nausea or vomiting. The patient  has not had jaundice, acholic stools or dark urine.      Patient Active Problem List    Diagnosis Date Noted    Cerebrovascular accident (CVA) due to embolism of left middle cerebral artery ([address] 2022    Acute pancreatitis without infection or necrosis 2022    Acute renal failure superimposed on stage 3 chronic kidney disease ([address] 2021    Secondary hyperparathyroidism of renal origin ([address] 2021    Vitamin D deficiency 2021    High serum magnesium 2021    Tobacco use disorder     Hypertensive heart and kidney disease without heart failure and with stage 3b chronic kidney disease ([address]     Mixed hyperlipidemia     Constipation     Current use of aspirin 2021    On clopidogrel therapy 2021    On statin therapy due to risk of future cardiovascular event 2021    Acute lacunar stroke ([address] 2021    Impaired mobility and ADLs 2021    Hemiparesis of right dominant side due to acute cerebrovascular disease ([address] 2021    Gastroesophageal reflux disease 2016    Abscess of finger 03/20/2016    Cocaine use 07/24/2015    Stage 3b chronic kidney disease ([address] 07/24/2015    History of Helicobacter pylori infection 07/24/2015    History of vitamin D deficiency 06/11/2015    History of iron deficiency anemia 06/2014    Rheumatoid arthritis ([address] 01/14/2013     Past Medical History:   Diagnosis Date    Abnormal mammogram 2014    left with biopsy    Acute lacunar stroke ([address] 5/21/2021    Acute Lacunar Stroke (acute lacunar infarct in the posterior left lentiform nucleus extending into the corona radiata) with residual right hemiparesis    Bacterial vaginosis 1/9/15    Dr Petar Salomon    Constipation     Current use of aspirin 5/23/2021    Gastroesophageal reflux disease 6/30/2016    Hemiparesis of right dominant side due to acute cerebrovascular disease ([address] 5/21/2021    History of Helicobacter pylori infection 7/24/2015    History of iron deficiency anemia 06/2014    History of vitamin D deficiency 6/11/2015    Hot flashes 1/9/15    Dr Petar Salomon    On clopidogrel therapy 5/23/2021    On statin therapy due to risk of future cardiovascular event 5/22/2021    On Atorvastatin    Rheumatoid arthritis ([facility]     Secondary hyperparathyroidism of renal origin ([facility] 5/31/2021    Stage 3b chronic kidney disease ([address] 7/24/2015    Tobacco use disorder     Vitamin D deficiency 5/31/2021    Vitamin D 25-Hydroxy (5/31/2021) = 10.5       Past Surgical History:   Procedure Laterality Date    HX LAPAROSCOPIC SUPRACERVICAL HYSTERECTOMY  11/2014    Dr. Deepika Hill, GYN    HX SALPINGO-OOPHORECTOMY  11/2014      Social History     Tobacco Use    Smoking status: Every Day     Packs/day: 0.50     Years: 20.00     Pack years: 10.00     Types: Cigarettes    Smokeless tobacco: Never   Substance Use Topics    Alcohol use: Not Currently      Social History     Tobacco Use   Smoking Status Every Day    Packs/day: 0.50    Years: 20.00    Pack years: 10.00    Types: Cigarettes   Smokeless Tobacco Never     Family History   Problem Relation Age of Onset    OSTEOARTHRITIS Maternal Grandmother     Cancer Sister     Hypertension Sister     OSTEOARTHRITIS Mother     Hypertension Mother     Diabetes Mother     Hypertension Brother     Stroke Neg Hx       Current Outpatient Medications   Medication Sig    amLODIPine (NORVASC) 10 mg tablet Take 1 Tablet by mouth daily. hydrALAZINE (APRESOLINE) 25 mg tablet Take 1 Tablet by mouth three (3) times daily for 90 days. clopidogreL (PLAVIX) 75 mg tab Take 1 Tablet by mouth daily for 90 days. aspirin delayed-release 81 mg tablet Take 1 Tablet by mouth daily for 90 days. atorvastatin (LIPITOR) 80 mg tablet Take 1 Tablet by mouth daily for 90 days. Indications: excessive fat in the blood, stroke prevention    acetaminophen (Tylenol Arthritis Pain) 650 mg TbER Take 1 Tablet by mouth every eight (8) hours. folic acid (FOLVITE) 1 mg tablet  (Patient not taking: No sig reported)    acetaminophen (TYLENOL) 325 mg tablet Take 2 Tablets by mouth every four (4) hours as needed (for fever or pain). Indications: fever, pain (Patient not taking: No sig reported)    co-enzyme Q-10 (CO Q-10) 100 mg capsule Take 1 Capsule by mouth daily. (Patient not taking: No sig reported)     No current facility-administered medications for this visit.       No Known Allergies     Review of Systems:  Constitutional: negative  Eyes: negative  Ears, Nose, Mouth, Throat, and Face: negative  Respiratory: negative  Cardiovascular: negative  Gastrointestinal: positive for abdominal pain  Genitourinary:negative  Integument/Breast: negative  Hematologic/Lymphatic: negative  Musculoskeletal:negative  Neurological: negative  Behavioral/Psychiatric: negative  Endocrine: negative  Allergic/Immunologic: negative    Objective:     Visit Vitals  BP (!) 144/100 (BP 1 Location: Right upper arm, BP Patient Position: Sitting, BP Cuff Size: Large adult)   Pulse 83   Temp 97 °F (36.1 °C) (Temporal)   Ht 5' 7\" (1.702 m)   Wt 77.6 kg (171 lb)   LMP 10/15/2014   SpO2 99%   BMI 26.78 kg/m²       Physical Exam:      General:  in no apparent distress, alert, oriented times 3, afebrile, normal vitals, and anicteric   Eyes:  conjunctivae and sclerae normal, pupils equal, round, reactive to light   Throat & Neck: no erythema or exudates noted and neck supple and symmetrical; no palpable masses   Lungs:   clear to auscultation bilaterally   Heart:  Regular rate and rhythm   Abdomen:   rounded, soft, nontender except for mild right upper quadrant tenderness, nondistended, no masses or organomegaly. No abdominal wall hernias. Extremities: extremities normal, atraumatic, no cyanosis or edema   Skin: Normal.         Imaging and Lab Review:     CBC:   Lab Results   Component Value Date/Time    WBC 5.0 05/30/2021 07:06 AM    RBC 5.46 (H) 05/30/2021 07:06 AM    HGB 12.9 06/14/2021 05:17 AM    HCT 39.3 06/14/2021 05:17 AM    PLATELET 723 84/92/0898 05:17 AM     BMP:   Lab Results   Component Value Date/Time    Glucose 98 07/07/2021 08:50 AM    Sodium 144 07/07/2021 08:50 AM    Potassium 4.1 07/07/2021 08:50 AM    Chloride 113 (H) 07/07/2021 08:50 AM    CO2 24 07/07/2021 08:50 AM    BUN 29 (H) 07/07/2021 08:50 AM    Creatinine 2.01 (H) 07/07/2021 08:50 AM    Calcium 9.2 07/07/2021 08:50 AM     CMP:  Lab Results   Component Value Date/Time    Glucose 98 07/07/2021 08:50 AM    Sodium 144 07/07/2021 08:50 AM    Potassium 4.1 07/07/2021 08:50 AM    Chloride 113 (H) 07/07/2021 08:50 AM    CO2 24 07/07/2021 08:50 AM    BUN 29 (H) 07/07/2021 08:50 AM    Creatinine 2.01 (H) 07/07/2021 08:50 AM    Calcium 9.2 07/07/2021 08:50 AM    Anion gap 7 07/07/2021 08:50 AM    BUN/Creatinine ratio 14 07/07/2021 08:50 AM    Alk.  phosphatase 126 (H) 06/25/2016 12:00 AM    Protein, total 7.1 06/25/2016 12:00 AM    Albumin 3.4 07/07/2021 08:50 AM    Globulin 4.7 (H) 03/20/2016 02:00 PM    A-G Ratio 1.2 06/25/2016 12:00 AM       No results found for this or any previous visit (from the past 24 hour(s)). images and reports reviewed    Assessment:   Teodoro Coe is a 62 y.o. female who is presenting with history of gallstone pancreatitis. I explained to the patient that the need for a cholecystectomy is to prevent another attack of pancreatitis which she does understand. I explained the indications for robotic cholecystectomy as well as the alternatives. I discussed the potential risks, including but not limited to bleeding, wound infection, trocar injuries, bile duct injury and leak, and also the possible need for conversion to open procedure. I also explained the firefly technology and how it allow us to visualize the biliary tree to avoid bile duct injury or leak. She indicates that she understands the risks, accepts and wishes to proceed. I told the patient that Zay Evangelista will call her to schedule her surgery. Plan: Will schedule for robotic cholecystectomy.      Please call me if you have any questions (cell phone: [phone])     Signed By: Sherri Mcallister MD     August 29, 2022

## 2022-08-29 NOTE — PROGRESS NOTES
Mary Gale is a 62 y.o. female (: 1960) presenting to address:    Chief Complaint   Patient presents with    New Patient     Cholelithiasis/referred by Dr. Gladys Rocha       Medication list and allergies have been reviewed with Mary Gale and updated as of today's date. I have gone over all Medical, Surgical and Social History with Mary Gale and updated/added the information accordingly.

## 2022-09-02 RX ORDER — ERGOCALCIFEROL 1.25 MG/1
50000 CAPSULE ORAL
COMMUNITY
Start: 2022-04-16

## 2022-09-02 NOTE — PERIOP NOTES
PRE-SURGICAL INSTRUCTIONS        Patient's Name:  Sasha Yadav      URBCYJHONY Date:  9/2/2022            Covid Testing Date and Time:    Surgery Date:  9/9/2022                Do NOT eat or drink anything, including candy, gum, or ice chips after midnight on 09/09, unless you have specific instructions from your surgeon or anesthesia provider to do so. You may brush your teeth before coming to the hospital.  No smoking 24 hours prior to the day of surgery. No alcohol 24 hours prior to the day of surgery. No recreational drugs for one week prior to the day of surgery. Leave all valuables, including money/purse, at home. Remove all jewelry, nail polish, acrylic nails, and makeup (including mascara); no lotions powders, deodorant, or perfume/cologne/after shave on the skin. Follow instruction for Hibiclens washes and CHG wipes from surgeon's office. Glasses/contact lenses and dentures may be worn to the hospital.  They will be removed prior to surgery. Call your doctor if symptoms of a cold or illness develop within 24-48 hours prior to your surgery. 11.  If you are having an outpatient procedure, please make arrangements for a responsible ADULT TO [address] [place] [address] and stay with you for 24 hours after your surgery. 12. ONE VISITOR in the hospital at this time for outpatient procedures. Exceptions may be made for surgical admissions, per nursing unit guidelines      Special Instructions:      Bring list of CURRENT medications. Bring any pertinent legal medical records. Follow physician instructions about stopping anticoagulants. Last dose 9/4/22      On the day of surgery, come in the main entrance of [facility]. Let the  at the desk know you are there for surgery. A staff member will come escort you to the surgical area on the second floor.     If you have any questions or concerns, please do not hesitate to call:     (Prior to the day of surgery) PAT department: [phone]   (Day of surgery) Pre-Op department:  [phone]    These surgical instructions were reviewed with [place] and Chelsie Fountain during the PAT phone call.

## 2022-09-08 ENCOUNTER — ANESTHESIA EVENT (OUTPATIENT)
Dept: SURGERY | Age: 62
End: 2022-09-08
Payer: MEDICAID

## 2022-09-08 RX ORDER — CLOPIDOGREL BISULFATE 75 MG/1
75 TABLET ORAL DAILY
COMMUNITY

## 2022-09-09 ENCOUNTER — ANESTHESIA (OUTPATIENT)
Dept: SURGERY | Age: 62
End: 2022-09-09
Payer: MEDICAID

## 2022-09-09 ENCOUNTER — HOSPITAL ENCOUNTER (OUTPATIENT)
Age: 62
Setting detail: OUTPATIENT SURGERY
Discharge: HOME | End: 2022-09-09
Attending: SURGERY | Admitting: SURGERY
Payer: MEDICAID

## 2022-09-09 VITALS
TEMPERATURE: 97.7 F | HEIGHT: 67 IN | HEART RATE: 69 BPM | WEIGHT: 172 LBS | DIASTOLIC BLOOD PRESSURE: 96 MMHG | RESPIRATION RATE: 20 BRPM | BODY MASS INDEX: 27 KG/M2 | SYSTOLIC BLOOD PRESSURE: 165 MMHG | OXYGEN SATURATION: 97 %

## 2022-09-09 LAB
AMPHET UR QL SCN: NEGATIVE
BARBITURATES UR QL SCN: NEGATIVE
BENZODIAZ UR QL: NEGATIVE
CANNABINOIDS UR QL SCN: NEGATIVE
COCAINE UR QL SCN: POSITIVE
HDSCOM,HDSCOM: ABNORMAL
METHADONE UR QL: NEGATIVE
OPIATES UR QL: NEGATIVE
PCP UR QL: NEGATIVE

## 2022-09-09 PROCEDURE — 74011000254 HC RX REV CODE- 254: Performed by: SURGERY

## 2022-09-09 PROCEDURE — 74011250636 HC RX REV CODE- 250/636: Performed by: NURSE ANESTHETIST, CERTIFIED REGISTERED

## 2022-09-09 PROCEDURE — 80307 DRUG TEST PRSMV CHEM ANLYZR: CPT

## 2022-09-09 PROCEDURE — 74011000250 HC RX REV CODE- 250: Performed by: NURSE ANESTHETIST, CERTIFIED REGISTERED

## 2022-09-09 RX ORDER — LIDOCAINE HYDROCHLORIDE 10 MG/ML
0.1 INJECTION, SOLUTION EPIDURAL; INFILTRATION; INTRACAUDAL; PERINEURAL AS NEEDED
Status: DISCONTINUED | OUTPATIENT
Start: 2022-09-09 | End: 2022-09-09 | Stop reason: HOSPADM

## 2022-09-09 RX ORDER — INDOCYANINE GREEN AND WATER 25 MG
2.5 KIT INJECTION
Status: COMPLETED | OUTPATIENT
Start: 2022-09-09 | End: 2022-09-09

## 2022-09-09 RX ORDER — SODIUM CHLORIDE, SODIUM LACTATE, POTASSIUM CHLORIDE, CALCIUM CHLORIDE 600; 310; 30; 20 MG/100ML; MG/100ML; MG/100ML; MG/100ML
50 INJECTION, SOLUTION INTRAVENOUS CONTINUOUS
Status: DISCONTINUED | OUTPATIENT
Start: 2022-09-09 | End: 2022-09-09 | Stop reason: HOSPADM

## 2022-09-09 RX ADMIN — INDOCYANINE GREEN AND WATER 2.5 MG: KIT at 08:20

## 2022-09-09 RX ADMIN — SODIUM CHLORIDE, POTASSIUM CHLORIDE, SODIUM LACTATE AND CALCIUM CHLORIDE 50 ML/HR: 600; 310; 30; 20 INJECTION, SOLUTION INTRAVENOUS at 08:26

## 2022-09-09 RX ADMIN — FAMOTIDINE 20 MG: 10 INJECTION, SOLUTION INTRAVENOUS at 08:26

## 2022-09-09 NOTE — ANESTHESIA PREPROCEDURE EVALUATION
Relevant Problems   RESPIRATORY SYSTEM   (+) History of Helicobacter pylori infection      NEUROLOGY   (+) Cerebrovascular accident (CVA) due to embolism of left middle cerebral artery (HCC)      GASTROINTESTINAL   (+) Gastroesophageal reflux disease      RENAL FAILURE   (+) Acute renal failure superimposed on stage 3 chronic kidney disease (HCC)   (+) Hypertensive heart and kidney disease without heart failure and with stage 3b chronic kidney disease (HCC)   (+) Stage 3b chronic kidney disease (HCC)      ENDOCRINE   (+) Rheumatoid arthritis ([facility]       Anesthetic History   No history of anesthetic complications            Review of Systems / Medical History  Patient summary reviewed and pertinent labs reviewed    Pulmonary          Smoker         Neuro/Psych       CVA  TIA     Cardiovascular    Hypertension                Comments: Acute lacunar stroke ([facility] 05/21/2021 Acute Lacunar Stroke (acute lacunar infarct in the posterior left lentiform nucleus extending into the corona radiata) with residual right hemiparesis   Bacterial vaginosis 01/09/2015 Dr Alfred Warner   Chronic kidney disease  Stage 4   Constipation     Current use of aspirin 05/23/2021    Gastroesophageal reflux disease 06/30/2016    Hemiparesis of right dominant side due to acute cerebrovascular disease ([facility] 05/21/2021    History of Helicobacter pylori infection 07/24/2015    History of iron deficiency anemia 06/2014    History of vitamin D deficiency 06/11/2015    Hot flashes 01/09/2015 Dr Alfred Warner   Hypertension     On clopidogrel therapy 05/23/2021         GI/Hepatic/Renal                Endo/Other        Arthritis     Other Findings              Physical Exam    Airway  Mallampati: II  TM Distance: 4 - 6 cm  Neck ROM: normal range of motion   Mouth opening: Normal     Cardiovascular    Rhythm: regular  Rate: normal         Dental    Dentition: Poor dentition     Pulmonary  Breath sounds clear to auscultation               Abdominal  GI exam deferred       Other Findings            Anesthetic Plan    ASA: 3  Anesthesia type: general          Induction: Intravenous  Anesthetic plan and risks discussed with: Patient

## 2022-09-09 NOTE — PROGRESS NOTES
Date of Surgery Update:  Juan C Gutierrez was seen and examined. History and physical has been reviewed. The patient has been examined.  There have been no significant clinical changes since the completion of the originally dated History and Physical. Will proceed with robotic cholecystectomy    Signed By: Momo Stover MD     September 9, 2022 8:17 AM

## 2022-09-12 NOTE — PROGRESS NOTES
Problem: Neurolinguistics Impaired (Adult)  Goal: *Speech Goal: (INSERT TEXT)  Description: Long term goals  Patient will:  1. Be oriented x 3 and recall events of the day, supervision. 2. Recall 3 words after 5 minutes, supervision. 3. Name 10-12 items within categories of increasing complexity, supervision. 4. Perform varied word finding tasks with 90% accuracy. 5. Perform problem solving/reasoning tasks with 90% accuracy. 6. Perform basic mathematical calculations, 90% accuracy. Short term goals (by 21)  Patient will:  1. Be oriented x 3 and recall events of the day, supervision. 2. Recall 3 words after 5 minutes, supervision. 3. Name 10-12 items within concrete categories, min assist-supervision. 4. Perform varied word finding tasks with 75-85% accuracy. 5. Perform problem solving/reasoning tasks with 80-90% accuracy. 6. Perform basic mathematical calculations, 70-80% accuracy. Outcome: Progressing Towards Goal   Speech language pathology treatment    Patient: Tamiko Pedroza (71 y.o. female)  Date: 6/10/2021  Diagnosis: Acute lacunar stroke ([facility] [I63.81] Acute lacunar stroke ([facility]      Session 1:  Time In: 4:10  Time Out:  4:40    ASSESSMENT:    SESSION 1 (9038-2362)    1)Delayed Recall: Pt unable to recall any comp strategies previously implemented. SLP re-educated pt using \"WRAP\". Given 5 min delay with min-mod redirection to apply appropriate comp strategies in order to recall 3/4 words ANAIS and increasing to 4/4 given min cues. 2) Reasonin% acc ANAIS and increasing to 93% acc given min-modA    3) Divergent: Holidays: 9/10 items ANAIS and 10/10givne Johana; Things that are Green: 8/10 AANIS and increasing to 9/10 given Johana; Movies: 2/6 ANAIS and 5/6 given min-mod cues.      Progression toward goals:  []       Improving appropriately and progressing toward goals  [x]       Improving slowly and progressing toward goals  []       Not making progress toward goals and plan of care will be Progress Note    Assessment/Plan  Continue right hip drain at the current time.  Presacral wounds are stable.  Reviewed CT scan.  Will review with radiology.  Current drain does appear to communicate with the actual right hip joint space.  Purulent drainage ongoing.  Right leg hip and buttock locations essentially no change with significant edematous findings.  Abdomen is benign at this point.  Drain dealt with by interventional radiology.  Active Problems:    Septic shock (H)      Subjective  Patient depressed the fact that she cannot go back to her previous care facility yet at this point.  Objective    Vital signs in last 24 hours  Temp:  [97.9  F (36.6  C)-98.8  F (37.1  C)] 98.2  F (36.8  C)  Pulse:  [80-96] 80  Resp:  [16-20] 16  BP: ()/(51-63) 98/55  SpO2:  [96 %-99 %] 97 %  Weight:   [unfilled]    Intake/Output last 3 shifts  I/O last 3 completed shifts:  In: 1360 [P.O.:1360]  Out: 1465 [Urine:1200; Drains:215; Stool:50]  Intake/Output this shift:  I/O this shift:  In: 240 [P.O.:240]  Out: -       Physical Exam  Abdomen benign with active bowel sounds.  Without peritoneal findings.  Drain right lower quadrant with clear urine.  Right hip draining significant purulent change.  No change in lower extremities.  No change in the presacral location of the wound coverings.    Pertinent Labs   Lab Results   Component Value Date    WBC 5.6 09/12/2022    HGB 6.5 (LL) 09/12/2022    HCT 23.0 (L) 09/11/2022    MCV 90 09/11/2022    PLT 65 (L) 09/12/2022             Pertinent Radiology     [unfilled]        Reji Hunter MD          adjusted     PLAN:  Patient continues to benefit from skilled intervention to address the above impairments. Continue treatment per established plan of care. Discharge Recommendations: To Be Determined     SUBJECTIVE:   Patient stated I have a headache today. OBJECTIVE:   Mental Status:  Neurologic State: Alert  Orientation Level: Oriented X4  Cognition: Follows commands  Perception: Appears intact  Perseveration: No perseveration noted  Safety/Judgement: Awareness of environment  Treatment & Interventions: Motor Speech:       Language Comprehension and Expression:     Verbal Expression          Neuro-Linguistics:       Language Co        Voice:        Response & Tolerance to Activities: Pt was receptive/cooperative. She attempted to completed all therapeutic tasks to the best of her ability. Pain:  Pre-Treatment:    8- Head  Post-Treatment:  8    After treatment:   []       Patient assisted to gym for next session. [x]       Patient left in no apparent distress sitting up in chair. []       Patient left in no apparent distress in bed. [x]       Call bell left within reach. []       Nursing notified. []       Caregiver present. []       Bed alarm activated.     Estimated LOS: targeted this date 6/15/21  Discharge recommendations:  MAGGIE Marmolejo MA, CCC-SLP  Speech-Language Pathologist    Time Calculation: 30 mins

## 2022-09-26 NOTE — PERIOP NOTES
PRE-SURGICAL INSTRUCTIONS        Patient's Name:  Lisa Henderson      NVSSW'M Date:  9/26/2022            Covid Testing Date and Time: vaccinated     Surgery Date:  9/30/2022                Do NOT eat or drink anything, including candy, gum, or ice chips after midnight on 9/30, unless you have specific instructions from your surgeon or anesthesia provider to do so. You may brush your teeth before coming to the hospital.  No smoking 24 hours prior to the day of surgery. No alcohol 24 hours prior to the day of surgery. No recreational drugs for one week prior to the day of surgery. Leave all valuables, including money/purse, at home. Remove all jewelry, nail polish, acrylic nails, and makeup (including mascara); no lotions powders, deodorant, or perfume/cologne/after shave on the skin. Follow instruction for Hibiclens washes and CHG wipes from surgeon's office. Glasses/contact lenses and dentures may be worn to the hospital.  They will be removed prior to surgery. Call your doctor if symptoms of a cold or illness develop within 24-48 hours prior to your surgery. 11.  If you are having an outpatient procedure, please make arrangements for a responsible ADULT TO [address] [place] [address] and stay with you for 24 hours after your surgery. 12. ONE VISITOR in the hospital at this time for outpatient procedures. Exceptions may be made for surgical admissions, per nursing unit guidelines      Special Instructions:      Bring list of CURRENT medications. Bring any pertinent legal medical records. Take these medications the morning of surgery with a sip of water:  as directed by MD  Follow physician instructions about stopping anticoagulants. On the day of surgery, come in the main entrance of [facility]. Let the  at the desk know you are there for surgery. A staff member will come escort you to the surgical area on the second floor.     If you have any questions or concerns, please do not hesitate to call:     (Prior to the day of surgery) [facility] department:  [phone]   (Day of surgery) Pre-Op department:  [phone]    These surgical instructions were reviewed with Nata Wood during the [facility] phone call.

## 2022-09-29 ENCOUNTER — ANESTHESIA EVENT (OUTPATIENT)
Dept: SURGERY | Age: 62
End: 2022-09-29
Payer: MEDICAID

## 2022-09-30 ENCOUNTER — TRANSCRIBE ORDER (OUTPATIENT)
Dept: SCHEDULING | Age: 62
End: 2022-09-30

## 2022-09-30 ENCOUNTER — HOSPITAL ENCOUNTER (OUTPATIENT)
Age: 62
Setting detail: OUTPATIENT SURGERY
Discharge: HOME | End: 2022-09-30
Attending: SURGERY | Admitting: SURGERY
Payer: MEDICAID

## 2022-09-30 ENCOUNTER — ANESTHESIA (OUTPATIENT)
Dept: SURGERY | Age: 62
End: 2022-09-30
Payer: MEDICAID

## 2022-09-30 VITALS
SYSTOLIC BLOOD PRESSURE: 131 MMHG | BODY MASS INDEX: 27.62 KG/M2 | OXYGEN SATURATION: 97 % | HEIGHT: 67 IN | WEIGHT: 176 LBS | HEART RATE: 77 BPM | RESPIRATION RATE: 16 BRPM | TEMPERATURE: 97 F | DIASTOLIC BLOOD PRESSURE: 84 MMHG

## 2022-09-30 DIAGNOSIS — N18.4 CHRONIC KIDNEY DISEASE, STAGE IV (SEVERE) (HCC): Primary | ICD-10-CM

## 2022-09-30 DIAGNOSIS — Z90.49 S/P CHOLECYSTECTOMY: Primary | ICD-10-CM

## 2022-09-30 DIAGNOSIS — G89.29 OTHER CHRONIC PAIN: ICD-10-CM

## 2022-09-30 LAB
AMPHET UR QL SCN: NEGATIVE
BARBITURATES UR QL SCN: NEGATIVE
BENZODIAZ UR QL: NEGATIVE
CANNABINOIDS UR QL SCN: NEGATIVE
COCAINE UR QL SCN: NEGATIVE
HDSCOM,HDSCOM: NORMAL
METHADONE UR QL: NEGATIVE
OPIATES UR QL: NEGATIVE
PCP UR QL: NEGATIVE
POTASSIUM SERPL-SCNC: 3.7 MMOL/L (ref 3.5–5.5)

## 2022-09-30 PROCEDURE — 00790 ANES IPER UPR ABD NOS: CPT | Performed by: NURSE ANESTHETIST, CERTIFIED REGISTERED

## 2022-09-30 PROCEDURE — 74011000250 HC RX REV CODE- 250: Performed by: NURSE ANESTHETIST, CERTIFIED REGISTERED

## 2022-09-30 PROCEDURE — 47562 LAPAROSCOPIC CHOLECYSTECTOMY: CPT | Performed by: SURGERY

## 2022-09-30 PROCEDURE — 00790 ANES IPER UPR ABD NOS: CPT | Performed by: ANESTHESIOLOGY

## 2022-09-30 PROCEDURE — 74011250636 HC RX REV CODE- 250/636: Performed by: NURSE ANESTHETIST, CERTIFIED REGISTERED

## 2022-09-30 PROCEDURE — 76210000021 HC REC RM PH II 0.5 TO 1 HR: Performed by: SURGERY

## 2022-09-30 PROCEDURE — 77030008683 HC TU ET CUF COVD -A: Performed by: ANESTHESIOLOGY

## 2022-09-30 PROCEDURE — 74011000254 HC RX REV CODE- 254: Performed by: SURGERY

## 2022-09-30 PROCEDURE — 77030035277 HC OBTRTR BLDELSS DISP INTU -B: Performed by: SURGERY

## 2022-09-30 PROCEDURE — 74011250637 HC RX REV CODE- 250/637: Performed by: NURSE ANESTHETIST, CERTIFIED REGISTERED

## 2022-09-30 PROCEDURE — 77030002933 HC SUT MCRYL J&J -A: Performed by: SURGERY

## 2022-09-30 PROCEDURE — 76010000934 HC OR TIME 1 TO 1.5HR INTENSV - TIER 2: Performed by: SURGERY

## 2022-09-30 PROCEDURE — 76060000033 HC ANESTHESIA 1 TO 1.5 HR: Performed by: SURGERY

## 2022-09-30 PROCEDURE — 77030010939 HC CLP LIG TELE -B: Performed by: SURGERY

## 2022-09-30 PROCEDURE — 2709999900 HC NON-CHARGEABLE SUPPLY: Performed by: SURGERY

## 2022-09-30 PROCEDURE — 77030008606 HC TRCR ENDOSC KII AMR -B: Performed by: SURGERY

## 2022-09-30 PROCEDURE — 80307 DRUG TEST PRSMV CHEM ANLYZR: CPT

## 2022-09-30 PROCEDURE — 77030031139 HC SUT VCRL2 J&J -A: Performed by: SURGERY

## 2022-09-30 PROCEDURE — 74011250636 HC RX REV CODE- 250/636: Performed by: SURGERY

## 2022-09-30 PROCEDURE — S2900 ROBOTIC SURGICAL SYSTEM: HCPCS | Performed by: SURGERY

## 2022-09-30 PROCEDURE — 77030010507 HC ADH SKN DERMBND J&J -B: Performed by: SURGERY

## 2022-09-30 PROCEDURE — 76210000006 HC OR PH I REC 0.5 TO 1 HR: Performed by: SURGERY

## 2022-09-30 PROCEDURE — 84132 ASSAY OF SERUM POTASSIUM: CPT

## 2022-09-30 PROCEDURE — 77030009851 HC PCH RTVR ENDOSC AMR -B: Performed by: SURGERY

## 2022-09-30 PROCEDURE — 77030026438 HC STYL ET INTUB CARD -A: Performed by: ANESTHESIOLOGY

## 2022-09-30 PROCEDURE — 77030020703 HC SEAL CANN DISP INTU -B: Performed by: SURGERY

## 2022-09-30 PROCEDURE — 88304 TISSUE EXAM BY PATHOLOGIST: CPT

## 2022-09-30 PROCEDURE — 74011000250 HC RX REV CODE- 250: Performed by: SURGERY

## 2022-09-30 RX ORDER — PHENYLEPHRINE HCL IN 0.9% NACL 1 MG/10 ML
SYRINGE (ML) INTRAVENOUS AS NEEDED
Status: DISCONTINUED | OUTPATIENT
Start: 2022-09-30 | End: 2022-09-30 | Stop reason: HOSPADM

## 2022-09-30 RX ORDER — SODIUM CHLORIDE, SODIUM LACTATE, POTASSIUM CHLORIDE, CALCIUM CHLORIDE 600; 310; 30; 20 MG/100ML; MG/100ML; MG/100ML; MG/100ML
50 INJECTION, SOLUTION INTRAVENOUS CONTINUOUS
Status: DISCONTINUED | OUTPATIENT
Start: 2022-09-30 | End: 2022-09-30 | Stop reason: HOSPADM

## 2022-09-30 RX ORDER — CEFAZOLIN SODIUM 1 G/3ML
INJECTION, POWDER, FOR SOLUTION INTRAMUSCULAR; INTRAVENOUS AS NEEDED
Status: DISCONTINUED | OUTPATIENT
Start: 2022-09-30 | End: 2022-09-30 | Stop reason: HOSPADM

## 2022-09-30 RX ORDER — GLYCOPYRROLATE 0.2 MG/ML
INJECTION INTRAMUSCULAR; INTRAVENOUS AS NEEDED
Status: DISCONTINUED | OUTPATIENT
Start: 2022-09-30 | End: 2022-09-30 | Stop reason: HOSPADM

## 2022-09-30 RX ORDER — PROPOFOL 10 MG/ML
INJECTION, EMULSION INTRAVENOUS AS NEEDED
Status: DISCONTINUED | OUTPATIENT
Start: 2022-09-30 | End: 2022-09-30 | Stop reason: HOSPADM

## 2022-09-30 RX ORDER — LIDOCAINE HYDROCHLORIDE 20 MG/ML
INJECTION, SOLUTION EPIDURAL; INFILTRATION; INTRACAUDAL; PERINEURAL AS NEEDED
Status: DISCONTINUED | OUTPATIENT
Start: 2022-09-30 | End: 2022-09-30 | Stop reason: HOSPADM

## 2022-09-30 RX ORDER — FENTANYL CITRATE 50 UG/ML
50 INJECTION, SOLUTION INTRAMUSCULAR; INTRAVENOUS AS NEEDED
Status: DISCONTINUED | OUTPATIENT
Start: 2022-09-30 | End: 2022-09-30 | Stop reason: HOSPADM

## 2022-09-30 RX ORDER — MIDAZOLAM HYDROCHLORIDE 1 MG/ML
INJECTION, SOLUTION INTRAMUSCULAR; INTRAVENOUS AS NEEDED
Status: DISCONTINUED | OUTPATIENT
Start: 2022-09-30 | End: 2022-09-30 | Stop reason: HOSPADM

## 2022-09-30 RX ORDER — HYDROMORPHONE HYDROCHLORIDE 1 MG/ML
0.5 INJECTION, SOLUTION INTRAMUSCULAR; INTRAVENOUS; SUBCUTANEOUS
Status: DISCONTINUED | OUTPATIENT
Start: 2022-09-30 | End: 2022-09-30 | Stop reason: HOSPADM

## 2022-09-30 RX ORDER — WATER FOR INJECTION,STERILE
VIAL (ML) INJECTION AS NEEDED
Status: DISCONTINUED | OUTPATIENT
Start: 2022-09-30 | End: 2022-09-30 | Stop reason: HOSPADM

## 2022-09-30 RX ORDER — LABETALOL HYDROCHLORIDE 5 MG/ML
INJECTION, SOLUTION INTRAVENOUS AS NEEDED
Status: DISCONTINUED | OUTPATIENT
Start: 2022-09-30 | End: 2022-09-30 | Stop reason: HOSPADM

## 2022-09-30 RX ORDER — INSULIN LISPRO 100 [IU]/ML
INJECTION, SOLUTION INTRAVENOUS; SUBCUTANEOUS ONCE
Status: DISCONTINUED | OUTPATIENT
Start: 2022-09-30 | End: 2022-09-30 | Stop reason: HOSPADM

## 2022-09-30 RX ORDER — SUCCINYLCHOLINE CHLORIDE 20 MG/ML
INJECTION INTRAMUSCULAR; INTRAVENOUS AS NEEDED
Status: DISCONTINUED | OUTPATIENT
Start: 2022-09-30 | End: 2022-09-30 | Stop reason: HOSPADM

## 2022-09-30 RX ORDER — ROCURONIUM BROMIDE 10 MG/ML
INJECTION, SOLUTION INTRAVENOUS AS NEEDED
Status: DISCONTINUED | OUTPATIENT
Start: 2022-09-30 | End: 2022-09-30 | Stop reason: HOSPADM

## 2022-09-30 RX ORDER — ONDANSETRON 2 MG/ML
4 INJECTION INTRAMUSCULAR; INTRAVENOUS ONCE
Status: DISCONTINUED | OUTPATIENT
Start: 2022-09-30 | End: 2022-09-30 | Stop reason: HOSPADM

## 2022-09-30 RX ORDER — FENTANYL CITRATE 50 UG/ML
INJECTION, SOLUTION INTRAMUSCULAR; INTRAVENOUS AS NEEDED
Status: DISCONTINUED | OUTPATIENT
Start: 2022-09-30 | End: 2022-09-30 | Stop reason: HOSPADM

## 2022-09-30 RX ORDER — INDOCYANINE GREEN AND WATER 25 MG
2.5 KIT INJECTION
Status: COMPLETED | OUTPATIENT
Start: 2022-09-30 | End: 2022-09-30

## 2022-09-30 RX ORDER — OXYCODONE AND ACETAMINOPHEN 5; 325 MG/1; MG/1
1 TABLET ORAL
Qty: 24 TABLET | Refills: 0 | Status: SHIPPED | OUTPATIENT
Start: 2022-09-30 | End: 2022-10-03

## 2022-09-30 RX ORDER — FAMOTIDINE 20 MG/1
20 TABLET, FILM COATED ORAL ONCE
Status: COMPLETED | OUTPATIENT
Start: 2022-09-30 | End: 2022-09-30

## 2022-09-30 RX ORDER — OXYCODONE AND ACETAMINOPHEN 5; 325 MG/1; MG/1
1 TABLET ORAL
Status: DISCONTINUED | OUTPATIENT
Start: 2022-09-30 | End: 2022-09-30 | Stop reason: HOSPADM

## 2022-09-30 RX ORDER — ONDANSETRON 2 MG/ML
INJECTION INTRAMUSCULAR; INTRAVENOUS AS NEEDED
Status: DISCONTINUED | OUTPATIENT
Start: 2022-09-30 | End: 2022-09-30 | Stop reason: HOSPADM

## 2022-09-30 RX ORDER — DEXAMETHASONE SODIUM PHOSPHATE 4 MG/ML
INJECTION, SOLUTION INTRA-ARTICULAR; INTRALESIONAL; INTRAMUSCULAR; INTRAVENOUS; SOFT TISSUE AS NEEDED
Status: DISCONTINUED | OUTPATIENT
Start: 2022-09-30 | End: 2022-09-30 | Stop reason: HOSPADM

## 2022-09-30 RX ADMIN — DEXAMETHASONE SODIUM PHOSPHATE 4 MG: 4 INJECTION, SOLUTION INTRAMUSCULAR; INTRAVENOUS at 10:18

## 2022-09-30 RX ADMIN — LABETALOL HYDROCHLORIDE 5 MG: 5 INJECTION, SOLUTION INTRAVENOUS at 10:05

## 2022-09-30 RX ADMIN — Medication 200 MCG: at 10:12

## 2022-09-30 RX ADMIN — CEFAZOLIN 2 G: 1 INJECTION, POWDER, FOR SOLUTION INTRAMUSCULAR; INTRAVENOUS at 10:12

## 2022-09-30 RX ADMIN — FENTANYL CITRATE 25 MCG: 50 INJECTION, SOLUTION INTRAMUSCULAR; INTRAVENOUS at 10:17

## 2022-09-30 RX ADMIN — SUGAMMADEX 200 MG: 100 INJECTION, SOLUTION INTRAVENOUS at 10:42

## 2022-09-30 RX ADMIN — LIDOCAINE HYDROCHLORIDE 60 MG: 20 INJECTION, SOLUTION EPIDURAL; INFILTRATION; INTRACAUDAL; PERINEURAL at 10:07

## 2022-09-30 RX ADMIN — SODIUM CHLORIDE, POTASSIUM CHLORIDE, SODIUM LACTATE AND CALCIUM CHLORIDE 50 ML/HR: 600; 310; 30; 20 INJECTION, SOLUTION INTRAVENOUS at 08:42

## 2022-09-30 RX ADMIN — ROCURONIUM BROMIDE 15 MG: 50 INJECTION INTRAVENOUS at 10:10

## 2022-09-30 RX ADMIN — WATER 20 ML: 1 INJECTION INTRAMUSCULAR; INTRAVENOUS; SUBCUTANEOUS at 10:12

## 2022-09-30 RX ADMIN — LABETALOL HYDROCHLORIDE 5 MG: 5 INJECTION, SOLUTION INTRAVENOUS at 10:04

## 2022-09-30 RX ADMIN — PROPOFOL 50 MG: 10 INJECTION, EMULSION INTRAVENOUS at 10:20

## 2022-09-30 RX ADMIN — FAMOTIDINE 20 MG: 20 TABLET ORAL at 08:42

## 2022-09-30 RX ADMIN — FENTANYL CITRATE 25 MCG: 50 INJECTION, SOLUTION INTRAMUSCULAR; INTRAVENOUS at 10:20

## 2022-09-30 RX ADMIN — FENTANYL CITRATE 50 MCG: 50 INJECTION, SOLUTION INTRAMUSCULAR; INTRAVENOUS at 10:07

## 2022-09-30 RX ADMIN — GLYCOPYRROLATE 0.1 MG: 0.2 INJECTION INTRAMUSCULAR; INTRAVENOUS at 09:56

## 2022-09-30 RX ADMIN — ONDANSETRON 4 MG: 2 INJECTION INTRAMUSCULAR; INTRAVENOUS at 10:18

## 2022-09-30 RX ADMIN — MIDAZOLAM HYDROCHLORIDE 2 MG: 2 INJECTION, SOLUTION INTRAMUSCULAR; INTRAVENOUS at 09:57

## 2022-09-30 RX ADMIN — INDOCYANINE GREEN AND WATER 2.5 MG: KIT at 08:55

## 2022-09-30 RX ADMIN — SUCCINYLCHOLINE CHLORIDE 100 MG: 20 INJECTION, SOLUTION INTRAMUSCULAR; INTRAVENOUS at 10:07

## 2022-09-30 RX ADMIN — DEXAMETHASONE SODIUM PHOSPHATE: 10 INJECTION INTRAMUSCULAR; INTRAVENOUS at 10:18

## 2022-09-30 RX ADMIN — GLYCOPYRROLATE 0.1 MG: 0.2 INJECTION INTRAMUSCULAR; INTRAVENOUS at 09:57

## 2022-09-30 RX ADMIN — PROPOFOL 150 MG: 10 INJECTION, EMULSION INTRAVENOUS at 10:07

## 2022-09-30 RX ADMIN — Medication 100 MCG: at 10:13

## 2022-09-30 RX ADMIN — ROCURONIUM BROMIDE 5 MG: 50 INJECTION INTRAVENOUS at 10:07

## 2022-09-30 NOTE — H&P
General Surgery Consult        Coby Young  Admit date: (Not on file)    MRN: [id number]     : 1960     Age: 62 y.o. Attending Physician: Lynn Guy MD, FACS        Subjective: Latasha Chowdary is a 62 y.o. female who was referred to me for evaluation of gallstone pancreatitis. The patient had attack of pancreatitis and she had an ultrasound that showed cholelithiasis and she also had an MRCP that showed pancreatitis with no evidence of choledocholithiasis. She was told that she needs her gallbladder removed to prevent another attack and she is here today to discuss the surgery. The patient currently stated that she feels great however she is concerned of having another attack because it was awful according to her. She has some minimal abdominal discomfort mainly in the right upper quadrant according to her now but no nausea or vomiting. The patient  has not had jaundice, acholic stools or dark urine.            Patient Active Problem List     Diagnosis Date Noted    Cerebrovascular accident (CVA) due to embolism of left middle cerebral artery ([address] 2022    Acute pancreatitis without infection or necrosis 2022    Acute renal failure superimposed on stage 3 chronic kidney disease ([address] 2021    Secondary hyperparathyroidism of renal origin ([address] 2021    Vitamin D deficiency 2021    High serum magnesium 2021    Tobacco use disorder      Hypertensive heart and kidney disease without heart failure and with stage 3b chronic kidney disease ([address]      Mixed hyperlipidemia      Constipation      Current use of aspirin 2021    On clopidogrel therapy 2021    On statin therapy due to risk of future cardiovascular event 2021    Acute lacunar stroke ([address] 2021    Impaired mobility and ADLs 2021    Hemiparesis of right dominant side due to acute cerebrovascular disease ([address] 2021    Gastroesophageal reflux disease 2016 Abscess of finger 03/20/2016    Cocaine use 07/24/2015    Stage 3b chronic kidney disease ([facility] 07/24/2015    History of Helicobacter pylori infection 07/24/2015    History of vitamin D deficiency 06/11/2015    History of iron deficiency anemia 06/2014    Rheumatoid arthritis ([facility] 01/14/2013           Past Medical History:   Diagnosis Date    Abnormal mammogram 2014     left with biopsy    Acute [facility] stroke ([facility] 5/21/2021     Acute Lacunar Stroke (acute lacunar infarct in the posterior left lentiform nucleus extending into the corona radiata) with residual right hemiparesis    Bacterial vaginosis 1/9/15     Dr Jacquie Hughes    Constipation      Current use of aspirin 5/23/2021    Gastroesophageal reflux disease 6/30/2016    Hemiparesis of right dominant side due to acute cerebrovascular disease ([facility] 5/21/2021    History of Helicobacter pylori infection 7/24/2015    History of iron deficiency anemia 06/2014    History of vitamin D deficiency 6/11/2015    Hot flashes 1/9/15     Dr Jacquie Hughes    On clopidogrel therapy 5/23/2021    On statin therapy due to risk of future cardiovascular event 5/22/2021     On Atorvastatin    Rheumatoid arthritis ([facility]      Secondary hyperparathyroidism of renal origin ([facility] 5/31/2021    Stage 3b chronic kidney disease ([facility] 7/24/2015    Tobacco use disorder      Vitamin D deficiency 5/31/2021     Vitamin D 25-Hydroxy (5/31/2021) = 10.5             Past Surgical History:   Procedure Laterality Date    HX LAPAROSCOPIC SUPRACERVICAL HYSTERECTOMY   11/2014     Dr. Jeet Le, GYN    HX SALPINGO-OOPHORECTOMY   11/2014      Social History            Tobacco Use    Smoking status: Every Day       Packs/day: 0.50       Years: 20.00       Pack years: 10.00       Types: Cigarettes    Smokeless tobacco: Never   Substance Use Topics    Alcohol use: Not Currently      Social History           Tobacco Use   Smoking Status Every Day    Packs/day: 0.50    Years: 20.00    Pack years: 10.00    Types: Cigarettes Smokeless Tobacco Never            Family History   Problem Relation Age of Onset    OSTEOARTHRITIS Maternal Grandmother      Cancer Sister      Hypertension Sister      OSTEOARTHRITIS Mother      Hypertension Mother      Diabetes Mother      Hypertension Brother      Stroke Neg Hx             Current Outpatient Medications   Medication Sig    amLODIPine (NORVASC) 10 mg tablet Take 1 Tablet by mouth daily. hydrALAZINE (APRESOLINE) 25 mg tablet Take 1 Tablet by mouth three (3) times daily for 90 days. clopidogreL (PLAVIX) 75 mg tab Take 1 Tablet by mouth daily for 90 days. aspirin delayed-release 81 mg tablet Take 1 Tablet by mouth daily for 90 days. atorvastatin (LIPITOR) 80 mg tablet Take 1 Tablet by mouth daily for 90 days. Indications: excessive fat in the blood, stroke prevention    acetaminophen (Tylenol Arthritis Pain) 650 mg TbER Take 1 Tablet by mouth every eight (8) hours. folic acid (FOLVITE) 1 mg tablet  (Patient not taking: No sig reported)    acetaminophen (TYLENOL) 325 mg tablet Take 2 Tablets by mouth every four (4) hours as needed (for fever or pain). Indications: fever, pain (Patient not taking: No sig reported)    co-enzyme Q-10 (CO Q-10) 100 mg capsule Take 1 Capsule by mouth daily. (Patient not taking: No sig reported)      No current facility-administered medications for this visit.       No Known Allergies      Review of Systems:  Constitutional: negative  Eyes: negative  Ears, Nose, Mouth, Throat, and Face: negative  Respiratory: negative  Cardiovascular: negative  Gastrointestinal: positive for abdominal pain  Genitourinary:negative  Integument/Breast: negative  Hematologic/Lymphatic: negative  Musculoskeletal:negative  Neurological: negative  Behavioral/Psychiatric: negative  Endocrine: negative  Allergic/Immunologic: negative     Objective:      Visit Vitals  BP (!) 144/100 (BP 1 Location: Right upper arm, BP Patient Position: Sitting, BP Cuff Size: Large adult)   Pulse 83 Temp 97 °F (36.1 °C) (Temporal)   Ht 5' 7\" (1.702 m)   Wt 77.6 kg (171 lb)   LMP 10/15/2014   SpO2 99%   BMI 26.78 kg/m²         Physical Exam:       General:  in no apparent distress, alert, oriented times 3, afebrile, normal vitals, and anicteric   Eyes:  conjunctivae and sclerae normal, pupils equal, round, reactive to light   Throat & Neck: no erythema or exudates noted and neck supple and symmetrical; no palpable masses   Lungs:   clear to auscultation bilaterally   Heart:  Regular rate and rhythm   Abdomen:   rounded, soft, nontender except for mild right upper quadrant tenderness, nondistended, no masses or organomegaly. No abdominal wall hernias. Extremities: extremities normal, atraumatic, no cyanosis or edema   Skin: Normal.          Imaging and Lab Review:      CBC:         Lab Results   Component Value Date/Time     WBC 5.0 05/30/2021 07:06 AM     RBC 5.46 (H) 05/30/2021 07:06 AM     HGB 12.9 06/14/2021 05:17 AM     HCT 39.3 06/14/2021 05:17 AM     PLATELET 289 50/75/5088 05:17 AM      BMP:         Lab Results   Component Value Date/Time     Glucose 98 07/07/2021 08:50 AM     Sodium 144 07/07/2021 08:50 AM     Potassium 4.1 07/07/2021 08:50 AM     Chloride 113 (H) 07/07/2021 08:50 AM     CO2 24 07/07/2021 08:50 AM     BUN 29 (H) 07/07/2021 08:50 AM     Creatinine 2.01 (H) 07/07/2021 08:50 AM     Calcium 9.2 07/07/2021 08:50 AM      CMP:        Lab Results   Component Value Date/Time     Glucose 98 07/07/2021 08:50 AM     Sodium 144 07/07/2021 08:50 AM     Potassium 4.1 07/07/2021 08:50 AM     Chloride 113 (H) 07/07/2021 08:50 AM     CO2 24 07/07/2021 08:50 AM     BUN 29 (H) 07/07/2021 08:50 AM     Creatinine 2.01 (H) 07/07/2021 08:50 AM     Calcium 9.2 07/07/2021 08:50 AM     Anion gap 7 07/07/2021 08:50 AM     BUN/Creatinine ratio 14 07/07/2021 08:50 AM     Alk.  phosphatase 126 (H) 06/25/2016 12:00 AM     Protein, total 7.1 06/25/2016 12:00 AM     Albumin 3.4 07/07/2021 08:50 AM     Globulin 4.7 (H) 03/20/2016 02:00 PM     A-G Ratio 1.2 06/25/2016 12:00 AM         Recent Results   No results found for this or any previous visit (from the past 24 hour(s)). images and reports reviewed     Assessment:   Gurmeet Dumont is a 62 y.o. female who is presenting with history of gallstone pancreatitis. I explained to the patient that the need for a cholecystectomy is to prevent another attack of pancreatitis which she does understand. I explained the indications for robotic cholecystectomy as well as the alternatives. I discussed the potential risks, including but not limited to bleeding, wound infection, trocar injuries, bile duct injury and leak, and also the possible need for conversion to open procedure. I also explained the firefly technology and how it allow us to visualize the biliary tree to avoid bile duct injury or leak. She indicates that she understands the risks, accepts and wishes to proceed. I told the patient that Nydia Hughes will call her to schedule her surgery. Plan: Will schedule for robotic cholecystectomy.

## 2022-09-30 NOTE — ANESTHESIA POSTPROCEDURE EVALUATION
Procedure(s):  ROBOTIC ASSISTED CHOLECYSTECTOMY WITH FIREFLY. general    Anesthesia Post Evaluation      Multimodal analgesia: multimodal analgesia used between 6 hours prior to anesthesia start to PACU discharge  Patient location during evaluation: bedside  Patient participation: complete - patient participated  Level of consciousness: lethargic  Pain score: 0  Pain management: adequate  Airway patency: patent  Anesthetic complications: no  Cardiovascular status: stable  Respiratory status: acceptable  Hydration status: acceptable  Post anesthesia nausea and vomiting:  none  Final Post Anesthesia Temperature Assessment:  Normothermia (36.0-37.5 degrees C)      INITIAL Post-op Vital signs:   Vitals Value Taken Time   /86 09/30/22 1149   Temp 36.5 °C (97.7 °F) 09/30/22 1059   Pulse 79 09/30/22 1156   Resp 16 09/30/22 1156   SpO2 96 % 09/30/22 1156   Vitals shown include unvalidated device data.

## 2022-09-30 NOTE — ANESTHESIA PREPROCEDURE EVALUATION
Relevant Problems   RESPIRATORY SYSTEM   (+) History of Helicobacter pylori infection      NEUROLOGY   (+) Cerebrovascular accident (CVA) due to embolism of left middle cerebral artery (HCC)      GASTROINTESTINAL   (+) Gastroesophageal reflux disease      RENAL FAILURE   (+) Acute renal failure superimposed on stage 3 chronic kidney disease (HCC)   (+) Hypertensive heart and kidney disease without heart failure and with stage 3b chronic kidney disease (HCC)   (+) Stage 3b chronic kidney disease (HCC)      ENDOCRINE   (+) Rheumatoid arthritis ([facility]       Anesthetic History               Review of Systems / Medical History  Patient summary reviewed and pertinent labs reviewed    Pulmonary                   Neuro/Psych       CVA  TIA     Cardiovascular    Hypertension: well controlled              Exercise tolerance: >4 METS     GI/Hepatic/Renal                Endo/Other        Arthritis     Other Findings              Physical Exam    Airway  Mallampati: III  TM Distance: 4 - 6 cm  Neck ROM: decreased range of motion   Mouth opening: Normal     Cardiovascular    Rhythm: regular  Rate: normal         Dental    Dentition: Poor dentition     Pulmonary  Breath sounds clear to auscultation               Abdominal  GI exam deferred       Other Findings            Anesthetic Plan    ASA: 3  Anesthesia type: general          Induction: Intravenous  Anesthetic plan and risks discussed with: Patient

## 2022-09-30 NOTE — BRIEF OP NOTE
Brief Postoperative Note    Patient: Neelam Guthrie  YOB: 1960  MRN: [id number]    Date of Procedure: 9/30/2022     Pre-Op Diagnosis: Gallstone pancreatitis [K85.10]    Post-Op Diagnosis: Cholecystitis     Procedure(s):  ROBOTIC ASSISTED CHOLECYSTECTOMY WITH FIREFLY    Surgeon(s):  Anand Naranjo MD    Surgical Assistant: Surg Asst-1: Tyrese Jerome    Anesthesia: General     Estimated Blood Loss (mL): Minimal    Complications: None    Specimens:   ID Type Source Tests Collected by Time Destination   1 : GALLBLADDER Preservative   Anand Naranjo MD 9/30/2022 1035 Pathology        Implants: * No implants in log *    Drains: * No LDAs found *    Findings: As above.      Electronically Signed by Tonio Castle MD on 9/30/2022 at 10:51 AM

## 2022-09-30 NOTE — DISCHARGE INSTRUCTIONS
Discharge Instructions Following Surgery    Patient: Darron Pierce MRN: [id number]  SSN: [id number]    YOB: 1960  Age: 62 y.o. Sex: female      Activity  As tolerated, walking encourage, stairs are okay. Avoid strenuous activities - no lifting anything heavier than 15 pounds till seen in the clinic. You may shower at home after 24 hours. Diet  Regular diet after nausea from the anesthetic has passed. Pain  Take pain medication as directed by your doctor. Call your doctor if pain is NOT relieved by medication. Wound and Dressing Care  There is glue on the wounds. No need for any dressing care. Apply ice packs to the area of the surgery for the first 1 to 2 days  Apply warm compresses after 2 days for pain relieve if needed    After Anesthesia  For the first 24 hours: DO NOT Drive, Drink alcoholic beverages, or Make important decisions. Be aware of dizziness following anesthesia and while taking pain medication. Call your doctor if  Excessive bleeding that does not stop after holding mild pressure over the area. Temperature of 101 degrees F or above. Redness,excessive swelling or bruising, and/or green or yellow, smelly discharge from incision. If nausea and vomiting continues. Appointment date/time Follow-Up Phone Calls    Call the office at [phone] to make your follow-up appointment in 2 weeks after the surgery (if not already set up) . Dr. Juno Cisneros cell phone number is [phone]. Please call me if you have any concerns or questions. DISCHARGE SUMMARY from Nurse    PATIENT INSTRUCTIONS:    After general anesthesia or intravenous sedation, for 24 hours or while taking prescription Narcotics:  Limit your activities  Do not drive and operate hazardous machinery  Do not make important personal or business decisions  Do  not drink alcoholic beverages  If you have not urinated within 8 hours after discharge, please contact your surgeon on call.     Report the following to your surgeon:  Excessive pain, swelling, redness or odor of or around the surgical area  Temperature over 100.5  Nausea and vomiting lasting longer than 4 hours or if unable to take medications  Any signs of decreased circulation or nerve impairment to extremity: change in color, persistent  numbness, tingling, coldness or increase pain  Any questions      These are general instructions for a healthy lifestyle:    No smoking/ No tobacco products/ Avoid exposure to second hand smoke  Surgeon General's Warning:  Quitting smoking now greatly reduces serious risk to your health. Obesity, smoking, and sedentary lifestyle greatly increases your risk for illness    A healthy diet, regular physical exercise & weight monitoring are important for maintaining a healthy lifestyle    You may be retaining fluid if you have a history of heart failure or if you experience any of the following symptoms:  Weight gain of 3 pounds or more overnight or 5 pounds in a week, increased swelling in our hands or feet or shortness of breath while lying flat in bed. Please call your doctor as soon as you notice any of these symptoms; do not wait until your next office visit. The discharge information has been reviewed with the patient and daughter. The patient and daughter verbalized understanding. Discharge medications reviewed with the patient and daughter and appropriate educational materials and side effects teaching were provided.   ___________________________________________________________________________________________________________________________________    Patient armband removed and shredded

## 2022-09-30 NOTE — PROGRESS NOTES
Date of Surgery Update:  Petrona Rubin was seen and examined. History and physical has been reviewed. The patient has been examined. There have been no significant clinical changes since the completion of the originally dated History and Physical. Will proceed with robotic cholecystectomy.     Signed By: Selam Vicente MD     September 30, 2022 8:16 AM

## 2022-10-01 NOTE — OP NOTES
[address] [place] [address]   OPERATIVE REPORT    Name:  Lavern Love  MR#:   [id number]  :  1960  ACCOUNT #:  [de-identified]  DATE OF SERVICE:  2022    PREOPERATIVE DIAGNOSIS:  Gallstone pancreatitis. POSTOPERATIVE DIAGNOSES:  Cholelithiasis, chronic cholecystitis, and history of gallstone pancreatitis. PROCEDURE PERFORMED:  Robotic cholecystectomy. SURGEON:  Maxim Laura. Albert Pittman MD.    ASSISTANTRegenna Yao CSA. ANESTHESIA:  General.    COMPLICATIONS:  None. SPECIMENS REMOVED:  Gallbladder. IMPLANTS:  None. ESTIMATED BLOOD LOSS:  Minimal.    DETAILS OF PROCEDURE:  The patient was brought to the operating room. Anesthesia was induced. Scrubbing and draping of the abdomen were done in the usual manner. A time-out was performed. A skin incision in the supraumbilical area was performed. Veress needle was inserted. Saline drop test was performed. Abdomen was insufflated. A 12 mm port was inserted. Abdomen was explored. There was no injury from the Veress needle or the port placement. Under direct visualization, two other 8 mm ports were placed on the right side and one on the left side and the robot was docked after placing the patient in reverse Trendelenburg position and doing TAP block. The gallbladder was identified. There was adhesion between the omentum and the gallbladder. The gallbladder was retracted superiorly and the adhesions were taken down using blunt and sharp dissection as well as cautery with a hook. We dissected all the way to the cystic duct and cystic artery. We dissected the cystic duct and cystic artery using the hook cautery. We identified the Calot triangle and the critical view was seen by seeing the cystic duct going into the gallbladder. Also, I used Firefly to identify the junction of the cystic duct with the common bile duct as well as the junction of the cystic duct with the gallbladder.   There was a very large stone at the neck of the gallbladder. At this point, the cystic duct was dissected completely. Three clips were placed on the staying side and one clip towards the specimen side and the cystic duct was divided with scissors. The cystic artery was clipped proximally and cauterized with hook cautery distally. The gallbladder was taken out using electrocautery from the liver bed. Hemostasis was secured. An Endo Catch was inserted. The gallbladder was placed inside the Endo Catch and taken through the 12 mm incision. I did scrub back in and I closed the defect of the 12 mm incision with a figure-of-eight #0 Vicryl suture. Then, we re-insufflated the abdomen and exploration revealed no bowel entrapment at the closure site and it revealed good hemostasis. At this point, all the instruments were taken out. The abdomen was desufflated. The skin incisions were closed with 4-0 Monocryl and glue.       Marylene Sage, MD      [id number]  D:  09/30/2022 10:50  T:  09/30/2022 20:01  JOB #:  [id number]

## 2022-10-11 ENCOUNTER — OFFICE VISIT (OUTPATIENT)
Dept: SURGERY | Age: 62
End: 2022-10-11
Payer: MEDICAID

## 2022-10-11 VITALS
TEMPERATURE: 98 F | SYSTOLIC BLOOD PRESSURE: 145 MMHG | HEART RATE: 78 BPM | BODY MASS INDEX: 27.62 KG/M2 | WEIGHT: 176 LBS | DIASTOLIC BLOOD PRESSURE: 86 MMHG | OXYGEN SATURATION: 100 % | HEIGHT: 67 IN

## 2022-10-11 DIAGNOSIS — Z90.49 S/P CHOLECYSTECTOMY: Primary | ICD-10-CM

## 2022-10-11 PROCEDURE — 99024 POSTOP FOLLOW-UP VISIT: CPT | Performed by: SURGERY

## 2022-10-11 NOTE — PROGRESS NOTES
Patient seen and examined. She is doing great. Her abdomen is soft and nontender and her wounds are healing well. Her pathology showed marked chronic cholecystitis. Follow-up as needed.

## 2022-10-11 NOTE — PROGRESS NOTES
Jv Llamas is a 62 y.o. female (: 1960) presenting to address:    Chief Complaint   Patient presents with    Surgical Follow-up     Cholecystectomy 22       Medication list and allergies have been reviewed with Jv Llamas and updated as of today's date. I have gone over all Medical, Surgical and Social History with Jv Llamas and updated/added the information accordingly. 1. Have you been to the ER, Urgent Care or Hospitalized since your last visit? NO      2. Have you followed up with your PCP or any other Physicians since your procedure/ last office visit?    NO

## 2022-10-12 ENCOUNTER — CLINICAL SUPPORT (OUTPATIENT)
Dept: SURGERY | Age: 62
End: 2022-10-12

## 2022-10-12 VITALS
RESPIRATION RATE: 17 BRPM | BODY MASS INDEX: 27.78 KG/M2 | WEIGHT: 177 LBS | OXYGEN SATURATION: 100 % | HEART RATE: 75 BPM | TEMPERATURE: 98 F | HEIGHT: 67 IN

## 2022-10-12 DIAGNOSIS — Z12.11 COLON CANCER SCREENING: Primary | ICD-10-CM

## 2022-10-12 DIAGNOSIS — Z01.818 PRE-OP TESTING: ICD-10-CM

## 2022-10-12 NOTE — PROGRESS NOTES
Colon Screen    Patient: Sammi Ace MRN: [id number]  SSN: [id number]    YOB: 1960  Age: 62 y.o. Sex: female        Subjective: Sammi Ace was referred by Dr. Madeleine Simon. Patient referred for colonoscopy for   Screening colonoscopy. Patient denies rectal pain or bleeding. Abdominal surgeries as described below, specifically cholecystectomy and hysterectomy. Family history as described below, specifically none. Patient has never had a colonoscopy.     No Known Allergies    Past Medical History:   Diagnosis Date    Abnormal mammogram 2014    left with biopsy    Acute lacunar stroke ([facility] 05/21/2021    Acute Lacunar Stroke (acute lacunar infarct in the posterior left lentiform nucleus extending into the corona radiata) with residual right hemiparesis    Bacterial vaginosis 01/09/2015    Dr Sunshine Lo    Chronic kidney disease     Stage 4, not on dialysis    Constipation     Current use of aspirin 05/23/2021    Gastroesophageal reflux disease 06/30/2016    Hemiparesis of right dominant side due to acute cerebrovascular disease ([address] 05/21/2021    History of Helicobacter pylori infection 07/24/2015    History of iron deficiency anemia 06/2014    History of vitamin D deficiency 06/11/2015    Hot flashes 01/09/2015    Dr Sunshine Lo    Hypertension     On clopidogrel therapy 05/23/2021    On statin therapy due to risk of future cardiovascular event 05/22/2021    On Atorvastatin    Pancreatitis due to common bile duct stone 2021    Rheumatoid arthritis ([address]     Secondary hyperparathyroidism of renal origin ([facility] 05/31/2021    Stage 3b chronic kidney disease ([facility] 07/24/2015    Stroke ([facility]     Lacunar 5/2021 , Rt. sided weakness    Tobacco use disorder     Vitamin D deficiency 05/31/2021    Vitamin D 25-Hydroxy (5/31/2021) = 10.5      Past Surgical History:   Procedure Laterality Date    HX CHOLECYSTECTOMY  09/30/2022    Robotic cholecystectomy    HX LAPAROSCOPIC SUPRACERVICAL HYSTERECTOMY  11/01/2014    Dr. Genie Cazares, GYN    HX SALPINGO-OOPHORECTOMY  11/01/2014      Family History   Problem Relation Age of Onset    OSTEOARTHRITIS Maternal Grandmother     Cancer Sister     Hypertension Sister     OSTEOARTHRITIS Mother     Hypertension Mother     Diabetes Mother     Hypertension Brother     Stroke Neg Hx      Social History     Tobacco Use    Smoking status: Every Day     Packs/day: 0.25     Years: 20.00     Pack years: 5.00     Types: Cigarettes    Smokeless tobacco: Never   Substance Use Topics    Alcohol use: Not Currently      Prior to Admission medications    Medication Sig Start Date End Date Taking? Authorizing Provider   clopidogreL (PLAVIX) 75 mg tab Take 75 mg by mouth daily. Yes Provider, Historical   ergocalciferol (ERGOCALCIFEROL) 1,250 mcg (50,000 unit) capsule Take 50,000 Units by mouth every seven (7) days. 4/16/22  Yes Provider, Historical   amLODIPine (NORVASC) 10 mg tablet Take 1 Tablet by mouth daily. 6/7/22  Yes Jo Shetty MD   acetaminophen (Tylenol Arthritis Pain) 650 mg TbER Take 1 Tablet by mouth every eight (8) hours. Patient taking differently: Take 650 mg by mouth every eight (8) hours as needed. 11/17/21  Yes Lin Castillo, NP          Review of Systems:  Review of Systems   Constitutional: Negative. HENT: Negative. Eyes: Negative. Respiratory: Negative. Cardiovascular: Negative. Gastrointestinal: Negative. Genitourinary: Negative. Musculoskeletal: Negative. Skin: Negative. Neurological: Negative. Endo/Heme/Allergies: Negative. Psychiatric/Behavioral: Negative. Risks colonoscopy described- colon injury, missed lesion, anesthesia problems, bleeding       Jyoti Hauser, LLOYDN  October 12, 3636  9:15 AM

## 2022-11-02 ENCOUNTER — HOSPITAL ENCOUNTER (OUTPATIENT)
Dept: VASCULAR SURGERY | Age: 62
Discharge: HOME | End: 2022-11-02
Attending: STUDENT IN AN ORGANIZED HEALTH CARE EDUCATION/TRAINING PROGRAM
Payer: MEDICAID

## 2022-11-02 ENCOUNTER — HOSPITAL ENCOUNTER (OUTPATIENT)
Dept: MAMMOGRAPHY | Age: 62
Discharge: HOME | End: 2022-11-02
Attending: STUDENT IN AN ORGANIZED HEALTH CARE EDUCATION/TRAINING PROGRAM
Payer: MEDICAID

## 2022-11-02 ENCOUNTER — HOSPITAL ENCOUNTER (OUTPATIENT)
Dept: ULTRASOUND IMAGING | Age: 62
Discharge: HOME | End: 2022-11-02
Attending: STUDENT IN AN ORGANIZED HEALTH CARE EDUCATION/TRAINING PROGRAM
Payer: MEDICAID

## 2022-11-02 DIAGNOSIS — N18.4 CHRONIC KIDNEY DISEASE, STAGE IV (SEVERE) (HCC): ICD-10-CM

## 2022-11-02 DIAGNOSIS — Z12.31 ENCOUNTER FOR SCREENING MAMMOGRAM FOR MALIGNANT NEOPLASM OF BREAST: ICD-10-CM

## 2022-11-02 LAB
ABDOMINAL PROX AORTA VEL: 54.5 CM/S
LEFT INTERLOBAR EDV: 9.7 CM/S
LEFT INTERLOBAR PSV: 28.2 CM/S
LEFT INTERLOBAR RI: 0.7
LEFT KIDNEY LENGTH: 8.87 CM
LEFT KIDNEY WIDTH: 5.39 CM
LEFT RENAL DIST DIAS: 14.7 CM/S
LEFT RENAL DIST RAR: 0.67
LEFT RENAL DIST RI: 0.6
LEFT RENAL DIST SYS: 36.7 CM/S
LEFT RENAL LOWER PARENCHYMA MAX: 11.3 CM/S
LEFT RENAL LOWER PARENCHYMA MIN: 4.5 CM/S
LEFT RENAL LOWER PARENCHYMA RI: 0.6
LEFT RENAL MID DIAS: 17.5 CM/S
LEFT RENAL MID RAR: 0.88
LEFT RENAL MID RI: 0.64
LEFT RENAL MID SYS: 48.1 CM/S
LEFT RENAL MIDDLE PARENCHYMA MAX: 11.3 CM/S
LEFT RENAL MIDDLE PARENCHYMA MIN: 5.2 CM/S
LEFT RENAL MIDDLE PARENCHYMA RI: 0.54
LEFT RENAL ORIGIN DIAS: 12 CM/S
LEFT RENAL ORIGIN RAR: 0.69
LEFT RENAL ORIGIN RI: 0.68
LEFT RENAL ORIGIN SYS: 37.5 CM/S
LEFT RENAL PROX DIAS: 16.8 CM/S
LEFT RENAL PROX RAR: 0.83
LEFT RENAL PROX RI: 0.63
LEFT RENAL PROX SYS: 45.3 CM/S
LEFT RENAL UPPER PARENCHYMA MAX: 11.3 CM/S
LEFT RENAL UPPER PARENCHYMA MIN: 5.2 CM/S
LEFT RENAL UPPER PARENCHYMA RI: 0.54
PROX AORTIC AP: 2.13 CM
PROX AORTIC TRANS: 2.13 CM
RIGHT INTERLOBAR EDV: 14 CM/S
RIGHT INTERLOBAR PSV: 37.6 CM/S
RIGHT INTERLOBAR RI: 0.6
RIGHT KIDNEY LENGTH: 9.78 CM
RIGHT KIDNEY WIDTH: 6.19 CM
RIGHT RENAL DIST DIAS: 20.4 CM/S
RIGHT RENAL DIST RAR: 1.09
RIGHT RENAL DIST RI: 0.66
RIGHT RENAL DIST SYS: 59.5 CM/S
RIGHT RENAL LOWER PARENCHYMA MAX: 12 CM/S
RIGHT RENAL LOWER PARENCHYMA MIN: 5.9 CM/S
RIGHT RENAL LOWER PARENCHYMA RI: 0.51
RIGHT RENAL MID DIAS: 27 CM/S
RIGHT RENAL MID RAR: 1.38
RIGHT RENAL MID RI: 0.64
RIGHT RENAL MID SYS: 75.4 CM/S
RIGHT RENAL MIDDLE PARENCHYMA MAX: 14.1 CM/S
RIGHT RENAL MIDDLE PARENCHYMA MIN: 5.9 CM/S
RIGHT RENAL MIDDLE PARENCHYMA RI: 0.58
RIGHT RENAL ORIGIN DIAS: 24.6 CM/S
RIGHT RENAL ORIGIN RAR: 1.17
RIGHT RENAL ORIGIN RI: 0.62
RIGHT RENAL ORIGIN SYS: 63.9 CM/S
RIGHT RENAL PROX DIAS: 26.5 CM/S
RIGHT RENAL PROX RAR: 1.14
RIGHT RENAL PROX RI: 0.57
RIGHT RENAL PROX SYS: 62 CM/S
RIGHT RENAL UPPER PARENCHYMA MAX: 17.4 CM/S
RIGHT RENAL UPPER PARENCHYMA MIN: 5.3 CM/S
RIGHT RENAL UPPER PARENCHYMA RI: 0.7

## 2022-11-02 PROCEDURE — 76770 US EXAM ABDO BACK WALL COMP: CPT

## 2022-11-02 PROCEDURE — 77063 BREAST TOMOSYNTHESIS BI: CPT

## 2022-11-02 PROCEDURE — 93975 VASCULAR STUDY: CPT

## 2022-11-10 ENCOUNTER — HOSPITAL ENCOUNTER (OUTPATIENT)
Dept: LAB | Age: 62
Discharge: HOME | End: 2022-11-10

## 2022-11-10 LAB — XX-LABCORP SPECIMEN COL,LCBCF: NORMAL

## 2022-11-10 PROCEDURE — 99001 SPECIMEN HANDLING PT-LAB: CPT

## 2022-11-30 ENCOUNTER — APPOINTMENT (OUTPATIENT)
Dept: INTERNAL MEDICINE CLINIC | Age: 62
End: 2022-11-30

## 2022-11-30 ENCOUNTER — OFFICE VISIT (OUTPATIENT)
Dept: INTERNAL MEDICINE CLINIC | Age: 62
End: 2022-11-30
Payer: MEDICAID

## 2022-11-30 ENCOUNTER — HOSPITAL ENCOUNTER (OUTPATIENT)
Dept: LAB | Age: 62
Discharge: HOME | End: 2022-11-30
Payer: MEDICAID

## 2022-11-30 VITALS
SYSTOLIC BLOOD PRESSURE: 146 MMHG | RESPIRATION RATE: 18 BRPM | HEIGHT: 67 IN | OXYGEN SATURATION: 98 % | WEIGHT: 178.8 LBS | BODY MASS INDEX: 28.06 KG/M2 | HEART RATE: 86 BPM | TEMPERATURE: 96.1 F | DIASTOLIC BLOOD PRESSURE: 92 MMHG

## 2022-11-30 DIAGNOSIS — G43.019 INTRACTABLE MIGRAINE WITHOUT AURA AND WITHOUT STATUS MIGRAINOSUS: ICD-10-CM

## 2022-11-30 DIAGNOSIS — E78.2 MIXED HYPERLIPIDEMIA: ICD-10-CM

## 2022-11-30 DIAGNOSIS — Z23 NEEDS FLU SHOT: ICD-10-CM

## 2022-11-30 DIAGNOSIS — N25.81 SECONDARY HYPERPARATHYROIDISM OF RENAL ORIGIN (HCC): ICD-10-CM

## 2022-11-30 DIAGNOSIS — I10 UNCONTROLLED HYPERTENSION: ICD-10-CM

## 2022-11-30 DIAGNOSIS — Z76.89 ENCOUNTER TO ESTABLISH CARE WITH NEW DOCTOR: Primary | ICD-10-CM

## 2022-11-30 DIAGNOSIS — F14.11 HISTORY OF COCAINE ABUSE (HCC): ICD-10-CM

## 2022-11-30 DIAGNOSIS — Z23 ENCOUNTER FOR IMMUNIZATION: ICD-10-CM

## 2022-11-30 DIAGNOSIS — E55.9 VITAMIN D DEFICIENCY: ICD-10-CM

## 2022-11-30 DIAGNOSIS — Z79.01 ON CLOPIDOGREL THERAPY: ICD-10-CM

## 2022-11-30 DIAGNOSIS — Z86.2 HISTORY OF ANEMIA: ICD-10-CM

## 2022-11-30 DIAGNOSIS — N18.4 BENIGN HYPERTENSION WITH CKD (CHRONIC KIDNEY DISEASE) STAGE IV (HCC): ICD-10-CM

## 2022-11-30 DIAGNOSIS — M06.9 RHEUMATOID ARTHRITIS, INVOLVING UNSPECIFIED SITE, UNSPECIFIED WHETHER RHEUMATOID FACTOR PRESENT (HCC): ICD-10-CM

## 2022-11-30 DIAGNOSIS — I63.81 ACUTE LACUNAR STROKE (HCC): ICD-10-CM

## 2022-11-30 DIAGNOSIS — I12.9 BENIGN HYPERTENSION WITH CKD (CHRONIC KIDNEY DISEASE) STAGE IV (HCC): ICD-10-CM

## 2022-11-30 DIAGNOSIS — F17.200 CURRENT EVERY DAY SMOKER: ICD-10-CM

## 2022-11-30 LAB
25(OH)D3 SERPL-MCNC: 14.7 NG/ML (ref 30–100)
ALBUMIN SERPL-MCNC: 3.7 G/DL (ref 3.4–5)
ALBUMIN/GLOB SERPL: 1.1 {RATIO} (ref 0.8–1.7)
ALP SERPL-CCNC: 181 U/L (ref 45–117)
ALT SERPL-CCNC: 26 U/L (ref 13–56)
ANION GAP SERPL CALC-SCNC: 8 MMOL/L (ref 3–18)
AST SERPL-CCNC: 12 U/L (ref 10–38)
BASOPHILS # BLD: 0 K/UL (ref 0–0.1)
BASOPHILS NFR BLD: 1 % (ref 0–2)
BILIRUB SERPL-MCNC: 0.4 MG/DL (ref 0.2–1)
BUN SERPL-MCNC: 18 MG/DL (ref 7–18)
BUN/CREAT SERPL: 9 (ref 12–20)
CALCIUM SERPL-MCNC: 10.1 MG/DL (ref 8.5–10.1)
CHLORIDE SERPL-SCNC: 113 MMOL/L (ref 100–111)
CHOLEST SERPL-MCNC: 193 MG/DL
CO2 SERPL-SCNC: 22 MMOL/L (ref 21–32)
CREAT SERPL-MCNC: 2.04 MG/DL (ref 0.6–1.3)
DIFFERENTIAL METHOD BLD: ABNORMAL
EOSINOPHIL # BLD: 0.1 K/UL (ref 0–0.4)
EOSINOPHIL NFR BLD: 2 % (ref 0–5)
ERYTHROCYTE [DISTWIDTH] IN BLOOD BY AUTOMATED COUNT: 14.6 % (ref 11.6–14.5)
GLOBULIN SER CALC-MCNC: 3.4 G/DL (ref 2–4)
GLUCOSE SERPL-MCNC: 84 MG/DL (ref 74–99)
HCT VFR BLD AUTO: 47.5 % (ref 35–45)
HDLC SERPL-MCNC: 48 MG/DL (ref 40–60)
HDLC SERPL: 4 {RATIO} (ref 0–5)
HGB BLD-MCNC: 15.5 G/DL (ref 12–16)
IMM GRANULOCYTES # BLD AUTO: 0 K/UL (ref 0–0.04)
IMM GRANULOCYTES NFR BLD AUTO: 0 % (ref 0–0.5)
LDLC SERPL CALC-MCNC: 107 MG/DL (ref 0–100)
LIPID PROFILE,FLP: ABNORMAL
LYMPHOCYTES # BLD: 2 K/UL (ref 0.9–3.6)
LYMPHOCYTES NFR BLD: 35 % (ref 21–52)
MCH RBC QN AUTO: 28.3 PG (ref 24–34)
MCHC RBC AUTO-ENTMCNC: 32.6 G/DL (ref 31–37)
MCV RBC AUTO: 86.8 FL (ref 78–100)
MONOCYTES # BLD: 0.5 K/UL (ref 0.05–1.2)
MONOCYTES NFR BLD: 8 % (ref 3–10)
NEUTS SEG # BLD: 3.1 K/UL (ref 1.8–8)
NEUTS SEG NFR BLD: 54 % (ref 40–73)
NRBC # BLD: 0 K/UL (ref 0–0.01)
NRBC BLD-RTO: 0 PER 100 WBC
PLATELET # BLD AUTO: 215 K/UL (ref 135–420)
PMV BLD AUTO: 10.1 FL (ref 9.2–11.8)
POTASSIUM SERPL-SCNC: 3.4 MMOL/L (ref 3.5–5.5)
PROT SERPL-MCNC: 7.1 G/DL (ref 6.4–8.2)
RBC # BLD AUTO: 5.47 M/UL (ref 4.2–5.3)
SODIUM SERPL-SCNC: 143 MMOL/L (ref 136–145)
TRIGL SERPL-MCNC: 190 MG/DL (ref ?–150)
TSH SERPL DL<=0.05 MIU/L-ACNC: 1.31 UIU/ML (ref 0.36–3.74)
VLDLC SERPL CALC-MCNC: 38 MG/DL
WBC # BLD AUTO: 5.6 K/UL (ref 4.6–13.2)

## 2022-11-30 PROCEDURE — 80061 LIPID PANEL: CPT

## 2022-11-30 PROCEDURE — 84443 ASSAY THYROID STIM HORMONE: CPT

## 2022-11-30 PROCEDURE — 80053 COMPREHEN METABOLIC PANEL: CPT

## 2022-11-30 PROCEDURE — 82306 VITAMIN D 25 HYDROXY: CPT

## 2022-11-30 PROCEDURE — 85025 COMPLETE CBC W/AUTO DIFF WBC: CPT

## 2022-11-30 PROCEDURE — 36415 COLL VENOUS BLD VENIPUNCTURE: CPT

## 2022-11-30 RX ORDER — ATORVASTATIN CALCIUM 80 MG/1
80 TABLET, FILM COATED ORAL DAILY
Qty: 90 TABLET | Refills: 1 | Status: SHIPPED | OUTPATIENT
Start: 2022-11-30

## 2022-11-30 RX ORDER — ATENOLOL 50 MG/1
50 TABLET ORAL DAILY
Qty: 90 TABLET | Refills: 1 | Status: SHIPPED | OUTPATIENT
Start: 2022-11-30

## 2022-11-30 RX ORDER — HYDRALAZINE HYDROCHLORIDE 25 MG/1
25 TABLET, FILM COATED ORAL 3 TIMES DAILY
Qty: 270 TABLET | Refills: 1 | Status: SHIPPED | OUTPATIENT
Start: 2022-11-30 | End: 2022-11-30 | Stop reason: DRUGHIGH

## 2022-11-30 RX ORDER — HYDRALAZINE HYDROCHLORIDE 50 MG/1
50 TABLET, FILM COATED ORAL 3 TIMES DAILY
Qty: 270 TABLET | Refills: 1 | Status: SHIPPED | OUTPATIENT
Start: 2022-11-30

## 2022-11-30 RX ORDER — ASPIRIN 81 MG/1
TABLET ORAL
COMMUNITY
Start: 2022-11-23

## 2022-11-30 RX ORDER — BUTALBITAL, ACETAMINOPHEN AND CAFFEINE 50; 325; 40 MG/1; MG/1; MG/1
TABLET ORAL
COMMUNITY
Start: 2022-10-25 | End: 2022-11-30 | Stop reason: SDUPTHER

## 2022-11-30 RX ORDER — AMLODIPINE BESYLATE 10 MG/1
10 TABLET ORAL DAILY
Qty: 30 TABLET | Refills: 1 | Status: SHIPPED | OUTPATIENT
Start: 2022-11-30

## 2022-11-30 RX ORDER — BUTALBITAL, ACETAMINOPHEN AND CAFFEINE 50; 325; 40 MG/1; MG/1; MG/1
1 TABLET ORAL
Qty: 30 TABLET | Refills: 0 | Status: SHIPPED | OUTPATIENT
Start: 2022-11-30

## 2022-11-30 RX ORDER — ATORVASTATIN CALCIUM 80 MG/1
80 TABLET, FILM COATED ORAL DAILY
COMMUNITY
Start: 2022-11-23 | End: 2022-11-30 | Stop reason: SDUPTHER

## 2022-11-30 RX ORDER — HYDRALAZINE HYDROCHLORIDE 25 MG/1
25 TABLET, FILM COATED ORAL 3 TIMES DAILY
COMMUNITY
Start: 2022-11-23 | End: 2022-11-30 | Stop reason: SDUPTHER

## 2022-11-30 RX ORDER — ATENOLOL 50 MG/1
50 TABLET ORAL DAILY
COMMUNITY
Start: 2022-10-24 | End: 2022-11-30 | Stop reason: SDUPTHER

## 2022-11-30 RX ORDER — CLOPIDOGREL BISULFATE 75 MG/1
75 TABLET ORAL DAILY
Qty: 90 TABLET | Refills: 1 | Status: SHIPPED | OUTPATIENT
Start: 2022-11-30

## 2022-11-30 NOTE — PROGRESS NOTES
[facility] [id number] [place] [address], [address] [place] [address]  [phone] UERKPI/[phone] fax    11/30/2022    Thierno Gutierrez 1960 is a pleasant BLACK/ female. Todays concerns/HPI:  Establish care. Used to see Dr Darlin Jernigan until she moved. Stage 4 CKD/Secondary hyperparathyroidism. Sees [facility] Leslie. No discussion of dialysis at this time. Hypertension. Out of atenolol 50 mg x 1 week. Taking amlodipine 10 mg daily and hydralazine 25 mg 3 times daily. She denies headaches, chest pain, shortness of breath, edema, syncope. CVA. Diagnosed 5/2021. Was prescribed Plavix but does not have refills so she has not been taking her medication. She does continue aspirin therapy. Cholesterol. She is tolerating atorvastatin without side effects. She does not focus on any particular diet. Recurrent pancreatitis secondary to gallstones. Choleycystectomy 9/2022 Dr Yumi Barnes. Rheumatoid arthritis. Affects her hands mostly. Sees Dr. Enedina Daniels as scheduled. Currently taking Tylenol arthritis as needed. Migraines. Seeking refill of Fioricet. Migraines occur 1-2 times per week and can last a few hours. Over-the-counter treatment such as Excedrin is not effective. History of cocaine use. She last used 2 to 3 months ago. Patient denies history of diabetes.       Past Medical History:   Diagnosis Date    Abnormal mammogram 2014    left with biopsy    Acute lacunar stroke ([address] 05/21/2021    Acute Lacunar Stroke (acute lacunar infarct in the posterior left lentiform nucleus extending into the corona radiata) with residual right hemiparesis    Cerebrovascular accident (CVA) due to embolism of left middle cerebral artery ([address] 06/06/2022    Chronic kidney disease     Stage 4, not on dialysis    Constipation     Current every day smoker     Current use of aspirin 05/23/2021    Gastroesophageal reflux disease 06/30/2016    Hemiparesis of right dominant side due to acute cerebrovascular disease ([facility] 05/21/2021    History of cocaine abuse ([facility] 12/01/2022    History of Helicobacter pylori infection 07/24/2015    History of iron deficiency anemia 06/2014    History of vitamin D deficiency 06/11/2015    Hypertension     Intractable migraine without aura and without status migrainosus 12/01/2022    Mixed hyperlipidemia     On clopidogrel therapy 05/23/2021    On statin therapy due to risk of future cardiovascular event 05/22/2021    On Atorvastatin    Pancreatitis due to common bile duct stone 2021    Rheumatoid arthritis ([facility]     Secondary hyperparathyroidism of renal origin ([facility] 05/31/2021    Stage 3b chronic kidney disease ([facility] 07/24/2015    Vitamin D deficiency 05/31/2021    Vitamin D 25-Hydroxy (5/31/2021) = 10.5      Current Outpatient Medications   Medication Sig    ergocalciferol (ERGOCALCIFEROL) 1,250 mcg (50,000 unit) capsule Take 1 Capsule by mouth every seven (7) days. Take 1 cap every week til all gone then take over the counter Vit D 5000 units daily  Indications: vitamin D deficiency (high dose therapy)    aspirin delayed-release 81 mg tablet TAKE 1 TABLET BY MOUTH ONCE DAILY FOR 90 DAYS    amLODIPine (NORVASC) 10 mg tablet Take 1 Tablet by mouth daily. atenoloL (TENORMIN) 50 mg tablet Take 1 Tablet by mouth daily. clopidogreL (PLAVIX) 75 mg tab Take 1 Tablet by mouth daily. butalbital-acetaminophen-caffeine (FIORICET, ESGIC) -40 mg per tablet Take 1 Tablet by mouth every six (6) hours as needed for Headache. Indications: a migraine headache    atorvastatin (LIPITOR) 80 mg tablet Take 1 Tablet by mouth daily. hydrALAZINE (APRESOLINE) 50 mg tablet Take 1 Tablet by mouth three (3) times daily. acetaminophen (Tylenol Arthritis Pain) 650 mg TbER Take 1 Tablet by mouth every eight (8) hours. (Patient taking differently: Take 650 mg by mouth every eight (8) hours as needed.)     No current facility-administered medications for this visit. Review of Systems:  Pertinent items are noted in HPI. Physical:   Visit Vitals  BP (!) 146/92   Pulse 86   Temp (!) 96.1 °F (35.6 °C) (Temporal)   Resp 18   Ht 5' 7\" (1.702 m)   Wt 178 lb 12.8 oz (81.1 kg)   LMP 10/15/2014   SpO2 98%   BMI 28.00 kg/m²      Wt Readings from Last 3 Encounters:   11/30/22 178 lb 12.8 oz (81.1 kg)   10/12/22 177 lb (80.3 kg)   10/11/22 176 lb (79.8 kg)       Exam:   Physical Exam  Constitutional:       Appearance: Normal appearance. She is obese. HENT:      Head: Normocephalic and atraumatic. Right Ear: Tympanic membrane, ear canal and external ear normal.      Left Ear: Tympanic membrane, ear canal and external ear normal.      Mouth/Throat:      Mouth: Mucous membranes are moist.   Eyes:      Extraocular Movements: Extraocular movements intact. Conjunctiva/sclera: Conjunctivae normal.   Neck:      Vascular: No carotid bruit. Cardiovascular:      Rate and Rhythm: Normal rate and regular rhythm. Pulses: Normal pulses. Heart sounds: Normal heart sounds. Comments: No edema noted to melinda LEs  Pulmonary:      Effort: Pulmonary effort is normal. No respiratory distress. Breath sounds: Normal breath sounds. No wheezing. Abdominal:      General: Abdomen is flat. Bowel sounds are normal. There is no distension. Palpations: Abdomen is soft. Tenderness: There is no abdominal tenderness. Musculoskeletal:         General: Normal range of motion. Cervical back: Normal range of motion and neck supple. Comments: Deformity noted to bilateral hands (RA)   Skin:     General: Skin is warm and dry. Neurological:      General: No focal deficit present. Mental Status: She is alert and oriented to person, place, and time. Psychiatric:         Mood and Affect: Mood normal.         Behavior: Behavior normal.      Body mass index is 28 kg/m².      Review of Data:  Obtain today      12/2/2022 lab update:  Please inform patient of the following lab results:  1. Vitamin D deficiency. Level 14.7. E scribed to vitamin D 50,000 units to take weekly. 2.  CKD. GFR 27  3. Cholesterol. . Goal less than 150. . Goal less than 100. Continue current statin therapy. Reduce fried fatty oily foods in diet. Increase exercise to burn calories. 4.  TSH level normal at 1.31  5. Elevated RBC and HCT. Most likely related to smoking. Patient will need lab 1 week prior to her 6-month appointment. Need to recheck her vitamin D level after treating with high-dose vitamin D. Please help her schedule this appointment. She does not need to be fasting. Thank you      Plan:    1. Encounter to establish care with new doctor  Patient is transferring to me from their previous provider. I spent no less than 25 minutes reviewing and updating the chart to include previous labs, diagnostics, and specialty notes. Reviewed medication and completed the medication reconciliation with the patient. Reviewed side effects of medications with the patient. Questions were answered and patient verb understanding. Informed patient chronic medication refills will not be given between their scheduled appointments; all refills will be given at the time of their scheduled follow-up appointments. Patient verbalized understanding    2. Benign hypertension with CKD (chronic kidney disease) stage IV ([address]    3. Uncontrolled hypertension  Not controlled  Refilled amlodipine, atenolol  Increase hydralazine to 50 mg 3 times daily    Limit sodium in diet to 2g/d  Avoid pork  Initiate cardio exercise  Weight reduction  Increase water intake  Check BP and report readings greater than 139/89 to office    - amLODIPine (NORVASC) 10 mg tablet; Take 1 Tablet by mouth daily. Dispense: 30 Tablet; Refill: 1  - atenoloL (TENORMIN) 50 mg tablet; Take 1 Tablet by mouth daily. Dispense: 90 Tablet; Refill: 1  - METABOLIC PANEL, COMPREHENSIVE;  Future  - hydrALAZINE (APRESOLINE) 50 mg tablet; Take 1 Tablet by mouth three (3) times daily. Dispense: 270 Tablet; Refill: 1    4. Acute lacunar stroke (HCC)  Restarted Plavix  Continue aspirin therapy  Continue statin therapy  Discussed importance of smoking cessation    - clopidogreL (PLAVIX) 75 mg tab; Take 1 Tablet by mouth daily. Dispense: 90 Tablet; Refill: 1    5. On clopidogrel therapy  Discussed possible side effects of Plavix    6. Mixed hyperlipidemia  Refilled    Reduce fried fatty or oily foods in diet  Limit red meats, processed foods, and alcohol. Limit fast food  Work on weight reduction  Increase water intake    - LIPID PANEL; Future  - atorvastatin (LIPITOR) 80 mg tablet; Take 1 Tablet by mouth daily. Dispense: 90 Tablet; Refill: 1    7. Rheumatoid arthritis, involving unspecified site, unspecified whether rheumatoid factor present ([facility]  Continue Tylenol arthritis as needed as directed on bottle    Specialist managed condition is being evaluated/managed by Dr. Lugo [facility]. No acute findings today meriting change in management plan. 8. Vitamin D deficiency  4/2022 level 17    - VITAMIN D, 25 HYDROXY; Future    9. Secondary hyperparathyroidism of renal origin [facility])  Specialist managed condition is being evaluated/managed by Dr. Palmer Bhandari. No acute findings today meriting change in management plan. - TSH 3RD GENERATION; Future    10. History of anemia    - CBC WITH AUTOMATED DIFF; Future    11. Intractable migraine without aura and without status migrainosus  Refilled  Discussed smoking could worsen symptoms and cause flares    - butalbital-acetaminophen-caffeine (FIORICET, ESGIC) -40 mg per tablet; Take 1 Tablet by mouth every six (6) hours as needed for Headache. Indications: a migraine headache  Dispense: 30 Tablet; Refill: 0    12. Current every day smoker  Smoking cessation discussed    13.  History of cocaine abuse ([facility]  Last used 9/2022-positive UDS    14. Encounter for immunization    - TDAP, [address], (AGE 10 YRS+), IM  - [place] IMMUNIZ ADMIN,1 SINGLE/COMB VAC/TOXOID    15. Needs flu shot    - INFLUENZA, FLUARIX, FLULAVAL, FLUZONE (AGE 6 MO+), AFLURIA(AGE 3Y+) IM, PF, 0.5 ML        HM:    Colonoscopy due:   1/2023. Unable to recall GI name    Mammogram due:  11/2023    Immunizations due:  Obtain influenza and Tdap vaccines today    Bone density due:  Completed under Dr. Matt Rogers RA. Requested results    Pap Smear due: Not Applicable. Hysterectomy    Eye exam due:  Patient to schedule           Reviewed medication and completed medication reconciliation with the patient. Reviewed side effects of medications with the patient. Questions were answered and patient verb understanding. Past Surgical History:   Procedure Laterality Date    HX CHOLECYSTECTOMY  09/30/2022    Robotic cholecystectomy    HX LAPAROSCOPIC SUPRACERVICAL HYSTERECTOMY  11/01/2014    Dr. Carolyn Aschoff, GYN    HX SALPINGO-OOPHORECTOMY  11/01/2014     Allergies and Intolerances:   No Known Allergies  Family History:   Family History   Problem Relation Age of Onset    OSTEOARTHRITIS Mother     Hypertension Mother     Diabetes Mother     Cancer Sister     Hypertension Sister     OSTEOARTHRITIS Maternal Grandmother     Cancer Brother     Hypertension Brother     Stroke Neg Hx      Social History:   She  reports that she has been smoking cigarettes. She has a 5.00 pack-year smoking history.  She has never used smokeless tobacco.   Social History     Substance and Sexual Activity   Alcohol Use Not Currently     Immunization History:  Immunization History   Administered Date(s) Administered    COVID-19, PFIZER PURPLE top, DILUTE for use, (age 15 y+), IM, 30mcg/0.3mL 03/14/2021, 04/04/2021    Influenza Vaccine 01/14/2013    Influenza Vaccine PF 11/19/2014    Influenza, FLUARIX, FLULAVAL, FLUZONE (age 10 mo+) AND AFLURIA, (age 1 y+), PF, 0.5mL 02/23/2016, 11/17/2021, 11/30/2022    Pneumococcal Polysaccharide (PPSV-23) 11/17/2021    Tdap 11/30/2022         Follow up 6 months      Dr. David Petersen, AGNP-C, DNP  [facility] [address] [place] [address]

## 2022-11-30 NOTE — PROGRESS NOTES
Shanae Jj 1960 female who presents for routine immunizations. Patient denies any symptoms , reactions or allergies that would exclude them from being immunized today. Risks and adverse reactions were discussed and the VIS was given to them. All questions were addressed. Order placed for INFLUENZA in LEFT deltoid and TDAP in RIGHT deltoid, per Verbal Order from Emi Fleming, with read back. Patient was observed for 15 min post injection. There were no reactions observed.     Sherrell AVILA

## 2022-11-30 NOTE — PROGRESS NOTES
Chief Complaint   Patient presents with    Establish Care     1. \"Have you been to the ER, urgent care clinic since your last visit? Hospitalized since your last visit? \"  N/a    2. \"Have you seen or consulted any other health care providers outside of the [address] [place] [address] since your last visit? \"  N/a      3. For patients aged 39-70: Has the patient had a colonoscopy / FIT/ Cologuard? Yes - Care Gap present. Most recent result on file scheduled for January 2023      If the patient is female:    4. For patients aged 41-77: Has the patient had a mammogram within the past 2 years? Yes - no Care Gap present      5. For patients aged 21-65: Has the patient had a pap smear? Yes - Care Gap present.  Most recent result on file

## 2022-11-30 NOTE — PATIENT INSTRUCTIONS
Vaccine Information Statement    Influenza (Flu) Vaccine (Inactivated or Recombinant): What You Need to Know    Many vaccine information statements are available in Georgian and other languages. See www.immunize.org/vis. Hojas de información sobre vacunas están disponibles en español y en muchos otros idiomas. Visite www.immunize.org/vis. 1. Why get vaccinated? Influenza vaccine can prevent influenza (flu). Flu is a contagious disease that spreads around the [facility] every year, usually between October and May. Anyone can get the flu, but it is more dangerous for some people. Infants and young children, people 72 years and older, pregnant people, and people with certain health conditions or a weakened immune system are at greatest risk of flu complications. Pneumonia, bronchitis, sinus infections, and ear infections are examples of flu-related complications. If you have a medical condition, such as heart disease, cancer, or diabetes, flu can make it worse. Flu can cause fever and chills, sore throat, muscle aches, fatigue, cough, headache, and runny or stuffy nose. Some people may have vomiting and diarrhea, though this is more common in children than adults. In an average year, thousands of people in the [facility] die from flu, and many more are hospitalized. Flu vaccine prevents millions of illnesses and flu-related visits to the doctor each year. 2. Influenza vaccines     CDC recommends everyone 6 months and older get vaccinated every flu season. Children 6 months through 6years of age may need 2 doses during a single flu season. Everyone else needs only 1 dose each flu season. It takes about 2 weeks for protection to develop after vaccination. There are many flu viruses, and they are always changing. Each year a new flu vaccine is made to protect against the influenza viruses believed to be likely to cause disease in the upcoming flu season.  Even when the vaccine doesnt exactly match these viruses, it may still provide some protection. Influenza vaccine does not cause flu. Influenza vaccine may be given at the same time as other vaccines. 3. Talk with your health care provider    Tell your vaccination provider if the person getting the vaccine:  Has had an allergic reaction after a previous dose of influenza vaccine, or has any severe, life-threatening allergies   Has ever had Guillain-Barré Syndrome (also called GBS)    In some cases, your health care provider may decide to postpone influenza vaccination until a future visit. Influenza vaccine can be administered at any time during pregnancy. People who are or will be pregnant during influenza season should receive inactivated influenza vaccine. People with minor illnesses, such as a cold, may be vaccinated. People who are moderately or severely ill should usually wait until they recover before getting influenza vaccine. Your health care provider can give you more information. 4. Risks of a vaccine reaction    Soreness, redness, and swelling where the shot is given, fever, muscle aches, and headache can happen after influenza vaccination. There may be a very small increased risk of Guillain-Barré Syndrome (GBS) after inactivated influenza vaccine (the flu shot). Patrick Ramírez children who get the flu shot along with pneumococcal vaccine (PCV13) and/or DTaP vaccine at the same time might be slightly more likely to have a seizure caused by fever. Tell your health care provider if a child who is getting flu vaccine has ever had a seizure. People sometimes faint after medical procedures, including vaccination. Tell your provider if you feel dizzy or have vision changes or ringing in the ears. As with any medicine, there is a very remote chance of a vaccine causing a severe allergic reaction, other serious injury, or death. 5. What if there is a serious problem?     An allergic reaction could occur after the vaccinated person leaves the clinic. If you see signs of a severe allergic reaction (hives, swelling of the face and throat, difficulty breathing, a fast heartbeat, dizziness, or weakness), call 9-1-1 and get the person to the nearest hospital.    For other signs that concern you, call your health care provider. Adverse reactions should be reported to the Vaccine Adverse Event Reporting System (VAERS). Your health care provider will usually file this report, or you can do it yourself. Visit the VAERS website at www.vaers. [facility].gov or call 8-720.774.4083. VAERS is only for reporting reactions, and VAERS staff members do not give medical advice. 6. The National Vaccine Injury Compensation Program    The [facility] Vaccine Injury Compensation Program (VICP) is a federal program that was created to compensate people who may have been injured by certain vaccines. Claims regarding alleged injury or death due to vaccination have a time limit for filing, which may be as short as two years. Visit the VICP website at www.[facility].gov/vaccinecompensation or call 9-731.605.3201 to learn about the program and about filing a claim. 7. How can I learn more? Ask your health care provider. Call your local or state health department. Visit the website of the Food and Drug Administration (FDA) for vaccine package inserts and additional information at www.fda.gov/vaccines-blood-biologics/vaccines. Contact the Centers for Disease Control and Prevention (CDC): Call 1-892.202.4606 (1-800-CDC-INFO) or  Visit CDCs influenza website at www.cdc.gov/flu. Vaccine Information Statement   Inactivated Influenza Vaccine   8/6/2021  42 SHIVA [facility] [id number]   Department of Health and Human Services  Centers for Disease Control and Prevention    Office Use Only      Vaccine Information Statement    Tdap (Tetanus, Diphtheria, Pertussis) Vaccine: What You Need to Know     Many vaccine information statements are available in Lao and other languages. See www.immunize.org/vis. Hojas de información sobre vacunas están disponibles en español y en muchos otros idiomas. Visite www.immunize.org/vis. 1. Why get vaccinated? Tdap vaccine can prevent tetanus, diphtheria, and pertussis. Diphtheria and pertussis spread from person to person. Tetanus enters the body through cuts or wounds. TETANUS (T) causes painful stiffening of the muscles. Tetanus can lead to serious health problems, including being unable to open the mouth, having trouble swallowing and breathing, or death. DIPHTHERIA (D) can lead to difficulty breathing, heart failure, paralysis, or death. PERTUSSIS (aP), also known as whooping cough, can cause uncontrollable, violent coughing that makes it hard to breathe, eat, or drink. Pertussis can be extremely serious especially in babies and young children, causing pneumonia, convulsions, brain damage, or death. In teens and adults, it can cause weight loss, loss of bladder control, passing out, and rib fractures from severe coughing. 2. Tdap vaccine     Tdap is only for children 7 years and older, adolescents, and adults. Adolescents should receive a single dose of Tdap, preferably at age 6 or 15 years. Pregnant people should get a dose of Tdap during every pregnancy, preferably during the early part of the third trimester, to help protect the  from pertussis. Infants are most at risk for severe, life-threatening complications from pertussis. Adults who have never received Tdap should get a dose of Tdap. Also, adults should receive a booster dose of either Tdap or Td (a different vaccine that protects against tetanus and diphtheria but not pertussis) every 10 years, or after 5 years in the case of a severe or dirty wound or burn. Tdap may be given at the same time as other vaccines. 3. Talk with your health care provider    Tell your vaccination provider if the person getting the vaccine:   Has had an allergic reaction after a previous dose of any vaccine that protects against tetanus, diphtheria, or pertussis, or has any severe, life-threatening allergies   Has had a coma, decreased level of consciousness, or prolonged seizures within 7 days after a previous dose of any pertussis vaccine (DTP, DTaP, or Tdap)  Has seizures or another nervous system problem  Has ever had Guillain-Barré Syndrome (also called GBS)  Has had severe pain or swelling after a previous dose of any vaccine that protects against tetanus or diphtheria    In some cases, your health care provider may decide to postpone Tdap vaccination until a future visit. People with minor illnesses, such as a cold, may be vaccinated. People who are moderately or severely ill should usually wait until they recover before getting Tdap vaccine. Your health care provider can give you more information. 4. Risks of a vaccine reaction    Pain, redness, or swelling where the shot was given, mild fever, headache, feeling tired, and nausea, vomiting, diarrhea, or stomachache sometimes happen after Tdap vaccination. People sometimes faint after medical procedures, including vaccination. Tell your provider if you feel dizzy or have vision changes or ringing in the ears. As with any medicine, there is a very remote chance of a vaccine causing a severe allergic reaction, other serious injury, or death. 5. What if there is a serious problem? An allergic reaction could occur after the vaccinated person leaves the clinic. If you see signs of a severe allergic reaction (hives, swelling of the face and throat, difficulty breathing, a fast heartbeat, dizziness, or weakness), call 9-1-1 and get the person to the nearest hospital.    For other signs that concern you, call your health care provider. Adverse reactions should be reported to the Vaccine Adverse Event Reporting System (VAERS).  Your health care provider will usually file this report, or you can do it yourself. Visit the VAERS website at www.vaers. [facility].gov or call 0-301.105.6292. VAERS is only for reporting reactions, and VAERS staff members do not give medical advice. 6. The National Vaccine Injury Compensation Program    The [facility] Vaccine Injury Compensation Program (VICP) is a federal program that was created to compensate people who may have been injured by certain vaccines. Claims regarding alleged injury or death due to vaccination have a time limit for filing, which may be as short as two years. Visit the VICP website at www.[facility]a.gov/vaccinecompensation or call 8-997.836.9922 to learn about the program and about filing a claim. 7. How can I learn more? Ask your health care provider. Call your local or state health department. Visit the website of the Food and Drug Administration (FDA) for vaccine package inserts and additional information at www.fda.gov/vaccines-blood-biologics/vaccines. Contact the Centers for Disease Control and Prevention (CDC): Call 9-436.823.5361 (1-800-CDC-INFO) or  Visit CDCs website at www.cdc.gov/vaccines. Vaccine Information Statement   Tdap (Tetanus, Diphtheria, Pertussis) Vaccine  8/6/2021  42 SHIVA Hugo [id number]   Department of Health and Human Services  Centers for Disease Control and Prevention    Office Use Only

## 2022-12-01 PROBLEM — Z74.09 IMPAIRED MOBILITY AND ADLS: Status: RESOLVED | Noted: 2021-05-21 | Resolved: 2022-12-01

## 2022-12-01 PROBLEM — K85.90 ACUTE PANCREATITIS WITHOUT INFECTION OR NECROSIS: Status: RESOLVED | Noted: 2022-04-08 | Resolved: 2022-12-01

## 2022-12-01 PROBLEM — N17.9 ACUTE RENAL FAILURE SUPERIMPOSED ON STAGE 3 CHRONIC KIDNEY DISEASE (HCC): Status: RESOLVED | Noted: 2021-06-01 | Resolved: 2022-12-01

## 2022-12-01 PROBLEM — N18.30 ACUTE RENAL FAILURE SUPERIMPOSED ON STAGE 3 CHRONIC KIDNEY DISEASE (HCC): Status: RESOLVED | Noted: 2021-06-01 | Resolved: 2022-12-01

## 2022-12-01 PROBLEM — I10 UNCONTROLLED HYPERTENSION: Status: RESOLVED | Noted: 2022-12-01 | Resolved: 2022-12-01

## 2022-12-01 PROBLEM — Z78.9 IMPAIRED MOBILITY AND ADLS: Status: RESOLVED | Noted: 2021-05-21 | Resolved: 2022-12-01

## 2022-12-01 PROBLEM — I10 UNCONTROLLED HYPERTENSION: Status: ACTIVE | Noted: 2022-12-01

## 2022-12-01 PROBLEM — G43.019 INTRACTABLE MIGRAINE WITHOUT AURA AND WITHOUT STATUS MIGRAINOSUS: Status: ACTIVE | Noted: 2022-12-01

## 2022-12-01 PROBLEM — F14.11 HISTORY OF COCAINE ABUSE (HCC): Status: ACTIVE | Noted: 2022-12-01

## 2022-12-01 PROBLEM — R79.89 HIGH SERUM MAGNESIUM: Status: RESOLVED | Noted: 2021-05-28 | Resolved: 2022-12-01

## 2022-12-02 ENCOUNTER — TELEPHONE (OUTPATIENT)
Dept: INTERNAL MEDICINE CLINIC | Age: 62
End: 2022-12-02

## 2022-12-02 PROBLEM — N18.4 BENIGN HYPERTENSION WITH CKD (CHRONIC KIDNEY DISEASE) STAGE IV (HCC): Status: ACTIVE | Noted: 2022-12-02

## 2022-12-02 PROBLEM — I12.9 BENIGN HYPERTENSION WITH CKD (CHRONIC KIDNEY DISEASE) STAGE IV (HCC): Status: ACTIVE | Noted: 2022-12-02

## 2022-12-02 RX ORDER — ERGOCALCIFEROL 1.25 MG/1
50000 CAPSULE ORAL
Qty: 13 CAPSULE | Refills: 1 | Status: SHIPPED | OUTPATIENT
Start: 2022-12-02

## 2022-12-02 NOTE — PROGRESS NOTES
Patient reached and made aware of lab results per Dr. Martha Lynch. Pt verbalized understanding. Labs mailed to pt's home address, she will have have them done 1 week prior to her 6 month followup.

## 2022-12-02 NOTE — TELEPHONE ENCOUNTER
----- Message from Heidi Pham DNP sent at 12/2/2022  8:09 AM EST -----  Please inform patient of the following lab results:  1. Vitamin D deficiency. Level 14.7. E scribed to vitamin D 50,000 units to take weekly. 2.  CKD. GFR 27  3. Cholesterol. . Goal less than 150. . Goal less than 100. Continue current statin therapy. Reduce fried fatty oily foods in diet. Increase exercise to burn calories. 4.  TSH level normal at 1.31  5. Elevated RBC and HCT. Most likely related to smoking. Patient will need lab 1 week prior to her 6-month appointment. Need to recheck her vitamin D level after treating with high-dose vitamin D. Please help her schedule this appointment. She does not need to be fasting.   Thank you

## 2022-12-02 NOTE — TELEPHONE ENCOUNTER
Left message for patient to call the office also sent my chart message. RE:  ----- Message from Sal Carrasco DNP sent at 12/2/2022  8:09 AM EST -----  Please inform patient of the following lab results:  1. Vitamin D deficiency. Level 14.7. E scribed to vitamin D 50,000 units to take weekly. 2.  CKD. GFR 27  3. Cholesterol. . Goal less than 150. . Goal less than 100. Continue current statin therapy. Reduce fried fatty oily foods in diet. Increase exercise to burn calories. 4.  TSH level normal at 1.31  5. Elevated RBC and HCT. Most likely related to smoking. Patient will need lab 1 week prior to her 6-month appointment. Need to recheck her vitamin D level after treating with high-dose vitamin D. Please help her schedule this appointment. She does not need to be fasting.

## 2022-12-02 NOTE — PROGRESS NOTES
Please inform patient of the following lab results:  1. Vitamin D deficiency. Level 14.7. E scribed to vitamin D 50,000 units to take weekly. 2.  CKD. GFR 27  3. Cholesterol. . Goal less than 150. . Goal less than 100. Continue current statin therapy. Reduce fried fatty oily foods in diet. Increase exercise to burn calories. 4.  TSH level normal at 1.31  5. Elevated RBC and HCT. Most likely related to smoking. Patient will need lab 1 week prior to her 6-month appointment. Need to recheck her vitamin D level after treating with high-dose vitamin D. Please help her schedule this appointment. She does not need to be fasting.   Thank you

## 2023-02-04 DIAGNOSIS — E55.9 VITAMIN D DEFICIENCY: Primary | ICD-10-CM

## 2023-02-07 ENCOUNTER — HOSPITAL ENCOUNTER (OUTPATIENT)
Dept: LAB | Age: 63
Discharge: HOME | End: 2023-02-07

## 2023-02-07 LAB — XX-LABCORP SPECIMEN COL,LCBCF: NORMAL

## 2023-02-07 PROCEDURE — 99001 SPECIMEN HANDLING PT-LAB: CPT

## 2023-04-06 ENCOUNTER — HOSPITAL ENCOUNTER (OUTPATIENT)
Facility: HOSPITAL | Age: 63
Discharge: HOME | End: 2023-04-06

## 2023-04-06 DIAGNOSIS — Z01.818 PREOP EXAMINATION: ICD-10-CM

## 2023-04-06 LAB
ANION GAP SERPL CALC-SCNC: 6 MMOL/L (ref 3–18)
BUN SERPL-MCNC: 34 MG/DL (ref 7–18)
CHLORIDE SERPL-SCNC: 114 MMOL/L (ref 100–111)
CO2 SERPL-SCNC: 23 MMOL/L (ref 21–32)
CREAT SERPL-MCNC: 2.21 MG/DL (ref 0.6–1.3)
EKG ATRIAL RATE: 57 BPM
EKG DIAGNOSIS: NORMAL
EKG P AXIS: 22 DEGREES
EKG P-R INTERVAL: 118 MS
EKG Q-T INTERVAL: 466 MS
EKG QRS DURATION: 100 MS
EKG QTC CALCULATION (BAZETT): 453 MS
EKG R AXIS: 16 DEGREES
EKG T AXIS: -73 DEGREES
EKG VENTRICULAR RATE: 57 BPM
POTASSIUM SERPL-SCNC: 3.8 MMOL/L (ref 3.5–5.5)
SODIUM SERPL-SCNC: 143 MMOL/L (ref 136–145)

## 2023-04-06 PROCEDURE — 82565 ASSAY OF CREATININE: CPT

## 2023-04-06 PROCEDURE — 84520 ASSAY OF UREA NITROGEN: CPT

## 2023-04-06 PROCEDURE — 36415 COLL VENOUS BLD VENIPUNCTURE: CPT

## 2023-04-06 PROCEDURE — 80051 ELECTROLYTE PANEL: CPT

## 2023-04-06 PROCEDURE — 93005 ELECTROCARDIOGRAM TRACING: CPT

## 2023-04-25 ENCOUNTER — OFFICE VISIT (OUTPATIENT)
Age: 63
End: 2023-04-25
Payer: MEDICAID

## 2023-04-25 VITALS
HEART RATE: 58 BPM | SYSTOLIC BLOOD PRESSURE: 148 MMHG | WEIGHT: 179 LBS | HEIGHT: 67 IN | DIASTOLIC BLOOD PRESSURE: 94 MMHG | OXYGEN SATURATION: 97 % | BODY MASS INDEX: 28.09 KG/M2

## 2023-04-25 DIAGNOSIS — R06.09 DYSPNEA ON EXERTION: Primary | ICD-10-CM

## 2023-04-25 DIAGNOSIS — I10 HYPERTENSION, UNSPECIFIED TYPE: ICD-10-CM

## 2023-04-25 DIAGNOSIS — E78.5 HYPERLIPIDEMIA, UNSPECIFIED HYPERLIPIDEMIA TYPE: ICD-10-CM

## 2023-04-25 DIAGNOSIS — Z86.73 HISTORY OF CVA (CEREBROVASCULAR ACCIDENT): ICD-10-CM

## 2023-04-25 DIAGNOSIS — Z87.891 HISTORY OF TOBACCO ABUSE: ICD-10-CM

## 2023-04-25 DIAGNOSIS — F14.11 HISTORY OF COCAINE ABUSE (HCC): ICD-10-CM

## 2023-04-25 PROCEDURE — 3077F SYST BP >= 140 MM HG: CPT | Performed by: INTERNAL MEDICINE

## 2023-04-25 PROCEDURE — 99204 OFFICE O/P NEW MOD 45 MIN: CPT | Performed by: INTERNAL MEDICINE

## 2023-04-25 PROCEDURE — 3080F DIAST BP >= 90 MM HG: CPT | Performed by: INTERNAL MEDICINE

## 2023-04-25 RX ORDER — CARVEDILOL 12.5 MG/1
12.5 TABLET ORAL 2 TIMES DAILY
Qty: 60 TABLET | Refills: 3 | Status: SHIPPED | OUTPATIENT
Start: 2023-04-25

## 2023-04-25 RX ORDER — HYDRALAZINE HYDROCHLORIDE 100 MG/1
100 TABLET, FILM COATED ORAL 3 TIMES DAILY
Qty: 90 TABLET | Refills: 5 | Status: SHIPPED | OUTPATIENT
Start: 2023-04-25

## 2023-04-25 RX ORDER — DAPAGLIFLOZIN 10 MG/1
10 TABLET, FILM COATED ORAL DAILY
COMMUNITY
Start: 2023-04-09

## 2023-04-25 ASSESSMENT — PATIENT HEALTH QUESTIONNAIRE - PHQ9
SUM OF ALL RESPONSES TO PHQ QUESTIONS 1-9: 0
SUM OF ALL RESPONSES TO PHQ9 QUESTIONS 1 & 2: 0
SUM OF ALL RESPONSES TO PHQ QUESTIONS 1-9: 0
SUM OF ALL RESPONSES TO PHQ QUESTIONS 1-9: 0
2. FEELING DOWN, DEPRESSED OR HOPELESS: 0
1. LITTLE INTEREST OR PLEASURE IN DOING THINGS: 0
SUM OF ALL RESPONSES TO PHQ QUESTIONS 1-9: 0

## 2023-04-25 ASSESSMENT — ENCOUNTER SYMPTOMS: SHORTNESS OF BREATH: 1

## 2023-04-25 NOTE — PROGRESS NOTES
Have you had Fatigue? Yes if so how long 2 months    2. Have you had have you had Chest Pain? No     3. Have you had Dyspnea (SOB) ? No   can you walk 2 blocks or a flight of stairs without SOB yes    4. Have you had Orthopnea? No     5. Have you had PND? No     6. Have you had leg swelling? No     7. Have you had any weight gain? No     8. Have you had any palpitations? No     9. Have you had any syncope? No     10. Do you have any wounds on legs?  no

## 2023-04-25 NOTE — PATIENT INSTRUCTIONS
Learning About the [facility] Diet  What is the Mediterranean diet? The Mediterranean diet is a style of eating rather than a diet plan. It features foods eaten in [place], [place], [place] and [place], and other countries along the [facility]. It emphasizes eating foods like fish, fruits, vegetables, beans, high-fiber breads and whole grains, nuts, and olive oil. This style of eating includes limited red meat, cheese, and sweets. Why choose the Mediterranean diet? A Mediterranean-style diet may improve heart health. It contains more fat than other heart-healthy diets. But the fats are mainly from nuts, unsaturated oils (such as fish oils and olive oil), and certain nut or seed oils (such as canola, soybean, or flaxseed oil). These fats may help protect the heart and blood vessels. How can you get started on the Mediterranean diet? Here are some things you can do to switch to a more Mediterranean way of eating. What to eat  Eat a variety of fruits and vegetables each day, such as grapes, blueberries, tomatoes, broccoli, peppers, figs, olives, spinach, eggplant, beans, lentils, and chickpeas. Eat a variety of whole-grain foods each day, such as oats, brown rice, and whole wheat bread, pasta, and couscous. Eat fish at least 2 times a week. Try tuna, salmon, mackerel, lake trout, herring, or sardines. Eat moderate amounts of low-fat dairy products, such as milk, cheese, or yogurt. Eat moderate amounts of poultry and eggs. Choose healthy (unsaturated) fats, such as nuts, olive oil, and certain nut or seed oils like canola, soybean, and flaxseed. Limit unhealthy (saturated) fats, such as butter, palm oil, and coconut oil. And limit fats found in animal products, such as meat and dairy products made with whole milk. Try to eat red meat only a few times a month in very small amounts. Limit sweets and desserts to only a few times a week. This includes sugar-sweetened drinks like soda.   The

## 2023-05-24 ENCOUNTER — OFFICE VISIT (OUTPATIENT)
Age: 63
End: 2023-05-24
Payer: MEDICAID

## 2023-05-24 VITALS
WEIGHT: 179.4 LBS | SYSTOLIC BLOOD PRESSURE: 143 MMHG | HEART RATE: 56 BPM | HEIGHT: 67 IN | BODY MASS INDEX: 28.16 KG/M2 | OXYGEN SATURATION: 99 % | DIASTOLIC BLOOD PRESSURE: 96 MMHG | TEMPERATURE: 98.3 F | RESPIRATION RATE: 16 BRPM

## 2023-05-24 DIAGNOSIS — E55.9 VITAMIN D DEFICIENCY: ICD-10-CM

## 2023-05-24 DIAGNOSIS — K02.9 DENTAL CARIES: ICD-10-CM

## 2023-05-24 DIAGNOSIS — E78.2 MIXED HYPERLIPIDEMIA: ICD-10-CM

## 2023-05-24 DIAGNOSIS — I10 UNCONTROLLED STAGE 2 HYPERTENSION: Primary | ICD-10-CM

## 2023-05-24 DIAGNOSIS — F17.200 CURRENT EVERY DAY SMOKER: ICD-10-CM

## 2023-05-24 DIAGNOSIS — N18.32 STAGE 3B CHRONIC KIDNEY DISEASE (HCC): ICD-10-CM

## 2023-05-24 DIAGNOSIS — Z86.2 HISTORY OF ANEMIA: ICD-10-CM

## 2023-05-24 PROCEDURE — 3080F DIAST BP >= 90 MM HG: CPT | Performed by: NURSE PRACTITIONER

## 2023-05-24 PROCEDURE — 99211 OFF/OP EST MAY X REQ PHY/QHP: CPT | Performed by: NURSE PRACTITIONER

## 2023-05-24 PROCEDURE — 99214 OFFICE O/P EST MOD 30 MIN: CPT | Performed by: NURSE PRACTITIONER

## 2023-05-24 PROCEDURE — 3077F SYST BP >= 140 MM HG: CPT | Performed by: NURSE PRACTITIONER

## 2023-05-24 SDOH — ECONOMIC STABILITY: HOUSING INSECURITY
IN THE LAST 12 MONTHS, WAS THERE A TIME WHEN YOU DID NOT HAVE A STEADY PLACE TO SLEEP OR SLEPT IN A SHELTER (INCLUDING NOW)?: NO

## 2023-05-24 SDOH — ECONOMIC STABILITY: FOOD INSECURITY: WITHIN THE PAST 12 MONTHS, THE FOOD YOU BOUGHT JUST DIDN'T LAST AND YOU DIDN'T HAVE MONEY TO GET MORE.: NEVER TRUE

## 2023-05-24 SDOH — ECONOMIC STABILITY: INCOME INSECURITY: HOW HARD IS IT FOR YOU TO PAY FOR THE VERY BASICS LIKE FOOD, HOUSING, MEDICAL CARE, AND HEATING?: NOT HARD AT ALL

## 2023-05-24 SDOH — ECONOMIC STABILITY: FOOD INSECURITY: WITHIN THE PAST 12 MONTHS, YOU WORRIED THAT YOUR FOOD WOULD RUN OUT BEFORE YOU GOT MONEY TO BUY MORE.: NEVER TRUE

## 2023-05-24 NOTE — ASSESSMENT & PLAN NOTE
Uncontrolled, continue current medications     Staff message sent to [facility], cardiology    Hello,  [id number] [address] DOE Caraballo. I saw this patient today. I had previously prescribed atenolol 50 mg daily, hydralazine 50 mg 3 times daily, and amlodipine 10 mg daily. Dr Paulina Dee saw pt in April and changed regimen to below: \"HTN - Stage II pressure in office, Continue hydralazine but increase to 100mg po TID,DC atenolol 50mg po daily can sometimes relapse from cocaine needs alpha blockade, chagne to coreg 12.5mg po bid, continue norvasc 10mg po daily,\"    Patient attempted the change in therapy but developed severe headaches so she returned back to atenolol 50 mg daily, hydralazine 100 mg 2 times daily (cannot member 3 times daily), and amlodipine 10 mg daily. She no longer experiences headaches but her blood pressure remains elevated. She denies using cocaine and does not plan on returning back to using. Today BP is 143/96. She does have CKD stage IV. She continues to smoke cigarettes (2 a day) and she consumes salt. Educated on both of those. I did not change her BP medication regimen. She has a follow-up appointment with you on 6/1/2023. Have a great day!!  Dr. Fabiola Koehler, AGNP-C, DNP    Continue amlodipine 10 mg, hydralazine 100 mg 3 times daily, atenolol 50 mg daily    Strongly encourage patient to quit smoking  Discussed the need for sodium restriction    Follow-up with cardiology 6/1/2023.

## 2023-05-24 NOTE — PROGRESS NOTES
Benjamín Ellie presents today for   Chief Complaint   Patient presents with    Hypertension     6 month f/u       1. \"Have you been to the ER, urgent care clinic since your last visit? Hospitalized since your last visit? \" No    2. \"Have you seen or consulted any other health care providers outside of the [address] [place] [address] since your last visit? \" NO     3. For patients aged 39-70: Has the patient had a colonoscopy / FIT/ Cologuard? No      If the patient is female:    4. For patients aged 41-77: Has the patient had a mammogram within the past 2 years? Yes  See top three    5. For patients aged 21-65: Has the patient had a pap smear?  Yes

## 2023-05-24 NOTE — PROGRESS NOTES
[facility] [id number] [address]   [place], [address] [place]  [phone] [facility]/[phone] fax    5/24/2023    Manuel Carlos 1960 is a pleasant Black /  female. Todays concern/HPI:  HTN. Saw cardio. BP was elevated. Smoked before appt. Med therapy changed. Unable to jill coreg, caused H/As not using cocaine. Returned back to amlodipine 10, hydralazine 100mg TID (having trouble remembering hydralazine TID), atenolol 50mg qd. No side effects. Puts salt on food, will reduce. HTN - Stage II pressure in office, Continue hydralazine but increase to 100mg po TID,DC atenolol 50mg po daily can sometimes relapse from cocaine needs alpha blockade, chagne to coreg 12.5mg po bid, continue norvasc 10mg po daily,    Dental. Appt 6/15/23. Needs to stop plavix due to having teeth pulled. Current smoker. Smoking 2 cigs per day. Working on cessation.      Review of Systems - Negative except above    Past Medical History:   Diagnosis Date    Abnormal mammogram 2014    left with biopsy    Acute lacunar stroke ([address] 05/21/2021    Acute Lacunar Stroke (acute lacunar infarct in the posterior left lentiform nucleus extending into the corona radiata) with residual right hemiparesis    Cerebrovascular accident (CVA) due to embolism of left middle cerebral artery ([address] 06/06/2022    Chronic kidney disease     Stage 4, not on dialysis    Constipation     Current every day smoker     Current use of aspirin 05/23/2021    Gastroesophageal reflux disease 06/30/2016    Hemiparesis of right dominant side due to acute cerebrovascular disease ([address] 05/21/2021    History of cocaine abuse ([address] 12/01/2022    History of Helicobacter pylori infection 07/24/2015    History of iron deficiency anemia 06/2014    History of vitamin D deficiency 06/11/2015    Hypertension     Intractable migraine without aura and without status migrainosus 12/01/2022    Mixed hyperlipidemia     On clopidogrel

## 2023-05-24 NOTE — ASSESSMENT & PLAN NOTE
Having teeth extracted on 6/15/2023 in preparation for artificial teeth    May stop Plavix 5 days prior to tooth extraction  Restart Plavix 24 hours after procedure as long as there is not excessive bleeding  Continue statin and aspirin therapy

## 2023-05-26 ENCOUNTER — TELEPHONE (OUTPATIENT)
Age: 63
End: 2023-05-26

## 2023-06-19 ENCOUNTER — TELEPHONE (OUTPATIENT)
Age: 63
End: 2023-06-19

## 2023-06-19 RX ORDER — ATENOLOL 50 MG/1
TABLET ORAL
Qty: 90 TABLET | Refills: 0 | OUTPATIENT
Start: 2023-06-19

## 2023-06-19 NOTE — TELEPHONE ENCOUNTER
atenolol (TENORMIN) 50 MG tablet (Discontinued)   11/30/2022 4/25/2023    Sig - Route: Take 1 tablet by mouth daily - Oral    Class: Historical Med    Reason for Discontinue: Alternate therapy      Unable to delete RX request. Provider will have to refuse since the request came from the pharmacy.

## 2023-06-19 NOTE — TELEPHONE ENCOUNTER
On 6/13/23 her cardio changed her BP therapy. She needs to reach out to cardio for medication refills.  Thank you

## 2023-06-19 NOTE — TELEPHONE ENCOUNTER
Pt called stating the prescription Hydralazine   Causes her to get headaches . Patient states at her last appt. She discussed with AN  going back on   Atenolol 50mg   Please resend to her pharmacy   [facility] [address]

## 2023-06-19 NOTE — TELEPHONE ENCOUNTER
Followed up with Mrs Remigio Saldivar regarding hypertension med adjustment request. Patient was advised to contact Dr Jacob Ba Cardiologist for med adjustment. Point of contact information offered patient declined stating that she already has info. HIPPA verified no further concerns at this time.

## 2023-06-26 ENCOUNTER — TELEPHONE (OUTPATIENT)
Age: 63
End: 2023-06-26

## 2023-06-26 ENCOUNTER — HOSPITAL ENCOUNTER (OUTPATIENT)
Facility: HOSPITAL | Age: 63
Discharge: HOME | End: 2023-06-29

## 2023-06-26 LAB — LABCORP SPECIMEN COLLECTION: NORMAL

## 2023-06-26 RX ORDER — ATENOLOL 50 MG/1
TABLET ORAL
Qty: 90 TABLET | Refills: 2 | OUTPATIENT
Start: 2023-06-26

## 2023-07-03 RX ORDER — CARVEDILOL 6.25 MG/1
6.25 TABLET ORAL 2 TIMES DAILY
Qty: 60 TABLET | Refills: 3 | Status: SHIPPED | OUTPATIENT
Start: 2023-07-03

## 2023-07-28 ENCOUNTER — TELEPHONE (OUTPATIENT)
Age: 63
End: 2023-07-28

## 2023-07-28 NOTE — TELEPHONE ENCOUNTER
Patient called stating that she has been having headaches since she started Coreg 6.25mg twice daily on 7/3/23. She doesn't have a way of checking her blood pressure at home. I asked to her to look into getting one for home to keep a log of her blood pressure. She wants to know what can we switch her too. She is also on Hydralazine 100mg TID and Norvasc 10mg daily.  Her last bp from 6/13/23 was 97/67

## 2023-07-29 RX ORDER — BLOOD PRESSURE TEST KIT
1 KIT MISCELLANEOUS 2 TIMES DAILY
Qty: 1 KIT | Refills: 0 | Status: SHIPPED | OUTPATIENT
Start: 2023-07-29

## 2023-07-29 NOTE — TELEPHONE ENCOUNTER
Discussed with patient that she is taking    farxiga  coreg  atorvastatin  vit d  aspirin 81  Clopidogrel    Recommended blood pressure cuff and to take blood pressure twice a day with heart rate and to show us those values on followup visit.

## 2023-08-01 RX ORDER — CLOPIDOGREL BISULFATE 75 MG/1
TABLET ORAL
Qty: 120 TABLET | Refills: 0 | Status: SHIPPED | OUTPATIENT
Start: 2023-08-01

## 2023-08-22 ENCOUNTER — OFFICE VISIT (OUTPATIENT)
Age: 63
End: 2023-08-22
Payer: MEDICAID

## 2023-08-22 VITALS
OXYGEN SATURATION: 98 % | WEIGHT: 179 LBS | BODY MASS INDEX: 28.04 KG/M2 | DIASTOLIC BLOOD PRESSURE: 110 MMHG | HEART RATE: 78 BPM | SYSTOLIC BLOOD PRESSURE: 170 MMHG

## 2023-08-22 DIAGNOSIS — R06.00 DYSPNEA, UNSPECIFIED TYPE: ICD-10-CM

## 2023-08-22 DIAGNOSIS — I10 HYPERTENSION, UNSPECIFIED TYPE: Primary | ICD-10-CM

## 2023-08-22 DIAGNOSIS — Z86.73 HISTORY OF CVA (CEREBROVASCULAR ACCIDENT): ICD-10-CM

## 2023-08-22 DIAGNOSIS — E78.5 HYPERLIPIDEMIA, UNSPECIFIED HYPERLIPIDEMIA TYPE: ICD-10-CM

## 2023-08-22 PROCEDURE — 99214 OFFICE O/P EST MOD 30 MIN: CPT | Performed by: INTERNAL MEDICINE

## 2023-08-22 PROCEDURE — 3080F DIAST BP >= 90 MM HG: CPT | Performed by: INTERNAL MEDICINE

## 2023-08-22 PROCEDURE — 3077F SYST BP >= 140 MM HG: CPT | Performed by: INTERNAL MEDICINE

## 2023-08-22 RX ORDER — HYDRALAZINE HYDROCHLORIDE 50 MG/1
50 TABLET, FILM COATED ORAL 3 TIMES DAILY
Qty: 90 TABLET | Refills: 3 | Status: SHIPPED | OUTPATIENT
Start: 2023-08-22

## 2023-08-22 RX ORDER — LISINOPRIL 10 MG/1
5 TABLET ORAL DAILY
Qty: 90 TABLET | Refills: 1 | Status: SHIPPED | OUTPATIENT
Start: 2023-08-22

## 2023-08-22 ASSESSMENT — ENCOUNTER SYMPTOMS
NAUSEA: 0
APNEA: 0
WHEEZING: 0
ABDOMINAL PAIN: 0
COLOR CHANGE: 0
CONSTIPATION: 0
SHORTNESS OF BREATH: 1
CHEST TIGHTNESS: 0
DIARRHEA: 0
COUGH: 0
BLOOD IN STOOL: 0

## 2023-08-22 NOTE — PROGRESS NOTES
HENT:      Head: Normocephalic and atraumatic. Eyes:      General: No scleral icterus. Right eye: No discharge. Left eye: No discharge. Conjunctiva/sclera: Conjunctivae normal.   Neck:      Vascular: No carotid bruit. Cardiovascular:      Rate and Rhythm: Normal rate and regular rhythm. Heart sounds: Normal heart sounds. No murmur heard. No friction rub. No gallop. Pulmonary:      Effort: Pulmonary effort is normal. No respiratory distress. Breath sounds: Normal breath sounds. No wheezing, rhonchi or rales. Chest:      Chest wall: No tenderness. Abdominal:      General: Bowel sounds are normal. There is no distension. Palpations: Abdomen is soft. Tenderness: There is no abdominal tenderness. There is no guarding. Musculoskeletal:         General: No swelling or tenderness. Cervical back: Neck supple. Right lower leg: No edema. Left lower leg: No edema. Skin:     General: Skin is warm and dry. Findings: No erythema or rash. Neurological:      Mental Status: She is alert. Mental status is at baseline. Motor: No weakness. Gait: Gait normal.   Psychiatric:         Mood and Affect: Mood normal.         Behavior: Behavior normal.         Thought Content: Thought content normal.       ASSESSMENT and PLAN  Ms. Moya has a reminder for a \"due or due soon\" health maintenance. I have asked that she contact her primary care provider for follow-up on this health maintenance. HLD - lipitor 80mg po daily, survey lipid panel periodically, 11/20222 , . HTN - Stage II pressure in office, Continue hydralazine but change to 50mg po TID states that the 100mg po TID gives headaches, Continue to coreg 12.5mg po bid, Not currently on norvasc. Start lisinopril 5mg po daily, check BMP in 1 week.       history of CVA - lipitor 80mg po daily, plavix 75mg po daily, only takes aspirin PRN for headache.      history of Cocaine Abuse -

## 2023-09-14 ENCOUNTER — TELEPHONE (OUTPATIENT)
Age: 63
End: 2023-09-14

## 2023-09-14 DIAGNOSIS — E78.2 MIXED HYPERLIPIDEMIA: Primary | ICD-10-CM

## 2023-09-14 RX ORDER — ATORVASTATIN CALCIUM 80 MG/1
80 TABLET, FILM COATED ORAL DAILY
Qty: 90 TABLET | Refills: 0 | Status: SHIPPED | OUTPATIENT
Start: 2023-09-14

## 2023-09-14 NOTE — PROGRESS NOTES
Requested Prescriptions     Signed Prescriptions Disp Refills    atorvastatin (LIPITOR) 80 MG tablet 90 tablet 0     Sig: Take 1 tablet by mouth daily

## 2023-09-14 NOTE — TELEPHONE ENCOUNTER
atorvastatin (LIPITOR) 80 MG tablet    [facility] [address] ([facility])

## 2023-09-24 ASSESSMENT — ENCOUNTER SYMPTOMS
COLOR CHANGE: 0
WHEEZING: 0
APNEA: 0
NAUSEA: 0
DIARRHEA: 0
ABDOMINAL PAIN: 0
BLOOD IN STOOL: 0
CHEST TIGHTNESS: 0
CONSTIPATION: 0
COUGH: 0

## 2023-09-29 ENCOUNTER — OFFICE VISIT (OUTPATIENT)
Age: 63
End: 2023-09-29
Payer: MEDICAID

## 2023-09-29 VITALS
DIASTOLIC BLOOD PRESSURE: 88 MMHG | HEART RATE: 69 BPM | OXYGEN SATURATION: 96 % | SYSTOLIC BLOOD PRESSURE: 132 MMHG | WEIGHT: 178.6 LBS | BODY MASS INDEX: 28.03 KG/M2 | HEIGHT: 67 IN

## 2023-09-29 DIAGNOSIS — E78.5 HYPERLIPIDEMIA, UNSPECIFIED HYPERLIPIDEMIA TYPE: ICD-10-CM

## 2023-09-29 DIAGNOSIS — I10 HYPERTENSION, UNSPECIFIED TYPE: Primary | ICD-10-CM

## 2023-09-29 DIAGNOSIS — Z86.73 HISTORY OF CVA (CEREBROVASCULAR ACCIDENT): ICD-10-CM

## 2023-09-29 DIAGNOSIS — Z87.891 HISTORY OF TOBACCO ABUSE: ICD-10-CM

## 2023-09-29 DIAGNOSIS — F14.11 HISTORY OF COCAINE ABUSE (HCC): ICD-10-CM

## 2023-09-29 PROCEDURE — 3078F DIAST BP <80 MM HG: CPT | Performed by: INTERNAL MEDICINE

## 2023-09-29 PROCEDURE — 99214 OFFICE O/P EST MOD 30 MIN: CPT | Performed by: INTERNAL MEDICINE

## 2023-09-29 PROCEDURE — 3074F SYST BP LT 130 MM HG: CPT | Performed by: INTERNAL MEDICINE

## 2023-09-29 RX ORDER — EZETIMIBE 10 MG/1
10 TABLET ORAL DAILY
Qty: 90 TABLET | Refills: 1 | Status: SHIPPED | OUTPATIENT
Start: 2023-09-29

## 2023-09-29 NOTE — PROGRESS NOTES
Have you had Fatigue? No      2. Have you had have you had Chest Pain? No     3. Have you had Dyspnea (SOB)? No     4. Have you had Orthopnea? No      5. Have you had PND? No     6. Have you had leg swelling? No       7. Have you had any weight gain? No      8. Have you had any palpitations? No        9. Have you had any syncope? No       10. Do you have any wounds on legs?  No

## 2023-09-29 NOTE — PATIENT INSTRUCTIONS
Learning About the [address] [place] [address] Diet  What is the Mediterranean diet? The Mediterranean diet is a style of eating rather than a diet plan. It features foods eaten in [facility], [place], [place] and [place], and other countries along the [facility]. It emphasizes eating foods like fish, fruits, vegetables, beans, high-fiber breads and whole grains, nuts, and olive oil. This style of eating includes limited red meat, cheese, and sweets. Why choose the Mediterranean diet? A Mediterranean-style diet may improve heart health. It contains more fat than other heart-healthy diets. But the fats are mainly from nuts, unsaturated oils (such as fish oils and olive oil), and certain nut or seed oils (such as canola, soybean, or flaxseed oil). These fats may help protect the heart and blood vessels. How can you get started on the Mediterranean diet? Here are some things you can do to switch to a more Mediterranean way of eating. What to eat  Eat a variety of fruits and vegetables each day, such as grapes, blueberries, tomatoes, broccoli, peppers, figs, olives, spinach, eggplant, beans, lentils, and chickpeas. Eat a variety of whole-grain foods each day, such as oats, brown rice, and whole wheat bread, pasta, and couscous. Eat fish at least 2 times a week. Try tuna, salmon, mackerel, lake trout, herring, or sardines. Eat moderate amounts of low-fat dairy products, such as milk, cheese, or yogurt. Eat moderate amounts of poultry and eggs. Choose healthy (unsaturated) fats, such as nuts, olive oil, and certain nut or seed oils like canola, soybean, and flaxseed. Limit unhealthy (saturated) fats, such as butter, palm oil, and coconut oil. And limit fats found in animal products, such as meat and dairy products made with whole milk. Try to eat red meat only a few times a month in very small amounts. Limit sweets and desserts to only a few times a week. This includes sugar-sweetened drinks like soda.   The

## 2023-09-30 ASSESSMENT — ENCOUNTER SYMPTOMS: SHORTNESS OF BREATH: 0

## 2023-10-02 NOTE — TELEPHONE ENCOUNTER
Requested Prescriptions     Pending Prescriptions Disp Refills    losartan (COZAAR) 100 mg tablet 90 Tablet 0     Sig: Take 1 Tablet by mouth daily. Patient is completely out of medication. Advised patient to call prior to running out, as the turn around is 24-48 hours. Patient understood. no

## 2023-10-10 RX ORDER — ERGOCALCIFEROL 1.25 MG/1
50000 CAPSULE ORAL WEEKLY
Qty: 13 CAPSULE | Refills: 1 | Status: SHIPPED | OUTPATIENT
Start: 2023-10-10

## 2023-10-27 RX ORDER — BLOOD PRESSURE TEST KIT
1 KIT MISCELLANEOUS 2 TIMES DAILY
Qty: 1 KIT | Refills: 0 | Status: SHIPPED | OUTPATIENT
Start: 2023-10-27

## 2023-11-17 DIAGNOSIS — E78.2 MIXED HYPERLIPIDEMIA: ICD-10-CM

## 2023-11-17 RX ORDER — CARVEDILOL 6.25 MG/1
6.25 TABLET ORAL 2 TIMES DAILY
Qty: 60 TABLET | Refills: 3 | Status: SHIPPED | OUTPATIENT
Start: 2023-11-17

## 2023-11-17 RX ORDER — HYDRALAZINE HYDROCHLORIDE 50 MG/1
50 TABLET, FILM COATED ORAL 3 TIMES DAILY
Qty: 90 TABLET | Refills: 3 | Status: SHIPPED | OUTPATIENT
Start: 2023-11-17

## 2023-11-17 RX ORDER — LISINOPRIL 10 MG/1
5 TABLET ORAL DAILY
Qty: 90 TABLET | Refills: 1 | Status: SHIPPED | OUTPATIENT
Start: 2023-11-17

## 2023-11-17 RX ORDER — EZETIMIBE 10 MG/1
10 TABLET ORAL DAILY
Qty: 90 TABLET | Refills: 1 | Status: SHIPPED | OUTPATIENT
Start: 2023-11-17

## 2023-11-17 RX ORDER — ERGOCALCIFEROL 1.25 MG/1
50000 CAPSULE ORAL WEEKLY
Qty: 13 CAPSULE | Refills: 1 | Status: CANCELLED | OUTPATIENT
Start: 2023-11-17

## 2023-11-17 RX ORDER — BLOOD PRESSURE TEST KIT
1 KIT MISCELLANEOUS 2 TIMES DAILY
Qty: 1 KIT | Refills: 0 | Status: SHIPPED | OUTPATIENT
Start: 2023-11-17

## 2023-11-21 NOTE — TELEPHONE ENCOUNTER
Last OV:  5/24/2023  Next OV:  11/27/2023  Last refill:   8/1/2023-- Plavix  9/14/2023 -- atorvastatin

## 2023-11-22 RX ORDER — CLOPIDOGREL BISULFATE 75 MG/1
75 TABLET ORAL DAILY
Qty: 120 TABLET | Refills: 0 | OUTPATIENT
Start: 2023-11-22

## 2023-11-22 RX ORDER — ATORVASTATIN CALCIUM 80 MG/1
80 TABLET, FILM COATED ORAL DAILY
Qty: 90 TABLET | Refills: 0 | OUTPATIENT
Start: 2023-11-22

## 2023-11-27 ENCOUNTER — OFFICE VISIT (OUTPATIENT)
Age: 63
End: 2023-11-27
Payer: MEDICAID

## 2023-11-27 VITALS
HEIGHT: 67 IN | SYSTOLIC BLOOD PRESSURE: 140 MMHG | RESPIRATION RATE: 16 BRPM | DIASTOLIC BLOOD PRESSURE: 93 MMHG | TEMPERATURE: 98.1 F | OXYGEN SATURATION: 98 % | WEIGHT: 183 LBS | HEART RATE: 78 BPM | BODY MASS INDEX: 28.72 KG/M2

## 2023-11-27 DIAGNOSIS — I63.412 CEREBROVASCULAR ACCIDENT (CVA) DUE TO EMBOLISM OF LEFT MIDDLE CEREBRAL ARTERY (HCC): ICD-10-CM

## 2023-11-27 DIAGNOSIS — M06.9 RHEUMATOID ARTHRITIS, INVOLVING UNSPECIFIED SITE, UNSPECIFIED WHETHER RHEUMATOID FACTOR PRESENT (HCC): ICD-10-CM

## 2023-11-27 DIAGNOSIS — N18.32 HYPERTENSIVE HEART AND KIDNEY DISEASE WITHOUT HEART FAILURE AND WITH STAGE 3B CHRONIC KIDNEY DISEASE (HCC): ICD-10-CM

## 2023-11-27 DIAGNOSIS — Z12.31 BREAST CANCER SCREENING BY MAMMOGRAM: ICD-10-CM

## 2023-11-27 DIAGNOSIS — G81.91 HEMIPARESIS OF RIGHT DOMINANT SIDE DUE TO ACUTE CEREBROVASCULAR DISEASE (HCC): ICD-10-CM

## 2023-11-27 DIAGNOSIS — G43.019 INTRACTABLE MIGRAINE WITHOUT AURA AND WITHOUT STATUS MIGRAINOSUS: ICD-10-CM

## 2023-11-27 DIAGNOSIS — Z12.4 CERVICAL CANCER SCREENING: ICD-10-CM

## 2023-11-27 DIAGNOSIS — I67.89 HEMIPARESIS OF RIGHT DOMINANT SIDE DUE TO ACUTE CEREBROVASCULAR DISEASE (HCC): ICD-10-CM

## 2023-11-27 DIAGNOSIS — E78.2 MIXED HYPERLIPIDEMIA: Primary | ICD-10-CM

## 2023-11-27 DIAGNOSIS — M06.9 RHEUMATOID ARTHRITIS INVOLVING BOTH HANDS, UNSPECIFIED WHETHER RHEUMATOID FACTOR PRESENT (HCC): ICD-10-CM

## 2023-11-27 DIAGNOSIS — F17.200 CURRENT EVERY DAY SMOKER: ICD-10-CM

## 2023-11-27 DIAGNOSIS — I13.10 HYPERTENSIVE HEART AND KIDNEY DISEASE WITHOUT HEART FAILURE AND WITH STAGE 3B CHRONIC KIDNEY DISEASE (HCC): ICD-10-CM

## 2023-11-27 DIAGNOSIS — R91.1 NODULE OF LOWER LOBE OF RIGHT LUNG: ICD-10-CM

## 2023-11-27 DIAGNOSIS — E55.9 VITAMIN D DEFICIENCY: ICD-10-CM

## 2023-11-27 DIAGNOSIS — Z23 ENCOUNTER FOR IMMUNIZATION: ICD-10-CM

## 2023-11-27 PROBLEM — I12.9 BENIGN HYPERTENSION WITH CKD (CHRONIC KIDNEY DISEASE) STAGE IV (HCC): Status: RESOLVED | Noted: 2022-12-02 | Resolved: 2023-11-27

## 2023-11-27 PROBLEM — N18.4 BENIGN HYPERTENSION WITH CKD (CHRONIC KIDNEY DISEASE) STAGE IV (HCC): Status: RESOLVED | Noted: 2022-12-02 | Resolved: 2023-11-27

## 2023-11-27 PROBLEM — I10 UNCONTROLLED STAGE 2 HYPERTENSION: Status: RESOLVED | Noted: 2023-05-24 | Resolved: 2023-11-27

## 2023-11-27 PROBLEM — Z79.899 ON STATIN THERAPY DUE TO RISK OF FUTURE CARDIOVASCULAR EVENT: Status: RESOLVED | Noted: 2021-05-22 | Resolved: 2023-11-27

## 2023-11-27 PROCEDURE — 90670 PCV13 VACCINE IM: CPT | Performed by: NURSE PRACTITIONER

## 2023-11-27 PROCEDURE — PBSHW PNEUMOCOCCAL, PCV-13, PREVNAR 13, (AGE 6 WKS+), IM: Performed by: NURSE PRACTITIONER

## 2023-11-27 PROCEDURE — PBSHW INFLUENZA, FLUCELVAX, (AGE 6 MO+), IM, PF, 0.5 ML: Performed by: NURSE PRACTITIONER

## 2023-11-27 PROCEDURE — 99214 OFFICE O/P EST MOD 30 MIN: CPT | Performed by: NURSE PRACTITIONER

## 2023-11-27 PROCEDURE — 90674 CCIIV4 VAC NO PRSV 0.5 ML IM: CPT | Performed by: NURSE PRACTITIONER

## 2023-11-27 RX ORDER — CLOPIDOGREL BISULFATE 75 MG/1
75 TABLET ORAL DAILY
Qty: 93 TABLET | Refills: 3 | Status: SHIPPED | OUTPATIENT
Start: 2023-11-27

## 2023-11-27 RX ORDER — ATORVASTATIN CALCIUM 80 MG/1
80 TABLET, FILM COATED ORAL DAILY
Qty: 93 TABLET | Refills: 3 | Status: SHIPPED | OUTPATIENT
Start: 2023-11-27

## 2023-11-27 ASSESSMENT — PATIENT HEALTH QUESTIONNAIRE - PHQ9
1. LITTLE INTEREST OR PLEASURE IN DOING THINGS: 0
SUM OF ALL RESPONSES TO PHQ QUESTIONS 1-9: 0
SUM OF ALL RESPONSES TO PHQ9 QUESTIONS 1 & 2: 0
SUM OF ALL RESPONSES TO PHQ QUESTIONS 1-9: 0
2. FEELING DOWN, DEPRESSED OR HOPELESS: 0

## 2023-11-27 NOTE — ASSESSMENT & PLAN NOTE
4/2022 CT Chest    Right posterior lower lobe 5 mm pulmonary nodule. If patient is high-risk, consider 12 month follow-up chest CT to assess for resolution.      Placed follow up CT Chest order

## 2023-11-27 NOTE — ASSESSMENT & PLAN NOTE
BP slightly elevated today  Cont current med therapy    Dr Jacob William T, cardio  Dr Susan aCrmona

## 2023-11-27 NOTE — ASSESSMENT & PLAN NOTE
Monitored by specialist- no acute findings meriting change in the plan   Dr Maura Ferraro, [facility]

## 2023-11-28 ENCOUNTER — HOSPITAL ENCOUNTER (OUTPATIENT)
Facility: HOSPITAL | Age: 63
Discharge: HOME | End: 2023-12-01

## 2023-11-28 LAB — LABCORP SPECIMEN COLLECTION: NORMAL

## 2023-12-28 RX ORDER — BLOOD PRESSURE TEST KIT
1 KIT MISCELLANEOUS 2 TIMES DAILY
Qty: 1 KIT | Refills: 0 | Status: SHIPPED | OUTPATIENT
Start: 2023-12-28

## 2024-01-16 ENCOUNTER — HOSPITAL ENCOUNTER (OUTPATIENT)
Facility: HOSPITAL | Age: 64
Discharge: HOME | End: 2024-01-19
Payer: MEDICAID

## 2024-01-16 VITALS — BODY MASS INDEX: 28.72 KG/M2 | HEIGHT: 67 IN | WEIGHT: 183 LBS

## 2024-01-16 DIAGNOSIS — Z12.31 BREAST CANCER SCREENING BY MAMMOGRAM: ICD-10-CM

## 2024-01-16 PROCEDURE — 77063 BREAST TOMOSYNTHESIS BI: CPT

## 2024-03-11 ENCOUNTER — TELEPHONE (OUTPATIENT)
Age: 64
End: 2024-03-11

## 2024-03-11 DIAGNOSIS — M06.9 RHEUMATOID ARTHRITIS, INVOLVING UNSPECIFIED SITE, UNSPECIFIED WHETHER RHEUMATOID FACTOR PRESENT (HCC): Primary | ICD-10-CM

## 2024-03-11 NOTE — TELEPHONE ENCOUNTER
----- Message from Marline Samayoa sent at 3/11/2024  9:28 AM EDT -----  Subject: Referral Request    Reason for referral request? arthritis   Provider patient wants to be referred to(if known):     Provider Phone Number(if known):    Additional Information for Provider? Dr. wesly rodríguez  ---------------------------------------------------------------------------  --------------  CALL BACK INFO    [phone]; OK to leave message on voicemail  ---------------------------------------------------------------------------  --------------

## 2024-04-01 PROBLEM — R26.81 GAIT INSTABILITY: Status: ACTIVE | Noted: 2024-04-01

## 2024-04-11 ENCOUNTER — HOSPITAL ENCOUNTER (OUTPATIENT)
Facility: HOSPITAL | Age: 64
Discharge: HOME | End: 2024-04-14

## 2024-04-11 PROCEDURE — 99001 SPECIMEN HANDLING PT-LAB: CPT

## 2024-04-16 LAB — LABCORP SPECIMEN COLLECTION: NORMAL

## 2024-04-22 ENCOUNTER — HOSPITAL ENCOUNTER (OUTPATIENT)
Facility: HOSPITAL | Age: 64
Discharge: HOME | End: 2024-04-25

## 2024-04-22 LAB — LABCORP SPECIMEN COLLECTION: NORMAL

## 2024-04-22 PROCEDURE — 99001 SPECIMEN HANDLING PT-LAB: CPT

## 2024-05-02 ENCOUNTER — HOSPITAL ENCOUNTER (OUTPATIENT)
Facility: HOSPITAL | Age: 64
Setting detail: RECURRING SERIES
End: 2024-05-02
Payer: MEDICAID

## 2024-05-22 RX ORDER — CARVEDILOL 6.25 MG/1
6.25 TABLET ORAL 2 TIMES DAILY
Qty: 60 TABLET | Refills: 0 | Status: SHIPPED | OUTPATIENT
Start: 2024-05-22

## 2024-05-30 ENCOUNTER — HOSPITAL ENCOUNTER (OUTPATIENT)
Facility: HOSPITAL | Age: 64
Setting detail: RECURRING SERIES
End: 2024-05-30
Payer: MEDICAID

## 2024-05-30 PROCEDURE — 97162 PT EVAL MOD COMPLEX 30 MIN: CPT

## 2024-05-30 NOTE — PROGRESS NOTES
[facility] [address]  [address]  [place] [address]  [phone] fax    Plan of Care / Statement of Necessity for Physical Therapy Services     Patient Name: Elina Moya : 1960   Medical   Diagnosis: Gait instability [R26.81]  CVA (cerebral vascular accident) (HCC) [I63.9] Treatment Diagnosis: R26.81  Unsteadiness on feet and M62.81  GENERAL MUSCLE WEAKNESS      Onset Date: 22 (stroke 3 years ago) Payor :  Payor: [facility] PL / Plan: [facility] [place] / Product Type: *No Product type* /    Referral Source: Anna Kate DNP Start of Care (SOC): 2024   Prior Hospitalization: See medical history Provider #: [id number]   Prior Level of Function: Prior to stroke, was able to ambulate without AD and independent with all ADL/IADL; SPC for amb ever since cane; caregiver assist since stroke with chores/transportation   Comorbidities: HTN, h/o CVA (L middle cerebral artery per EMR), RA, stage 4 kidney disease; h/o cocaine abuse (per EMR), -- see EMR for full PMH    Subjective Info:  Chief complaint: H/o stroke x3 years ago, affecting R side. Went through a course of PT. Now feels like her R leg is getting worse and very heavy. Amb with SPC - has been using cane since the stroke. Also reports arthritis is B hands. Denies h/o falls. Caregiver, Patricia, present for evaluation.   Current Pain: 7/10     Best Pain: 5/10          Worst Pain: 9/10  Constant/Intermittent: Constant   Progression since onset: Worsening over time   Pain Location/Description: Ant/lat R hip   Current Symptoms: Pain in R hip, instability with walking   Current Mobility: SPC for ambulation  Aggravating/Reliving Factors: Aggravating: walking for extended periods (>10 min)  Relieving: being off her feet - sitting or laying down   CHRISS: CVA 3 years ago   Previous Treatment: PT 3 years ago   Self Care: Independent, has caregiver that helps her with  Complexity : 3 Standardized tests and measures addressin body structure, function, activity limitation and / or participation in recreation  ;Presentation:  MEDIUM Complexity : Evolving with changing characteristics  ;Clinical Decision Making:  FOTO: 40 = MEDIUM Complexity : FOTO score of 26-74 FOTO score = an established functional score where 100 = no disability  Overall Complexity Rating: MEDIUM  Problem List: pain affecting function, decrease ROM, decrease strength, edema affection function, impaired gait/balance, decrease ADL/functional abilities, decrease activity tolerance, decrease flexibility/joint mobility, and decrease transfer abilities    Treatment Plan may include any combination of the followin Therapeutic Exercise, 31091 Neuromuscular Re-Education, 09030 Manual Therapy, 04248 Therapeutic Activity, 32221 Self Care/Home Management, 51695 Electrical Stim unattended, 06973 Electrical Stim attended, and 91852 Gait Training  Patient / Family readiness to learn indicated by: asking questions, trying to perform skills, interest, return verbalization , and return demonstration   Persons(s) to be included in education: patient (P) and family support person (FSP); list Asil, Caregiver  Barriers to Learning/Limitations: none  Measures taken if barriers to learning present: NA  Patient Goal (s): \"Try to walk straight and not getting tired all the time\"   Patient Self Reported Health Status: fair  Rehabilitation Potential: fair    Short Term Goals: To be accomplished in 2 weeks  Pt will be able to report a </= 4/10 pain rating at worst to improve patient's ability to tolerate prolonged functional activities at home.    Eval: 7/10 in R hip    Pt will be independent with HEP to facilitate carry-over of functional gains made in PT at home & community.     Eval: HEP established, printout provided    Long Term Goals: To be accomplished in 10 treatments  Pt will be able to improve strength in RLE to at least

## 2024-05-30 NOTE — PROGRESS NOTES
PT DAILY TREATMENT NOTE/NEURO EVAL     Patient Name: Elina Moya    Date: 2024    : 1960  Insurance: Payor: [facility] PL / Plan: [facility] [place] / Product Type: *No Product type* /      Patient  verified yes     Visit #   Current / Total 1 10   Time   In / Out 0215 0250   Pain   In / Out 7/10 7/10   Subjective Functional Status/Changes: Pt arrives to PT IE with c/o weakness/instability; h/o CVA.      Treatment Area: Gait instability [R26.81]  CVA (cerebral vascular accident) (HCC) [I63.9]    SUBJECTIVE  Any medication changes, allergies to medications, adverse drug reactions, diagnosis change, or new procedure performed?: [x] No    [] Yes (see summary sheet for update)    Subjective Info:  Chief complaint: H/o stroke x3 years ago, affecting R side. Went through a course of PT. Now feels like her R leg is getting worse and very heavy. Amb with SPC - has been using cane since the stroke. Also reports arthritis is B hands. Denies h/o falls. Caregiver, Patricia, present for evaluation.   Current Pain: 7/10 Best Pain: 5/10 Worst Pain: 9/10  Constant/Intermittent: Constant   Progression since onset: Worsening over time   Pain Location/Description: Ant/lat R hip   Current Symptoms: Pain in R hip, instability with walking   Current Mobility: SPC for ambulation  Aggravating/Reliving Factors: Aggravating: walking for extended periods (>10 min)  Relieving: being off her feet - sitting or laying down   CHRISS: CVA 3 years ago   Previous Treatment: PT 3 years ago   Self Care: Independent, has caregiver that helps her with cooking, cleaning, transportation  Activity/Participation Limitations: NA  PLOF: Prior to stroke, was able to ambulate without AD and independent with all ADL/IADL; SPC for amb ever since cane; caregiver assist since stroke  Home-Environment: 0 MARLINE - elevator only   Past Med Hx/Surgical Hx: HTN, h/o CVA (L cerebral artery per EMR), RA, stage 4 kidney disease

## 2024-06-13 ENCOUNTER — HOSPITAL ENCOUNTER (OUTPATIENT)
Facility: HOSPITAL | Age: 64
Discharge: HOME | End: 2024-06-13
Payer: MEDICAID

## 2024-06-13 ENCOUNTER — HOSPITAL ENCOUNTER (OUTPATIENT)
Facility: HOSPITAL | Age: 64
Setting detail: RECURRING SERIES
Discharge: HOME | End: 2024-06-16
Payer: MEDICAID

## 2024-06-13 LAB
25(OH)D3 SERPL-MCNC: 30.3 NG/ML (ref 30–100)
ALBUMIN SERPL-MCNC: 3.5 G/DL (ref 3.4–5)
ALBUMIN/GLOB SERPL: 1 (ref 0.8–1.7)
ALP SERPL-CCNC: 175 U/L (ref 45–117)
ALT SERPL-CCNC: 21 U/L (ref 13–56)
ANION GAP SERPL CALC-SCNC: 7 MMOL/L (ref 3–18)
AST SERPL-CCNC: 15 U/L (ref 10–38)
BASOPHILS # BLD: 0 K/UL (ref 0–0.1)
BASOPHILS NFR BLD: 1 % (ref 0–2)
BILIRUB SERPL-MCNC: 0.7 MG/DL (ref 0.2–1)
BUN SERPL-MCNC: 31 MG/DL (ref 7–18)
BUN/CREAT SERPL: 12 (ref 12–20)
CALCIUM SERPL-MCNC: 9.9 MG/DL (ref 8.5–10.1)
CHLORIDE SERPL-SCNC: 112 MMOL/L (ref 100–111)
CHOLEST SERPL-MCNC: 164 MG/DL
CO2 SERPL-SCNC: 23 MMOL/L (ref 21–32)
CREAT SERPL-MCNC: 2.55 MG/DL (ref 0.6–1.3)
DIFFERENTIAL METHOD BLD: ABNORMAL
EOSINOPHIL # BLD: 0.1 K/UL (ref 0–0.4)
EOSINOPHIL NFR BLD: 1 % (ref 0–5)
ERYTHROCYTE [DISTWIDTH] IN BLOOD BY AUTOMATED COUNT: 13.5 % (ref 11.6–14.5)
GLOBULIN SER CALC-MCNC: 3.6 G/DL (ref 2–4)
GLUCOSE SERPL-MCNC: 109 MG/DL (ref 74–99)
HCT VFR BLD AUTO: 47.5 % (ref 35–45)
HDLC SERPL-MCNC: 38 MG/DL (ref 40–60)
HDLC SERPL: 4.3 (ref 0–5)
HGB BLD-MCNC: 15.2 G/DL (ref 12–16)
IMM GRANULOCYTES # BLD AUTO: 0 K/UL (ref 0–0.04)
IMM GRANULOCYTES NFR BLD AUTO: 0 % (ref 0–0.5)
LDLC SERPL CALC-MCNC: 74.8 MG/DL (ref 0–100)
LIPID PANEL: ABNORMAL
LYMPHOCYTES # BLD: 1.8 K/UL (ref 0.9–3.6)
LYMPHOCYTES NFR BLD: 36 % (ref 21–52)
MCH RBC QN AUTO: 28.7 PG (ref 24–34)
MCHC RBC AUTO-ENTMCNC: 32 G/DL (ref 31–37)
MCV RBC AUTO: 89.6 FL (ref 78–100)
MONOCYTES # BLD: 0.4 K/UL (ref 0.05–1.2)
MONOCYTES NFR BLD: 9 % (ref 3–10)
NEUTS SEG # BLD: 2.7 K/UL (ref 1.8–8)
NEUTS SEG NFR BLD: 54 % (ref 40–73)
NRBC # BLD: 0 K/UL (ref 0–0.01)
NRBC BLD-RTO: 0 PER 100 WBC
PLATELET # BLD AUTO: 245 K/UL (ref 135–420)
PMV BLD AUTO: 9.7 FL (ref 9.2–11.8)
POTASSIUM SERPL-SCNC: 4 MMOL/L (ref 3.5–5.5)
PROT SERPL-MCNC: 7.1 G/DL (ref 6.4–8.2)
RBC # BLD AUTO: 5.3 M/UL (ref 4.2–5.3)
SODIUM SERPL-SCNC: 142 MMOL/L (ref 136–145)
TRIGL SERPL-MCNC: 256 MG/DL
VLDLC SERPL CALC-MCNC: 51.2 MG/DL
WBC # BLD AUTO: 5 K/UL (ref 4.6–13.2)

## 2024-06-13 PROCEDURE — 80061 LIPID PANEL: CPT

## 2024-06-13 PROCEDURE — 97535 SELF CARE MNGMENT TRAINING: CPT

## 2024-06-13 PROCEDURE — 97112 NEUROMUSCULAR REEDUCATION: CPT

## 2024-06-13 PROCEDURE — 97110 THERAPEUTIC EXERCISES: CPT

## 2024-06-13 PROCEDURE — 36415 COLL VENOUS BLD VENIPUNCTURE: CPT

## 2024-06-13 PROCEDURE — 85025 COMPLETE CBC W/AUTO DIFF WBC: CPT

## 2024-06-13 PROCEDURE — 82306 VITAMIN D 25 HYDROXY: CPT

## 2024-06-13 PROCEDURE — 80053 COMPREHEN METABOLIC PANEL: CPT

## 2024-06-13 NOTE — PROGRESS NOTES
perform transfers at home & community.   Eval: 4/5 R hip flex, abd, ext; 4/5 R knee flex/ext; 4/5 R ankle PF/DF      Pt will be able to improve 5xSTS score to </= 12 sec to improve transfer abilities and to decrease fall risk.   Eval: 15.33 sec with BUE on thighs     Pt will be able to improve TUG score to </= 13 sec w/ and w/o LRAD with good quality and no instances of instability to improve ability to perform transfers and ambulation and to decrease fall risk.              Eval: 12.5 sec with SPC and 14.62 sec without AD (gait belt, PT CGA) -- poor quality, gait deviations and instability      Pt will be able to perform SLS on BLE >/= 10 sec bouts without LOB so that she can perform ambulation with improved stability.              Eval: <2 sec on RLE, <5 sec on LLE     Pt will improve FOTO score to 46/100 to demonstrate improvement in patient's ability to perform unrestricted ADLs/IADLs at home & community.              Eval: 40/100 -- with assist of caregiver    Next PN/ RC due 06/29/2024  Auth due (visit number/ date) NOELLE    PLAN  - Continue Plan of Care    Traci Edgar, PTA    6/13/2024    2:21 PM    Future Appointments   Date Time Provider Department Center   6/14/2024 11:30 AM Deb Pro, PT [facility]   6/17/2024  2:15 PM Anna Kate DNP [facility]   6/18/2024  2:10 PM Frida Cardenas PTA [facility]   6/21/2024 11:30 AM Ofelia Clarke PTA [facility]   6/24/2024 12:10 PM Deb Pro, PT [facility]   6/27/2024  1:30 [facility]   9/11/2024  2:15 PM Giovanni Hinds MD [facility]

## 2024-06-14 ENCOUNTER — HOSPITAL ENCOUNTER (OUTPATIENT)
Facility: HOSPITAL | Age: 64
Setting detail: RECURRING SERIES
Discharge: HOME | End: 2024-06-17
Payer: MEDICAID

## 2024-06-14 PROCEDURE — 97535 SELF CARE MNGMENT TRAINING: CPT

## 2024-06-14 PROCEDURE — 97530 THERAPEUTIC ACTIVITIES: CPT

## 2024-06-14 PROCEDURE — 97110 THERAPEUTIC EXERCISES: CPT

## 2024-06-14 PROCEDURE — 97112 NEUROMUSCULAR REEDUCATION: CPT

## 2024-06-14 NOTE — PROGRESS NOTES
Pt will be independent with HEP to facilitate carry-over of functional gains made in PT at home & community.               Eval: HEP established, printout provided   Current: reports initial compliance (06/14/24)      Long Term Goals: To be accomplished in 10 treatments  Pt will be able to improve strength in RLE to at least 4+/5 to improve patient's ability to ambulate and perform transfers at home & community.   Eval: 4/5 R hip flex, abd, ext; 4/5 R knee flex/ext; 4/5 R ankle PF/DF      Pt will be able to improve 5xSTS score to </= 12 sec to improve transfer abilities and to decrease fall risk.   Eval: 15.33 sec with BUE on thighs     Pt will be able to improve TUG score to </= 13 sec w/ and w/o LRAD with good quality and no instances of instability to improve ability to perform transfers and ambulation and to decrease fall risk.              Eval: 12.5 sec with SPC and 14.62 sec without AD (gait belt, PT CGA) -- poor quality, gait deviations and instability      Pt will be able to perform SLS on BLE >/= 10 sec bouts without LOB so that she can perform ambulation with improved stability.              Eval: <2 sec on RLE, <5 sec on LLE     Pt will improve FOTO score to 46/100 to demonstrate improvement in patient's ability to perform unrestricted ADLs/IADLs at home & community.              Eval: 40/100 -- with assist of caregiver    Next PN/ RC due 06/29/24  Auth due (visit number/ date) NOELLE    PLAN  - Continue Plan of Care    Deb Pro PT    6/14/2024    11:35 AM    Future Appointments   Date Time Provider Department Center   6/17/2024  2:15 PM Anna Kate DNP [facility]   6/18/2024  2:10 PM Frida Cardenas, PTA [facility]   6/21/2024 11:30 AM Ofelia Clarke, PTA [facility]   6/24/2024 12:10 PM Deb Pro, PT [facility]   6/27/2024  1:30 PM Deb Pro, PT [facility]   9/11/2024  2:15 PM Giovanni Hinds MD [facility]

## 2024-06-17 ENCOUNTER — OFFICE VISIT (OUTPATIENT)
Facility: CLINIC | Age: 64
End: 2024-06-17
Payer: MEDICAID

## 2024-06-17 VITALS
TEMPERATURE: 97.5 F | HEART RATE: 77 BPM | HEIGHT: 67 IN | BODY MASS INDEX: 29.03 KG/M2 | DIASTOLIC BLOOD PRESSURE: 87 MMHG | SYSTOLIC BLOOD PRESSURE: 132 MMHG | OXYGEN SATURATION: 97 % | WEIGHT: 185 LBS

## 2024-06-17 DIAGNOSIS — R74.8 ELEVATED ALKALINE PHOSPHATASE LEVEL: ICD-10-CM

## 2024-06-17 DIAGNOSIS — E78.2 MIXED HYPERLIPIDEMIA: Primary | ICD-10-CM

## 2024-06-17 DIAGNOSIS — E55.9 VITAMIN D DEFICIENCY: ICD-10-CM

## 2024-06-17 DIAGNOSIS — I13.10 HYPERTENSIVE HEART AND KIDNEY DISEASE WITHOUT HEART FAILURE AND WITH STAGE 3B CHRONIC KIDNEY DISEASE (HCC): ICD-10-CM

## 2024-06-17 DIAGNOSIS — N18.32 HYPERTENSIVE HEART AND KIDNEY DISEASE WITHOUT HEART FAILURE AND WITH STAGE 3B CHRONIC KIDNEY DISEASE (HCC): ICD-10-CM

## 2024-06-17 DIAGNOSIS — M06.9 RHEUMATOID ARTHRITIS INVOLVING BOTH HANDS, UNSPECIFIED WHETHER RHEUMATOID FACTOR PRESENT (HCC): ICD-10-CM

## 2024-06-17 PROCEDURE — 99213 OFFICE O/P EST LOW 20 MIN: CPT | Performed by: NURSE PRACTITIONER

## 2024-06-17 SDOH — ECONOMIC STABILITY: FOOD INSECURITY: WITHIN THE PAST 12 MONTHS, THE FOOD YOU BOUGHT JUST DIDN'T LAST AND YOU DIDN'T HAVE MONEY TO GET MORE.: NEVER TRUE

## 2024-06-17 SDOH — ECONOMIC STABILITY: INCOME INSECURITY: HOW HARD IS IT FOR YOU TO PAY FOR THE VERY BASICS LIKE FOOD, HOUSING, MEDICAL CARE, AND HEATING?: NOT VERY HARD

## 2024-06-17 SDOH — ECONOMIC STABILITY: TRANSPORTATION INSECURITY
IN THE PAST 12 MONTHS, HAS LACK OF TRANSPORTATION KEPT YOU FROM MEETINGS, WORK, OR FROM GETTING THINGS NEEDED FOR DAILY LIVING?: NO

## 2024-06-17 SDOH — ECONOMIC STABILITY: FOOD INSECURITY: WITHIN THE PAST 12 MONTHS, YOU WORRIED THAT YOUR FOOD WOULD RUN OUT BEFORE YOU GOT MONEY TO BUY MORE.: NEVER TRUE

## 2024-06-17 NOTE — ASSESSMENT & PLAN NOTE
Borderline controlled, continue current medications and lifestyle modifications recommended  The patient's triglyceride levels have escalated from 190 to 256, while her LDL cholesterol has remained stable at 107 to 74.   Dietary modifications were recommended, including the avoidance of oily and junk food.   Strongly encouraged pt to cont statin and zetia therapy as prescribed  Lakhwinder level 6m

## 2024-06-17 NOTE — PROGRESS NOTES
Elina Moya presents today for   Chief Complaint   Patient presents with    Follow-up    Other     Bruising from blood thinner                  1. \"Have you been to the ER, urgent care clinic since your last visit?  Hospitalized since your last visit?\" no    2. \"Have you seen or consulted any other health care providers outside of the [facility] since your last visit?\" no     3. For patients aged 45-75: Has the patient had a colonoscopy / FIT/ Cologuard? No      If the patient is female:    4. For patients aged 40-74: Has the patient had a mammogram within the past 2 years? Yes - no Care Gap present      5. For patients aged 21-65: Has the patient had a pap smear? No    
[facility] [address]  [address]  [place] [id number]  [phone] office/[phone] fax    6/17/2024    Elina Moya 1960 is a pleasant Black /  female.       History of Present Illness  The patient presents for evaluation of multiple medical concerns.    Cholesterol: The patient has been consuming an increased amount of oily and junk food. She is taking the statin and zetia as prescribed. She is scheduled for a follow-up appointment with her cardiologist next month.    Migraines: under a lot of stress lately.  Recently, her headaches have intensified, necessitating the use of BC, which she reports as the only effective treatment for her headaches. She believes her headaches may be stress-induced. She has been experiencing frequent headaches, which radiate to the back of her head. Cont fioricet as prescribed by neph. She has a scheduled appointment with her nephrologist on 07/15/2024.     CKD 4. Neph prescribes farxiga. Dr Donte Rosen, neph.     Vit D def: Her nephrologist has advised her to discontinue her vitamin D supplement.     RA: Vasquez hands. She has not sought medical attention for her rheumatoid arthritis.      Hx of CVA: Dx 2021. Rt side deficits. She resumed physical therapy two months ago for her Rt leg limp - post CVA..      HTN/CVA. Cont plavix, statin, ASA and BP medications.     Current smoker: has not completed Chest CT ordered 11/2023.    Physical Exam      Physical Exam  Constitutional:       Appearance: Normal appearance. She is obese.   Eyes:      Conjunctiva/sclera: Conjunctivae normal.   Cardiovascular:      Rate and Rhythm: Normal rate and regular rhythm.      Heart sounds: Normal heart sounds.   Pulmonary:      Effort: Pulmonary effort is normal.      Breath sounds: Normal breath sounds.   Musculoskeletal:         General: Deformity (Vasquez hands) present.      Comments: Noted limping on right side   Neurological:      
Calm

## 2024-06-17 NOTE — ASSESSMENT & PLAN NOTE
Level 30  The patient was advised to consult her nephrologist regarding why Vit D was discontinued and inquire about restarting.

## 2024-06-17 NOTE — ASSESSMENT & PLAN NOTE
The elevated alkaline phosphatase level is likely a result of rheumatoid arthritis plus statin therapy.   Will ck levels 6m to include GGT

## 2024-06-18 ENCOUNTER — HOSPITAL ENCOUNTER (OUTPATIENT)
Facility: HOSPITAL | Age: 64
Setting detail: RECURRING SERIES
Discharge: HOME | End: 2024-06-21
Payer: MEDICAID

## 2024-06-18 PROCEDURE — 97530 THERAPEUTIC ACTIVITIES: CPT

## 2024-06-18 PROCEDURE — 97112 NEUROMUSCULAR REEDUCATION: CPT

## 2024-06-18 PROCEDURE — 97110 THERAPEUTIC EXERCISES: CPT

## 2024-06-18 PROCEDURE — 97535 SELF CARE MNGMENT TRAINING: CPT

## 2024-06-18 RX ORDER — ERGOCALCIFEROL 1.25 MG/1
50000 CAPSULE ORAL WEEKLY
Qty: 13 CAPSULE | Refills: 0 | OUTPATIENT
Start: 2024-06-18

## 2024-06-18 NOTE — PROGRESS NOTES
compliance (06/14/24)      Long Term Goals: To be accomplished in 10 treatments  Pt will be able to improve strength in RLE to at least 4+/5 to improve patient's ability to ambulate and perform transfers at home & community.   Eval: 4/5 R hip flex, abd, ext; 4/5 R knee flex/ext; 4/5 R ankle PF/DF      Pt will be able to improve 5xSTS score to </= 12 sec to improve transfer abilities and to decrease fall risk.   Eval: 15.33 sec with BUE on thighs     Pt will be able to improve TUG score to </= 13 sec w/ and w/o LRAD with good quality and no instances of instability to improve ability to perform transfers and ambulation and to decrease fall risk.              Eval: 12.5 sec with SPC and 14.62 sec without AD (gait belt, PT CGA) -- poor quality, gait deviations and instability      Pt will be able to perform SLS on BLE >/= 10 sec bouts without LOB so that she can perform ambulation with improved stability.              Eval: <2 sec on RLE, <5 sec on LLE     Pt will improve FOTO score to 46/100 to demonstrate improvement in patient's ability to perform unrestricted ADLs/IADLs at home & community.              Eval: 40/100 -- with assist of caregiver    Next PN/ RC due 06/29/24  Auth due (visit number/ date) NOELLE    PLAN  - Continue Plan of Care    MANUEL TURNER PTA    6/18/2024    2:33 PM    Future Appointments   Date Time Provider Department Center   6/21/2024 11:30 AM Ofelia Clarke PTA [facility]   6/24/2024 12:10 PM Deb Pro, PT [facility]   6/27/2024  1:30 PM Deb Pro, PT [facility]   9/11/2024  2:15 PM Giovanni Hinds MD [facility]   12/9/2024  2:15 PM Anna Kate DNP [facility]

## 2024-06-21 ENCOUNTER — HOSPITAL ENCOUNTER (OUTPATIENT)
Facility: HOSPITAL | Age: 64
Setting detail: RECURRING SERIES
Discharge: HOME | End: 2024-06-24
Payer: MEDICAID

## 2024-06-21 PROCEDURE — 97112 NEUROMUSCULAR REEDUCATION: CPT

## 2024-06-21 PROCEDURE — 97530 THERAPEUTIC ACTIVITIES: CPT

## 2024-06-21 PROCEDURE — 97110 THERAPEUTIC EXERCISES: CPT

## 2024-06-21 PROCEDURE — 97535 SELF CARE MNGMENT TRAINING: CPT

## 2024-06-21 NOTE — PROGRESS NOTES
PHYSICAL / OCCUPATIONAL THERAPY - DAILY TREATMENT NOTE    Patient Name: Elina Moya    Date: 2024    : 1960  Insurance: Payor: [facility] PL / Plan: [facility] [place] OF [place] / Product Type: *No Product type* /      Patient  verified Yes     Visit #   Current / Total 5 10   Time   In / Out 1132 1210   Pain   In / Out 0 0   Subjective Functional Status/Changes: Patient reports not having any pain but stiffness. States that the (R) LE just feels heavy.     TREATMENT AREA =  Gait instability [R26.81]  CVA (cerebral vascular accident) (HCC) [I63.9]     OBJECTIVE        Therapeutic Procedures:    Tx Min Billable or 1:1 Min (if diff from Tx Min) Procedure, Rationale, Specifics   10  59331 Therapeutic Exercise (timed):  increase ROM, strength, coordination, balance, and proprioception to improve patient's ability to progress to PLOF and address remaining functional goals. (see flow sheet as applicable)     Details if applicable:       10  72122 Neuromuscular Re-Education (timed):  improve balance, coordination, kinesthetic sense, posture, core stability and proprioception to improve patient's ability to develop conscious control of individual muscles and awareness of position of extremities in order to progress to PLOF and address remaining functional goals. (see flow sheet as applicable)     Details if applicable:     10  03744 Therapeutic Activity (timed):  use of dynamic activities replicating functional movements to increase ROM, strength, coordination, balance, and proprioception in order to improve patient's ability to progress to PLOF and address remaining functional goals.  (see flow sheet as applicable)     Details if applicable:     8  15166 Self Care/Home Management (timed):  improve patient knowledge and understanding of positioning, posture/ergonomics, diagnosis/prognosis, and physical therapy expectations, procedures and progression  to improve patient's ability to

## 2024-06-24 ENCOUNTER — HOSPITAL ENCOUNTER (OUTPATIENT)
Facility: HOSPITAL | Age: 64
Setting detail: RECURRING SERIES
Discharge: HOME | End: 2024-06-27
Payer: MEDICAID

## 2024-06-24 PROCEDURE — 97110 THERAPEUTIC EXERCISES: CPT

## 2024-06-24 PROCEDURE — 97112 NEUROMUSCULAR REEDUCATION: CPT

## 2024-06-24 PROCEDURE — 97530 THERAPEUTIC ACTIVITIES: CPT

## 2024-06-24 NOTE — PROGRESS NOTES
PHYSICAL / OCCUPATIONAL THERAPY - DAILY TREATMENT NOTE    Patient Name: Elina Moya    Date: 2024    : 1960  Insurance: Payor: [email or web address] PL / Plan: [facility] [place] / Product Type: *No Product type* /      Patient  verified Yes     Visit #   Current / Total 6 10   Time   In / Out 1225 1250   Pain   In / Out 0/10 0/10   Subjective Functional Status/Changes: Patient reports that she was at the hospital with her daughter, so that is why she is late today.      TREATMENT AREA =  Gait instability [R26.81]  CVA (cerebral vascular accident) ([facility]) [I63.9]     OBJECTIVE    Therapeutic Procedures:    Tx Min Billable or 1:1 Min (if diff from Tx Min) Procedure, Rationale, Specifics   8  00541 Therapeutic Exercise (timed):  increase ROM, strength, coordination, balance, and proprioception to improve patient's ability to progress to PLOF and address remaining functional goals. (see flow sheet as applicable)     Details if applicable:       9  23964 Neuromuscular Re-Education (timed):  improve balance, coordination, kinesthetic sense, posture, core stability and proprioception to improve patient's ability to develop conscious control of individual muscles and awareness of position of extremities in order to progress to PLOF and address remaining functional goals. (see flow sheet as applicable)     Details if applicable:     8  40003 Therapeutic Activity (timed):  use of dynamic activities replicating functional movements to increase ROM, strength, coordination, balance, and proprioception in order to improve patient's ability to progress to PLOF and address remaining functional goals.  (see flow sheet as applicable)     Details if applicable:     25  [facility] Totals Reminder: bill using total billable min of TIMED therapeutic procedures (example: do not include dry needle or estim unattended, both untimed codes, in totals to left)  8-22 min = 1 unit; 23-37 min = 2 units; 38-52 min

## 2024-06-27 ENCOUNTER — APPOINTMENT (OUTPATIENT)
Facility: HOSPITAL | Age: 64
End: 2024-06-27
Payer: MEDICAID

## 2024-07-02 ENCOUNTER — HOSPITAL ENCOUNTER (OUTPATIENT)
Facility: HOSPITAL | Age: 64
Setting detail: RECURRING SERIES
Discharge: HOME | End: 2024-07-05
Payer: MEDICAID

## 2024-07-02 PROCEDURE — 97112 NEUROMUSCULAR REEDUCATION: CPT

## 2024-07-02 PROCEDURE — 97535 SELF CARE MNGMENT TRAINING: CPT

## 2024-07-02 PROCEDURE — 97530 THERAPEUTIC ACTIVITIES: CPT

## 2024-07-02 NOTE — THERAPY RECERTIFICATION
[facility] [address]  [address]  [place] [address]  [phone] fax  PROGRESS NOTE  Patient Name: Elina Moya : 1960   Treatment/Medical Diagnosis: Gait instability [R26.81]  CVA (cerebral vascular accident) ([facility]) [I63.9]   Referral Source:  Payor Anna Kate DNP  Payor: [facility] PL / Plan: [facility] [place] OF [place] / Product Type: *No Product type* /      Date of Initial Visit: 24 Attended Visits: 7 Missed Visits: 0     SUMMARY OF TREATMENT  Ms. Moya presents for her 7th visit including IE with report of 80% improvement. She reports increasing ambulation tolerance, improved ease of transferring in and out of tub, and decreased reliance on her cane during ambulation. Her R LE strength has progressed and she has improved her 5xSTS and TUG time. Functional deficits remain, however, as she continues to exhibit extremely limited SLS balance times of 4 and 16 seconds on her R and L sides, respectively. Additionally, while her 5xSTS and TUG times have improved, she continues to exhibit impaired mechanics/deviations throughout completion. Her FOTO score, while progressed since SOC, remains functionally deficient at only 49/100 points. Ms. Moya would continue to benefit from skilled PT intervention 2x/week for 4 weeks to continue to increase her LE strength and stability and address her functional gait deficits for safer ambulation.     CURRENT STATUS  Short Term Goals: To be accomplished in 2 weeks  Pt will be able to report a </= 4/10 pain rating at worst to improve patient's ability to tolerate prolonged functional activities at home.               Eval: 7/10 in R hip              Current: met, denies pain, only reports \"stiffness\"      Pt will be independent with HEP to facilitate carry-over of functional gains made in PT at home & community.               Eval: HEP established, printout provided

## 2024-07-02 NOTE — PROGRESS NOTES
PHYSICAL / OCCUPATIONAL THERAPY - DAILY TREATMENT NOTE    Patient Name: Elina Moya    Date: 2024    : 1960  Insurance: Payor: [facility] PL / Plan: [facility] [place] OF [place] / Product Type: *No Product type* /      Patient  verified Yes     Visit #   Current / Total 7 10   Time   In / Out 1132 1211   Pain   In / Out 0/10 0   Subjective Functional Status/Changes: Functional Gains: improved ambulation, decreased use of cane, improved ease of transferring in and out of tub/bed  Functional Deficits: no complaints  % improvement: 80%  Pain   Average: 0/10       Best: 0/10     Worst: 0/10, just stiffness  Patient Goal: \"to continue PT\"       TREATMENT AREA =  Gait instability [R26.81]  CVA (cerebral vascular accident) ([facility]) [I63.9]     OBJECTIVE    Therapeutic Procedures:    Tx Min Billable or 1:1 Min (if diff from Tx Min) Procedure, Rationale, Specifics   8 8 09240 Self Care/Home Management (timed):  improve patient knowledge and understanding of home safety and physical therapy expectations, procedures and progression  to improve patient's ability to progress to PLOF and address remaining functional goals.      Details if applicable:       8  56756 Neuromuscular Re-Education (timed):  improve balance, coordination, kinesthetic sense, posture, core stability and proprioception to improve patient's ability to develop conscious control of individual muscles and awareness of position of extremities in order to progress to PLOF and address remaining functional goals. (see flow sheet as applicable)     Details if applicable:  balance testing, FGA testing   23 23 75647 Therapeutic Activity (timed):  use of dynamic activities replicating functional movements to increase ROM, strength, coordination, balance, and proprioception in order to improve patient's ability to progress to PLOF and address remaining functional goals.  (see flow sheet as applicable)     Details if applicable:   10-Dec-2022 15:36

## 2024-07-09 ENCOUNTER — HOSPITAL ENCOUNTER (OUTPATIENT)
Facility: HOSPITAL | Age: 64
Setting detail: RECURRING SERIES
Discharge: HOME | End: 2024-07-12
Payer: MEDICAID

## 2024-07-09 PROCEDURE — 97535 SELF CARE MNGMENT TRAINING: CPT

## 2024-07-09 PROCEDURE — 97530 THERAPEUTIC ACTIVITIES: CPT

## 2024-07-09 PROCEDURE — 97110 THERAPEUTIC EXERCISES: CPT

## 2024-07-09 PROCEDURE — 97112 NEUROMUSCULAR REEDUCATION: CPT

## 2024-07-09 NOTE — PROGRESS NOTES
sec to improve transfer abilities and to decrease fall risk.   PN: MET, 10\" without B UE use but exhibiting increased anterior weight translation/premature heel rise during ascent      3. Pt will be able to improve TUG score to </= 13 sec w/ and w/o LRAD with good quality and no instances of instability to improve ability to perform transfers and ambulation and to decrease fall risk.              PN: 10\" without AD with CGA with gait belt used; decreased heel strike exhibited     4. Pt will be able to perform SLS on BLE >/= 10 sec bouts without LOB so that she can perform ambulation with improved stability.              PN:  R: 4\" , L: 16\"       5.Patient will achieve 21 /30 points on FGA to demonstrate improvement in patient's ability to perform unrestricted ADLs/IADLs at home & community.   PN: will finish assessment at first f/u (TC at PN)   Current: 9/30 (07/09/24)     Next PN/ RC due 08/01/24  Auth due (visit number/ date) NOELLE    PLAN  - Continue Plan of Care    Deb Pro, PT    7/9/2024    12:29 PM    Future Appointments   Date Time Provider Department Center   7/11/2024 10:50 AM Manish Gomez PT [facility]   7/16/2024 10:50 AM Traci Edgar SPTA [facility]   7/18/2024 10:50 AM Manish Gomez PT [facility]   9/11/2024  2:15 PM Giovanni Hinds MD [facility]   12/9/2024  2:15 PM Anna Kate DNP [facility]

## 2024-07-11 ENCOUNTER — HOSPITAL ENCOUNTER (OUTPATIENT)
Facility: HOSPITAL | Age: 64
Discharge: HOME | End: 2024-07-14

## 2024-07-11 ENCOUNTER — APPOINTMENT (OUTPATIENT)
Facility: HOSPITAL | Age: 64
End: 2024-07-11
Payer: MEDICAID

## 2024-07-11 LAB — LABCORP SPECIMEN COLLECTION: NORMAL

## 2024-07-11 PROCEDURE — 99001 SPECIMEN HANDLING PT-LAB: CPT

## 2024-07-12 ENCOUNTER — HOSPITAL ENCOUNTER (OUTPATIENT)
Facility: HOSPITAL | Age: 64
Setting detail: RECURRING SERIES
End: 2024-07-12
Payer: MEDICAID

## 2024-07-16 ENCOUNTER — APPOINTMENT (OUTPATIENT)
Facility: HOSPITAL | Age: 64
End: 2024-07-16
Payer: MEDICAID

## 2024-07-18 ENCOUNTER — HOSPITAL ENCOUNTER (OUTPATIENT)
Facility: HOSPITAL | Age: 64
Setting detail: RECURRING SERIES
Discharge: HOME | End: 2024-07-21
Payer: MEDICAID

## 2024-07-18 PROCEDURE — 97110 THERAPEUTIC EXERCISES: CPT

## 2024-07-18 PROCEDURE — 97112 NEUROMUSCULAR REEDUCATION: CPT

## 2024-07-18 PROCEDURE — 97530 THERAPEUTIC ACTIVITIES: CPT

## 2024-07-18 PROCEDURE — 97535 SELF CARE MNGMENT TRAINING: CPT

## 2024-07-18 NOTE — PROGRESS NOTES
PHYSICAL / OCCUPATIONAL THERAPY - DAILY TREATMENT NOTE    Patient Name: Elina Moya    Date: 2024    : 1960  Insurance: Payor: [facility] PL / Plan: [facility] [place]AN OF [place] / Product Type: *No Product type* /      Patient  verified Yes     Visit #   Current / Total 2 10   Time   In / Out 1056 1128   Pain   In / Out 0 0   Subjective Functional Status/Changes: States that she is feeling a little better but still recovering from being sick.      TREATMENT AREA =  Gait instability [R26.81]  CVA (cerebral vascular accident) ([facility]) [I63.9]     OBJECTIVE         Therapeutic Procedures:    Tx Min Billable or 1:1 Min (if diff from Tx Min) Procedure, Rationale, Specifics   8  57752 Therapeutic Exercise (timed):  increase ROM, strength, coordination, balance, and proprioception to improve patient's ability to progress to PLOF and address remaining functional goals. (see flow sheet as applicable)     Details if applicable:       8 44226 Neuromuscular Re-Education (timed):  improve balance, coordination, kinesthetic sense, posture, core stability and proprioception to improve patient's ability to develop conscious control of individual muscles and awareness of position of extremities in order to progress to PLOF and address remaining functional goals. (see flow sheet as applicable)     Details if applicable:     97530 Therapeutic Activity (timed):  use of dynamic activities replicating functional movements to increase ROM, strength, coordination, balance, and proprioception in order to improve patient's ability to progress to PLOF and address remaining functional goals.  (see flow sheet as applicable)     Details if applicable:     97535 Self Care/Home Management (timed):  improve patient knowledge and understanding of pain reducing techniques, positioning, posture/ergonomics, home safety, activity modification, diagnosis/prognosis, and physical therapy expectations,

## 2024-07-19 ENCOUNTER — HOSPITAL ENCOUNTER (OUTPATIENT)
Facility: HOSPITAL | Age: 64
Setting detail: RECURRING SERIES
Discharge: HOME | End: 2024-07-22
Payer: MEDICAID

## 2024-07-19 PROCEDURE — 97535 SELF CARE MNGMENT TRAINING: CPT

## 2024-07-19 PROCEDURE — 97110 THERAPEUTIC EXERCISES: CPT

## 2024-07-19 PROCEDURE — 97530 THERAPEUTIC ACTIVITIES: CPT

## 2024-07-19 NOTE — PROGRESS NOTES
PHYSICAL / OCCUPATIONAL THERAPY - DAILY TREATMENT NOTE    Patient Name: Elina Moya    Date: 2024    : 1960  Insurance: Payor: [facility] PL / Plan: [facility] OF [place] / Product Type: *No Product type* /      Patient  verified Yes     Visit #   Current / Total 3 10   Time   In / Out 1102 1129   Pain   In / Out 0 0   Subjective Functional Status/Changes: Pt reports she's feeling good, she's gotten much better with therapy.     TREATMENT AREA =  Gait instability [R26.81]  CVA (cerebral vascular accident) ([facility]) [I63.9]     OBJECTIVE    Therapeutic Procedures:    Tx Min Billable or 1:1 Min (if diff from Tx Min) Procedure, Rationale, Specifics   8  83969 Therapeutic Exercise (timed):  increase ROM, strength, coordination, balance, and proprioception to improve patient's ability to progress to PLOF and address remaining functional goals. (see flow sheet as applicable)     Details if applicable:       11  27241 Therapeutic Activity (timed):  use of dynamic activities replicating functional movements to increase ROM, strength, coordination, balance, and proprioception in order to improve patient's ability to progress to PLOF and address remaining functional goals.  (see flow sheet as applicable)     Details if applicable:  reassessment   8  29485 Self Care/Home Management (timed):  improve patient knowledge and understanding of pain reducing techniques, positioning, posture/ergonomics, home safety, activity modification, diagnosis/prognosis, and physical therapy expectations, procedures and progression  to improve patient's ability to progress to PLOF and address remaining functional goals.  (see flow sheet as applicable)     Details if applicable:  pt ed, HEP instruction   27  [facility] Totals Reminder: bill using total billable min of TIMED therapeutic procedures (example: do not include dry needle or estim unattended, both untimed codes, in totals to left)  8-22 min = 1 unit;

## 2024-09-11 ENCOUNTER — OFFICE VISIT (OUTPATIENT)
Age: 64
End: 2024-09-11
Payer: MEDICAID

## 2024-09-11 VITALS
WEIGHT: 182 LBS | BODY MASS INDEX: 28.56 KG/M2 | SYSTOLIC BLOOD PRESSURE: 142 MMHG | HEART RATE: 80 BPM | DIASTOLIC BLOOD PRESSURE: 94 MMHG | HEIGHT: 67 IN | OXYGEN SATURATION: 97 %

## 2024-09-11 DIAGNOSIS — I10 HYPERTENSION, UNSPECIFIED TYPE: Primary | ICD-10-CM

## 2024-09-11 DIAGNOSIS — I63.412 CEREBROVASCULAR ACCIDENT (CVA) DUE TO EMBOLISM OF LEFT MIDDLE CEREBRAL ARTERY (HCC): ICD-10-CM

## 2024-09-11 DIAGNOSIS — E78.2 MIXED HYPERLIPIDEMIA: ICD-10-CM

## 2024-09-11 PROCEDURE — 99214 OFFICE O/P EST MOD 30 MIN: CPT | Performed by: INTERNAL MEDICINE

## 2024-09-11 PROCEDURE — 3080F DIAST BP >= 90 MM HG: CPT | Performed by: INTERNAL MEDICINE

## 2024-09-11 PROCEDURE — 3077F SYST BP >= 140 MM HG: CPT | Performed by: INTERNAL MEDICINE

## 2024-09-11 RX ORDER — HYDRALAZINE HYDROCHLORIDE 50 MG/1
50 TABLET, FILM COATED ORAL 3 TIMES DAILY
Qty: 270 TABLET | Refills: 1 | Status: SHIPPED | OUTPATIENT
Start: 2024-09-11

## 2024-09-11 RX ORDER — AMLODIPINE BESYLATE 5 MG/1
5 TABLET ORAL DAILY
Qty: 30 TABLET | Refills: 3 | Status: SHIPPED | OUTPATIENT
Start: 2024-09-11

## 2024-09-11 RX ORDER — EZETIMIBE 10 MG/1
10 TABLET ORAL DAILY
Qty: 90 TABLET | Refills: 1 | Status: SHIPPED | OUTPATIENT
Start: 2024-09-11

## 2024-09-11 RX ORDER — CARVEDILOL 6.25 MG/1
6.25 TABLET ORAL 2 TIMES DAILY
Qty: 180 TABLET | Refills: 1 | Status: SHIPPED | OUTPATIENT
Start: 2024-09-11

## 2024-09-11 RX ORDER — ATORVASTATIN CALCIUM 80 MG/1
80 TABLET, FILM COATED ORAL DAILY
Qty: 90 TABLET | Refills: 1 | Status: SHIPPED | OUTPATIENT
Start: 2024-09-11

## 2024-09-11 RX ORDER — CLOPIDOGREL BISULFATE 75 MG/1
75 TABLET ORAL DAILY
Qty: 90 TABLET | Refills: 1 | Status: SHIPPED | OUTPATIENT
Start: 2024-09-11

## 2024-09-11 RX ORDER — LISINOPRIL 5 MG/1
5 TABLET ORAL DAILY
Qty: 90 TABLET | Refills: 1 | Status: SHIPPED | OUTPATIENT
Start: 2024-09-11

## 2024-11-05 ENCOUNTER — APPOINTMENT (OUTPATIENT)
Facility: HOSPITAL | Age: 64
DRG: 204 | End: 2024-11-05
Payer: MEDICAID

## 2024-11-05 ENCOUNTER — HOSPITAL ENCOUNTER (OUTPATIENT)
Facility: HOSPITAL | Age: 64
Setting detail: SPECIMEN
Discharge: HOME | End: 2024-11-08

## 2024-11-05 ENCOUNTER — HOSPITAL ENCOUNTER (INPATIENT)
Facility: HOSPITAL | Age: 64
LOS: 2 days | Discharge: HOME | DRG: 204 | End: 2024-11-07
Attending: HOSPITALIST | Admitting: HOSPITALIST
Payer: MEDICAID

## 2024-11-05 DIAGNOSIS — R55 VASOVAGAL SYNCOPE: ICD-10-CM

## 2024-11-05 DIAGNOSIS — G45.9 TIA (TRANSIENT ISCHEMIC ATTACK): Primary | ICD-10-CM

## 2024-11-05 PROBLEM — N18.4 STAGE 4 CHRONIC KIDNEY DISEASE (HCC): Status: ACTIVE | Noted: 2022-12-02

## 2024-11-05 PROBLEM — I69.30 HISTORY OF CEREBROVASCULAR ACCIDENT (CVA) WITH RESIDUAL DEFICIT: Status: ACTIVE | Noted: 2024-11-05

## 2024-11-05 LAB
ALBUMIN SERPL-MCNC: 3.7 G/DL (ref 3.4–5)
ALBUMIN/GLOB SERPL: 1.2 (ref 0.8–1.7)
ALP SERPL-CCNC: 151 U/L (ref 45–117)
ALT SERPL-CCNC: 28 U/L (ref 13–56)
AMPHET UR QL SCN: NEGATIVE
ANION GAP SERPL CALC-SCNC: 7 MMOL/L (ref 3–18)
APPEARANCE UR: CLEAR
AST SERPL-CCNC: 28 U/L (ref 10–38)
BACTERIA URNS QL MICRO: ABNORMAL /HPF
BARBITURATES UR QL SCN: NEGATIVE
BASOPHILS # BLD: 0 K/UL (ref 0–0.1)
BASOPHILS NFR BLD: 0 % (ref 0–2)
BENZODIAZ UR QL: NEGATIVE
BILIRUB SERPL-MCNC: 0.6 MG/DL (ref 0.2–1)
BILIRUB UR QL: NEGATIVE
BUN SERPL-MCNC: 31 MG/DL (ref 7–18)
BUN/CREAT SERPL: 11 (ref 12–20)
CALCIUM SERPL-MCNC: 9.1 MG/DL (ref 8.5–10.1)
CANNABINOIDS UR QL SCN: NEGATIVE
CHLORIDE SERPL-SCNC: 112 MMOL/L (ref 100–111)
CO2 SERPL-SCNC: 25 MMOL/L (ref 21–32)
COCAINE UR QL SCN: POSITIVE
COLOR UR: YELLOW
CREAT SERPL-MCNC: 2.95 MG/DL (ref 0.6–1.3)
DIFFERENTIAL METHOD BLD: ABNORMAL
EOSINOPHIL # BLD: 0 K/UL (ref 0–0.4)
EOSINOPHIL NFR BLD: 0 % (ref 0–5)
EPITH CASTS URNS QL MICRO: ABNORMAL /LPF (ref 0–5)
ERYTHROCYTE [DISTWIDTH] IN BLOOD BY AUTOMATED COUNT: 14.8 % (ref 11.6–14.5)
ETHANOL SERPL-MCNC: <3 MG/DL (ref 0–3)
GLOBULIN SER CALC-MCNC: 3 G/DL (ref 2–4)
GLUCOSE SERPL-MCNC: 144 MG/DL (ref 74–99)
GLUCOSE UR STRIP.AUTO-MCNC: NEGATIVE MG/DL
HCT VFR BLD AUTO: 47.6 % (ref 35–45)
HGB BLD-MCNC: 15.4 G/DL (ref 12–16)
HGB UR QL STRIP: NEGATIVE
IMM GRANULOCYTES # BLD AUTO: 0 K/UL (ref 0–0.04)
IMM GRANULOCYTES NFR BLD AUTO: 0 % (ref 0–0.5)
INR PPP: 1.1 (ref 0.9–1.1)
KETONES UR QL STRIP.AUTO: NEGATIVE MG/DL
LABCORP SPECIMEN COLLECTION: NORMAL
LACTATE SERPL-SCNC: 1.3 MMOL/L (ref 0.4–2)
LEUKOCYTE ESTERASE UR QL STRIP.AUTO: NEGATIVE
LYMPHOCYTES # BLD: 1.2 K/UL (ref 0.9–3.6)
LYMPHOCYTES NFR BLD: 26 % (ref 21–52)
Lab: ABNORMAL
MCH RBC QN AUTO: 27.8 PG (ref 24–34)
MCHC RBC AUTO-ENTMCNC: 32.4 G/DL (ref 31–37)
MCV RBC AUTO: 86.1 FL (ref 78–100)
METHADONE UR QL: NEGATIVE
MONOCYTES # BLD: 0.4 K/UL (ref 0.05–1.2)
MONOCYTES NFR BLD: 8 % (ref 3–10)
MUCOUS THREADS URNS QL MICRO: ABNORMAL /LPF
NEUTS SEG # BLD: 3 K/UL (ref 1.8–8)
NEUTS SEG NFR BLD: 66 % (ref 40–73)
NITRITE UR QL STRIP.AUTO: NEGATIVE
NRBC # BLD: 0 K/UL (ref 0–0.01)
NRBC BLD-RTO: 0 PER 100 WBC
OPIATES UR QL: NEGATIVE
PCP UR QL: NEGATIVE
PH UR STRIP: 5.5 (ref 5–8)
PLATELET # BLD AUTO: 162 K/UL (ref 135–420)
PMV BLD AUTO: 10.1 FL (ref 9.2–11.8)
POTASSIUM SERPL-SCNC: 3.9 MMOL/L (ref 3.5–5.5)
PROT SERPL-MCNC: 6.7 G/DL (ref 6.4–8.2)
PROT UR STRIP-MCNC: 30 MG/DL
PROTHROMBIN TIME: 14.8 SEC (ref 11.9–14.9)
RBC # BLD AUTO: 5.53 M/UL (ref 4.2–5.3)
RBC #/AREA URNS HPF: ABNORMAL /HPF (ref 0–5)
SODIUM SERPL-SCNC: 144 MMOL/L (ref 136–145)
SP GR UR REFRACTOMETRY: >1.03 (ref 1–1.03)
TROPONIN I SERPL HS-MCNC: 116 NG/L (ref 0–54)
TROPONIN I SERPL HS-MCNC: 123 NG/L (ref 0–54)
UROBILINOGEN UR QL STRIP.AUTO: 0.2 EU/DL (ref 0.2–1)
WBC # BLD AUTO: 4.6 K/UL (ref 4.6–13.2)
WBC URNS QL MICRO: ABNORMAL /HPF (ref 0–4)

## 2024-11-05 PROCEDURE — 70551 MRI BRAIN STEM W/O DYE: CPT

## 2024-11-05 PROCEDURE — 70498 CT ANGIOGRAPHY NECK: CPT

## 2024-11-05 PROCEDURE — 99223 1ST HOSP IP/OBS HIGH 75: CPT | Performed by: PHYSICIAN ASSISTANT

## 2024-11-05 PROCEDURE — 83605 ASSAY OF LACTIC ACID: CPT

## 2024-11-05 PROCEDURE — 6360000004 HC RX CONTRAST MEDICATION: Performed by: PHYSICIAN ASSISTANT

## 2024-11-05 PROCEDURE — 6360000002 HC RX W HCPCS: Performed by: PHYSICIAN ASSISTANT

## 2024-11-05 PROCEDURE — 81001 URINALYSIS AUTO W/SCOPE: CPT

## 2024-11-05 PROCEDURE — 99001 SPECIMEN HANDLING PT-LAB: CPT

## 2024-11-05 PROCEDURE — 82077 ASSAY SPEC XCP UR&BREATH IA: CPT

## 2024-11-05 PROCEDURE — 2580000003 HC RX 258: Performed by: PHYSICIAN ASSISTANT

## 2024-11-05 PROCEDURE — 87086 URINE CULTURE/COLONY COUNT: CPT

## 2024-11-05 PROCEDURE — 80307 DRUG TEST PRSMV CHEM ANLYZR: CPT

## 2024-11-05 PROCEDURE — 1100000003 HC PRIVATE W/ TELEMETRY

## 2024-11-05 PROCEDURE — 85610 PROTHROMBIN TIME: CPT

## 2024-11-05 PROCEDURE — 99285 EMERGENCY DEPT VISIT HI MDM: CPT

## 2024-11-05 PROCEDURE — 85025 COMPLETE CBC W/AUTO DIFF WBC: CPT

## 2024-11-05 PROCEDURE — 80053 COMPREHEN METABOLIC PANEL: CPT

## 2024-11-05 PROCEDURE — 93005 ELECTROCARDIOGRAM TRACING: CPT | Performed by: PHYSICIAN ASSISTANT

## 2024-11-05 PROCEDURE — 84484 ASSAY OF TROPONIN QUANT: CPT

## 2024-11-05 PROCEDURE — 70450 CT HEAD/BRAIN W/O DYE: CPT

## 2024-11-05 PROCEDURE — 96375 TX/PRO/DX INJ NEW DRUG ADDON: CPT

## 2024-11-05 PROCEDURE — 96365 THER/PROPH/DIAG IV INF INIT: CPT

## 2024-11-05 RX ORDER — MAGNESIUM SULFATE IN WATER 40 MG/ML
2000 INJECTION, SOLUTION INTRAVENOUS ONCE
Status: COMPLETED | OUTPATIENT
Start: 2024-11-05 | End: 2024-11-05

## 2024-11-05 RX ORDER — SODIUM CHLORIDE 9 MG/ML
INJECTION, SOLUTION INTRAVENOUS PRN
Status: DISCONTINUED | OUTPATIENT
Start: 2024-11-05 | End: 2024-11-07 | Stop reason: HOSPADM

## 2024-11-05 RX ORDER — CLOPIDOGREL BISULFATE 75 MG/1
75 TABLET ORAL DAILY
Status: DISCONTINUED | OUTPATIENT
Start: 2024-11-06 | End: 2024-11-07 | Stop reason: HOSPADM

## 2024-11-05 RX ORDER — ONDANSETRON 2 MG/ML
4 INJECTION INTRAMUSCULAR; INTRAVENOUS EVERY 6 HOURS PRN
Status: DISCONTINUED | OUTPATIENT
Start: 2024-11-05 | End: 2024-11-07 | Stop reason: HOSPADM

## 2024-11-05 RX ORDER — SODIUM CHLORIDE 0.9 % (FLUSH) 0.9 %
5-40 SYRINGE (ML) INJECTION EVERY 12 HOURS SCHEDULED
Status: DISCONTINUED | OUTPATIENT
Start: 2024-11-05 | End: 2024-11-07 | Stop reason: HOSPADM

## 2024-11-05 RX ORDER — ACETAMINOPHEN 650 MG/1
650 SUPPOSITORY RECTAL EVERY 6 HOURS PRN
Status: DISCONTINUED | OUTPATIENT
Start: 2024-11-05 | End: 2024-11-07 | Stop reason: HOSPADM

## 2024-11-05 RX ORDER — ENOXAPARIN SODIUM 100 MG/ML
30 INJECTION SUBCUTANEOUS DAILY
Status: DISCONTINUED | OUTPATIENT
Start: 2024-11-06 | End: 2024-11-07 | Stop reason: HOSPADM

## 2024-11-05 RX ORDER — EZETIMIBE 10 MG/1
10 TABLET ORAL DAILY
Status: DISCONTINUED | OUTPATIENT
Start: 2024-11-06 | End: 2024-11-07 | Stop reason: HOSPADM

## 2024-11-05 RX ORDER — SODIUM BICARBONATE 650 MG/1
650 TABLET ORAL 2 TIMES DAILY
COMMUNITY
Start: 2024-10-10

## 2024-11-05 RX ORDER — ONDANSETRON 4 MG/1
4 TABLET, ORALLY DISINTEGRATING ORAL EVERY 8 HOURS PRN
Status: DISCONTINUED | OUTPATIENT
Start: 2024-11-05 | End: 2024-11-07 | Stop reason: HOSPADM

## 2024-11-05 RX ORDER — 0.9 % SODIUM CHLORIDE 0.9 %
500 INTRAVENOUS SOLUTION INTRAVENOUS ONCE
Status: COMPLETED | OUTPATIENT
Start: 2024-11-05 | End: 2024-11-05

## 2024-11-05 RX ORDER — BISACODYL 10 MG
10 SUPPOSITORY, RECTAL RECTAL DAILY PRN
Status: DISCONTINUED | OUTPATIENT
Start: 2024-11-05 | End: 2024-11-07 | Stop reason: HOSPADM

## 2024-11-05 RX ORDER — LEVETIRACETAM 500 MG/5ML
2000 INJECTION, SOLUTION, CONCENTRATE INTRAVENOUS
Status: COMPLETED | OUTPATIENT
Start: 2024-11-05 | End: 2024-11-05

## 2024-11-05 RX ORDER — POLYETHYLENE GLYCOL 3350 17 G/17G
17 POWDER, FOR SOLUTION ORAL DAILY PRN
Status: DISCONTINUED | OUTPATIENT
Start: 2024-11-05 | End: 2024-11-07 | Stop reason: HOSPADM

## 2024-11-05 RX ORDER — ACETAMINOPHEN 325 MG/1
650 TABLET ORAL EVERY 6 HOURS PRN
Status: DISCONTINUED | OUTPATIENT
Start: 2024-11-05 | End: 2024-11-07 | Stop reason: HOSPADM

## 2024-11-05 RX ORDER — SODIUM CHLORIDE 0.9 % (FLUSH) 0.9 %
5-40 SYRINGE (ML) INJECTION PRN
Status: DISCONTINUED | OUTPATIENT
Start: 2024-11-05 | End: 2024-11-07 | Stop reason: HOSPADM

## 2024-11-05 RX ORDER — ATORVASTATIN CALCIUM 40 MG/1
80 TABLET, FILM COATED ORAL DAILY
Status: DISCONTINUED | OUTPATIENT
Start: 2024-11-06 | End: 2024-11-07 | Stop reason: HOSPADM

## 2024-11-05 RX ORDER — IOPAMIDOL 755 MG/ML
80 INJECTION, SOLUTION INTRAVASCULAR
Status: COMPLETED | OUTPATIENT
Start: 2024-11-05 | End: 2024-11-05

## 2024-11-05 RX ORDER — ASPIRIN 81 MG/1
81 TABLET ORAL DAILY
Status: DISCONTINUED | OUTPATIENT
Start: 2024-11-06 | End: 2024-11-07 | Stop reason: HOSPADM

## 2024-11-05 RX ORDER — THIAMINE HYDROCHLORIDE 100 MG/ML
100 INJECTION, SOLUTION INTRAMUSCULAR; INTRAVENOUS ONCE
Status: COMPLETED | OUTPATIENT
Start: 2024-11-05 | End: 2024-11-05

## 2024-11-05 RX ADMIN — SODIUM CHLORIDE 500 ML: 9 INJECTION, SOLUTION INTRAVENOUS at 17:07

## 2024-11-05 RX ADMIN — THIAMINE HYDROCHLORIDE 100 MG: 100 INJECTION, SOLUTION INTRAMUSCULAR; INTRAVENOUS at 13:47

## 2024-11-05 RX ADMIN — MAGNESIUM SULFATE HEPTAHYDRATE 2000 MG: 40 INJECTION, SOLUTION INTRAVENOUS at 13:50

## 2024-11-05 RX ADMIN — IOPAMIDOL 80 ML: 755 INJECTION, SOLUTION INTRAVENOUS at 12:33

## 2024-11-05 RX ADMIN — SODIUM CHLORIDE, PRESERVATIVE FREE 10 ML: 5 INJECTION INTRAVENOUS at 20:19

## 2024-11-05 RX ADMIN — LEVETIRACETAM 2000 MG: 100 INJECTION INTRAVENOUS at 12:39

## 2024-11-05 ASSESSMENT — ENCOUNTER SYMPTOMS
ABDOMINAL PAIN: 0
VOMITING: 0
DIARRHEA: 0
SORE THROAT: 0
SHORTNESS OF BREATH: 0

## 2024-11-05 ASSESSMENT — PAIN - FUNCTIONAL ASSESSMENT: PAIN_FUNCTIONAL_ASSESSMENT: NONE - DENIES PAIN

## 2024-11-05 NOTE — ED PROVIDER NOTES
release tablet  Commonly known as: TYLENOL     amLODIPine 5 MG tablet  Commonly known as: NORVASC  Take 1 tablet by mouth daily     aspirin 81 MG EC tablet     atorvastatin 80 MG tablet  Commonly known as: LIPITOR  Take 1 tablet by mouth daily     butalbital-acetaminophen-caffeine -40 MG per tablet  Commonly known as: FIORICET, ESGIC     carvedilol 6.25 MG tablet  Commonly known as: COREG  Take 1 tablet by mouth 2 times daily     clopidogrel 75 MG tablet  Commonly known as: PLAVIX  Take 1 tablet by mouth daily     ezetimibe 10 MG tablet  Commonly known as: ZETIA  Take 1 tablet by mouth daily     Farxiga 10 MG tablet  Generic drug: dapagliflozin     hydrALAZINE 50 MG tablet  Commonly known as: APRESOLINE  Take 1 tablet by mouth 3 times daily     lisinopril 5 MG tablet  Commonly known as: PRINIVIL;ZESTRIL  Take 1 tablet by mouth daily     sodium bicarbonate 650 MG tablet              Disposition: Admitted      Dragon Disclaimer     Please note that this dictation was completed with Fusion-io, the computer voice recognition software.  Quite often unanticipated grammatical, syntax, homophones, and other interpretive errors are inadvertently transcribed by the computer software.  Please disregard these errors.  Please excuse any errors that have escaped final proofreading.      Yina Rudolph PA-C, PA  11/05/24 [id number]

## 2024-11-05 NOTE — H&P
criteria. Right vertebral: Patent Left vertebral: Patent CTA HEAD VASCULAR ANALYSIS: Anterior circulation: -ICA: Patent -KATHY: Patent -MCA: Patent with mild narrowing of bilateral M2 -AComm: Diminutive. -PComm: Hypoplastic bilaterally. Posterior circulation: -Vertebral: Patent -Basilar: Patent -PCA: Patent Major dural venous sinuses: Unremarkable CTA SOFT TISSUE ANALYSIS: Lungs: Mild dependent atelectasis Soft tissue: Heterogeneous thyroid with approximately 1.5 cm right thyroid nodule. 7 mm nodule posterior to the right lobe of the thyroid relatively hypoenhancing. Lymph nodes: No adenopathy Orbits: Unremarkable Paranasal sinuses: Mild prominence of the nasopharyngeal mucosa with associated enhancement. Brain: No hemorrhage or mass effect. Bones: Unremarkable for age.     1.  No acute large vessel occlusion identified. 2.  Mild burden of intracranial stenoses involving the bilateral MCA. 3.  Atheromatous disease of the aortic arch with moderate narrowing left subclavian artery. 4.  Otherwise patent cervical carotid and vertebral arteries. 5.  Heterogeneous right lobe of the thyroid, largest nodule approximately 1.5 cm. Small nodule posterior to the right lobe may represent exophytic thyroid nodule or parathyroid adenoma. Recommend sonographic follow-up. 6.  Mild prominence of the nasopharyngeal mucosa, may represent reactive enlargement however cannot exclude underlying lesion, recommend correlation. Findings of   code s  was called to Dr Abraham at 11/5/2024 12:47 PM Electronically signed by Raul Hernandez    CT HEAD WO CONTRAST    Result Date: 11/5/2024  EXAM: CT CODE NEURO HEAD WO CONTRAST CLINICAL INDICATION/HISTORY: Stroke slurred speech, aphasia, incontinence, seizure TECHNIQUE: Contiguous axial images were obtained through the brain.  Sagittal and coronal reconstructions were generated. CT scans at this facility are performed using dose optimization technique as appropriate with performed exam, to include

## 2024-11-06 ENCOUNTER — APPOINTMENT (OUTPATIENT)
Facility: HOSPITAL | Age: 64
DRG: 204 | End: 2024-11-06
Payer: MEDICAID

## 2024-11-06 LAB
ANION GAP SERPL CALC-SCNC: 5 MMOL/L (ref 3–18)
BASOPHILS # BLD: 0 K/UL (ref 0–0.1)
BASOPHILS NFR BLD: 1 % (ref 0–2)
BUN SERPL-MCNC: 28 MG/DL (ref 7–18)
BUN/CREAT SERPL: 12 (ref 12–20)
CALCIUM SERPL-MCNC: 9.1 MG/DL (ref 8.5–10.1)
CHLORIDE SERPL-SCNC: 112 MMOL/L (ref 100–111)
CHOLEST SERPL-MCNC: 112 MG/DL
CO2 SERPL-SCNC: 24 MMOL/L (ref 21–32)
CREAT SERPL-MCNC: 2.42 MG/DL (ref 0.6–1.3)
DIFFERENTIAL METHOD BLD: ABNORMAL
ECHO AO ASC DIAM: 3.9 CM
ECHO AO ASCENDING AORTA INDEX: 2.02 CM/M2
ECHO AO ROOT DIAM: 3.4 CM
ECHO AO ROOT INDEX: 1.76 CM/M2
ECHO AV PEAK GRADIENT: 4 MMHG
ECHO AV PEAK VELOCITY: 1 M/S
ECHO BSA: 1.96 M2
ECHO EST RA PRESSURE: 8 MMHG
ECHO LA VOL A-L A2C: 48 ML (ref 22–52)
ECHO LA VOL A-L A4C: 39 ML (ref 22–52)
ECHO LA VOL BP: 42 ML (ref 22–52)
ECHO LA VOL MOD A2C: 46 ML (ref 22–52)
ECHO LA VOL MOD A4C: 38 ML (ref 22–52)
ECHO LA VOL/BSA BIPLANE: 22 ML/M2 (ref 16–34)
ECHO LA VOLUME AREA LENGTH: 44 ML
ECHO LA VOLUME INDEX A-L A2C: 25 ML/M2 (ref 16–34)
ECHO LA VOLUME INDEX A-L A4C: 20 ML/M2 (ref 16–34)
ECHO LA VOLUME INDEX AREA LENGTH: 23 ML/M2 (ref 16–34)
ECHO LA VOLUME INDEX MOD A2C: 24 ML/M2 (ref 16–34)
ECHO LA VOLUME INDEX MOD A4C: 20 ML/M2 (ref 16–34)
ECHO LV E' LATERAL VELOCITY: 4.8 CM/S
ECHO LV E' SEPTAL VELOCITY: 4.9 CM/S
ECHO LV EF PHYSICIAN: 55 %
ECHO LV FRACTIONAL SHORTENING: 27 % (ref 28–44)
ECHO LV INTERNAL DIMENSION DIASTOLE INDEX: 2.49 CM/M2
ECHO LV INTERNAL DIMENSION DIASTOLIC: 4.8 CM (ref 3.9–5.3)
ECHO LV INTERNAL DIMENSION SYSTOLIC INDEX: 1.81 CM/M2
ECHO LV INTERNAL DIMENSION SYSTOLIC: 3.5 CM
ECHO LV IVSD: 1.2 CM (ref 0.6–0.9)
ECHO LV MASS 2D: 181.9 G (ref 67–162)
ECHO LV MASS INDEX 2D: 94.3 G/M2 (ref 43–95)
ECHO LV POSTERIOR WALL DIASTOLIC: 0.9 CM (ref 0.6–0.9)
ECHO LV RELATIVE WALL THICKNESS RATIO: 0.38
ECHO LVOT AREA: 4.5 CM2
ECHO LVOT DIAM: 2.4 CM
ECHO MV A VELOCITY: 0.64 M/S
ECHO MV E DECELERATION TIME (DT): 234.9 MS
ECHO MV E VELOCITY: 0.33 M/S
ECHO MV E/A RATIO: 0.52
ECHO MV E/E' LATERAL: 6.88
ECHO MV E/E' RATIO (AVERAGED): 6.8
ECHO MV E/E' SEPTAL: 6.73
ECHO PV MAX VELOCITY: 0.8 M/S
ECHO PV PEAK GRADIENT: 3 MMHG
ECHO RA END SYSTOLIC VOLUME APICAL 4 CHAMBER INDEX BSA: 21 ML/M2
ECHO RA VOLUME BIPLANE METHOD OF DISKS: 41 ML
ECHO RA VOLUME INDEX BP: 21 ML/M2
ECHO RA VOLUME: 41 ML
ECHO RIGHT VENTRICULAR SYSTOLIC PRESSURE (RVSP): 22 MMHG
ECHO RV BASAL DIMENSION: 3.1 CM
ECHO RV TAPSE: 1.8 CM (ref 1.7–?)
ECHO TV REGURGITANT MAX VELOCITY: 1.89 M/S
ECHO TV REGURGITANT PEAK GRADIENT: 14 MMHG
EKG ATRIAL RATE: 77 BPM
EKG DIAGNOSIS: NORMAL
EKG P AXIS: 54 DEGREES
EKG P-R INTERVAL: 164 MS
EKG Q-T INTERVAL: 398 MS
EKG QRS DURATION: 94 MS
EKG QTC CALCULATION (BAZETT): 450 MS
EKG R AXIS: 3 DEGREES
EKG T AXIS: 84 DEGREES
EKG VENTRICULAR RATE: 77 BPM
EOSINOPHIL # BLD: 0 K/UL (ref 0–0.4)
EOSINOPHIL NFR BLD: 0 % (ref 0–5)
ERYTHROCYTE [DISTWIDTH] IN BLOOD BY AUTOMATED COUNT: 14.8 % (ref 11.6–14.5)
GLUCOSE SERPL-MCNC: 117 MG/DL (ref 74–99)
HCT VFR BLD AUTO: 45.5 % (ref 35–45)
HDLC SERPL-MCNC: 36 MG/DL (ref 40–60)
HDLC SERPL: 3.1 (ref 0–5)
HGB BLD-MCNC: 14.8 G/DL (ref 12–16)
IMM GRANULOCYTES # BLD AUTO: 0 K/UL (ref 0–0.04)
IMM GRANULOCYTES NFR BLD AUTO: 0 % (ref 0–0.5)
LDLC SERPL CALC-MCNC: 40.2 MG/DL (ref 0–100)
LIPID PANEL: ABNORMAL
LYMPHOCYTES # BLD: 2.1 K/UL (ref 0.9–3.6)
LYMPHOCYTES NFR BLD: 58 % (ref 21–52)
MCH RBC QN AUTO: 28.1 PG (ref 24–34)
MCHC RBC AUTO-ENTMCNC: 32.5 G/DL (ref 31–37)
MCV RBC AUTO: 86.5 FL (ref 78–100)
MONOCYTES # BLD: 0.4 K/UL (ref 0.05–1.2)
MONOCYTES NFR BLD: 11 % (ref 3–10)
NEUTS SEG # BLD: 1.1 K/UL (ref 1.8–8)
NEUTS SEG NFR BLD: 30 % (ref 40–73)
NRBC # BLD: 0 K/UL (ref 0–0.01)
NRBC BLD-RTO: 0 PER 100 WBC
PLATELET # BLD AUTO: 166 K/UL (ref 135–420)
PMV BLD AUTO: 10.5 FL (ref 9.2–11.8)
POTASSIUM SERPL-SCNC: 3.6 MMOL/L (ref 3.5–5.5)
RBC # BLD AUTO: 5.26 M/UL (ref 4.2–5.3)
SODIUM SERPL-SCNC: 141 MMOL/L (ref 136–145)
TRIGL SERPL-MCNC: 179 MG/DL
TROPONIN I SERPL HS-MCNC: 138 NG/L (ref 0–54)
VLDLC SERPL CALC-MCNC: 35.8 MG/DL
WBC # BLD AUTO: 3.6 K/UL (ref 4.6–13.2)

## 2024-11-06 PROCEDURE — 2580000003 HC RX 258: Performed by: PHYSICIAN ASSISTANT

## 2024-11-06 PROCEDURE — 6370000000 HC RX 637 (ALT 250 FOR IP): Performed by: PHYSICIAN ASSISTANT

## 2024-11-06 PROCEDURE — 97162 PT EVAL MOD COMPLEX 30 MIN: CPT

## 2024-11-06 PROCEDURE — 85025 COMPLETE CBC W/AUTO DIFF WBC: CPT

## 2024-11-06 PROCEDURE — 93306 TTE W/DOPPLER COMPLETE: CPT

## 2024-11-06 PROCEDURE — 1100000003 HC PRIVATE W/ TELEMETRY

## 2024-11-06 PROCEDURE — 97165 OT EVAL LOW COMPLEX 30 MIN: CPT

## 2024-11-06 PROCEDURE — 99232 SBSQ HOSP IP/OBS MODERATE 35: CPT | Performed by: STUDENT IN AN ORGANIZED HEALTH CARE EDUCATION/TRAINING PROGRAM

## 2024-11-06 PROCEDURE — 93010 ELECTROCARDIOGRAM REPORT: CPT | Performed by: INTERNAL MEDICINE

## 2024-11-06 PROCEDURE — 97535 SELF CARE MNGMENT TRAINING: CPT

## 2024-11-06 PROCEDURE — 99222 1ST HOSP IP/OBS MODERATE 55: CPT | Performed by: PSYCHIATRY & NEUROLOGY

## 2024-11-06 PROCEDURE — 36415 COLL VENOUS BLD VENIPUNCTURE: CPT

## 2024-11-06 PROCEDURE — 2580000003 HC RX 258: Performed by: STUDENT IN AN ORGANIZED HEALTH CARE EDUCATION/TRAINING PROGRAM

## 2024-11-06 PROCEDURE — 80061 LIPID PANEL: CPT

## 2024-11-06 PROCEDURE — 94761 N-INVAS EAR/PLS OXIMETRY MLT: CPT

## 2024-11-06 PROCEDURE — 84484 ASSAY OF TROPONIN QUANT: CPT

## 2024-11-06 PROCEDURE — 80048 BASIC METABOLIC PNL TOTAL CA: CPT

## 2024-11-06 RX ORDER — SODIUM CHLORIDE 9 MG/ML
INJECTION, SOLUTION INTRAVENOUS CONTINUOUS
Status: DISPENSED | OUTPATIENT
Start: 2024-11-06 | End: 2024-11-07

## 2024-11-06 RX ORDER — BENZONATATE 100 MG/1
100 CAPSULE ORAL 3 TIMES DAILY PRN
Status: DISCONTINUED | OUTPATIENT
Start: 2024-11-06 | End: 2024-11-07 | Stop reason: HOSPADM

## 2024-11-06 RX ADMIN — ATORVASTATIN CALCIUM 80 MG: 40 TABLET, FILM COATED ORAL at 09:39

## 2024-11-06 RX ADMIN — BENZONATATE 100 MG: 100 CAPSULE ORAL at 21:18

## 2024-11-06 RX ADMIN — SODIUM CHLORIDE, PRESERVATIVE FREE 10 ML: 5 INJECTION INTRAVENOUS at 22:00

## 2024-11-06 RX ADMIN — EZETIMIBE 10 MG: 10 TABLET ORAL at 09:39

## 2024-11-06 RX ADMIN — SODIUM CHLORIDE: 9 INJECTION, SOLUTION INTRAVENOUS at 18:46

## 2024-11-06 RX ADMIN — SODIUM CHLORIDE, PRESERVATIVE FREE 10 ML: 5 INJECTION INTRAVENOUS at 09:40

## 2024-11-06 RX ADMIN — ASPIRIN 81 MG: 81 TABLET, COATED ORAL at 09:40

## 2024-11-06 RX ADMIN — CLOPIDOGREL BISULFATE 75 MG: 75 TABLET ORAL at 09:39

## 2024-11-06 ASSESSMENT — PAIN SCALES - GENERAL
PAINLEVEL_OUTOF10: 0

## 2024-11-06 NOTE — CARE COORDINATION
11/06/24 0853   Service Assessment   Patient Orientation Alert and Oriented   Cognition Alert   History Provided By Patient   Primary Caregiver Self   Accompanied By/Relationship no one at bedside   Support Systems Spouse/Significant Other   Patient's Healthcare Decision Maker is: Patient Declined (Legal Next of Kin Remains as Decision Maker)   PCP Verified by CM Yes   Last Visit to PCP Within last 3 months   Prior Functional Level Independent in ADLs/IADLs   Current Functional Level Independent in ADLs/IADLs   Can patient return to prior living arrangement Yes   Ability to make needs known: Good   Family able to assist with home care needs: Yes   Would you like for me to discuss the discharge plan with any other family members/significant others, and if so, who? Yes  (Patricia Malagon)   Financial Resources Medicaid   Community Resources None   CM/SW Referral Other (see comment)  (not applicable)   Social/Functional History   Lives With Friend(s)   Type of Home Apartment   Home Layout One level   Home Access Elevator  (Patient lives on the second floor of the apartment building)   Bathroom Shower/Tub Tub/Shower unit   Bathroom Toilet Standard   Bathroom Equipment Grab bars in shower   Bathroom Accessibility Accessible   Home Equipment Cane   Receives Help From Personal care attendant   ADL Assistance Independent   Homemaking Assistance Independent   Homemaking Responsibilities Yes   Ambulation Assistance Independent   Transfer Assistance Independent   Active  No   Patient's  Info Patricia Malagon   Mode of Transportation Car   Education   (not applicable)   Occupation On disability   Type of Occupation   (not applicable)   Discharge Planning   Type of Residence Apartment   Living Arrangements Friends   Current Services Prior To Admission Private Duty Homecare   Potential Assistance Needed N/A   DME Ordered? No   Potential Assistance Purchasing Medications No   Type of Home Care Services Aide Services

## 2024-11-06 NOTE — PROGRESS NOTES
OCCUPATIONAL THERAPY EVALUATION/DISCHARGE    Patient: Elina Moya (64 y.o. female)  Date: 11/6/2024  Primary Diagnosis: TIA (transient ischemic attack) [G45.9]  Precautions: Fall Risk, General Precautions  PLOF: Pt needed assist with self-care \"at times\" and used a straight cane for functional mobility. Pt reports having a caregiver M- F, for 8 hours and her \"friend\" whom she lives wth assists on weekends prn.      ASSESSMENT AND RECOMMENDATIONS:  Pt cleared to participate in OT evaluation by RN. Upon entering the room, the pt was supine in bed, alert, and agreeable to participate in OT session. Patient mod (I) for supine > sit, stand by assist for donning BLE socks and contact guard  - stand by assist for functional transfers/ mobility in preparation for ADLs. Patient with PMHx CVA, Right residual deficits noted. Pt reports RUE does not \"feel\" back t baseline but does not impact her from ADLs. Patient with no further self-care concerns. Pt presents fair dynamic standing balance, however will defer to PT for functional balance and functional mobility tasks. Patient left in recliner with all needs met, call bell in reach.        Maximum therapeutic gains met at current level of care and patient will be discharged from occupational therapy at this time.    Further Equipment Recommendations for Discharge: Patient has all needed DME for home safety.    AMPA: AM-PAC Inpatient Daily Activity Raw Score: 19    At this time and based on an AM-PAC score, no further OT is recommended upon discharge.  Recommend patient returns to prior setting with prior services.    This AMPA score should be considered in conjunction with interdisciplinary team recommendations to determine the most appropriate discharge setting. Patient's social support, diagnosis, medical stability, and prior level of function should also be taken into consideration.     SUBJECTIVE:   Patient stated “Yolanda done OP therapy before but its no need

## 2024-11-06 NOTE — PROGRESS NOTES
Spiritual Health History and Assessment/Progress Note  [facility]    Spiritual/Emotional Needs, Advance Care Planning,  ,  ,      Name: Elina Moya MRN: [id number]    Age: 64 y.o.     Sex: female   Language: English   Caodaism: Denominational   Vasovagal syncope     Date: 11/6/2024            Total Time Calculated: 7 min              Spiritual Assessment began in [facility]        Referral/Consult From: Multi-disciplinary team   Encounter Overview/Reason: Spiritual/Emotional Needs, Advance Care Planning  Service Provided For: Patient    Lynn, Belief, Meaning:   Patient has beliefs or practices that help with coping during difficult times  Family/Friends No family/friends present      Importance and Influence:  Patient has spiritual/personal beliefs that influence decisions regarding their health  Family/Friends No family/friends present    Community:  Patient feels well-supported. Support system includes: Children and Other: lay caregiver  Family/Friends No family/friends present    Assessment and Plan of Care:     Patient Interventions include: Affirmed coping skills/support systems  Family/Friends Interventions include: No family/friends present    Patient Plan of Care: No spiritual needs identified for follow-up  Family/Friends Plan of Care: No family/friends present    Electronically signed by NICK Michel on 11/6/2024 at 1:27 PM

## 2024-11-06 NOTE — CONSULTS
movements.  No abnormal movements were seen.  Reflexes were symmetric and physiologic.  Plantar responses are downgoing.    Laboratory studies were reviewed.    Imaging studies were reviewed.    Summary:  This is a 64-year-old woman who had a near syncopal episode with associated nausea, vomiting and diaphoresis.  This is consistent with a cardiovascular event.  Her echocardiogram is pending.  Given her elevated troponins, with consider cardiology evaluation.  Recall if you have further questions.    Thank you for allow me to participate in the care of this patient.  I can be reached at [phone].

## 2024-11-06 NOTE — PROGRESS NOTES
Progress Note  Hospitalist Service    Patient: Elina Moya MRN: [id number]   SSN: [id number]  YOB: 1960   Age: 64 y.o.  Sex: female      Admit Date: 11/5/2024    LOS: 1 day   Chief Complaint   Patient presents with    Aphasia       Subjective:     Patient seen and examined.  Patient with difficulty recalling her blood pressure medicine as well as her aspirin and Plavix.  Doubt good compliance.  Patient aware we are awaiting results of echo.  And can be discharged shortly after.    Objective:     Vitals:  /81   Pulse 75   Temp 97.6 °F (36.4 °C) (Oral)   Resp 17   Ht 1.702 m (5' 7\")   Wt 81.6 kg (180 lb)   SpO2 98%   BMI 28.19 kg/m²     Physical Exam:   General appearance: alert, appears stated age, and cooperative  Lungs: clear to auscultation bilaterally  Heart: regular rate and rhythm, S1, S2 normal, no murmur, click, rub or gallop  Abdomen: soft, non-tender; bowel sounds normal; no masses,  no organomegaly  Pulses: 2+ and symmetric  Skin: Skin color, texture, turgor normal. No rashes or lesions  Neuro:  right upper extremity weakness - 4/5, all others 5/5. No facial asymmetry    Intake and Output:  Current Shift: 11/06 0701 - 11/06 1900  In: 720 [P.O.:720]  Out: -   Last three shifts: 11/04 1901 - 11/06 0700  In: 1410 [P.O.:1360]  Out: 200 [Urine:200]    Lab/Data Review:  Recent Results (from the past 12 hour(s))   Echo (TTE) complete (PRN contrast/bubble/strain/3D)    Collection Time: 11/06/24  9:39 AM   Result Value Ref Range    Body Surface Area 1.96 m2    IVSd 1.2 (A) 0.6 - 0.9 cm    LVIDd 4.8 3.9 - 5.3 cm    LVIDs 3.5 cm    LVOT Diameter 2.4 cm    LVPWd 0.9 0.6 - 0.9 cm    RV Basal Dimension 3.1 cm    LA Volume A/L 44 mL    LA Volume A-L A4C 48 22 - 52 mL    LA Volume A-L A4C 39 22 - 52 mL    LA Volume MOD A2C 46 22 - 52 mL    LA Volume MOD A4C 38 22 - 52 mL    LA Volume BP 42 22 - 52 mL    RA Volume 41 ml    AV Peak Gradient 4 mmHg    AV Peak Velocity 1.0 m/s    MV A

## 2024-11-06 NOTE — PROGRESS NOTES
PHYSICAL THERAPY EVALUATION/DISCHARGE    Patient: Elina Moya (64 y.o. female)  Date: 11/6/2024  Primary Diagnosis: TIA (transient ischemic attack) [G45.9]  Precautions: Fall Risk  PLOF: Independent; ww as needed   ASSESSMENT AND RECOMMENDATIONS:  Seated in recliner. Mod I for transfers. Amb 25ft with no AD. Steady gait. Returned to seated in recliner. Denies mobility concerns. Call bell in reach.     Patient does not require further skilled physical therapy intervention at this level of care.    Further Equipment Recommendations for Discharge: rolling walker; has if needed    AM-PAC Inpatient Mobility Raw Score : 24    This [facility] score should be considered in conjunction with interdisciplinary team recommendations to determine the most appropriate discharge setting. Patient's social support, diagnosis, medical stability, and prior level of function should also be taken into consideration.    Current research shows that an AM-PAC score of 18 (14 without stairs) or greater is associated with a discharge to the patient's home setting.     SUBJECTIVE:   Patient stated “My granddaughter is my aide.”    OBJECTIVE DATA SUMMARY:     Past Medical History:   Diagnosis Date    Abnormal mammogram 2014    left with biopsy    Acute lacunar stroke (HCC) 05/21/2021    Acute Lacunar Stroke (acute lacunar infarct in the posterior left lentiform nucleus extending into the corona radiata) with residual right hemiparesis    Cerebrovascular accident (CVA) due to embolism of left middle cerebral artery (HCC) 06/06/2022    Chronic kidney disease     Stage 4, not on dialysis    Constipation     Current every day smoker     Current use of aspirin 05/23/2021    Gastroesophageal reflux disease 06/30/2016    Hemiparesis of right dominant side due to acute cerebrovascular disease (HCC) 05/21/2021    History of cocaine abuse (HCC) 12/01/2022    History of Helicobacter pylori infection 07/24/2015    History of iron deficiency anemia 06/2014

## 2024-11-07 VITALS
TEMPERATURE: 98.1 F | OXYGEN SATURATION: 96 % | SYSTOLIC BLOOD PRESSURE: 137 MMHG | HEART RATE: 83 BPM | RESPIRATION RATE: 17 BRPM | WEIGHT: 180 LBS | DIASTOLIC BLOOD PRESSURE: 76 MMHG | HEIGHT: 67 IN | BODY MASS INDEX: 28.25 KG/M2

## 2024-11-07 LAB
ANION GAP SERPL CALC-SCNC: 5 MMOL/L (ref 3–18)
BACTERIA SPEC CULT: NORMAL
BASOPHILS # BLD: 0 K/UL (ref 0–0.1)
BASOPHILS NFR BLD: 0 % (ref 0–2)
BUN SERPL-MCNC: 23 MG/DL (ref 7–18)
BUN/CREAT SERPL: 11 (ref 12–20)
CALCIUM SERPL-MCNC: 9.3 MG/DL (ref 8.5–10.1)
CHLORIDE SERPL-SCNC: 115 MMOL/L (ref 100–111)
CO2 SERPL-SCNC: 24 MMOL/L (ref 21–32)
CREAT SERPL-MCNC: 2.06 MG/DL (ref 0.6–1.3)
DIFFERENTIAL METHOD BLD: ABNORMAL
EOSINOPHIL # BLD: 0 K/UL (ref 0–0.4)
EOSINOPHIL NFR BLD: 1 % (ref 0–5)
ERYTHROCYTE [DISTWIDTH] IN BLOOD BY AUTOMATED COUNT: 14.7 % (ref 11.6–14.5)
GLUCOSE SERPL-MCNC: 101 MG/DL (ref 74–99)
HCT VFR BLD AUTO: 45.4 % (ref 35–45)
HGB BLD-MCNC: 14.3 G/DL (ref 12–16)
IMM GRANULOCYTES # BLD AUTO: 0 K/UL (ref 0–0.04)
IMM GRANULOCYTES NFR BLD AUTO: 0 % (ref 0–0.5)
LYMPHOCYTES # BLD: 1.3 K/UL (ref 0.9–3.6)
LYMPHOCYTES NFR BLD: 32 % (ref 21–52)
MCH RBC QN AUTO: 27.7 PG (ref 24–34)
MCHC RBC AUTO-ENTMCNC: 31.5 G/DL (ref 31–37)
MCV RBC AUTO: 87.8 FL (ref 78–100)
MONOCYTES # BLD: 0.4 K/UL (ref 0.05–1.2)
MONOCYTES NFR BLD: 10 % (ref 3–10)
NEUTS SEG # BLD: 2.3 K/UL (ref 1.8–8)
NEUTS SEG NFR BLD: 57 % (ref 40–73)
NRBC # BLD: 0 K/UL (ref 0–0.01)
NRBC BLD-RTO: 0 PER 100 WBC
PLATELET # BLD AUTO: 153 K/UL (ref 135–420)
PMV BLD AUTO: 10.3 FL (ref 9.2–11.8)
POTASSIUM SERPL-SCNC: 4 MMOL/L (ref 3.5–5.5)
RBC # BLD AUTO: 5.17 M/UL (ref 4.2–5.3)
SERVICE CMNT-IMP: NORMAL
SODIUM SERPL-SCNC: 144 MMOL/L (ref 136–145)
TROPONIN I SERPL HS-MCNC: 143 NG/L (ref 0–54)
WBC # BLD AUTO: 3.9 K/UL (ref 4.6–13.2)

## 2024-11-07 PROCEDURE — 6370000000 HC RX 637 (ALT 250 FOR IP): Performed by: PHYSICIAN ASSISTANT

## 2024-11-07 PROCEDURE — 85025 COMPLETE CBC W/AUTO DIFF WBC: CPT

## 2024-11-07 PROCEDURE — 2580000003 HC RX 258: Performed by: PHYSICIAN ASSISTANT

## 2024-11-07 PROCEDURE — 36415 COLL VENOUS BLD VENIPUNCTURE: CPT

## 2024-11-07 PROCEDURE — 80048 BASIC METABOLIC PNL TOTAL CA: CPT

## 2024-11-07 PROCEDURE — 84484 ASSAY OF TROPONIN QUANT: CPT

## 2024-11-07 PROCEDURE — 6360000002 HC RX W HCPCS: Performed by: PHYSICIAN ASSISTANT

## 2024-11-07 PROCEDURE — 99239 HOSP IP/OBS DSCHRG MGMT >30: CPT | Performed by: STUDENT IN AN ORGANIZED HEALTH CARE EDUCATION/TRAINING PROGRAM

## 2024-11-07 RX ORDER — BENZONATATE 100 MG/1
100 CAPSULE ORAL 3 TIMES DAILY PRN
Qty: 30 CAPSULE | Refills: 0 | Status: SHIPPED | OUTPATIENT
Start: 2024-11-07 | End: 2024-11-17

## 2024-11-07 RX ADMIN — ATORVASTATIN CALCIUM 80 MG: 40 TABLET, FILM COATED ORAL at 07:57

## 2024-11-07 RX ADMIN — CLOPIDOGREL BISULFATE 75 MG: 75 TABLET ORAL at 07:57

## 2024-11-07 RX ADMIN — EZETIMIBE 10 MG: 10 TABLET ORAL at 07:57

## 2024-11-07 RX ADMIN — SODIUM CHLORIDE, PRESERVATIVE FREE 10 ML: 5 INJECTION INTRAVENOUS at 07:57

## 2024-11-07 RX ADMIN — ASPIRIN 81 MG: 81 TABLET, COATED ORAL at 07:57

## 2024-11-07 ASSESSMENT — PAIN SCALES - GENERAL
PAINLEVEL_OUTOF10: 0

## 2024-11-07 NOTE — DISCHARGE SUMMARY
vertebral arteries.  5.  Heterogeneous right lobe of the thyroid, largest nodule approximately 1.5  cm. Small nodule posterior to the right lobe may represent exophytic thyroid  nodule or parathyroid adenoma. Recommend sonographic follow-up.  6.  Mild prominence of the nasopharyngeal mucosa, may represent reactive  enlargement however cannot exclude underlying lesion, recommend correlation.      Findings of   code s  was called to Dr Abraham at 11/5/2024 12:47 PM        Electronically signed by Raul Hernandez     MRI Result (most recent):  MRI BRAIN WO CONTRAST 11/05/2024    Narrative  MR BRAIN WITHOUT CONTRAST    HISTORY: Acute on chronic right leg weakness    COMPARISON: None    TECHNIQUE: Multisequence multiplanar imaging of the brain obtained without  contrast.    FINDINGS:    Brain parenchyma: No acute infarct, hemorrhage, or mass effect. Confluent  periventricular and deep white matter T2 FLAIR hyperintensity consistent with  chronic microvascular ischemic disease. Chronic left external capsule/lateral  basal ganglia infarct. Numerous foci of susceptibility in the bilateral  cerebellum, ingrid, midbrain and to a lesser degree cerebral hemispheres.    Extra-axial spaces, ventricles, basal cisterns: CSF isointense collection along  the left frontal convexity likely represents a 2 cm arachnoid cyst. The  ventricles and sulci are within normal limits for patient's age. Basal cisterns  are patent.    Other:  Orbits: Chronic defect in the left lamina papyracea.  Paranasal sinuses: Included paranasal sinuses are clear.  Mastoid air cells: Clear.  Calvarium and skull base: Calvarium is normal limits.    Impression  No acute infarct or hemorrhage.    Chronic left external capsule/lateral basal ganglia infarct.    Confluent white matter changes suggestive chronic microvascular ischemic  disease.    Numerous foci of microhemorrhage in the bilateral cerebral hemispheres,  brainstem and cerebellum. Distribution is

## 2024-11-07 NOTE — PROGRESS NOTES
4 Eyes Skin Assessment     NAME:  Elina Moya  YOB: 1960  MEDICAL RECORD NUMBER:  [id number]    The patient is being assessed for  Shift Handoff    I agree that at least one RN has performed a thorough Head to Toe Skin Assessment on the patient. ALL assessment sites listed below have been assessed.      Areas assessed by both nurses:    Head, Face, Ears, Shoulders, Back, Chest, Arms, Elbows, Hands, Sacrum. Buttock, Coccyx, Ischium, and Legs. Feet and Heels        Does the Patient have a Wound? No noted wound(s)       Himanshu Prevention initiated by RN: No  Wound Care Orders initiated by RN: No    Pressure Injury (Stage 3,4, Unstageable, DTI, NWPT, and Complex wounds) if present, place Wound referral order by RN under : No    New Ostomies, if present place, Ostomy referral order under : No     Nurse 1 eSignature: Electronically signed by Mariaelena Muller RN on 11/7/24 at 7:22 AM EST    **SHARE this note so that the co-signing nurse can place an eSignature**    Nurse 2 eSignature: {Esignature:[id number]}

## 2024-11-07 NOTE — CARE COORDINATION
D/C order noted for today. Orders reviewed. No needs identified at this time. Patient's personal care aide will transport patient home.  remains available if needed.    Sandra Edwards, NINAN, RN  Case Management   [phone]

## 2024-11-07 NOTE — PLAN OF CARE
Problem: Chronic Conditions and Co-morbidities  Goal: Patient's chronic conditions and co-morbidity symptoms are monitored and maintained or improved  11/5/2024 2254 by Frida Martinez RN  Outcome: Progressing     Problem: Discharge Planning  Goal: Discharge to home or other facility with appropriate resources  11/5/2024 2254 by Frida Martinez RN  Outcome: Progressing     Problem: Skin/Tissue Integrity  Goal: Absence of new skin breakdown  Description: 1.  Monitor for areas of redness and/or skin breakdown  2.  Assess vascular access sites hourly  3.  Every 4-6 hours minimum:  Change oxygen saturation probe site  4.  Every 4-6 hours:  If on nasal continuous positive airway pressure, respiratory therapy assess nares and determine need for appliance change or resting period.  11/5/2024 2254 by Frida Martinez RN  Outcome: Progressing     Problem: Safety - Adult  Goal: Free from fall injury  11/5/2024 2254 by Frida Martinez RN  Outcome: Progressing     
  Problem: Chronic Conditions and Co-morbidities  Goal: Patient's chronic conditions and co-morbidity symptoms are monitored and maintained or improved  11/7/2024 0847 by Jyoti Elizabeth RN  Outcome: Progressing  11/7/2024 0222 by Mariaelena Muller RN  Flowsheets (Taken 11/7/2024 0222)  Care Plan - Patient's Chronic Conditions and Co-Morbidity Symptoms are Monitored and Maintained or Improved:   Collaborate with multidisciplinary team to address chronic and comorbid conditions and prevent exacerbation or deterioration   Monitor and assess patient's chronic conditions and comorbid symptoms for stability, deterioration, or improvement  11/6/2024 2356 by Mariaelena Muller RN  Outcome: Progressing     Problem: Discharge Planning  Goal: Discharge to home or other facility with appropriate resources  11/7/2024 0847 by Jyoti Elizabeth RN  Outcome: Progressing  11/6/2024 2356 by Mariaelena Muller RN  Outcome: Progressing     Problem: Skin/Tissue Integrity  Goal: Absence of new skin breakdown  Description: 1.  Monitor for areas of redness and/or skin breakdown  2.  Assess vascular access sites hourly  3.  Every 4-6 hours minimum:  Change oxygen saturation probe site  4.  Every 4-6 hours:  If on nasal continuous positive airway pressure, respiratory therapy assess nares and determine need for appliance change or resting period.  11/7/2024 0847 by Jyoti Elizabeth RN  Outcome: Progressing  11/6/2024 2356 by Mariaelena Muller RN  Outcome: Progressing     Problem: Safety - Adult  Goal: Free from fall injury  11/7/2024 0847 by Jyoti Elizabeth RN  Outcome: Progressing  11/6/2024 2356 by Mariaelena Muller RN  Outcome: Progressing     Problem: Pain  Goal: Verbalizes/displays adequate comfort level or baseline comfort level  11/7/2024 0847 by Jyoti Elizabeth RN  Outcome: Progressing  11/6/2024 2356 by Mariaelena Muller RN  Outcome: Progressing  Flowsheets (Taken 11/6/2024 1623 by Stephanie Buenrostro RN)  Verbalizes/displays adequate comfort level or 
  Problem: Chronic Conditions and Co-morbidities  Goal: Patient's chronic conditions and co-morbidity symptoms are monitored and maintained or improved  11/7/2024 1407 by Jyoti Elizabeth RN  Outcome: Completed  11/7/2024 0847 by Jyoti Elizabeth RN  Outcome: Progressing  11/7/2024 0222 by Mariaelena Muller RN  Flowsheets (Taken 11/7/2024 0222)  Care Plan - Patient's Chronic Conditions and Co-Morbidity Symptoms are Monitored and Maintained or Improved:   Collaborate with multidisciplinary team to address chronic and comorbid conditions and prevent exacerbation or deterioration   Monitor and assess patient's chronic conditions and comorbid symptoms for stability, deterioration, or improvement     Problem: Discharge Planning  Goal: Discharge to home or other facility with appropriate resources  11/7/2024 1407 by Jyoti Elizabeth RN  Outcome: Completed  11/7/2024 0847 by Jyoti Elizabeth RN  Outcome: Progressing     Problem: Skin/Tissue Integrity  Goal: Absence of new skin breakdown  Description: 1.  Monitor for areas of redness and/or skin breakdown  2.  Assess vascular access sites hourly  3.  Every 4-6 hours minimum:  Change oxygen saturation probe site  4.  Every 4-6 hours:  If on nasal continuous positive airway pressure, respiratory therapy assess nares and determine need for appliance change or resting period.  11/7/2024 1407 by Jyoti Elizabeth RN  Outcome: Completed  11/7/2024 0847 by Jyoti Elizabeth RN  Outcome: Progressing     Problem: Safety - Adult  Goal: Free from fall injury  11/7/2024 1407 by Jyoti Elizabeth RN  Outcome: Completed  11/7/2024 0847 by Jyoti Elizabeth RN  Outcome: Progressing     Problem: Pain  Goal: Verbalizes/displays adequate comfort level or baseline comfort level  11/7/2024 1407 by Jyoti Elizabeth RN  Outcome: Completed  11/7/2024 0847 by Jyoti Elizabeth RN  Outcome: Progressing     
  Problem: Chronic Conditions and Co-morbidities  Goal: Patient's chronic conditions and co-morbidity symptoms are monitored and maintained or improved  Outcome: Progressing     Problem: Discharge Planning  Goal: Discharge to home or other facility with appropriate resources  Outcome: Progressing     Problem: Skin/Tissue Integrity  Goal: Absence of new skin breakdown  Description: 1.  Monitor for areas of redness and/or skin breakdown  2.  Assess vascular access sites hourly  3.  Every 4-6 hours minimum:  Change oxygen saturation probe site  4.  Every 4-6 hours:  If on nasal continuous positive airway pressure, respiratory therapy assess nares and determine need for appliance change or resting period.  Outcome: Progressing     Problem: Safety - Adult  Goal: Free from fall injury  Outcome: Progressing     
  Problem: Discharge Planning  Goal: Discharge to home or other facility with appropriate resources  11/6/2024 1054 by Stephanie Buenrostro, RN  Outcome: Progressing  Flowsheets (Taken 11/6/2024 0800)  Discharge to home or other facility with appropriate resources:   Identify barriers to discharge with patient and caregiver   Arrange for needed discharge resources and transportation as appropriate   Identify discharge learning needs (meds, wound care, etc)   Arrange for interpreters to assist at discharge as needed   Refer to discharge planning if patient needs post-hospital services based on physician order or complex needs related to functional status, cognitive ability or social support system  11/5/2024 2254 by Frida Martinez, RN  Outcome: Progressing     Problem: Safety - Adult  Goal: Free from fall injury  11/6/2024 1054 by Stephanie Buenrostro RN  Outcome: Progressing  Flowsheets (Taken 11/6/2024 1054)  Free From Fall Injury: Instruct family/caregiver on patient safety  11/5/2024 2254 by Frida Martinez, RN  Outcome: Progressing     Problem: Neurosensory - Adult  Goal: Remains free of injury related to seizures activity  Outcome: Progressing  Flowsheets (Taken 11/6/2024 1054)  Remains free of injury related to seizure activity:   Monitor patient for seizure activity, document and report duration and description of seizure to Licensed Independent Practitioner   Maintain airway, patient safety  and administer oxygen as ordered     Problem: Neurosensory - Adult  Goal: Achieves maximal functionality and self care  Outcome: Progressing  Flowsheets (Taken 11/6/2024 1054)  Achieves maximal functionality and self care:   Monitor swallowing and airway patency with patient fatigue and changes in neurological status   Encourage and assist patient to increase activity and self care with guidance from physical therapy/occupational therapy     Problem: Musculoskeletal - Adult  Goal: Return mobility to safest level of 
assistance   Assess pain using appropriate pain scale     Problem: Neurosensory - Adult  Goal: Remains free of injury related to seizures activity  11/6/2024 2356 by Mariaelena Muller RN  Outcome: Progressing  11/6/2024 1054 by Stephanie Buenrostro RN  Outcome: Progressing  Flowsheets (Taken 11/6/2024 1054)  Remains free of injury related to seizure activity:   Monitor patient for seizure activity, document and report duration and description of seizure to Licensed Independent Practitioner   Maintain airway, patient safety  and administer oxygen as ordered  Goal: Achieves maximal functionality and self care  11/6/2024 2356 by Mariaelena Muller RN  Outcome: Progressing  11/6/2024 1054 by Stephanie Buenrostro RN  Outcome: Progressing  Flowsheets (Taken 11/6/2024 1054)  Achieves maximal functionality and self care:   Monitor swallowing and airway patency with patient fatigue and changes in neurological status   Encourage and assist patient to increase activity and self care with guidance from physical therapy/occupational therapy     Problem: Cardiovascular - Adult  Goal: Maintains optimal cardiac output and hemodynamic stability  Outcome: Progressing     Problem: Musculoskeletal - Adult  Goal: Return mobility to safest level of function  11/6/2024 2356 by Mariaelena Muller RN  Outcome: Progressing  11/6/2024 1054 by Stephanie Buenrostro RN  Outcome: Progressing  Flowsheets (Taken 11/6/2024 1054)  Return Mobility to Safest Level of Function:   Assess patient stability and activity tolerance for standing, transferring and ambulating with or without assistive devices   Assist with transfers and ambulation using safe patient handling equipment as needed  Goal: Return ADL status to a safe level of function  11/6/2024 2356 by Mariaelena Muller RN  Outcome: Progressing  11/6/2024 1054 by Stephanie Buenrostro RN  Outcome: Progressing  Flowsheets (Taken 11/6/2024 1054)  Return ADL Status to a Safe Level of Function: Administer medication as ordered

## 2024-11-18 ENCOUNTER — OFFICE VISIT (OUTPATIENT)
Facility: CLINIC | Age: 64
End: 2024-11-18

## 2024-11-18 VITALS
RESPIRATION RATE: 16 BRPM | TEMPERATURE: 98.3 F | OXYGEN SATURATION: 100 % | HEART RATE: 70 BPM | DIASTOLIC BLOOD PRESSURE: 81 MMHG | WEIGHT: 184 LBS | HEIGHT: 67 IN | SYSTOLIC BLOOD PRESSURE: 120 MMHG | BODY MASS INDEX: 28.88 KG/M2

## 2024-11-18 DIAGNOSIS — R55 VASOVAGAL SYNCOPE: ICD-10-CM

## 2024-11-18 DIAGNOSIS — Z09 HOSPITAL DISCHARGE FOLLOW-UP: Primary | ICD-10-CM

## 2024-11-18 DIAGNOSIS — Z23 NEED FOR IMMUNIZATION AGAINST INFLUENZA: ICD-10-CM

## 2024-11-18 DIAGNOSIS — E04.1 THYROID NODULE: ICD-10-CM

## 2024-11-18 DIAGNOSIS — F14.10 COCAINE ABUSE (HCC): ICD-10-CM

## 2024-11-18 DIAGNOSIS — N18.4 STAGE 4 CHRONIC KIDNEY DISEASE (HCC): ICD-10-CM

## 2024-11-18 DIAGNOSIS — Z79.82 CURRENT USE OF ASPIRIN: ICD-10-CM

## 2024-11-18 DIAGNOSIS — G45.9 TIA (TRANSIENT ISCHEMIC ATTACK): ICD-10-CM

## 2024-11-18 PROBLEM — F14.11 HISTORY OF COCAINE ABUSE (HCC): Status: RESOLVED | Noted: 2022-12-01 | Resolved: 2024-11-18

## 2024-11-18 NOTE — ASSESSMENT & PLAN NOTE
DOA: 11/5/2024 LOS:  LOS: 2 days   Discharge Date:  11/7/2024      Admission Diagnosis: TIA (transient ischemic attack) [G45.9]     Discharge Diagnosis: Vasovagal syncope  Nausea vomiting  Bowel incontinence and abdominal cramping  Prior CVA with residual deficits  Stage I MIGUEL on CKD 4  HTN  HLD    Laboratory Studies  Creatinine increased from 2.55 to 2.95, then decreased to 2.42. EGFR decreased from 21 to 17, then increased to 22.  Positive cocaine    Imaging  MRI of the brain was normal, no acute infarcts or hemorrhage, chronic microvascular ischemic disease present. CTA of the head was normal. CTA soft tissue showed a 1.5 cm right thyroid nodule and a 7 mm nodule posterior to the right lobe of the thyroid.

## 2024-11-18 NOTE — ASSESSMENT & PLAN NOTE
CTA head/neck  Heterogeneous right lobe of the thyroid, largest nodule approximately 1.5  cm. Small nodule posterior to the right lobe may represent exophytic thyroid  nodule or parathyroid adenoma. Recommend sonographic follow-up.    Ultrasound thyroid placed

## 2024-11-18 NOTE — ASSESSMENT & PLAN NOTE
She experienced symptoms of a TIA, which are similar to those of a stroke but are not an actual stroke. Her MRI of the brain was normal, showing no acute infarcts or hemorrhage, but did reveal chronic microvascular ischemic disease. The CTA of the head was also normal.

## 2024-11-18 NOTE — ASSESSMENT & PLAN NOTE
Instructed patient on the importance of cessation of cocaine use  Educated patient to use her cocaine may negatively impact her kidneys, increase for blood pressure, close symptoms such as TIAs.  Strongly encourage patient to stop using cocaine

## 2024-11-18 NOTE — ASSESSMENT & PLAN NOTE
She was diagnosed with vasovagal syncope after experiencing dizziness and passing out. Her MRI of the brain was normal, showing no acute infarcts or hemorrhage. The CTA of the head was also normal. She was advised to increase her water intake and avoid non-prescription medications.

## 2024-11-18 NOTE — PROGRESS NOTES
Elina Moya is a 64 y.o. female (: 1960) presenting to address:    Chief Complaint   Patient presents with    Follow-Up from Hospital     TIA (transient ischemic attack)       Vitals:    24 1246   BP: 120/81   Pulse: 70   Resp: 16   Temp: 98.3 °F (36.8 °C)   SpO2: 100%       \"Have you been to the ER, urgent care clinic since your last visit?  Hospitalized since your last visit?\"    YES - When: approximately 1  weeks ago.  Where and Why: TIA.    “Have you seen or consulted any other health care providers outside of [facility] since your last visit?”    NO    “Have you had a colorectal cancer screening such as a colonoscopy/FIT/Cologuard?    NO    No colonoscopy on file  No cologuard on file  No FIT/FOBT on file   No flexible sigmoidoscopy on file              
Immunizations Administered       Name Date Dose Route    Influenza, FLUCELVAX, (age 6 mo+) IM, Trivalent PF, 0.5mL 11/18/2024 0.5 mL Intramuscular    Site: Deltoid- Left    Lot: [id number]    NDC: 31203-842-19            
10y-64y), BOOSTRIX (age 10y+), IM, 0.5mL 11/30/2022         Return if symptoms worsen or fail to improve.      Dr. Anna Kate, NEGRITO-C, DNP  [facility]     The patient (or guardian, if applicable) and other individuals in attendance with the patient were advised that Artificial Intelligence will be utilized during this visit to record and process the conversation to generate a clinical note. The patient (or guardian, if applicable) and other individuals in attendance at the appointment consented to the use of AI, including the recording.

## 2024-11-22 ENCOUNTER — HOSPITAL ENCOUNTER (OUTPATIENT)
Facility: HOSPITAL | Age: 64
Setting detail: SPECIMEN
Discharge: HOME | End: 2024-11-25

## 2024-11-22 LAB — LABCORP SPECIMEN COLLECTION: NORMAL

## 2024-11-22 PROCEDURE — 99001 SPECIMEN HANDLING PT-LAB: CPT

## 2024-12-09 ENCOUNTER — OFFICE VISIT (OUTPATIENT)
Facility: CLINIC | Age: 64
End: 2024-12-09
Payer: MEDICAID

## 2024-12-09 VITALS
RESPIRATION RATE: 18 BRPM | BODY MASS INDEX: 28.88 KG/M2 | HEIGHT: 67 IN | WEIGHT: 184 LBS | SYSTOLIC BLOOD PRESSURE: 128 MMHG | OXYGEN SATURATION: 98 % | DIASTOLIC BLOOD PRESSURE: 85 MMHG | HEART RATE: 90 BPM | TEMPERATURE: 97.9 F

## 2024-12-09 DIAGNOSIS — N18.4 STAGE 4 CHRONIC KIDNEY DISEASE (HCC): ICD-10-CM

## 2024-12-09 DIAGNOSIS — G43.719 INTRACTABLE CHRONIC MIGRAINE WITHOUT AURA AND WITHOUT STATUS MIGRAINOSUS: ICD-10-CM

## 2024-12-09 DIAGNOSIS — I13.10 HYPERTENSIVE HEART AND KIDNEY DISEASE WITHOUT HEART FAILURE AND WITH STAGE 3B CHRONIC KIDNEY DISEASE (HCC): ICD-10-CM

## 2024-12-09 DIAGNOSIS — E04.1 THYROID NODULE: ICD-10-CM

## 2024-12-09 DIAGNOSIS — R53.82 CHRONIC FATIGUE: ICD-10-CM

## 2024-12-09 DIAGNOSIS — N18.32 HYPERTENSIVE HEART AND KIDNEY DISEASE WITHOUT HEART FAILURE AND WITH STAGE 3B CHRONIC KIDNEY DISEASE (HCC): ICD-10-CM

## 2024-12-09 DIAGNOSIS — H61.21 CERUMEN DEBRIS ON TYMPANIC MEMBRANE OF RIGHT EAR: ICD-10-CM

## 2024-12-09 DIAGNOSIS — I10 PRIMARY HYPERTENSION: ICD-10-CM

## 2024-12-09 DIAGNOSIS — M06.9 RHEUMATOID ARTHRITIS INVOLVING BOTH HANDS, UNSPECIFIED WHETHER RHEUMATOID FACTOR PRESENT (HCC): Primary | ICD-10-CM

## 2024-12-09 DIAGNOSIS — E78.2 MIXED HYPERLIPIDEMIA: ICD-10-CM

## 2024-12-09 PROCEDURE — 3079F DIAST BP 80-89 MM HG: CPT | Performed by: NURSE PRACTITIONER

## 2024-12-09 PROCEDURE — 99214 OFFICE O/P EST MOD 30 MIN: CPT | Performed by: NURSE PRACTITIONER

## 2024-12-09 PROCEDURE — 3074F SYST BP LT 130 MM HG: CPT | Performed by: NURSE PRACTITIONER

## 2024-12-09 NOTE — ASSESSMENT & PLAN NOTE
She has an appointment scheduled for a thyroid ultrasound on 12/15/2024.    11/18/2024:  CTA head/neck  Heterogeneous right lobe of the thyroid, largest nodule approximately 1.5  cm. Small nodule posterior to the right lobe may represent exophytic thyroid  nodule or parathyroid adenoma. Recommend sonographic follow-up.    Ultrasound thyroid placed

## 2024-12-09 NOTE — PROGRESS NOTES
[facility] [address]  [address]  [place] [id number]  [phone] office/[phone] fax    12/9/2024    Elina Moya 1960 is a pleasant Black /  female.       History of Present Illness  The patient presents for a regular follow-up.    She has recently undergone lab tests with her nephrologist. Her current medications include amlodipine, atorvastatin, carvedilol 6.25 mg twice daily, Plavix, Zetia, hydralazine, and lisinopril 5 mg. She is also taking Farxiga, prescribed by her nephrologist, and sodium bicarbonate, four tablets in the morning and four at night. For her migraines, she is on Fioricet, prescribed by her neph.    She reports a lack of energy and is seeking a medication to help with this. She admits to staying up late watching TV, which may be contributing to her fatigue. She reports no swelling in her feet.    She has an upcoming appointment for a thyroid US on 12/15/2024.    She uses a cane for mobility and reports limited use of her right hand, which is worsening. She is unable to open her hand without assistance. She can't find an RA that accepts her insurance.    SOCIAL HISTORY  She has stopped using cocaine.      Physical Exam  Right ear has significant cerumen impaction, obscuring the view of the eardrum.    Physical Exam  Constitutional:       Appearance: Normal appearance.   HENT:      Right Ear: Ear canal and external ear normal.      Left Ear: Tympanic membrane, ear canal and external ear normal.      Ears:      Comments: Cerumen Rt ear     Mouth/Throat:      Pharynx: Oropharynx is clear.   Eyes:      Extraocular Movements: Extraocular movements intact.      Conjunctiva/sclera: Conjunctivae normal.   Neck:      Vascular: No carotid bruit.   Cardiovascular:      Rate and Rhythm: Normal rate and regular rhythm.      Pulses: Normal pulses.      Heart sounds: Normal heart sounds.   Pulmonary:      Effort: Pulmonary effort is normal.

## 2024-12-09 NOTE — ASSESSMENT & PLAN NOTE
Triglycerides dropped from 256 to 179. LDL is at 40.  Continue statin therapy  Recheck level 12 months    6/17/2024 OV note:  Borderline controlled, continue current medications and lifestyle modifications recommended  The patient's triglyceride levels have escalated from 190 to 256, while her LDL cholesterol has remained stable at 107 to 74.   Dietary modifications were recommended, including the avoidance of oily and junk food.   Strongly encouraged pt to cont statin and zetia therapy as prescribed  Lakhwinder level 6m

## 2024-12-09 NOTE — ASSESSMENT & PLAN NOTE
Monitored by specialist- no acute findings meriting change in the plan  Dr Donte Rosen, neph  She is under the care of her nephrologist, Dr. Proctor, who has prescribed Farxiga and sodium bicarbonate. She should continue her current medication regimen.

## 2024-12-09 NOTE — ASSESSMENT & PLAN NOTE
Her blood pressure is 128/85. She is currently taking amlodipine, carvedilol 6.25 mg twice a day, hydralazine, and lisinopril 5 mg. She should continue her current medication regimen.    5/20/2023 OV note:  Staff message sent to IVANNA Messina, cardiology  Hello,  FYI.      I saw this patient today.  I had previously prescribed atenolol 50 mg daily, hydralazine 50 mg 3 times daily, and amlodipine 10 mg daily.     Dr Hinds saw pt in April and changed regimen to below:    \"HTN - Stage II pressure in office, Continue hydralazine but increase to 100mg po TID,DC atenolol 50mg po daily can sometimes relapse from cocaine needs alpha blockade, chagne to coreg 12.5mg po bid, continue norvasc 10mg po daily,\"    Patient attempted the change in therapy but developed severe headaches so she returned back to atenolol 50 mg daily, hydralazine 100 mg 2 times daily (cannot member 3 times daily), and amlodipine 10 mg daily.  She no longer experiences headaches but her blood pressure remains elevated.  She denies using cocaine and does not plan on returning back to using.  Today BP is 143/96.  She does have CKD stage IV.  She continues to smoke cigarettes (2 a day) and she consumes salt.  Educated on both of those.    I did not change her BP medication regimen.  She has a follow-up appointment with you on 6/1/2023.    Have a great day!!  Dr. Anna Kate, AGNP-C, DNP    Continue amlodipine 10 mg, hydralazine 100 mg 3 times daily, atenolol 50 mg daily    Strongly encourage patient to quit smoking  Discussed the need for sodium restriction    Follow-up with cardiology 6/1/2023.

## 2024-12-09 NOTE — ASSESSMENT & PLAN NOTE
She admits to staying up late at night.  She reports feeling tired and lacking energy. She was advised to maintain a consistent sleep schedule, going to bed and waking up at the same time daily, even on weekends. Watching TV late at night was discouraged as it can affect sleep quality.

## 2024-12-09 NOTE — PROGRESS NOTES
Elina Moya is a 64 y.o. female (: 1960) presenting to address:    Chief Complaint   Patient presents with    6 Month Follow-Up       Vitals:    24 1423   BP: 128/85   Pulse: 90   Resp: 18   Temp: 97.9 °F (36.6 °C)   SpO2: 98%       \"Have you been to the ER, urgent care clinic since your last visit?  Hospitalized since your last visit?\"    NO    “Have you seen or consulted any other health care providers outside of [facility] since your last visit?”    NO    “Have you had a colorectal cancer screening such as a colonoscopy/FIT/Cologuard?    NO    No colonoscopy on file  No cologuard on file  No FIT/FOBT on file   No flexible sigmoidoscopy on file

## 2024-12-09 NOTE — ASSESSMENT & PLAN NOTE
Monitored by specialist- no acute findings meriting change in the plan   Dr Donte Rosen, neph  Prescribing Fioricet therapy

## 2024-12-09 NOTE — ASSESSMENT & PLAN NOTE
There is significant ear wax in her right ear. She was advised to use Debrox as per the package instructions to clear the ear wax. The use of Q-tips was discouraged.

## 2024-12-09 NOTE — ASSESSMENT & PLAN NOTE
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 Leung-does not take her insurance  A new referral was placed for Mary LARSEN.  Contact information provided to the patient.

## 2024-12-16 ENCOUNTER — HOSPITAL ENCOUNTER (OUTPATIENT)
Facility: HOSPITAL | Age: 64
Discharge: HOME | End: 2024-12-19
Payer: MEDICAID

## 2024-12-16 DIAGNOSIS — E04.1 THYROID NODULE: ICD-10-CM

## 2024-12-16 PROCEDURE — 76536 US EXAM OF HEAD AND NECK: CPT

## 2024-12-18 PROBLEM — Z09 HOSPITAL DISCHARGE FOLLOW-UP: Status: RESOLVED | Noted: 2024-11-18 | Resolved: 2024-12-18

## 2025-01-03 DIAGNOSIS — E04.1 RIGHT THYROID NODULE: Primary | ICD-10-CM

## 2025-01-21 ENCOUNTER — HOSPITAL ENCOUNTER (OUTPATIENT)
Facility: HOSPITAL | Age: 65
Discharge: HOME | End: 2025-01-24
Payer: MEDICAID

## 2025-01-21 DIAGNOSIS — E04.1 RIGHT THYROID NODULE: ICD-10-CM

## 2025-01-21 PROCEDURE — 88173 CYTOPATH EVAL FNA REPORT: CPT

## 2025-01-21 PROCEDURE — 10005 FNA BX W/US GDN 1ST LES: CPT

## 2025-01-21 PROCEDURE — 88172 CYTP DX EVAL FNA 1ST EA SITE: CPT

## 2025-01-29 ENCOUNTER — HOSPITAL ENCOUNTER (OUTPATIENT)
Facility: HOSPITAL | Age: 65
Setting detail: SPECIMEN
Discharge: HOME | End: 2025-02-01

## 2025-01-29 LAB — LABCORP SPECIMEN COLLECTION: NORMAL

## 2025-01-29 PROCEDURE — 99001 SPECIMEN HANDLING PT-LAB: CPT

## 2025-03-08 NOTE — PROGRESS NOTES
HISTORY OF PRESENT ILLNESS  Elina Moya is a 64 y.o. female.    PMH HLD, HTN, history of Cocaine Abuse, history of CVA, history of RA  ----  CARDIAC STUDIES  ----  2d echo 11/2024   Image quality is technically difficult. Technically difficult study due to patient's body habitus and limited study due to patient's ability to tolerate test.    Left Ventricle: Normal left ventricular systolic function with a visually estimated EF of 55 - 60%. Left ventricle size is normal. Mild septal thickening. Normal wall motion. Abnormal diastolic function.  Grade I DD    Right Ventricle: Right ventricle size is normal. Normal systolic function.    Tricuspid Valve: Normal RVSP. The estimated RVSP is 22 mmHg.    Pericardium: Small (<1 cm) pericardial effusion present. No indication of cardiac tamponade.  ----  2d tte 5/15/2023    Left Ventricle: Normal left ventricular systolic function with a visually estimated EF of 55 - 60%. Left ventricle size is normal. Increased wall thickness. Findings consistent with moderate concentric hypertrophy. Normal wall motion. Normal diastolic function.    Left Atrium: Left atrium is moderately dilated. Left atrial volume index is moderately increased (42-48 mL/m2). LA Vol Index A/L is 43 mL/m2.  ----  Nuclear stress test 5/15/2023    Stress Combined Conclusion: Normal pharmacological myocardial perfusion study. Findings suggest a low risk of myocardial ischemia.    Stress Function: Left ventricular function post-stress is normal. Post-stress ejection fraction is 66%.    Perfusion Comments: LV perfusion is normal. There is no evidence of inducible ischemia.    Perfusion Conclusion: There is no evidence of transient ischemic dilation (TID).    ECG: Resting ECG demonstrates normal sinus rhythm.    ECG: The ECG was negative for ischemia.    Stress Test: A pharmacological stress test was performed using lexiscan. Hemodynamics are adequate for diagnosis. Blood pressure demonstrated a normal response

## 2025-03-12 ENCOUNTER — OFFICE VISIT (OUTPATIENT)
Age: 65
End: 2025-03-12
Payer: MEDICAID

## 2025-03-12 VITALS
SYSTOLIC BLOOD PRESSURE: 145 MMHG | WEIGHT: 183 LBS | DIASTOLIC BLOOD PRESSURE: 86 MMHG | HEART RATE: 73 BPM | OXYGEN SATURATION: 95 % | BODY MASS INDEX: 28.66 KG/M2

## 2025-03-12 DIAGNOSIS — I10 HYPERTENSION, UNSPECIFIED TYPE: Primary | ICD-10-CM

## 2025-03-12 DIAGNOSIS — Z87.891 HISTORY OF TOBACCO ABUSE: ICD-10-CM

## 2025-03-12 DIAGNOSIS — Z86.73 HISTORY OF CVA (CEREBROVASCULAR ACCIDENT): ICD-10-CM

## 2025-03-12 DIAGNOSIS — F14.11 HISTORY OF COCAINE ABUSE (HCC): ICD-10-CM

## 2025-03-12 DIAGNOSIS — E78.49 OTHER HYPERLIPIDEMIA: ICD-10-CM

## 2025-03-12 PROCEDURE — 99214 OFFICE O/P EST MOD 30 MIN: CPT | Performed by: INTERNAL MEDICINE

## 2025-03-12 PROCEDURE — 3079F DIAST BP 80-89 MM HG: CPT | Performed by: INTERNAL MEDICINE

## 2025-03-12 PROCEDURE — 3077F SYST BP >= 140 MM HG: CPT | Performed by: INTERNAL MEDICINE

## 2025-03-12 RX ORDER — AMLODIPINE BESYLATE 10 MG/1
10 TABLET ORAL DAILY
Qty: 90 TABLET | Refills: 1 | Status: SHIPPED | OUTPATIENT
Start: 2025-03-12

## 2025-03-12 NOTE — PATIENT INSTRUCTIONS
Learning About the Mediterranean Diet  What is the Mediterranean diet?     The Mediterranean diet is a style of eating rather than a diet plan. It features foods eaten in [place], [place], southern [place], and other countries along the [place] Sea. It emphasizes eating foods like fish, fruits, vegetables, beans, high-fiber breads and whole grains, nuts, and olive oil. This style of eating includes limited red meat, cheese, and sweets.  Why choose the Mediterranean diet?  A Mediterranean-style diet may improve heart health. It contains more fat than other heart-healthy diets. But the fats are mainly from nuts, unsaturated oils (such as fish oils and olive oil), and certain nut or seed oils (such as canola, soybean, or flaxseed oil). These fats may help protect the heart and blood vessels.  How can you get started on the Mediterranean diet?  Here are some things you can do to switch to a more Mediterranean way of eating.  What to eat  Eat a variety of fruits and vegetables each day, such as grapes, blueberries, tomatoes, broccoli, peppers, figs, olives, spinach, eggplant, beans, lentils, and chickpeas.  Eat a variety of whole-grain foods each day, such as oats, brown rice, and whole wheat bread, pasta, and couscous.  Eat fish at least 2 times a week. Try tuna, salmon, mackerel, lake trout, herring, or sardines.  Eat moderate amounts of low-fat dairy products, such as milk, cheese, or yogurt.  Eat moderate amounts of poultry and eggs.  Choose healthy (unsaturated) fats, such as nuts, olive oil, and certain nut or seed oils like canola, soybean, and flaxseed.  Limit unhealthy (saturated) fats, such as butter, palm oil, and coconut oil. And limit fats found in animal products, such as meat and dairy products made with whole milk. Try to eat red meat only a few times a month in very small amounts.  Limit sweets and desserts to only a few times a week. This includes sugar-sweetened drinks like soda.  The

## 2025-03-12 NOTE — PROGRESS NOTES
Have you had Fatigue?  No       2.   Have you chest Pain? No      3.   Have you had Dyspnea (SOB)? No      4.   Have you had Orthopnea? No       5.   Have you had PND? No      6.   Have you had leg swelling? No            7.    Have you had any weight gain? No        8.    Have you had any palpitations? No         9.    Have you had any syncope? No         10. Do you have any wounds on legs? No

## 2025-03-21 ENCOUNTER — HOSPITAL ENCOUNTER (EMERGENCY)
Facility: HOSPITAL | Age: 65
Discharge: HOME | End: 2025-03-21
Payer: MEDICAID

## 2025-03-21 ENCOUNTER — HOSPITAL ENCOUNTER (EMERGENCY)
Facility: HOSPITAL | Age: 65
End: 2025-03-21
Payer: MEDICAID

## 2025-03-21 VITALS
WEIGHT: 180 LBS | TEMPERATURE: 97.6 F | DIASTOLIC BLOOD PRESSURE: 96 MMHG | OXYGEN SATURATION: 99 % | BODY MASS INDEX: 28.25 KG/M2 | SYSTOLIC BLOOD PRESSURE: 145 MMHG | HEART RATE: 73 BPM | RESPIRATION RATE: 18 BRPM | HEIGHT: 67 IN

## 2025-03-21 DIAGNOSIS — M51.34 DDD (DEGENERATIVE DISC DISEASE), THORACIC: ICD-10-CM

## 2025-03-21 DIAGNOSIS — M50.30 DDD (DEGENERATIVE DISC DISEASE), CERVICAL: ICD-10-CM

## 2025-03-21 DIAGNOSIS — R91.1 PULMONARY NODULE: ICD-10-CM
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
## 2025-03-21 DIAGNOSIS — S09.90XA CLOSED HEAD INJURY, INITIAL ENCOUNTER: ICD-10-CM

## 2025-03-21 DIAGNOSIS — S16.1XXA CERVICAL STRAIN, ACUTE, INITIAL ENCOUNTER: ICD-10-CM

## 2025-03-21 DIAGNOSIS — M25.551 RIGHT HIP PAIN: ICD-10-CM

## 2025-03-21 PROCEDURE — 74176 CT ABD & PELVIS W/O CONTRAST: CPT

## 2025-03-21 PROCEDURE — 70450 CT HEAD/BRAIN W/O DYE: CPT

## 2025-03-21 PROCEDURE — 99284 EMERGENCY DEPT VISIT MOD MDM: CPT

## 2025-03-21 PROCEDURE — 72125 CT NECK SPINE W/O DYE: CPT

## 2025-03-21 RX ORDER — ACETAMINOPHEN 500 MG
1000 TABLET ORAL 4 TIMES DAILY PRN
Qty: 30 TABLET | Refills: 1 | Status: SHIPPED | OUTPATIENT
Start: 2025-03-21

## 2025-03-21 RX ORDER — LIDOCAINE 50 MG/G
1 PATCH TOPICAL DAILY
Qty: 10 PATCH | Refills: 0 | Status: SHIPPED | OUTPATIENT
Start: 2025-03-21 | End: 2025-03-31

## 2025-03-21 RX ORDER — METHOCARBAMOL 750 MG/1
750 TABLET, FILM COATED ORAL 4 TIMES DAILY
Qty: 20 TABLET | Refills: 0 | Status: SHIPPED | OUTPATIENT
Start: 2025-03-21 | End: 2025-03-26

## 2025-03-21 ASSESSMENT — PAIN DESCRIPTION - ORIENTATION: ORIENTATION: RIGHT

## 2025-03-21 ASSESSMENT — PAIN DESCRIPTION - DESCRIPTORS: DESCRIPTORS: ACHING

## 2025-03-21 ASSESSMENT — PAIN DESCRIPTION - PAIN TYPE: TYPE: ACUTE PAIN

## 2025-03-21 ASSESSMENT — PAIN SCALES - GENERAL: PAINLEVEL_OUTOF10: 8

## 2025-03-21 ASSESSMENT — PAIN DESCRIPTION - LOCATION: LOCATION: BACK;LEG

## 2025-03-21 ASSESSMENT — PAIN - FUNCTIONAL ASSESSMENT: PAIN_FUNCTIONAL_ASSESSMENT: 0-10

## 2025-03-21 NOTE — ED NOTES
This nurse attempted to call CT scan to see where pt was on list for scanning as pt was becoming inpatient. No answer,. Called x3. Each time got voicemail. This nurse gave pt beverage and crackers as she was pacing and wanting to leave.
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
## 2025-03-21 NOTE — ED PROVIDER NOTES
FARXIGA 10 MG tablet Take 1 tablet by mouth daily      butalbital-acetaminophen-caffeine (FIORICET, ESGIC) -40 MG per tablet Take 1 tablet by mouth every 6 hours as needed         Past History     Past Medical History:   Past Medical History:   Diagnosis Date    Abnormal mammogram 2014    left with biopsy    Acute lacunar stroke (HCC) 05/21/2021    Acute Lacunar Stroke (acute lacunar infarct in the posterior left lentiform nucleus extending into the corona radiata) with residual right hemiparesis    Cerebrovascular accident (CVA) due to embolism of left middle cerebral artery (HCC) 06/06/2022    Chronic kidney disease     Stage 4, not on dialysis    Constipation     Current every day smoker     Current use of aspirin 05/23/2021    Gastroesophageal reflux disease 06/30/2016    Hemiparesis of right dominant side due to acute cerebrovascular disease (HCC) 05/21/2021    History of cocaine abuse (HCC) 12/01/2022    History of Helicobacter pylori infection 07/24/2015    History of iron deficiency anemia 06/2014    History of vitamin D deficiency 06/11/2015    Hypertension     Hypertensive heart and kidney disease without heart failure and with stage 3b chronic kidney disease (HCC)     Dr Hinds T, cardio Dr Donte Rosen, neph    Intractable migraine without aura and without status migrainosus 12/01/2022    Mixed hyperlipidemia     On clopidogrel therapy 05/23/2021    On statin therapy due to risk of future cardiovascular event 05/22/2021    On Atorvastatin    Osteoarthritis     Pancreatitis due to common bile duct stone 2021    Rheumatoid arthritis (HCC)     Secondary hyperparathyroidism of renal origin 05/31/2021    Stage 3b chronic kidney disease (HCC) 07/24/2015    Thyroid nodule 01/2025    FNA revealed benign follicular nodule    Vitamin D deficiency 05/31/2021    Vitamin D 25-Hydroxy (5/31/2021) = 10.5        Past Surgical History:  Past Surgical History:   Procedure Laterality Date    CHOLECYSTECTOMY

## 2025-03-25 ENCOUNTER — TELEPHONE (OUTPATIENT)
Facility: CLINIC | Age: 65
End: 2025-03-25

## 2025-03-25 ENCOUNTER — PATIENT MESSAGE (OUTPATIENT)
Facility: CLINIC | Age: 65
End: 2025-03-25

## 2025-03-25 ENCOUNTER — CLINICAL DOCUMENTATION (OUTPATIENT)
Facility: CLINIC | Age: 65
End: 2025-03-25

## 2025-03-25 NOTE — PROGRESS NOTES
Received a message via patient's chart from the ED provider stating that patient had abnormal CT imaging.    Sent message to HEDY Acosta, to reach out to patient and schedule an appointment to discuss her CT results and possible intervention.    3/21/2025 ED  CT chest abdomen pelvis  1. No acute abnormality.  2. Multiple small pulmonary nodules, measuring up to 7 mm in the right lower  lobe. If no outside hospital studies are available for comparison, recommend  follow-up chest CT in 3-6 months per Fleischner criteria.  3. Large serpiginous lucent lesion with narrow zone of transition in the right  iliac bone. This does not appear aggressive, though is increased in size since  remote prior abdominal radiographs from 2011. Recommend correlation with outside  [facility] 2/19/2022 to assess for more recent  Stability    CT C-spine IMPRESSION:     There is no acute bony abnormality or subluxation.  Mild straightening of the normal cervical lordosis without other alignment  abnormalities; possibly positional.  Mild spondylitic degenerative change C5-6 and C6-7. Canal and foramina are  patent throughout.  Small nodule in the right lobe of the thyroid, stable (History of benign  biopsy).
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
## 2025-04-03 ENCOUNTER — TELEMEDICINE (OUTPATIENT)
Facility: CLINIC | Age: 65
End: 2025-04-03
Payer: MEDICAID

## 2025-04-03 DIAGNOSIS — M25.551 ACUTE RIGHT HIP PAIN: Primary | ICD-10-CM

## 2025-04-03 DIAGNOSIS — R93.89 ABNORMAL CAT SCAN: ICD-10-CM

## 2025-04-03 PROCEDURE — 99214 OFFICE O/P EST MOD 30 MIN: CPT | Performed by: NURSE PRACTITIONER

## 2025-04-03 ASSESSMENT — PATIENT HEALTH QUESTIONNAIRE - PHQ9
2. FEELING DOWN, DEPRESSED OR HOPELESS: NOT AT ALL
SUM OF ALL RESPONSES TO PHQ QUESTIONS 1-9: 0
1. LITTLE INTEREST OR PLEASURE IN DOING THINGS: NOT AT ALL
SUM OF ALL RESPONSES TO PHQ QUESTIONS 1-9: 0

## 2025-04-03 NOTE — ASSESSMENT & PLAN NOTE
- The patient has an appointment with her rheumatologist, Dr. Galindo, next week.  - She is advised to discuss the recent CT scan findings with him to assess if the serpingious lesion on the right iliac bone is related to her rheumatoid arthritis.  - Follow-up with the rheumatologist is essential to determine the appropriate management plan for her condition.

## 2025-04-03 NOTE — ASSESSMENT & PLAN NOTE
- The patient reports significant pain in the right hip, making it difficult to walk or stand. The pain is rated at an 8 out of 10.  - Previous imaging showed a lucent lesion on the right iliac bone, which has increased in size but does not appear aggressive.  - She is currently taking Tylenol, Robaxin, and using lidocaine patches for pain management.  - A referral to Dr. Marx's orthopedic group will be initiated for further evaluation of the right hip.
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
## 2025-04-07 NOTE — PROGRESS NOTES
WITH PELVIS 2-3 VIEWS     DRAIN/INJECT LARGE JOINT/BURSA     betamethasone acetate-betamethasone sodium phosphate (CELESTONE) injection 3 mg     BUPivacaine (MARCAINE) 0.5 % injection 20 mg     Ambulatory referral to Physical Therapy      2. Unilateral primary osteoarthritis, right hip  M16.11 DRAIN/INJECT LARGE JOINT/BURSA     betamethasone acetate-betamethasone sodium phosphate (CELESTONE) injection 3 mg     BUPivacaine (MARCAINE) 0.5 % injection 20 mg     Ambulatory referral to Physical Therapy      3. Trochanteric bursitis, right hip  M70.61 DRAIN/INJECT LARGE JOINT/BURSA     betamethasone acetate-betamethasone sodium phosphate (CELESTONE) injection 3 mg     BUPivacaine (MARCAINE) 0.5 % injection 20 mg     Ambulatory referral to Physical Therapy      4. Contracture of joint of both hands  M24.541 Ambulatory referral to Hand Surgery    M24.542       5. Rheumatoid arthritis, involving unspecified site, unspecified whether rheumatoid factor present ([facility])  M06.9       6. Strain of lumbar region, initial encounter  S39.012A Ambulatory referral to Physical Therapy        PLAN:  Right trochanteric bursa injected 4 cc 0.25% Marcaine and 0.5 cc (3 mg) Celestone. Dr. Mckeon consultation. Advised the patient to take OTC Tylenol for pain. She will follow up as needed.    Documentation by mariana Garg, as documented by Calderon Odonnell MD.

## 2025-04-10 ENCOUNTER — OFFICE VISIT (OUTPATIENT)
Age: 65
End: 2025-04-10

## 2025-04-10 VITALS — HEIGHT: 67 IN | WEIGHT: 183.6 LBS | BODY MASS INDEX: 28.82 KG/M2

## 2025-04-10 DIAGNOSIS — S39.012A STRAIN OF LUMBAR REGION, INITIAL ENCOUNTER: ICD-10-CM

## 2025-04-10 DIAGNOSIS — M24.541 CONTRACTURE OF JOINT OF BOTH HANDS: ICD-10-CM

## 2025-04-10 DIAGNOSIS — M25.551 HIP PAIN, RIGHT: Primary | ICD-10-CM

## 2025-04-10 DIAGNOSIS — M70.61 TROCHANTERIC BURSITIS, RIGHT HIP: ICD-10-CM

## 2025-04-10 DIAGNOSIS — M16.11 UNILATERAL PRIMARY OSTEOARTHRITIS, RIGHT HIP: ICD-10-CM

## 2025-04-10 DIAGNOSIS — M06.9 RHEUMATOID ARTHRITIS, INVOLVING UNSPECIFIED SITE, UNSPECIFIED WHETHER RHEUMATOID FACTOR PRESENT (HCC): ICD-10-CM

## 2025-04-10 DIAGNOSIS — M24.542 CONTRACTURE OF JOINT OF BOTH HANDS: ICD-10-CM

## 2025-04-10 RX ORDER — BUPIVACAINE HYDROCHLORIDE 5 MG/ML
4 INJECTION, SOLUTION PERINEURAL ONCE
Status: COMPLETED | OUTPATIENT
Start: 2025-04-10 | End: 2025-04-10

## 2025-04-10 RX ORDER — BETAMETHASONE SODIUM PHOSPHATE AND BETAMETHASONE ACETATE 3; 3 MG/ML; MG/ML
3 INJECTION, SUSPENSION INTRA-ARTICULAR; INTRALESIONAL; INTRAMUSCULAR; SOFT TISSUE ONCE
Status: COMPLETED | OUTPATIENT
Start: 2025-04-10 | End: 2025-04-10

## 2025-04-10 RX ADMIN — BETAMETHASONE SODIUM PHOSPHATE AND BETAMETHASONE ACETATE 3 MG: 3; 3 INJECTION, SUSPENSION INTRA-ARTICULAR; INTRALESIONAL; INTRAMUSCULAR; SOFT TISSUE at 14:44

## 2025-04-10 RX ADMIN — BUPIVACAINE HYDROCHLORIDE 20 MG: 5 INJECTION, SOLUTION PERINEURAL at 14:44

## 2025-04-21 ENCOUNTER — HOSPITAL ENCOUNTER (OUTPATIENT)
Facility: HOSPITAL | Age: 65
Discharge: HOME | End: 2025-04-24
Payer: MEDICAID

## 2025-04-21 VITALS — HEIGHT: 67 IN | BODY MASS INDEX: 28.75 KG/M2

## 2025-04-21 DIAGNOSIS — Z12.31 VISIT FOR SCREENING MAMMOGRAM: ICD-10-CM

## 2025-04-21 PROCEDURE — 77063 BREAST TOMOSYNTHESIS BI: CPT

## 2025-05-07 DIAGNOSIS — M25.551 HIP PAIN, RIGHT: Primary | ICD-10-CM

## 2025-05-07 DIAGNOSIS — M16.11 UNILATERAL PRIMARY OSTEOARTHRITIS, RIGHT HIP: ICD-10-CM

## 2025-05-07 RX ORDER — LIDOCAINE 50 MG/G
1 PATCH TOPICAL DAILY
Qty: 30 PATCH | Refills: 0 | Status: SHIPPED | OUTPATIENT
Start: 2025-05-07 | End: 2025-06-06

## 2025-05-07 NOTE — TELEPHONE ENCOUNTER
Patient's granddaughter called in and has requested that pain patches and something for pain be sent into the pharmacy for her grandmother the patient. She state that the patient is having severe pain to the right hip    Please advise Patricia @ [phone]

## 2025-05-13 ENCOUNTER — OFFICE VISIT (OUTPATIENT)
Age: 65
End: 2025-05-13
Payer: MEDICAID

## 2025-05-13 VITALS — HEIGHT: 67 IN | WEIGHT: 184 LBS | BODY MASS INDEX: 28.88 KG/M2

## 2025-05-13 DIAGNOSIS — M20.031 SWAN-NECK DEFORMITY OF FINGER OF BOTH HANDS: ICD-10-CM

## 2025-05-13 DIAGNOSIS — M05.742 RHEUMATOID ARTHRITIS INVOLVING BOTH HANDS WITH POSITIVE RHEUMATOID FACTOR (HCC): Primary | ICD-10-CM

## 2025-05-13 DIAGNOSIS — M20.032 SWAN-NECK DEFORMITY OF FINGER OF BOTH HANDS: ICD-10-CM

## 2025-05-13 DIAGNOSIS — M05.741 RHEUMATOID ARTHRITIS INVOLVING BOTH HANDS WITH POSITIVE RHEUMATOID FACTOR (HCC): Primary | ICD-10-CM

## 2025-05-13 DIAGNOSIS — M24.541 CONTRACTURE, RIGHT HAND: ICD-10-CM

## 2025-05-13 DIAGNOSIS — R20.2 RIGHT HAND PARESTHESIA: ICD-10-CM

## 2025-05-13 PROCEDURE — 99214 OFFICE O/P EST MOD 30 MIN: CPT

## 2025-05-13 NOTE — PROGRESS NOTES
Elina Moya is a 64 y.o. female previously right handed, now left handed, retiree.  Worker's Compensation and legal considerations: none    Chief Complaint   Patient presents with    Hand Pain     Bilateral       Pain Score:   8    Subjective:     Initial HPI: Patient presents to with a concern of bilateral hand deformities, decreased use of right hand, numbness and tingling in right hand.  Reports symptoms have been ongoing for 12 to 13 years.  Patient states she was previously right handed however now she has to be left handed due to minimal use of her right hand.    She has a known history of rheumatoid arthritis and last saw her rheumatologist approximately 2 years ago.  She has not been able to see another rheumatologist due to insurance restrictions.  She also had a stroke a few years ago with right side hemiparesis.    Date of onset: Chronic  Injury: No  Prior Treatment:  No  Contributory history: Rheumatoid arthritis (not on DMARD medication), CVA affecting right side    ROS: Review of Systems - General ROS: negative except HPI    Past Medical History:   Diagnosis Date    Abnormal mammogram 2014    left with biopsy    Acute lacunar stroke (HCC) 05/21/2021    Acute Lacunar Stroke (acute lacunar infarct in the posterior left lentiform nucleus extending into the corona radiata) with residual right hemiparesis    Cerebrovascular accident (CVA) due to embolism of left middle cerebral artery (HCC) 06/06/2022    Chronic kidney disease     Stage 4, not on dialysis    Constipation     Current every day smoker     Current use of aspirin 05/23/2021    Gastroesophageal reflux disease 06/30/2016    Hemiparesis of right dominant side due to acute cerebrovascular disease (HCC) 05/21/2021    History of cocaine abuse (HCC) 12/01/2022    History of Helicobacter pylori infection 07/24/2015    History of iron deficiency anemia 06/2014    History of vitamin D deficiency 06/11/2015    Hypertension     Hypertensive heart

## 2025-05-16 ENCOUNTER — TELEPHONE (OUTPATIENT)
Facility: CLINIC | Age: 65
End: 2025-05-16

## 2025-05-16 DIAGNOSIS — M06.9 RHEUMATOID ARTHRITIS, INVOLVING UNSPECIFIED SITE, UNSPECIFIED WHETHER RHEUMATOID FACTOR PRESENT (HCC): Primary | ICD-10-CM
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
## 2025-05-16 NOTE — TELEPHONE ENCOUNTER
Attempted to contact pt granddaughter Mervat Malagon to discuss Rheumatology referral. No answer. VM left to return call.      FATIMAH Carlos LPN

## 2025-05-16 NOTE — TELEPHONE ENCOUNTER
Patient Granddaughter Patricia Vesna return call. Advised that due to ins, no provider listed locally. Advised referral could be sent to [facility] rheumatology Dr. Enoc Parra. Granddaughter verbalized understanding and agreed. Referral updated and sent to provider for review. Will send POC via [employer].     FATIMAH Carlos LPN

## 2025-05-16 NOTE — TELEPHONE ENCOUNTER
Per [employer] Website, Dr. Valdez is in network with insurance, all other in network providers are in [facility].

## 2025-05-16 NOTE — TELEPHONE ENCOUNTER
Pts granddaughter Patricia Malagon on [employer] called in states she needs a new referral for a Rheumatologist. She asked if they can be closer to the Pts home and within network of her insurance.     Please advise.     Call back req

## 2025-05-16 NOTE — TELEPHONE ENCOUNTER
Magan Marino,    I have contacted Dr. Valdez's office. They do not accept patient's ins. I will contact pt. Dr. Enoc Parra will be the patient's best choice as of right now which is who we normally would refer to unless the patient can contact ins or reach out to some others. I will notify pt.    FATIMAH Carlos LPN

## 2025-05-20 ENCOUNTER — OFFICE VISIT (OUTPATIENT)
Age: 65
End: 2025-05-20
Payer: MEDICAID

## 2025-05-20 VITALS — WEIGHT: 184 LBS | BODY MASS INDEX: 28.88 KG/M2 | HEIGHT: 67 IN

## 2025-05-20 DIAGNOSIS — M70.61 TROCHANTERIC BURSITIS, RIGHT HIP: ICD-10-CM

## 2025-05-20 DIAGNOSIS — M24.541 CONTRACTURE OF JOINT OF BOTH HANDS: ICD-10-CM

## 2025-05-20 DIAGNOSIS — M24.542 CONTRACTURE OF JOINT OF BOTH HANDS: ICD-10-CM

## 2025-05-20 DIAGNOSIS — M25.551 RIGHT HIP PAIN: Primary | ICD-10-CM

## 2025-05-20 DIAGNOSIS — M16.11 UNILATERAL PRIMARY OSTEOARTHRITIS, RIGHT HIP: ICD-10-CM

## 2025-05-20 DIAGNOSIS — M54.16 LUMBAR RADICULOPATHY: ICD-10-CM

## 2025-05-20 DIAGNOSIS — M06.9 RHEUMATOID ARTHRITIS, INVOLVING UNSPECIFIED SITE, UNSPECIFIED WHETHER RHEUMATOID FACTOR PRESENT (HCC): ICD-10-CM

## 2025-05-20 PROCEDURE — 99213 OFFICE O/P EST LOW 20 MIN: CPT | Performed by: SPECIALIST

## 2025-05-20 PROCEDURE — 20610 DRAIN/INJ JOINT/BURSA W/O US: CPT | Performed by: SPECIALIST

## 2025-05-20 RX ORDER — BUPIVACAINE HYDROCHLORIDE 5 MG/ML
4 INJECTION, SOLUTION PERINEURAL ONCE
Status: COMPLETED | OUTPATIENT
Start: 2025-05-20 | End: 2025-05-20

## 2025-05-20 RX ORDER — BETAMETHASONE SODIUM PHOSPHATE AND BETAMETHASONE ACETATE 3; 3 MG/ML; MG/ML
3 INJECTION, SUSPENSION INTRA-ARTICULAR; INTRALESIONAL; INTRAMUSCULAR; SOFT TISSUE ONCE
Status: COMPLETED | OUTPATIENT
Start: 2025-05-20 | End: 2025-05-20

## 2025-05-20 RX ADMIN — BUPIVACAINE HYDROCHLORIDE 20 MG: 5 INJECTION, SOLUTION PERINEURAL at 10:45

## 2025-05-20 RX ADMIN — BETAMETHASONE SODIUM PHOSPHATE AND BETAMETHASONE ACETATE 3 MG: 3; 3 INJECTION, SUSPENSION INTRA-ARTICULAR; INTRALESIONAL; INTRAMUSCULAR; SOFT TISSUE at 10:44

## 2025-05-23 ENCOUNTER — HOSPITAL ENCOUNTER (OUTPATIENT)
Facility: HOSPITAL | Age: 65
Setting detail: RECURRING SERIES
Discharge: HOME | End: 2025-05-26
Payer: MEDICAID

## 2025-05-23 PROCEDURE — 97535 SELF CARE MNGMENT TRAINING: CPT

## 2025-05-23 PROCEDURE — 97760 ORTHOTIC MGMT&TRAING 1ST ENC: CPT

## 2025-05-23 PROCEDURE — 97167 OT EVAL HIGH COMPLEX 60 MIN: CPT

## 2025-05-23 NOTE — PROGRESS NOTES
OT DAILY TREATMENT NOTE/HAND EVAL 10-18    Patient Name: Elina Moya    Date: 2025    : 1960  Insurance: Payor: [facility] / Plan: [employer] [place] / Product Type: *No Product type* /      Patient  verified yes     Visit #   Current / Total 1 6   Time   In / Out [id number]   Pain   In / Out 7 7   Subjective Functional Status/Changes: See history below    Changes to:  Meds, Allergies, Med Hx, Sx Hx?  If yes, update Summary List no       TREATMENT AREA =  Rheumatoid arthritis involving both hands with positive rheumatoid factor (HCC) [M05.741, M05.742]  Contracture, right hand [M24.541]    SUBJECTIVE  Pain Level (0-10 scale): 7  [x]constant []intermittent []improving []worsening []no change since onset    Any medication changes, allergies to medications, adverse drug reactions, diagnosis change, or new procedure performed?: [x] No    [] Yes (see summary sheet for update)  Subjective functional status/changes:     PLOF: Pt has had bilateral hand contractures for over 10 years, reports she has never had treatment or braces on bilateral hands ; pt is a right hand dominant  Limitations to PLOF: Pain, hand deformities   Mechanism of Injury: Progressive RA over the past 10 years, has no been on medical intervention for RA over the past 2 years. Previous CVA effecting right side over 3 years ago  Current symptoms/Complaints: Pain, hand deformities   Previous Treatment/Compliance: Pt reports none  PMHx/Surgical Hx: RA, CVA   Work Hx: Pt is on disability   Living Situation: Pt has aid at home, required assistance with IADLs in house, reports independence with self care tasks.   Pt Goals: \"use the hands better\"   Barriers: []pain []financial []time [x]transportation []other  Motivation: pt appears motivated   Cognition: A & O x 3    Other:    OBJECTIVE    15 min [x]Eval - untimed                           Therapeutic Procedures:  Tx Min Billable or 1:1 Min (if diff

## 2025-05-23 NOTE — PROGRESS NOTES
[facility]  [address] [facility] [address] [place] [address] Ph:[phone] Fx: [phone]    Plan of Care/Statement of Necessity for Occupational Therapy Services        Patient name: Elina Moya Start of Care: 2025   Referral source: Vera Mcpherson PA-C : 1960    Medical Diagnosis: Rheumatoid arthritis involving both hands with positive rheumatoid factor (HCC)  Contracture, right hand   Onset Date:2015    Treatment Diagnosis: M79.641  Pain in right hand and M79.642  Pain in left hand     Prior Hospitalization: see medical history Provider#: [id number]     Comorbidities: Other: CVA, RA, Htn, OA,    Prior Level of Function: Pt has had bilateral hand contractures for over 10 years, reports she has never had treatment or braces on bilateral hands    The Plan of Care and following information is based on the information from the initial evaluation.  Assessment/ key information: Pt presents with bilateral hand deformities stemming from uncontrolled rheumatoid arhthrisits that was diagnosed over 10 years ago, as well as CVA impacting right side over 3 years ago. Pt has multiple swan neck deformities, EDC subluxation on both right and left hands. Non passively correctable DIP flexion on the right small/middle finger and left small and ring finger. Pt educated on what therapy/splinting would be able to provide for the patient. Pt educated on the goals of therapy would be to find the right fit for orthosis for the fingers and hand to allow for increased function of the hand, as well as preventing from deformities from getting worse. Pt educated that there will have to be immobilization of some of the joints in order to promote use of the IP joints and improve function. Pt was given oval 8 orthosis for left middle and ring finger to prevent swan neck deformity which was successful, but only able to passively open MP joints due to subluxation of EDC,

## 2025-05-27 ENCOUNTER — APPOINTMENT (OUTPATIENT)
Facility: HOSPITAL | Age: 65
End: 2025-05-27
Attending: SPECIALIST
Payer: MEDICAID

## 2025-06-04 ENCOUNTER — HOSPITAL ENCOUNTER (OUTPATIENT)
Facility: HOSPITAL | Age: 65
Setting detail: SPECIMEN
Discharge: HOME | End: 2025-06-07

## 2025-06-04 ENCOUNTER — APPOINTMENT (OUTPATIENT)
Facility: HOSPITAL | Age: 65
End: 2025-06-04
Attending: SPECIALIST
Payer: MEDICAID

## 2025-06-04 LAB — LABCORP SPECIMEN COLLECTION: NORMAL

## 2025-06-04 PROCEDURE — 99001 SPECIMEN HANDLING PT-LAB: CPT

## 2025-06-05 ENCOUNTER — APPOINTMENT (OUTPATIENT)
Facility: HOSPITAL | Age: 65
End: 2025-06-05
Attending: SPECIALIST
Payer: MEDICAID

## 2025-06-12 ENCOUNTER — HOSPITAL ENCOUNTER (OUTPATIENT)
Facility: HOSPITAL | Age: 65
Setting detail: RECURRING SERIES
End: 2025-06-12
Attending: SPECIALIST
Payer: MEDICAID

## 2025-06-12 DIAGNOSIS — E78.2 MIXED HYPERLIPIDEMIA: ICD-10-CM

## 2025-06-12 RX ORDER — ATORVASTATIN CALCIUM 80 MG/1
80 TABLET, FILM COATED ORAL DAILY
Qty: 90 TABLET | Refills: 3 | Status: SHIPPED | OUTPATIENT
Start: 2025-06-12

## 2025-06-12 RX ORDER — LISINOPRIL 5 MG/1
5 TABLET ORAL DAILY
Qty: 90 TABLET | Refills: 3 | Status: SHIPPED | OUTPATIENT
Start: 2025-06-12

## 2025-06-12 RX ORDER — EZETIMIBE 10 MG/1
10 TABLET ORAL DAILY
Qty: 90 TABLET | Refills: 3 | Status: SHIPPED | OUTPATIENT
Start: 2025-06-12

## 2025-06-19 ENCOUNTER — HOSPITAL ENCOUNTER (OUTPATIENT)
Facility: HOSPITAL | Age: 65
Setting detail: RECURRING SERIES
Discharge: HOME | End: 2025-06-22
Attending: SPECIALIST
Payer: MEDICAID

## 2025-06-19 PROCEDURE — 97535 SELF CARE MNGMENT TRAINING: CPT

## 2025-06-19 PROCEDURE — 97763 ORTHC/PROSTC MGMT SBSQ ENC: CPT

## 2025-06-19 NOTE — PROGRESS NOTES
PHYSICAL / OCCUPATIONAL THERAPY - DAILY TREATMENT NOTE     Patient Name: Elina Moya    Date: 2025    : 1960  Insurance: Payor: [facility] / Plan: [employer] [place] / Product Type: *No Product type* /      Patient  verified Yes     Visit #   Current / Total 2 6   Time   In / Out 156 235   Pain   In / Out 0 0   Subjective Functional Status/Changes: \"The last one didn't work\"    Changes to:  Allergies, Med Hx, Sx Hx?   no       TREATMENT AREA =  Rheumatoid arthritis involving both hands with positive rheumatoid factor (HCC) [M05.741, M05.742]  Contracture, right hand [M24.541]    If an interpreting service is utilized for treatment of this patient, the contents of this document represent the material reviewed with the patient via the .     OBJECTIVE        Therapeutic Procedures:  Tx Min Billable or 1:1 Min (if diff from Tx Min) Procedure, Rationale, Specifics   31  16146 Orthotic Management and Training (timed): improve positioning of lower extremity during weight bearing and gait, improve pressure distrubution of the plantar aspect of the foot to improve patient's ability to progress to PLOF and address remaining functional goals.    Details if applicable:       8  62052 Self Care/Home Management (timed):  improve patient knowledge and understanding of home injury/symptom/pain management  to improve patient's ability to progress to PLOF and address remaining functional goals.  (see flow sheet as applicable)    Details if applicable:                    39  [facility] Totals Reminder: bill using total billable min of TIMED therapeutic procedures (example: do not include dry needle or estim unattended, both untimed codes, in totals to left)  8-22 min = 1 unit; 23-37 min = 2 units; 38-52 min = 3 units; 53-67 min = 4 units; 68-82 min = 5 units   Total Total     TOTAL TREATMENT TIME:        39     Charge Capture    [x]  Patient Education billed

## 2025-07-02 DIAGNOSIS — I63.412 CEREBROVASCULAR ACCIDENT (CVA) DUE TO EMBOLISM OF LEFT MIDDLE CEREBRAL ARTERY (HCC): ICD-10-CM

## 2025-07-03 ENCOUNTER — TELEPHONE (OUTPATIENT)
Facility: HOSPITAL | Age: 65
End: 2025-07-03

## 2025-07-03 RX ORDER — CARVEDILOL 6.25 MG/1
6.25 TABLET ORAL 2 TIMES DAILY
Qty: 180 TABLET | Refills: 0 | Status: SHIPPED | OUTPATIENT
Start: 2025-07-03

## 2025-07-03 RX ORDER — CLOPIDOGREL BISULFATE 75 MG/1
75 TABLET ORAL DAILY
Qty: 90 TABLET | Refills: 0 | Status: SHIPPED | OUTPATIENT
Start: 2025-07-03

## 2025-07-09 ENCOUNTER — TELEPHONE (OUTPATIENT)
Facility: HOSPITAL | Age: 65
End: 2025-07-09

## 2025-07-09 NOTE — TELEPHONE ENCOUNTER
Spoke with Patient and her and [facility] and noted that she wantes to be discharged from OT at this time.

## 2025-08-18 RX ORDER — LIDOCAINE 50 MG/G
1 PATCH TOPICAL DAILY
Qty: 30 PATCH | Refills: 0 | Status: SHIPPED | OUTPATIENT
Start: 2025-08-18 | End: 2025-09-17

## 2025-08-26 ENCOUNTER — TELEPHONE (OUTPATIENT)
Facility: CLINIC | Age: 65
End: 2025-08-26

## 2025-09-03 ENCOUNTER — OFFICE VISIT (OUTPATIENT)
Facility: CLINIC | Age: 65
End: 2025-09-03
Payer: MEDICAID

## 2025-09-03 ENCOUNTER — TELEPHONE (OUTPATIENT)
Facility: CLINIC | Age: 65
End: 2025-09-03

## 2025-09-03 VITALS
HEIGHT: 67 IN | HEART RATE: 76 BPM | TEMPERATURE: 98.2 F | DIASTOLIC BLOOD PRESSURE: 92 MMHG | SYSTOLIC BLOOD PRESSURE: 134 MMHG | WEIGHT: 180 LBS | RESPIRATION RATE: 16 BRPM | OXYGEN SATURATION: 96 % | BODY MASS INDEX: 28.25 KG/M2

## 2025-09-03 DIAGNOSIS — N18.32 HYPERTENSIVE HEART AND KIDNEY DISEASE WITHOUT HEART FAILURE AND WITH STAGE 3B CHRONIC KIDNEY DISEASE (HCC): ICD-10-CM

## 2025-09-03 DIAGNOSIS — I10 PRIMARY HYPERTENSION: ICD-10-CM

## 2025-09-03 DIAGNOSIS — I13.10 HYPERTENSIVE HEART AND KIDNEY DISEASE WITHOUT HEART FAILURE AND WITH STAGE 3B CHRONIC KIDNEY DISEASE (HCC): ICD-10-CM

## 2025-09-03 DIAGNOSIS — Z78.9 DEFICIT IN ACTIVITIES OF DAILY LIVING (ADL): ICD-10-CM

## 2025-09-03 DIAGNOSIS — F51.01 PRIMARY INSOMNIA: Primary | ICD-10-CM

## 2025-09-03 PROCEDURE — 3080F DIAST BP >= 90 MM HG: CPT | Performed by: NURSE PRACTITIONER

## 2025-09-03 PROCEDURE — 99214 OFFICE O/P EST MOD 30 MIN: CPT | Performed by: NURSE PRACTITIONER

## 2025-09-03 PROCEDURE — 3075F SYST BP GE 130 - 139MM HG: CPT | Performed by: NURSE PRACTITIONER

## 2025-09-03 RX ORDER — LISINOPRIL 5 MG/1
10 TABLET ORAL DAILY
Qty: 90 TABLET | Refills: 3 | Status: SHIPPED
Start: 2025-09-03

## 2025-09-03 RX ORDER — LEFLUNOMIDE 10 MG/1
10 TABLET ORAL DAILY
COMMUNITY

## (undated) DEVICE — COVER LT HNDL BLU STRL -- MEDICHOICE

## (undated) DEVICE — ARM DRAPE

## (undated) DEVICE — INTENDED FOR TISSUE SEPARATION, AND OTHER PROCEDURES THAT REQUIRE A SHARP SURGICAL BLADE TO PUNCTURE OR CUT.: Brand: BARD-PARKER ®  SAFETY SCALPED

## (undated) DEVICE — TISSUE RETRIEVAL SYSTEM: Brand: INZII RETRIEVAL SYSTEM

## (undated) DEVICE — Device

## (undated) DEVICE — DRAPE TOWEL: Brand: CONVERTORS

## (undated) DEVICE — SYR 10ML LUER LOK 1/5ML GRAD --

## (undated) DEVICE — COLUMN DRAPE

## (undated) DEVICE — SYR LR LCK 1ML GRAD NSAF 30ML --

## (undated) DEVICE — BLADELESS OBTURATOR: Brand: WECK VISTA

## (undated) DEVICE — INSUFFLATION NEEDLE TO ESTABLISH PNEUMOPERITONEUM.: Brand: INSUFFLATION NEEDLE

## (undated) DEVICE — COVER MPLR TIP CRV SCIS ACC DA VINCI

## (undated) DEVICE — SOLUTION IRRIGATION SODIUM CHL 0.9% 100 ML BTL

## (undated) DEVICE — SUTURE SZ 0 27IN 5/8 CIR UR-6  TAPER PT VIOLET ABSRB VICRYL J603H

## (undated) DEVICE — REM POLYHESIVE ADULT PATIENT RETURN ELECTRODE: Brand: VALLEYLAB

## (undated) DEVICE — STERILE POLYISOPRENE POWDER-FREE SURGICAL GLOVES: Brand: PROTEXIS

## (undated) DEVICE — NDL PRT INJ NSAF BLNT 18GX1.5 --

## (undated) DEVICE — SEAL UNIV 5-8MM DISP BX/10 -- DA VINCI XI - SNGL USE

## (undated) DEVICE — TROCAR: Brand: KII SHIELDED BLADED ACCESS SYSTEM

## (undated) DEVICE — CLIP INT M L POLYMER LOK LIG HEM O LOK

## (undated) DEVICE — TROCAR: Brand: KII® OPTICAL ACCESS SYSTEM

## (undated) DEVICE — DERMABOND SKIN ADH 0.7ML -- DERMABOND ADVANCED 12/BX

## (undated) DEVICE — PREP SKN CHLRAPRP APL 26ML STR --

## (undated) DEVICE — SUTURE MCRYL SZ 4-0 L27IN ABSRB UD L24MM PS-1 3/8 CIR PRIM Y935H